# Patient Record
Sex: MALE | Race: WHITE | NOT HISPANIC OR LATINO | Employment: OTHER | URBAN - METROPOLITAN AREA
[De-identification: names, ages, dates, MRNs, and addresses within clinical notes are randomized per-mention and may not be internally consistent; named-entity substitution may affect disease eponyms.]

---

## 2017-01-01 ENCOUNTER — ANESTHESIA EVENT (OUTPATIENT)
Dept: PERIOP | Facility: HOSPITAL | Age: 77
End: 2017-01-01

## 2017-01-01 ENCOUNTER — APPOINTMENT (OUTPATIENT)
Dept: CARDIOLOGY | Facility: HOSPITAL | Age: 77
End: 2017-01-01

## 2017-01-01 ENCOUNTER — HOSPITAL ENCOUNTER (INPATIENT)
Facility: HOSPITAL | Age: 77
LOS: 12 days | End: 2017-03-18
Attending: FAMILY MEDICINE | Admitting: HOSPITALIST

## 2017-01-01 ENCOUNTER — APPOINTMENT (OUTPATIENT)
Dept: GENERAL RADIOLOGY | Facility: HOSPITAL | Age: 77
End: 2017-01-01

## 2017-01-01 ENCOUNTER — ANESTHESIA (OUTPATIENT)
Dept: PERIOP | Facility: HOSPITAL | Age: 77
End: 2017-01-01

## 2017-01-01 ENCOUNTER — HOSPITAL ENCOUNTER (EMERGENCY)
Facility: HOSPITAL | Age: 77
Discharge: HOME | End: 2017-02-25
Attending: EMERGENCY MEDICINE | Admitting: EMERGENCY MEDICINE

## 2017-01-01 ENCOUNTER — HOSPITAL ENCOUNTER (INPATIENT)
Facility: HOSPITAL | Age: 77
LOS: 24 days | Discharge: NURSING, REHAB OR LONG-TERM CARE | End: 2017-02-13
Attending: HOSPITALIST | Admitting: HOSPITALIST

## 2017-01-01 ENCOUNTER — APPOINTMENT (OUTPATIENT)
Dept: CARDIOLOGY | Facility: HOSPITAL | Age: 77
End: 2017-01-01
Attending: SURGERY

## 2017-01-01 ENCOUNTER — APPOINTMENT (OUTPATIENT)
Dept: CT IMAGING | Facility: HOSPITAL | Age: 77
End: 2017-01-01

## 2017-01-01 ENCOUNTER — APPOINTMENT (OUTPATIENT)
Dept: GENERAL RADIOLOGY | Facility: HOSPITAL | Age: 77
End: 2017-01-01
Attending: SURGERY

## 2017-01-01 ENCOUNTER — TELEPHONE (OUTPATIENT)
Dept: INFECTIOUS DISEASES | Facility: CLINIC | Age: 77
End: 2017-01-01

## 2017-01-01 VITALS
TEMPERATURE: 97.2 F | SYSTOLIC BLOOD PRESSURE: 73 MMHG | HEIGHT: 74 IN | BODY MASS INDEX: 19.66 KG/M2 | WEIGHT: 153.19 LBS | DIASTOLIC BLOOD PRESSURE: 49 MMHG | OXYGEN SATURATION: 85 %

## 2017-01-01 VITALS
DIASTOLIC BLOOD PRESSURE: 67 MMHG | HEART RATE: 101 BPM | SYSTOLIC BLOOD PRESSURE: 110 MMHG | RESPIRATION RATE: 16 BRPM | HEIGHT: 72 IN | TEMPERATURE: 98.4 F | BODY MASS INDEX: 20.32 KG/M2 | OXYGEN SATURATION: 100 % | WEIGHT: 150 LBS

## 2017-01-01 VITALS
HEART RATE: 89 BPM | BODY MASS INDEX: 21.22 KG/M2 | OXYGEN SATURATION: 94 % | SYSTOLIC BLOOD PRESSURE: 122 MMHG | HEIGHT: 74 IN | DIASTOLIC BLOOD PRESSURE: 55 MMHG | WEIGHT: 165.34 LBS | TEMPERATURE: 97.7 F | RESPIRATION RATE: 18 BRPM

## 2017-01-01 DIAGNOSIS — R53.1 GENERALIZED WEAKNESS: ICD-10-CM

## 2017-01-01 DIAGNOSIS — I48.91 ATRIAL FIBRILLATION WITH RVR (HCC): Primary | ICD-10-CM

## 2017-01-01 DIAGNOSIS — D63.8 ANEMIA, CHRONIC DISEASE: Primary | ICD-10-CM

## 2017-01-01 DIAGNOSIS — N18.6 ESRD (END STAGE RENAL DISEASE) ON DIALYSIS (HCC): ICD-10-CM

## 2017-01-01 DIAGNOSIS — I49.5 SSS (SICK SINUS SYNDROME) (HCC): Primary | ICD-10-CM

## 2017-01-01 DIAGNOSIS — I49.5 SICK SINUS SYNDROME (HCC): ICD-10-CM

## 2017-01-01 DIAGNOSIS — E03.9 HYPOTHYROIDISM, UNSPECIFIED TYPE: ICD-10-CM

## 2017-01-01 DIAGNOSIS — T80.219A INFECTED VENOUS ACCESS PORT: ICD-10-CM

## 2017-01-01 DIAGNOSIS — T82.7XXA: ICD-10-CM

## 2017-01-01 DIAGNOSIS — Z99.2 ESRD (END STAGE RENAL DISEASE) ON DIALYSIS (HCC): ICD-10-CM

## 2017-01-01 LAB
ABO + RH BLD: NORMAL
ABO GROUP BLD: NORMAL
ALBUMIN SERPL-MCNC: 2.6 G/DL (ref 3.5–5.2)
ALBUMIN SERPL-MCNC: 2.7 G/DL (ref 3.5–5.2)
ALBUMIN SERPL-MCNC: 2.8 G/DL (ref 3.5–5.2)
ALBUMIN SERPL-MCNC: 2.8 G/DL (ref 3.5–5.2)
ALBUMIN SERPL-MCNC: 2.9 G/DL (ref 3.5–5.2)
ALBUMIN SERPL-MCNC: 3 G/DL (ref 3.5–5.2)
ALBUMIN SERPL-MCNC: 3 G/DL (ref 3.5–5.2)
ALBUMIN SERPL-MCNC: 3.1 G/DL (ref 3.5–5.2)
ALBUMIN SERPL-MCNC: 3.5 G/DL (ref 3.5–5.2)
ALBUMIN/GLOB SERPL: 0.6 G/DL
ALBUMIN/GLOB SERPL: 0.7 G/DL
ALBUMIN/GLOB SERPL: 0.8 G/DL
ALP SERPL-CCNC: 100 U/L (ref 39–117)
ALP SERPL-CCNC: 117 U/L (ref 39–117)
ALP SERPL-CCNC: 123 U/L (ref 39–117)
ALP SERPL-CCNC: 148 U/L (ref 39–117)
ALP SERPL-CCNC: 164 U/L (ref 39–117)
ALP SERPL-CCNC: 175 U/L (ref 39–117)
ALP SERPL-CCNC: 206 U/L (ref 39–117)
ALP SERPL-CCNC: 258 U/L (ref 39–117)
ALP SERPL-CCNC: 261 U/L (ref 39–117)
ALP SERPL-CCNC: 329 U/L (ref 39–117)
ALP SERPL-CCNC: 86 U/L (ref 39–117)
ALP SERPL-CCNC: 99 U/L (ref 39–117)
ALT SERPL W P-5'-P-CCNC: 11 U/L (ref 1–41)
ALT SERPL W P-5'-P-CCNC: 12 U/L (ref 1–41)
ALT SERPL W P-5'-P-CCNC: 14 U/L (ref 1–41)
ALT SERPL W P-5'-P-CCNC: 14 U/L (ref 1–41)
ALT SERPL W P-5'-P-CCNC: 18 U/L (ref 1–41)
ALT SERPL W P-5'-P-CCNC: 19 U/L (ref 1–41)
ALT SERPL W P-5'-P-CCNC: 19 U/L (ref 1–41)
ALT SERPL W P-5'-P-CCNC: 20 U/L (ref 1–41)
ALT SERPL W P-5'-P-CCNC: 24 U/L (ref 1–41)
ALT SERPL W P-5'-P-CCNC: 26 U/L (ref 1–41)
ANION GAP SERPL CALCULATED.3IONS-SCNC: 14.4 MMOL/L
ANION GAP SERPL CALCULATED.3IONS-SCNC: 14.8 MMOL/L
ANION GAP SERPL CALCULATED.3IONS-SCNC: 15.3 MMOL/L
ANION GAP SERPL CALCULATED.3IONS-SCNC: 15.3 MMOL/L
ANION GAP SERPL CALCULATED.3IONS-SCNC: 16.1 MMOL/L
ANION GAP SERPL CALCULATED.3IONS-SCNC: 16.5 MMOL/L
ANION GAP SERPL CALCULATED.3IONS-SCNC: 16.6 MMOL/L
ANION GAP SERPL CALCULATED.3IONS-SCNC: 16.6 MMOL/L
ANION GAP SERPL CALCULATED.3IONS-SCNC: 17.2 MMOL/L
ANION GAP SERPL CALCULATED.3IONS-SCNC: 17.2 MMOL/L
ANION GAP SERPL CALCULATED.3IONS-SCNC: 18.2 MMOL/L
ANION GAP SERPL CALCULATED.3IONS-SCNC: 18.9 MMOL/L
ANION GAP SERPL CALCULATED.3IONS-SCNC: 19.2 MMOL/L
ANION GAP SERPL CALCULATED.3IONS-SCNC: 19.3 MMOL/L
ANION GAP SERPL CALCULATED.3IONS-SCNC: 19.5 MMOL/L
ANION GAP SERPL CALCULATED.3IONS-SCNC: 19.7 MMOL/L
ANION GAP SERPL CALCULATED.3IONS-SCNC: 19.8 MMOL/L
ANION GAP SERPL CALCULATED.3IONS-SCNC: 19.9 MMOL/L
ANION GAP SERPL CALCULATED.3IONS-SCNC: 20 MMOL/L
ANION GAP SERPL CALCULATED.3IONS-SCNC: 20.1 MMOL/L
ANION GAP SERPL CALCULATED.3IONS-SCNC: 20.1 MMOL/L
ANION GAP SERPL CALCULATED.3IONS-SCNC: 20.3 MMOL/L
ANION GAP SERPL CALCULATED.3IONS-SCNC: 23 MMOL/L
ANION GAP SERPL CALCULATED.3IONS-SCNC: 25.7 MMOL/L
ANION GAP SERPL CALCULATED.3IONS-SCNC: 25.8 MMOL/L
ANION GAP SERPL CALCULATED.3IONS-SCNC: 26.3 MMOL/L
ANISOCYTOSIS BLD QL: ABNORMAL
ANISOCYTOSIS BLD QL: ABNORMAL
AORTIC ARCH: 2.3 CM
APTT PPP: 155.4 SECONDS (ref 22.7–35.4)
APTT PPP: 33.2 SECONDS (ref 22.7–35.4)
APTT PPP: 33.8 SECONDS (ref 22.7–35.4)
APTT PPP: 34.9 SECONDS (ref 22.7–35.4)
APTT PPP: 37.1 SECONDS (ref 22.7–35.4)
APTT PPP: 38.1 SECONDS (ref 22.7–35.4)
APTT PPP: 38.5 SECONDS (ref 22.7–35.4)
APTT PPP: 39 SECONDS (ref 22.7–35.4)
APTT PPP: 40.5 SECONDS (ref 22.7–35.4)
APTT PPP: 40.8 SECONDS (ref 22.7–35.4)
APTT PPP: 41.5 SECONDS (ref 22.7–35.4)
APTT PPP: 48.6 SECONDS (ref 22.7–35.4)
APTT PPP: 52.5 SECONDS (ref 22.7–35.4)
APTT PPP: 54.5 SECONDS (ref 22.7–35.4)
APTT PPP: 57.8 SECONDS (ref 22.7–35.4)
APTT PPP: 59.5 SECONDS (ref 22.7–35.4)
APTT PPP: 63.1 SECONDS (ref 22.7–35.4)
APTT PPP: 68.6 SECONDS (ref 22.7–35.4)
APTT PPP: 84.4 SECONDS (ref 22.7–35.4)
APTT PPP: 97.6 SECONDS (ref 22.7–35.4)
ASCENDING AORTA: 4 CM
AST SERPL-CCNC: 14 U/L (ref 1–40)
AST SERPL-CCNC: 17 U/L (ref 1–40)
AST SERPL-CCNC: 18 U/L (ref 1–40)
AST SERPL-CCNC: 21 U/L (ref 1–40)
AST SERPL-CCNC: 22 U/L (ref 1–40)
AST SERPL-CCNC: 24 U/L (ref 1–40)
AST SERPL-CCNC: 26 U/L (ref 1–40)
AST SERPL-CCNC: 26 U/L (ref 1–40)
AST SERPL-CCNC: 33 U/L (ref 1–40)
AST SERPL-CCNC: 37 U/L (ref 1–40)
AST SERPL-CCNC: 37 U/L (ref 1–40)
AST SERPL-CCNC: 43 U/L (ref 1–40)
BACTERIA BLD CULT: ABNORMAL
BACTERIA SPEC AEROBE CULT: ABNORMAL
BACTERIA SPEC AEROBE CULT: NORMAL
BACTERIA SPEC ANAEROBE CULT: NORMAL
BASOPHILS # BLD AUTO: 0.03 10*3/MM3 (ref 0–0.2)
BASOPHILS # BLD AUTO: 0.04 10*3/MM3 (ref 0–0.2)
BASOPHILS # BLD AUTO: 0.05 10*3/MM3 (ref 0–0.2)
BASOPHILS # BLD AUTO: 0.06 10*3/MM3 (ref 0–0.2)
BASOPHILS # BLD AUTO: 0.08 10*3/MM3 (ref 0–0.2)
BASOPHILS # BLD AUTO: 0.12 10*3/MM3 (ref 0–0.2)
BASOPHILS # BLD AUTO: 0.12 10*3/MM3 (ref 0–0.2)
BASOPHILS # BLD AUTO: 0.13 10*3/MM3 (ref 0–0.2)
BASOPHILS # BLD AUTO: 0.14 10*3/MM3 (ref 0–0.2)
BASOPHILS NFR BLD AUTO: 0.2 % (ref 0–1.5)
BASOPHILS NFR BLD AUTO: 0.3 % (ref 0–1.5)
BASOPHILS NFR BLD AUTO: 0.3 % (ref 0–1.5)
BASOPHILS NFR BLD AUTO: 0.4 % (ref 0–1.5)
BASOPHILS NFR BLD AUTO: 0.5 % (ref 0–1.5)
BASOPHILS NFR BLD AUTO: 0.8 % (ref 0–1.5)
BASOPHILS NFR BLD AUTO: 0.9 % (ref 0–1.5)
BASOPHILS NFR BLD AUTO: 1.1 % (ref 0–1.5)
BASOPHILS NFR BLD AUTO: 1.2 % (ref 0–1.5)
BASOPHILS NFR BLD AUTO: 1.3 % (ref 0–1.5)
BH BB BLOOD EXPIRATION DATE: NORMAL
BH BB BLOOD TYPE BARCODE: 5100
BH BB BLOOD TYPE BARCODE: 6200
BH BB DISPENSE STATUS: NORMAL
BH BB PRODUCT CODE: NORMAL
BH BB UNIT NUMBER: NORMAL
BH CV ECHO MEAS - ACS: 0.7 CM
BH CV ECHO MEAS - AO MEAN PG (FULL): 9 MMHG
BH CV ECHO MEAS - AO MEAN PG: 12 MMHG
BH CV ECHO MEAS - AO ROOT AREA (BSA CORRECTED): 1.8
BH CV ECHO MEAS - AO ROOT AREA: 11.9 CM^2
BH CV ECHO MEAS - AO ROOT DIAM: 3.9 CM
BH CV ECHO MEAS - AO V2 MAX: 253 CM/SEC
BH CV ECHO MEAS - AO V2 MEAN: 158 CM/SEC
BH CV ECHO MEAS - AO V2 VTI: 60.1 CM
BH CV ECHO MEAS - ASC AORTA: 4 CM
BH CV ECHO MEAS - AVA(I,A): 1.4 CM^2
BH CV ECHO MEAS - AVA(I,D): 1.4 CM^2
BH CV ECHO MEAS - BSA(HAYCOCK): 2.1 M^2
BH CV ECHO MEAS - BSA: 2.1 M^2
BH CV ECHO MEAS - BZI_BMI: 24.4 KILOGRAMS/M^2
BH CV ECHO MEAS - BZI_METRIC_HEIGHT: 188 CM
BH CV ECHO MEAS - BZI_METRIC_WEIGHT: 86.2 KG
BH CV ECHO MEAS - CONTRAST EF (2CH): 67.2 ML/M^2
BH CV ECHO MEAS - CONTRAST EF 4CH: 66.7 ML/M^2
BH CV ECHO MEAS - EDV(CUBED): 103.8 ML
BH CV ECHO MEAS - EDV(MOD-SP2): 134 ML
BH CV ECHO MEAS - EDV(MOD-SP4): 144 ML
BH CV ECHO MEAS - EDV(TEICH): 102.4 ML
BH CV ECHO MEAS - EF(CUBED): 68.4 %
BH CV ECHO MEAS - EF(MOD-SP2): 67.2 %
BH CV ECHO MEAS - EF(MOD-SP4): 66.7 %
BH CV ECHO MEAS - EF(TEICH): 60 %
BH CV ECHO MEAS - ESV(CUBED): 32.8 ML
BH CV ECHO MEAS - ESV(MOD-SP2): 44 ML
BH CV ECHO MEAS - ESV(MOD-SP4): 48 ML
BH CV ECHO MEAS - ESV(TEICH): 41 ML
BH CV ECHO MEAS - FS: 31.9 %
BH CV ECHO MEAS - IVS/LVPW: 1
BH CV ECHO MEAS - IVSD: 1 CM
BH CV ECHO MEAS - LAT PEAK E' VEL: 7 CM/SEC
BH CV ECHO MEAS - LV DIASTOLIC VOL/BSA (35-75): 67.7 ML/M^2
BH CV ECHO MEAS - LV MASS(C)D: 164.5 GRAMS
BH CV ECHO MEAS - LV MASS(C)DI: 77.3 GRAMS/M^2
BH CV ECHO MEAS - LV MEAN PG: 3 MMHG
BH CV ECHO MEAS - LV SYSTOLIC VOL/BSA (12-30): 22.6 ML/M^2
BH CV ECHO MEAS - LV V1 MAX: 114 CM/SEC
BH CV ECHO MEAS - LV V1 MEAN: 71.1 CM/SEC
BH CV ECHO MEAS - LV V1 VTI: 26.2 CM
BH CV ECHO MEAS - LVIDD: 4.7 CM
BH CV ECHO MEAS - LVIDS: 3.2 CM
BH CV ECHO MEAS - LVLD AP2: 9 CM
BH CV ECHO MEAS - LVLD AP4: 8.8 CM
BH CV ECHO MEAS - LVLS AP2: 6.9 CM
BH CV ECHO MEAS - LVLS AP4: 7.6 CM
BH CV ECHO MEAS - LVOT AREA (M): 3.1 CM^2
BH CV ECHO MEAS - LVOT AREA: 3.1 CM^2
BH CV ECHO MEAS - LVOT DIAM: 2 CM
BH CV ECHO MEAS - LVPWD: 1 CM
BH CV ECHO MEAS - MED PEAK E' VEL: 6 CM/SEC
BH CV ECHO MEAS - MR MAX PG: 89.9 MMHG
BH CV ECHO MEAS - MR MAX VEL: 474 CM/SEC
BH CV ECHO MEAS - MV DEC SLOPE: 441 CM/SEC^2
BH CV ECHO MEAS - MV DEC TIME: 0.26 SEC
BH CV ECHO MEAS - MV E MAX VEL: 138 CM/SEC
BH CV ECHO MEAS - MV MAX PG: 10 MMHG
BH CV ECHO MEAS - MV MEAN PG: 2 MMHG
BH CV ECHO MEAS - MV P1/2T MAX VEL: 159 CM/SEC
BH CV ECHO MEAS - MV P1/2T: 105.6 MSEC
BH CV ECHO MEAS - MV V2 MEAN: 61 CM/SEC
BH CV ECHO MEAS - MV V2 VTI: 48.3 CM
BH CV ECHO MEAS - MVA P1/2T LCG: 1.4 CM^2
BH CV ECHO MEAS - MVA(P1/2T): 2.1 CM^2
BH CV ECHO MEAS - MVA(VTI): 1.7 CM^2
BH CV ECHO MEAS - PA ACC SLOPE: 59.3 CM/SEC^2
BH CV ECHO MEAS - PA ACC TIME: 0.11 SEC
BH CV ECHO MEAS - PA MAX PG (FULL): 6.5 MMHG
BH CV ECHO MEAS - PA MAX PG: 8.9 MMHG
BH CV ECHO MEAS - PA PR(ACCEL): 28.2 MMHG
BH CV ECHO MEAS - PA V2 MAX: 149 CM/SEC
BH CV ECHO MEAS - PULM DIAS VEL: 80.1 CM/SEC
BH CV ECHO MEAS - PULM S/D: 0.61
BH CV ECHO MEAS - PULM SYS VEL: 48.5 CM/SEC
BH CV ECHO MEAS - PVA(V,A): 2.4 CM^2
BH CV ECHO MEAS - PVA(V,D): 2.4 CM^2
BH CV ECHO MEAS - QP/QS: 0.59
BH CV ECHO MEAS - RAP SYSTOLE: 15 MMHG
BH CV ECHO MEAS - RV MAX PG: 2.4 MMHG
BH CV ECHO MEAS - RV MEAN PG: 1 MMHG
BH CV ECHO MEAS - RV V1 MAX: 77.5 CM/SEC
BH CV ECHO MEAS - RV V1 MEAN: 46.4 CM/SEC
BH CV ECHO MEAS - RV V1 VTI: 10.7 CM
BH CV ECHO MEAS - RVOT AREA: 4.5 CM^2
BH CV ECHO MEAS - RVOT DIAM: 2.4 CM
BH CV ECHO MEAS - RVSP: 98 MMHG
BH CV ECHO MEAS - SI(AO): 337.7 ML/M^2
BH CV ECHO MEAS - SI(CUBED): 33.4 ML/M^2
BH CV ECHO MEAS - SI(LVOT): 38.7 ML/M^2
BH CV ECHO MEAS - SI(MOD-SP2): 42.3 ML/M^2
BH CV ECHO MEAS - SI(MOD-SP4): 45.1 ML/M^2
BH CV ECHO MEAS - SI(TEICH): 28.9 ML/M^2
BH CV ECHO MEAS - SUP REN AO DIAM: 1.8 CM
BH CV ECHO MEAS - SV(AO): 717.9 ML
BH CV ECHO MEAS - SV(CUBED): 71.1 ML
BH CV ECHO MEAS - SV(LVOT): 82.3 ML
BH CV ECHO MEAS - SV(MOD-SP2): 90 ML
BH CV ECHO MEAS - SV(MOD-SP4): 96 ML
BH CV ECHO MEAS - SV(RVOT): 48.4 ML
BH CV ECHO MEAS - SV(TEICH): 61.4 ML
BH CV ECHO MEAS - TAPSE (>1.6): 1.8 CM2
BH CV ECHO MEAS - TR MAX VEL: 456 CM/SEC
BH CV VAS MEAS BASILIC ANTECUBITAL FOSSA LEFT: 0.36 CM
BH CV VAS MEAS BASILIC FOREARM LEFT - DIST: 0.16 CM
BH CV VAS MEAS BASILIC FOREARM LEFT - MID: 0.2 CM
BH CV VAS MEAS BASILIC FOREARM LEFT - PROX: 0.21 CM
BH CV VAS MEAS BASILIC UPPER ARM LEFT - DIST: 0.33 CM
BH CV VAS MEAS BASILIC UPPER ARM LEFT - MID: 0.58 CM
BH CV VAS MEAS BASILIC UPPER ARM LEFT - PROX: 0.5 CM
BH CV VAS MEAS CEPHALIC ANTECUBITAL FOSSA LEFT: 0.21 CM
BH CV VAS MEAS CEPHALIC FOREARM LEFT - DIST: 0.11 CM
BH CV VAS MEAS CEPHALIC FOREARM LEFT - MID: 0.18 CM
BH CV VAS MEAS CEPHALIC FOREARM LEFT - PROX: 0.15 CM
BH CV VAS MEAS CEPHALIC UPPER ARM LEFT - DIST: 0.18 CM
BH CV VAS MEAS CEPHALIC UPPER ARM LEFT - MID: 0.13 CM
BH CV VAS MEAS CEPHALIC UPPER ARM LEFT - PROX: 0.15 CM
BH CV VAS MEAS RADIAL UPPER ARM LEFT - DIST: 0.24 CM
BH CV VAS MEAS RADIAL UPPER ARM LEFT - MID: 0.26 CM
BH CV VAS MEAS RADIAL UPPER ARM LEFT - PROX: 0.16 CM
BH CV XLRA - RV BASE: 4.5 CM
BH CV XLRA - RV LENGTH: 9 CM
BH CV XLRA - RV MID: 4.5 CM
BH CV XLRA - TDI S': 14 CM/SEC
BILIRUB SERPL-MCNC: 1 MG/DL (ref 0.1–1.2)
BILIRUB SERPL-MCNC: 1.1 MG/DL (ref 0.1–1.2)
BILIRUB SERPL-MCNC: 1.1 MG/DL (ref 0.1–1.2)
BILIRUB SERPL-MCNC: 1.2 MG/DL (ref 0.1–1.2)
BILIRUB SERPL-MCNC: 1.3 MG/DL (ref 0.1–1.2)
BILIRUB SERPL-MCNC: 1.9 MG/DL (ref 0.1–1.2)
BILIRUB SERPL-MCNC: 2.1 MG/DL (ref 0.1–1.2)
BILIRUB SERPL-MCNC: 2.4 MG/DL (ref 0.1–1.2)
BILIRUB SERPL-MCNC: 3.8 MG/DL (ref 0.1–1.2)
BILIRUB SERPL-MCNC: 4.2 MG/DL (ref 0.1–1.2)
BLD GP AB SCN SERPL QL: NEGATIVE
BUN BLD-MCNC: 22 MG/DL (ref 8–23)
BUN BLD-MCNC: 24 MG/DL (ref 8–23)
BUN BLD-MCNC: 25 MG/DL (ref 8–23)
BUN BLD-MCNC: 26 MG/DL (ref 8–23)
BUN BLD-MCNC: 27 MG/DL (ref 8–23)
BUN BLD-MCNC: 27 MG/DL (ref 8–23)
BUN BLD-MCNC: 28 MG/DL (ref 8–23)
BUN BLD-MCNC: 31 MG/DL (ref 8–23)
BUN BLD-MCNC: 31 MG/DL (ref 8–23)
BUN BLD-MCNC: 32 MG/DL (ref 8–23)
BUN BLD-MCNC: 33 MG/DL (ref 8–23)
BUN BLD-MCNC: 34 MG/DL (ref 8–23)
BUN BLD-MCNC: 35 MG/DL (ref 8–23)
BUN BLD-MCNC: 35 MG/DL (ref 8–23)
BUN BLD-MCNC: 38 MG/DL (ref 8–23)
BUN BLD-MCNC: 40 MG/DL (ref 8–23)
BUN BLD-MCNC: 40 MG/DL (ref 8–23)
BUN BLD-MCNC: 47 MG/DL (ref 8–23)
BUN BLD-MCNC: 47 MG/DL (ref 8–23)
BUN BLD-MCNC: 48 MG/DL (ref 8–23)
BUN BLD-MCNC: 49 MG/DL (ref 8–23)
BUN BLD-MCNC: 49 MG/DL (ref 8–23)
BUN BLD-MCNC: 63 MG/DL (ref 8–23)
BUN BLD-MCNC: 63 MG/DL (ref 8–23)
BUN BLD-MCNC: 88 MG/DL (ref 8–23)
BUN BLD-MCNC: 9 MG/DL (ref 8–23)
BUN/CREAT SERPL: 10.1 (ref 7–25)
BUN/CREAT SERPL: 10.6 (ref 7–25)
BUN/CREAT SERPL: 11.2 (ref 7–25)
BUN/CREAT SERPL: 12.2 (ref 7–25)
BUN/CREAT SERPL: 12.3 (ref 7–25)
BUN/CREAT SERPL: 3.2 (ref 7–25)
BUN/CREAT SERPL: 4.5 (ref 7–25)
BUN/CREAT SERPL: 5.1 (ref 7–25)
BUN/CREAT SERPL: 5.1 (ref 7–25)
BUN/CREAT SERPL: 5.2 (ref 7–25)
BUN/CREAT SERPL: 5.5 (ref 7–25)
BUN/CREAT SERPL: 5.5 (ref 7–25)
BUN/CREAT SERPL: 5.6 (ref 7–25)
BUN/CREAT SERPL: 5.7 (ref 7–25)
BUN/CREAT SERPL: 5.9 (ref 7–25)
BUN/CREAT SERPL: 6 (ref 7–25)
BUN/CREAT SERPL: 6 (ref 7–25)
BUN/CREAT SERPL: 6.3 (ref 7–25)
BUN/CREAT SERPL: 6.7 (ref 7–25)
BUN/CREAT SERPL: 6.8 (ref 7–25)
BUN/CREAT SERPL: 6.8 (ref 7–25)
BUN/CREAT SERPL: 7.3 (ref 7–25)
BUN/CREAT SERPL: 7.7 (ref 7–25)
BUN/CREAT SERPL: 7.8 (ref 7–25)
BUN/CREAT SERPL: 8.3 (ref 7–25)
BUN/CREAT SERPL: 9.5 (ref 7–25)
BURR CELLS BLD QL SMEAR: ABNORMAL
CALCIUM SPEC-SCNC: 8 MG/DL (ref 8.6–10.5)
CALCIUM SPEC-SCNC: 8.1 MG/DL (ref 8.6–10.5)
CALCIUM SPEC-SCNC: 8.3 MG/DL (ref 8.6–10.5)
CALCIUM SPEC-SCNC: 8.4 MG/DL (ref 8.6–10.5)
CALCIUM SPEC-SCNC: 8.4 MG/DL (ref 8.6–10.5)
CALCIUM SPEC-SCNC: 8.5 MG/DL (ref 8.6–10.5)
CALCIUM SPEC-SCNC: 8.6 MG/DL (ref 8.6–10.5)
CALCIUM SPEC-SCNC: 8.7 MG/DL (ref 8.6–10.5)
CALCIUM SPEC-SCNC: 8.7 MG/DL (ref 8.6–10.5)
CALCIUM SPEC-SCNC: 8.8 MG/DL (ref 8.6–10.5)
CALCIUM SPEC-SCNC: 8.9 MG/DL (ref 8.6–10.5)
CALCIUM SPEC-SCNC: 9 MG/DL (ref 8.6–10.5)
CALCIUM SPEC-SCNC: 9.1 MG/DL (ref 8.6–10.5)
CALCIUM SPEC-SCNC: 9.2 MG/DL (ref 8.6–10.5)
CALCIUM SPEC-SCNC: 9.2 MG/DL (ref 8.6–10.5)
CALCIUM SPEC-SCNC: 9.3 MG/DL (ref 8.6–10.5)
CALCIUM SPEC-SCNC: 9.4 MG/DL (ref 8.6–10.5)
CALCIUM SPEC-SCNC: 9.6 MG/DL (ref 8.6–10.5)
CHLORIDE SERPL-SCNC: 100 MMOL/L (ref 98–107)
CHLORIDE SERPL-SCNC: 101 MMOL/L (ref 98–107)
CHLORIDE SERPL-SCNC: 101 MMOL/L (ref 98–107)
CHLORIDE SERPL-SCNC: 103 MMOL/L (ref 98–107)
CHLORIDE SERPL-SCNC: 88 MMOL/L (ref 98–107)
CHLORIDE SERPL-SCNC: 88 MMOL/L (ref 98–107)
CHLORIDE SERPL-SCNC: 93 MMOL/L (ref 98–107)
CHLORIDE SERPL-SCNC: 94 MMOL/L (ref 98–107)
CHLORIDE SERPL-SCNC: 95 MMOL/L (ref 98–107)
CHLORIDE SERPL-SCNC: 96 MMOL/L (ref 98–107)
CHLORIDE SERPL-SCNC: 97 MMOL/L (ref 98–107)
CHLORIDE SERPL-SCNC: 98 MMOL/L (ref 98–107)
CHLORIDE SERPL-SCNC: 99 MMOL/L (ref 98–107)
CHOLEST SERPL-MCNC: 100 MG/DL (ref 0–200)
CHOLEST SERPL-MCNC: 120 MG/DL (ref 0–200)
CK MB SERPL-CCNC: 1.92 NG/ML
CK MB SERPL-CCNC: <1 NG/ML
CK MB SERPL-CCNC: <1 NG/ML
CK SERPL-CCNC: 20 U/L (ref 20–200)
CO2 SERPL-SCNC: 15.7 MMOL/L (ref 22–29)
CO2 SERPL-SCNC: 17.3 MMOL/L (ref 22–29)
CO2 SERPL-SCNC: 18.3 MMOL/L (ref 22–29)
CO2 SERPL-SCNC: 18.9 MMOL/L (ref 22–29)
CO2 SERPL-SCNC: 19.2 MMOL/L (ref 22–29)
CO2 SERPL-SCNC: 19.9 MMOL/L (ref 22–29)
CO2 SERPL-SCNC: 20.4 MMOL/L (ref 22–29)
CO2 SERPL-SCNC: 20.8 MMOL/L (ref 22–29)
CO2 SERPL-SCNC: 21.1 MMOL/L (ref 22–29)
CO2 SERPL-SCNC: 21.2 MMOL/L (ref 22–29)
CO2 SERPL-SCNC: 21.5 MMOL/L (ref 22–29)
CO2 SERPL-SCNC: 21.7 MMOL/L (ref 22–29)
CO2 SERPL-SCNC: 22.1 MMOL/L (ref 22–29)
CO2 SERPL-SCNC: 22.5 MMOL/L (ref 22–29)
CO2 SERPL-SCNC: 22.7 MMOL/L (ref 22–29)
CO2 SERPL-SCNC: 22.8 MMOL/L (ref 22–29)
CO2 SERPL-SCNC: 22.9 MMOL/L (ref 22–29)
CO2 SERPL-SCNC: 23 MMOL/L (ref 22–29)
CO2 SERPL-SCNC: 23 MMOL/L (ref 22–29)
CO2 SERPL-SCNC: 23.8 MMOL/L (ref 22–29)
CO2 SERPL-SCNC: 23.8 MMOL/L (ref 22–29)
CO2 SERPL-SCNC: 24.4 MMOL/L (ref 22–29)
CO2 SERPL-SCNC: 24.6 MMOL/L (ref 22–29)
CO2 SERPL-SCNC: 24.7 MMOL/L (ref 22–29)
CO2 SERPL-SCNC: 24.7 MMOL/L (ref 22–29)
CO2 SERPL-SCNC: 26.2 MMOL/L (ref 22–29)
CREAT BLD-MCNC: 2.81 MG/DL (ref 0.76–1.27)
CREAT BLD-MCNC: 3.39 MG/DL (ref 0.76–1.27)
CREAT BLD-MCNC: 3.57 MG/DL (ref 0.76–1.27)
CREAT BLD-MCNC: 3.78 MG/DL (ref 0.76–1.27)
CREAT BLD-MCNC: 4.16 MG/DL (ref 0.76–1.27)
CREAT BLD-MCNC: 4.28 MG/DL (ref 0.76–1.27)
CREAT BLD-MCNC: 4.37 MG/DL (ref 0.76–1.27)
CREAT BLD-MCNC: 4.76 MG/DL (ref 0.76–1.27)
CREAT BLD-MCNC: 4.86 MG/DL (ref 0.76–1.27)
CREAT BLD-MCNC: 4.87 MG/DL (ref 0.76–1.27)
CREAT BLD-MCNC: 4.87 MG/DL (ref 0.76–1.27)
CREAT BLD-MCNC: 4.95 MG/DL (ref 0.76–1.27)
CREAT BLD-MCNC: 5.15 MG/DL (ref 0.76–1.27)
CREAT BLD-MCNC: 5.17 MG/DL (ref 0.76–1.27)
CREAT BLD-MCNC: 5.29 MG/DL (ref 0.76–1.27)
CREAT BLD-MCNC: 5.37 MG/DL (ref 0.76–1.27)
CREAT BLD-MCNC: 5.87 MG/DL (ref 0.76–1.27)
CREAT BLD-MCNC: 5.89 MG/DL (ref 0.76–1.27)
CREAT BLD-MCNC: 5.92 MG/DL (ref 0.76–1.27)
CREAT BLD-MCNC: 5.97 MG/DL (ref 0.76–1.27)
CREAT BLD-MCNC: 6.42 MG/DL (ref 0.76–1.27)
CREAT BLD-MCNC: 6.91 MG/DL (ref 0.76–1.27)
CREAT BLD-MCNC: 7.03 MG/DL (ref 0.76–1.27)
CREAT BLD-MCNC: 7.11 MG/DL (ref 0.76–1.27)
CREAT BLD-MCNC: 7.18 MG/DL (ref 0.76–1.27)
CREAT BLD-MCNC: 7.21 MG/DL (ref 0.76–1.27)
DEPRECATED RDW RBC AUTO: 54.4 FL (ref 37–54)
DEPRECATED RDW RBC AUTO: 54.4 FL (ref 37–54)
DEPRECATED RDW RBC AUTO: 54.9 FL (ref 37–54)
DEPRECATED RDW RBC AUTO: 55.2 FL (ref 37–54)
DEPRECATED RDW RBC AUTO: 55.9 FL (ref 37–54)
DEPRECATED RDW RBC AUTO: 55.9 FL (ref 37–54)
DEPRECATED RDW RBC AUTO: 56.3 FL (ref 37–54)
DEPRECATED RDW RBC AUTO: 56.6 FL (ref 37–54)
DEPRECATED RDW RBC AUTO: 57 FL (ref 37–54)
DEPRECATED RDW RBC AUTO: 57.4 FL (ref 37–54)
DEPRECATED RDW RBC AUTO: 57.7 FL (ref 37–54)
DEPRECATED RDW RBC AUTO: 58.9 FL (ref 37–54)
DEPRECATED RDW RBC AUTO: 58.9 FL (ref 37–54)
DEPRECATED RDW RBC AUTO: 59.2 FL (ref 37–54)
DEPRECATED RDW RBC AUTO: 60.2 FL (ref 37–54)
DEPRECATED RDW RBC AUTO: 60.2 FL (ref 37–54)
DEPRECATED RDW RBC AUTO: 60.4 FL (ref 37–54)
DEPRECATED RDW RBC AUTO: 60.6 FL (ref 37–54)
DEPRECATED RDW RBC AUTO: 61.2 FL (ref 37–54)
DEPRECATED RDW RBC AUTO: 61.9 FL (ref 37–54)
DEPRECATED RDW RBC AUTO: 62.7 FL (ref 37–54)
DEPRECATED RDW RBC AUTO: 66.6 FL (ref 37–54)
DEPRECATED RDW RBC AUTO: 68.3 FL (ref 37–54)
DEPRECATED RDW RBC AUTO: 68.3 FL (ref 37–54)
DEPRECATED RDW RBC AUTO: 69 FL (ref 37–54)
DEPRECATED RDW RBC AUTO: 70 FL (ref 37–54)
DEPRECATED RDW RBC AUTO: 71.7 FL (ref 37–54)
E/E' RATIO: 21
EOSINOPHIL # BLD AUTO: 0.04 10*3/MM3 (ref 0–0.7)
EOSINOPHIL # BLD AUTO: 0.05 10*3/MM3 (ref 0–0.7)
EOSINOPHIL # BLD AUTO: 0.08 10*3/MM3 (ref 0–0.7)
EOSINOPHIL # BLD AUTO: 0.09 10*3/MM3 (ref 0–0.7)
EOSINOPHIL # BLD AUTO: 0.13 10*3/MM3 (ref 0–0.7)
EOSINOPHIL # BLD AUTO: 0.17 10*3/MM3 (ref 0–0.7)
EOSINOPHIL # BLD AUTO: 0.18 10*3/MM3 (ref 0–0.7)
EOSINOPHIL # BLD AUTO: 0.22 10*3/MM3 (ref 0–0.7)
EOSINOPHIL # BLD AUTO: 0.24 10*3/MM3 (ref 0–0.7)
EOSINOPHIL # BLD AUTO: 0.24 10*3/MM3 (ref 0–0.7)
EOSINOPHIL # BLD AUTO: 0.27 10*3/MM3 (ref 0–0.7)
EOSINOPHIL # BLD AUTO: 0.28 10*3/MM3 (ref 0–0.7)
EOSINOPHIL # BLD AUTO: 0.34 10*3/MM3 (ref 0–0.7)
EOSINOPHIL # BLD AUTO: 0.37 10*3/MM3 (ref 0–0.7)
EOSINOPHIL # BLD AUTO: 0.41 10*3/MM3 (ref 0–0.7)
EOSINOPHIL # BLD AUTO: 0.43 10*3/MM3 (ref 0–0.7)
EOSINOPHIL # BLD AUTO: 0.56 10*3/MM3 (ref 0–0.7)
EOSINOPHIL # BLD MANUAL: 0.37 10*3/MM3 (ref 0–0.7)
EOSINOPHIL NFR BLD AUTO: 0.3 % (ref 0.3–6.2)
EOSINOPHIL NFR BLD AUTO: 0.6 % (ref 0.3–6.2)
EOSINOPHIL NFR BLD AUTO: 0.6 % (ref 0.3–6.2)
EOSINOPHIL NFR BLD AUTO: 1.3 % (ref 0.3–6.2)
EOSINOPHIL NFR BLD AUTO: 1.5 % (ref 0.3–6.2)
EOSINOPHIL NFR BLD AUTO: 1.8 % (ref 0.3–6.2)
EOSINOPHIL NFR BLD AUTO: 2 % (ref 0.3–6.2)
EOSINOPHIL NFR BLD AUTO: 2 % (ref 0.3–6.2)
EOSINOPHIL NFR BLD AUTO: 2.1 % (ref 0.3–6.2)
EOSINOPHIL NFR BLD AUTO: 2.7 % (ref 0.3–6.2)
EOSINOPHIL NFR BLD AUTO: 3 % (ref 0.3–6.2)
EOSINOPHIL NFR BLD AUTO: 3.3 % (ref 0.3–6.2)
EOSINOPHIL NFR BLD AUTO: 4.5 % (ref 0.3–6.2)
EOSINOPHIL NFR BLD AUTO: 4.8 % (ref 0.3–6.2)
EOSINOPHIL NFR BLD AUTO: 5 % (ref 0.3–6.2)
EOSINOPHIL NFR BLD AUTO: 6.3 % (ref 0.3–6.2)
EOSINOPHIL NFR BLD AUTO: 8.4 % (ref 0.3–6.2)
EOSINOPHIL NFR BLD MANUAL: 4 % (ref 0.3–6.2)
ERYTHROCYTE [DISTWIDTH] IN BLOOD BY AUTOMATED COUNT: 15.7 % (ref 11.5–14.5)
ERYTHROCYTE [DISTWIDTH] IN BLOOD BY AUTOMATED COUNT: 15.8 % (ref 11.5–14.5)
ERYTHROCYTE [DISTWIDTH] IN BLOOD BY AUTOMATED COUNT: 15.8 % (ref 11.5–14.5)
ERYTHROCYTE [DISTWIDTH] IN BLOOD BY AUTOMATED COUNT: 16 % (ref 11.5–14.5)
ERYTHROCYTE [DISTWIDTH] IN BLOOD BY AUTOMATED COUNT: 16 % (ref 11.5–14.5)
ERYTHROCYTE [DISTWIDTH] IN BLOOD BY AUTOMATED COUNT: 16.1 % (ref 11.5–14.5)
ERYTHROCYTE [DISTWIDTH] IN BLOOD BY AUTOMATED COUNT: 16.2 % (ref 11.5–14.5)
ERYTHROCYTE [DISTWIDTH] IN BLOOD BY AUTOMATED COUNT: 16.2 % (ref 11.5–14.5)
ERYTHROCYTE [DISTWIDTH] IN BLOOD BY AUTOMATED COUNT: 16.6 % (ref 11.5–14.5)
ERYTHROCYTE [DISTWIDTH] IN BLOOD BY AUTOMATED COUNT: 16.6 % (ref 11.5–14.5)
ERYTHROCYTE [DISTWIDTH] IN BLOOD BY AUTOMATED COUNT: 16.7 % (ref 11.5–14.5)
ERYTHROCYTE [DISTWIDTH] IN BLOOD BY AUTOMATED COUNT: 17.1 % (ref 11.5–14.5)
ERYTHROCYTE [DISTWIDTH] IN BLOOD BY AUTOMATED COUNT: 17.1 % (ref 11.5–14.5)
ERYTHROCYTE [DISTWIDTH] IN BLOOD BY AUTOMATED COUNT: 17.2 % (ref 11.5–14.5)
ERYTHROCYTE [DISTWIDTH] IN BLOOD BY AUTOMATED COUNT: 17.4 % (ref 11.5–14.5)
ERYTHROCYTE [DISTWIDTH] IN BLOOD BY AUTOMATED COUNT: 17.6 % (ref 11.5–14.5)
ERYTHROCYTE [DISTWIDTH] IN BLOOD BY AUTOMATED COUNT: 17.7 % (ref 11.5–14.5)
ERYTHROCYTE [DISTWIDTH] IN BLOOD BY AUTOMATED COUNT: 17.9 % (ref 11.5–14.5)
ERYTHROCYTE [DISTWIDTH] IN BLOOD BY AUTOMATED COUNT: 18 % (ref 11.5–14.5)
ERYTHROCYTE [DISTWIDTH] IN BLOOD BY AUTOMATED COUNT: 18.5 % (ref 11.5–14.5)
ERYTHROCYTE [DISTWIDTH] IN BLOOD BY AUTOMATED COUNT: 18.7 % (ref 11.5–14.5)
ERYTHROCYTE [DISTWIDTH] IN BLOOD BY AUTOMATED COUNT: 18.9 % (ref 11.5–14.5)
ERYTHROCYTE [DISTWIDTH] IN BLOOD BY AUTOMATED COUNT: 20.1 % (ref 11.5–14.5)
ERYTHROCYTE [DISTWIDTH] IN BLOOD BY AUTOMATED COUNT: 20.1 % (ref 11.5–14.5)
ERYTHROCYTE [DISTWIDTH] IN BLOOD BY AUTOMATED COUNT: 20.5 % (ref 11.5–14.5)
GASTROCULT GAST QL: POSITIVE
GFR SERPL CREATININE-BSD FRML MDRD: 10 ML/MIN/1.73
GFR SERPL CREATININE-BSD FRML MDRD: 11 ML/MIN/1.73
GFR SERPL CREATININE-BSD FRML MDRD: 12 ML/MIN/1.73
GFR SERPL CREATININE-BSD FRML MDRD: 13 ML/MIN/1.73
GFR SERPL CREATININE-BSD FRML MDRD: 14 ML/MIN/1.73
GFR SERPL CREATININE-BSD FRML MDRD: 14 ML/MIN/1.73
GFR SERPL CREATININE-BSD FRML MDRD: 16 ML/MIN/1.73
GFR SERPL CREATININE-BSD FRML MDRD: 17 ML/MIN/1.73
GFR SERPL CREATININE-BSD FRML MDRD: 18 ML/MIN/1.73
GFR SERPL CREATININE-BSD FRML MDRD: 22 ML/MIN/1.73
GFR SERPL CREATININE-BSD FRML MDRD: 7 ML/MIN/1.73
GFR SERPL CREATININE-BSD FRML MDRD: 7 ML/MIN/1.73
GFR SERPL CREATININE-BSD FRML MDRD: 8 ML/MIN/1.73
GFR SERPL CREATININE-BSD FRML MDRD: 9 ML/MIN/1.73
GLOBULIN UR ELPH-MCNC: 3.4 GM/DL
GLOBULIN UR ELPH-MCNC: 3.6 GM/DL
GLOBULIN UR ELPH-MCNC: 3.6 GM/DL
GLOBULIN UR ELPH-MCNC: 3.8 GM/DL
GLOBULIN UR ELPH-MCNC: 3.9 GM/DL
GLOBULIN UR ELPH-MCNC: 4 GM/DL
GLOBULIN UR ELPH-MCNC: 4.1 GM/DL
GLOBULIN UR ELPH-MCNC: 4.7 GM/DL
GLOBULIN UR ELPH-MCNC: 4.7 GM/DL
GLOBULIN UR ELPH-MCNC: 4.8 GM/DL
GLOBULIN UR ELPH-MCNC: 4.9 GM/DL
GLOBULIN UR ELPH-MCNC: 5.2 GM/DL
GLUCOSE BLD-MCNC: 101 MG/DL (ref 65–99)
GLUCOSE BLD-MCNC: 103 MG/DL (ref 65–99)
GLUCOSE BLD-MCNC: 105 MG/DL (ref 65–99)
GLUCOSE BLD-MCNC: 105 MG/DL (ref 65–99)
GLUCOSE BLD-MCNC: 106 MG/DL (ref 65–99)
GLUCOSE BLD-MCNC: 106 MG/DL (ref 65–99)
GLUCOSE BLD-MCNC: 108 MG/DL (ref 65–99)
GLUCOSE BLD-MCNC: 110 MG/DL (ref 65–99)
GLUCOSE BLD-MCNC: 112 MG/DL (ref 65–99)
GLUCOSE BLD-MCNC: 112 MG/DL (ref 65–99)
GLUCOSE BLD-MCNC: 115 MG/DL (ref 65–99)
GLUCOSE BLD-MCNC: 117 MG/DL (ref 65–99)
GLUCOSE BLD-MCNC: 118 MG/DL (ref 65–99)
GLUCOSE BLD-MCNC: 121 MG/DL (ref 65–99)
GLUCOSE BLD-MCNC: 126 MG/DL (ref 65–99)
GLUCOSE BLD-MCNC: 138 MG/DL (ref 65–99)
GLUCOSE BLD-MCNC: 141 MG/DL (ref 65–99)
GLUCOSE BLD-MCNC: 83 MG/DL (ref 65–99)
GLUCOSE BLD-MCNC: 86 MG/DL (ref 65–99)
GLUCOSE BLD-MCNC: 94 MG/DL (ref 65–99)
GLUCOSE BLD-MCNC: 95 MG/DL (ref 65–99)
GLUCOSE BLD-MCNC: 96 MG/DL (ref 65–99)
GLUCOSE BLD-MCNC: 96 MG/DL (ref 65–99)
GLUCOSE BLD-MCNC: 97 MG/DL (ref 65–99)
GLUCOSE BLD-MCNC: 99 MG/DL (ref 65–99)
GLUCOSE BLD-MCNC: 99 MG/DL (ref 65–99)
GLUCOSE BLDC GLUCOMTR-MCNC: 110 MG/DL (ref 70–130)
GRAM STN SPEC: ABNORMAL
HBA1C MFR BLD: 4.59 % (ref 4.8–5.6)
HBA1C MFR BLD: 4.9 % (ref 4.8–5.6)
HBV SURFACE AG SERPL QL IA: NORMAL
HCT VFR BLD AUTO: 19.6 % (ref 40.4–52.2)
HCT VFR BLD AUTO: 22.2 % (ref 40.4–52.2)
HCT VFR BLD AUTO: 24.2 % (ref 40.4–52.2)
HCT VFR BLD AUTO: 24.2 % (ref 40.4–52.2)
HCT VFR BLD AUTO: 24.4 % (ref 40.4–52.2)
HCT VFR BLD AUTO: 25.7 % (ref 40.4–52.2)
HCT VFR BLD AUTO: 26 % (ref 40.4–52.2)
HCT VFR BLD AUTO: 26.2 % (ref 40.4–52.2)
HCT VFR BLD AUTO: 26.3 % (ref 40.4–52.2)
HCT VFR BLD AUTO: 26.3 % (ref 40.4–52.2)
HCT VFR BLD AUTO: 26.7 % (ref 40.4–52.2)
HCT VFR BLD AUTO: 26.7 % (ref 40.4–52.2)
HCT VFR BLD AUTO: 27 % (ref 40.4–52.2)
HCT VFR BLD AUTO: 27.3 % (ref 40.4–52.2)
HCT VFR BLD AUTO: 27.6 % (ref 40.4–52.2)
HCT VFR BLD AUTO: 27.6 % (ref 40.4–52.2)
HCT VFR BLD AUTO: 27.7 % (ref 40.4–52.2)
HCT VFR BLD AUTO: 28.2 % (ref 40.4–52.2)
HCT VFR BLD AUTO: 28.4 % (ref 40.4–52.2)
HCT VFR BLD AUTO: 29.3 % (ref 40.4–52.2)
HCT VFR BLD AUTO: 29.3 % (ref 40.4–52.2)
HCT VFR BLD AUTO: 29.6 % (ref 40.4–52.2)
HCT VFR BLD AUTO: 30.2 % (ref 40.4–52.2)
HCT VFR BLD AUTO: 30.3 % (ref 40.4–52.2)
HCT VFR BLD AUTO: 32.2 % (ref 40.4–52.2)
HCT VFR BLD AUTO: 32.2 % (ref 40.4–52.2)
HCT VFR BLD AUTO: 35 % (ref 40.4–52.2)
HDLC SERPL-MCNC: 18 MG/DL (ref 40–60)
HDLC SERPL-MCNC: 4 MG/DL (ref 40–60)
HGB BLD-MCNC: 10 G/DL (ref 13.7–17.6)
HGB BLD-MCNC: 10.1 G/DL (ref 13.7–17.6)
HGB BLD-MCNC: 10.4 G/DL (ref 13.7–17.6)
HGB BLD-MCNC: 10.6 G/DL (ref 13.7–17.6)
HGB BLD-MCNC: 6.7 G/DL (ref 13.7–17.6)
HGB BLD-MCNC: 7.4 G/DL (ref 13.7–17.6)
HGB BLD-MCNC: 8 G/DL (ref 13.7–17.6)
HGB BLD-MCNC: 8 G/DL (ref 13.7–17.6)
HGB BLD-MCNC: 8.2 G/DL (ref 13.7–17.6)
HGB BLD-MCNC: 8.2 G/DL (ref 13.7–17.6)
HGB BLD-MCNC: 8.4 G/DL (ref 13.7–17.6)
HGB BLD-MCNC: 8.5 G/DL (ref 13.7–17.6)
HGB BLD-MCNC: 8.6 G/DL (ref 13.7–17.6)
HGB BLD-MCNC: 8.6 G/DL (ref 13.7–17.6)
HGB BLD-MCNC: 8.7 G/DL (ref 13.7–17.6)
HGB BLD-MCNC: 8.8 G/DL (ref 13.7–17.6)
HGB BLD-MCNC: 8.9 G/DL (ref 13.7–17.6)
HGB BLD-MCNC: 9.1 G/DL (ref 13.7–17.6)
HGB BLD-MCNC: 9.1 G/DL (ref 13.7–17.6)
HGB BLD-MCNC: 9.2 G/DL (ref 13.7–17.6)
HGB BLD-MCNC: 9.2 G/DL (ref 13.7–17.6)
HGB BLD-MCNC: 9.3 G/DL (ref 13.7–17.6)
HGB BLD-MCNC: 9.3 G/DL (ref 13.7–17.6)
HGB BLD-MCNC: 9.4 G/DL (ref 13.7–17.6)
HGB BLD-MCNC: 9.4 G/DL (ref 13.7–17.6)
HGB BLD-MCNC: 9.5 G/DL (ref 13.7–17.6)
HGB BLD-MCNC: 9.5 G/DL (ref 13.7–17.6)
IMM GRANULOCYTES # BLD: 0 10*3/MM3 (ref 0–0.03)
IMM GRANULOCYTES # BLD: 0.02 10*3/MM3 (ref 0–0.03)
IMM GRANULOCYTES # BLD: 0.03 10*3/MM3 (ref 0–0.03)
IMM GRANULOCYTES # BLD: 0.04 10*3/MM3 (ref 0–0.03)
IMM GRANULOCYTES # BLD: 0.04 10*3/MM3 (ref 0–0.03)
IMM GRANULOCYTES # BLD: 0.08 10*3/MM3 (ref 0–0.03)
IMM GRANULOCYTES # BLD: 0.11 10*3/MM3 (ref 0–0.03)
IMM GRANULOCYTES # BLD: 0.12 10*3/MM3 (ref 0–0.03)
IMM GRANULOCYTES NFR BLD: 0 % (ref 0–0.5)
IMM GRANULOCYTES NFR BLD: 0.2 % (ref 0–0.5)
IMM GRANULOCYTES NFR BLD: 0.2 % (ref 0–0.5)
IMM GRANULOCYTES NFR BLD: 0.3 % (ref 0–0.5)
IMM GRANULOCYTES NFR BLD: 0.4 % (ref 0–0.5)
IMM GRANULOCYTES NFR BLD: 0.5 % (ref 0–0.5)
IMM GRANULOCYTES NFR BLD: 0.6 % (ref 0–0.5)
IMM GRANULOCYTES NFR BLD: 0.8 % (ref 0–0.5)
IMM GRANULOCYTES NFR BLD: 0.9 % (ref 0–0.5)
INR PPP: 1.29 (ref 0.9–1.1)
INR PPP: 1.3 (ref 0.9–1.1)
INR PPP: 1.31 (ref 0.9–1.1)
INR PPP: 1.34 (ref 0.9–1.1)
INR PPP: 1.37 (ref 0.9–1.1)
INR PPP: 1.38 (ref 0.9–1.1)
INR PPP: 1.41 (ref 0.9–1.1)
INR PPP: 1.43 (ref 0.9–1.1)
INR PPP: 1.46 (ref 0.9–1.1)
INR PPP: 1.46 (ref 0.9–1.1)
INR PPP: 1.51 (ref 0.9–1.1)
INR PPP: 1.52 (ref 0.9–1.1)
INR PPP: 1.56 (ref 0.9–1.1)
INR PPP: 1.57 (ref 0.9–1.1)
INR PPP: 1.76 (ref 0.9–1.1)
INR PPP: 1.84 (ref 0.9–1.1)
INR PPP: 1.93 (ref 0.9–1.1)
INR PPP: 2.02 (ref 0.9–1.1)
INR PPP: 2.03 (ref 0.9–1.1)
INR PPP: 2.11 (ref 0.9–1.1)
INR PPP: 2.28 (ref 0.9–1.1)
INR PPP: 2.29 (ref 0.9–1.1)
INR PPP: 2.62 (ref 0.9–1.1)
INR PPP: 2.63 (ref 0.9–1.1)
INR PPP: 2.77 (ref 0.9–1.1)
INR PPP: 2.8 (ref 0.9–1.1)
INR PPP: 2.89 (ref 0.9–1.1)
INR PPP: 2.92 (ref 0.9–1.1)
INR PPP: 3.22 (ref 0.9–1.1)
INR PPP: 9.37 (ref 0.9–1.1)
LDLC SERPL CALC-MCNC: 70 MG/DL (ref 0–100)
LDLC SERPL CALC-MCNC: 85 MG/DL (ref 0–100)
LDLC/HDLC SERPL: 17.4 {RATIO}
LDLC/HDLC SERPL: 4.71 {RATIO}
LEFT ATRIUM VOLUME INDEX: 39 ML/M2
LYMPHOCYTES # BLD AUTO: 1.12 10*3/MM3 (ref 0.9–4.8)
LYMPHOCYTES # BLD AUTO: 1.13 10*3/MM3 (ref 0.9–4.8)
LYMPHOCYTES # BLD AUTO: 1.14 10*3/MM3 (ref 0.9–4.8)
LYMPHOCYTES # BLD AUTO: 1.16 10*3/MM3 (ref 0.9–4.8)
LYMPHOCYTES # BLD AUTO: 1.19 10*3/MM3 (ref 0.9–4.8)
LYMPHOCYTES # BLD AUTO: 1.2 10*3/MM3 (ref 0.9–4.8)
LYMPHOCYTES # BLD AUTO: 1.26 10*3/MM3 (ref 0.9–4.8)
LYMPHOCYTES # BLD AUTO: 1.27 10*3/MM3 (ref 0.9–4.8)
LYMPHOCYTES # BLD AUTO: 1.31 10*3/MM3 (ref 0.9–4.8)
LYMPHOCYTES # BLD AUTO: 1.34 10*3/MM3 (ref 0.9–4.8)
LYMPHOCYTES # BLD AUTO: 1.39 10*3/MM3 (ref 0.9–4.8)
LYMPHOCYTES # BLD AUTO: 1.44 10*3/MM3 (ref 0.9–4.8)
LYMPHOCYTES # BLD AUTO: 1.47 10*3/MM3 (ref 0.9–4.8)
LYMPHOCYTES # BLD AUTO: 1.53 10*3/MM3 (ref 0.9–4.8)
LYMPHOCYTES # BLD AUTO: 1.56 10*3/MM3 (ref 0.9–4.8)
LYMPHOCYTES # BLD AUTO: 1.56 10*3/MM3 (ref 0.9–4.8)
LYMPHOCYTES # BLD AUTO: 1.63 10*3/MM3 (ref 0.9–4.8)
LYMPHOCYTES # BLD MANUAL: 0.61 10*3/MM3 (ref 0.9–4.8)
LYMPHOCYTES # BLD MANUAL: 1.75 10*3/MM3 (ref 0.9–4.8)
LYMPHOCYTES NFR BLD AUTO: 10.3 % (ref 19.6–45.3)
LYMPHOCYTES NFR BLD AUTO: 11 % (ref 19.6–45.3)
LYMPHOCYTES NFR BLD AUTO: 11.2 % (ref 19.6–45.3)
LYMPHOCYTES NFR BLD AUTO: 12.6 % (ref 19.6–45.3)
LYMPHOCYTES NFR BLD AUTO: 13.2 % (ref 19.6–45.3)
LYMPHOCYTES NFR BLD AUTO: 14.5 % (ref 19.6–45.3)
LYMPHOCYTES NFR BLD AUTO: 14.6 % (ref 19.6–45.3)
LYMPHOCYTES NFR BLD AUTO: 17.8 % (ref 19.6–45.3)
LYMPHOCYTES NFR BLD AUTO: 18.1 % (ref 19.6–45.3)
LYMPHOCYTES NFR BLD AUTO: 18.1 % (ref 19.6–45.3)
LYMPHOCYTES NFR BLD AUTO: 18.6 % (ref 19.6–45.3)
LYMPHOCYTES NFR BLD AUTO: 20 % (ref 19.6–45.3)
LYMPHOCYTES NFR BLD AUTO: 21.8 % (ref 19.6–45.3)
LYMPHOCYTES NFR BLD AUTO: 23.3 % (ref 19.6–45.3)
LYMPHOCYTES NFR BLD AUTO: 26 % (ref 19.6–45.3)
LYMPHOCYTES NFR BLD AUTO: 8.2 % (ref 19.6–45.3)
LYMPHOCYTES NFR BLD AUTO: 9.7 % (ref 19.6–45.3)
LYMPHOCYTES NFR BLD MANUAL: 19 % (ref 19.6–45.3)
LYMPHOCYTES NFR BLD MANUAL: 3 % (ref 5–12)
LYMPHOCYTES NFR BLD MANUAL: 6 % (ref 19.6–45.3)
LYMPHOCYTES NFR BLD MANUAL: 7 % (ref 5–12)
MAGNESIUM SERPL-MCNC: 2.2 MG/DL (ref 1.6–2.4)
MAGNESIUM SERPL-MCNC: 2.2 MG/DL (ref 1.6–2.4)
MAGNESIUM SERPL-MCNC: 2.5 MG/DL (ref 1.6–2.4)
MAGNESIUM SERPL-MCNC: 2.5 MG/DL (ref 1.6–2.4)
MAGNESIUM SERPL-MCNC: 2.6 MG/DL (ref 1.6–2.4)
MCH RBC QN AUTO: 29.8 PG (ref 27–32.7)
MCH RBC QN AUTO: 30 PG (ref 27–32.7)
MCH RBC QN AUTO: 30.2 PG (ref 27–32.7)
MCH RBC QN AUTO: 30.3 PG (ref 27–32.7)
MCH RBC QN AUTO: 30.9 PG (ref 27–32.7)
MCH RBC QN AUTO: 31.1 PG (ref 27–32.7)
MCH RBC QN AUTO: 31.2 PG (ref 27–32.7)
MCH RBC QN AUTO: 31.2 PG (ref 27–32.7)
MCH RBC QN AUTO: 31.3 PG (ref 27–32.7)
MCH RBC QN AUTO: 31.4 PG (ref 27–32.7)
MCH RBC QN AUTO: 31.5 PG (ref 27–32.7)
MCH RBC QN AUTO: 31.6 PG (ref 27–32.7)
MCH RBC QN AUTO: 31.8 PG (ref 27–32.7)
MCH RBC QN AUTO: 31.9 PG (ref 27–32.7)
MCH RBC QN AUTO: 31.9 PG (ref 27–32.7)
MCH RBC QN AUTO: 32.2 PG (ref 27–32.7)
MCH RBC QN AUTO: 32.2 PG (ref 27–32.7)
MCH RBC QN AUTO: 32.4 PG (ref 27–32.7)
MCH RBC QN AUTO: 32.4 PG (ref 27–32.7)
MCH RBC QN AUTO: 32.5 PG (ref 27–32.7)
MCH RBC QN AUTO: 32.5 PG (ref 27–32.7)
MCH RBC QN AUTO: 33.2 PG (ref 27–32.7)
MCHC RBC AUTO-ENTMCNC: 30.3 G/DL (ref 32.6–36.4)
MCHC RBC AUTO-ENTMCNC: 30.7 G/DL (ref 32.6–36.4)
MCHC RBC AUTO-ENTMCNC: 31 G/DL (ref 32.6–36.4)
MCHC RBC AUTO-ENTMCNC: 31.1 G/DL (ref 32.6–36.4)
MCHC RBC AUTO-ENTMCNC: 31.1 G/DL (ref 32.6–36.4)
MCHC RBC AUTO-ENTMCNC: 31.3 G/DL (ref 32.6–36.4)
MCHC RBC AUTO-ENTMCNC: 31.4 G/DL (ref 32.6–36.4)
MCHC RBC AUTO-ENTMCNC: 31.7 G/DL (ref 32.6–36.4)
MCHC RBC AUTO-ENTMCNC: 32.1 G/DL (ref 32.6–36.4)
MCHC RBC AUTO-ENTMCNC: 32.3 G/DL (ref 32.6–36.4)
MCHC RBC AUTO-ENTMCNC: 32.6 G/DL (ref 32.6–36.4)
MCHC RBC AUTO-ENTMCNC: 32.7 G/DL (ref 32.6–36.4)
MCHC RBC AUTO-ENTMCNC: 32.9 G/DL (ref 32.6–36.4)
MCHC RBC AUTO-ENTMCNC: 33.1 G/DL (ref 32.6–36.4)
MCHC RBC AUTO-ENTMCNC: 33.3 G/DL (ref 32.6–36.4)
MCHC RBC AUTO-ENTMCNC: 33.4 G/DL (ref 32.6–36.4)
MCHC RBC AUTO-ENTMCNC: 33.7 G/DL (ref 32.6–36.4)
MCHC RBC AUTO-ENTMCNC: 34.1 G/DL (ref 32.6–36.4)
MCHC RBC AUTO-ENTMCNC: 34.2 G/DL (ref 32.6–36.4)
MCHC RBC AUTO-ENTMCNC: 34.4 G/DL (ref 32.6–36.4)
MCHC RBC AUTO-ENTMCNC: 34.4 G/DL (ref 32.6–36.4)
MCHC RBC AUTO-ENTMCNC: 35.1 G/DL (ref 32.6–36.4)
MCV RBC AUTO: 101.1 FL (ref 79.8–96.2)
MCV RBC AUTO: 101.9 FL (ref 79.8–96.2)
MCV RBC AUTO: 94.2 FL (ref 79.8–96.2)
MCV RBC AUTO: 94.3 FL (ref 79.8–96.2)
MCV RBC AUTO: 94.5 FL (ref 79.8–96.2)
MCV RBC AUTO: 94.5 FL (ref 79.8–96.2)
MCV RBC AUTO: 94.7 FL (ref 79.8–96.2)
MCV RBC AUTO: 94.9 FL (ref 79.8–96.2)
MCV RBC AUTO: 95.2 FL (ref 79.8–96.2)
MCV RBC AUTO: 95.3 FL (ref 79.8–96.2)
MCV RBC AUTO: 95.4 FL (ref 79.8–96.2)
MCV RBC AUTO: 95.4 FL (ref 79.8–96.2)
MCV RBC AUTO: 95.5 FL (ref 79.8–96.2)
MCV RBC AUTO: 95.7 FL (ref 79.8–96.2)
MCV RBC AUTO: 95.8 FL (ref 79.8–96.2)
MCV RBC AUTO: 95.9 FL (ref 79.8–96.2)
MCV RBC AUTO: 96.1 FL (ref 79.8–96.2)
MCV RBC AUTO: 96.2 FL (ref 79.8–96.2)
MCV RBC AUTO: 96.3 FL (ref 79.8–96.2)
MCV RBC AUTO: 96.7 FL (ref 79.8–96.2)
MCV RBC AUTO: 96.9 FL (ref 79.8–96.2)
MCV RBC AUTO: 97 FL (ref 79.8–96.2)
MCV RBC AUTO: 97.4 FL (ref 79.8–96.2)
MCV RBC AUTO: 97.9 FL (ref 79.8–96.2)
MCV RBC AUTO: 99.2 FL (ref 79.8–96.2)
MCV RBC AUTO: 99.2 FL (ref 79.8–96.2)
MCV RBC AUTO: 99.6 FL (ref 79.8–96.2)
MONOCYTES # BLD AUTO: 0.3 10*3/MM3 (ref 0.2–1.2)
MONOCYTES # BLD AUTO: 0.39 10*3/MM3 (ref 0.2–1.2)
MONOCYTES # BLD AUTO: 0.39 10*3/MM3 (ref 0.2–1.2)
MONOCYTES # BLD AUTO: 0.44 10*3/MM3 (ref 0.2–1.2)
MONOCYTES # BLD AUTO: 0.45 10*3/MM3 (ref 0.2–1.2)
MONOCYTES # BLD AUTO: 0.46 10*3/MM3 (ref 0.2–1.2)
MONOCYTES # BLD AUTO: 0.55 10*3/MM3 (ref 0.2–1.2)
MONOCYTES # BLD AUTO: 0.56 10*3/MM3 (ref 0.2–1.2)
MONOCYTES # BLD AUTO: 0.57 10*3/MM3 (ref 0.2–1.2)
MONOCYTES # BLD AUTO: 0.62 10*3/MM3 (ref 0.2–1.2)
MONOCYTES # BLD AUTO: 0.63 10*3/MM3 (ref 0.2–1.2)
MONOCYTES # BLD AUTO: 0.65 10*3/MM3 (ref 0.2–1.2)
MONOCYTES # BLD AUTO: 0.65 10*3/MM3 (ref 0.2–1.2)
MONOCYTES # BLD AUTO: 0.68 10*3/MM3 (ref 0.2–1.2)
MONOCYTES # BLD AUTO: 0.72 10*3/MM3 (ref 0.2–1.2)
MONOCYTES # BLD AUTO: 0.74 10*3/MM3 (ref 0.2–1.2)
MONOCYTES # BLD AUTO: 0.82 10*3/MM3 (ref 0.2–1.2)
MONOCYTES # BLD AUTO: 0.84 10*3/MM3 (ref 0.2–1.2)
MONOCYTES # BLD AUTO: 1.3 10*3/MM3 (ref 0.2–1.2)
MONOCYTES NFR BLD AUTO: 10.1 % (ref 5–12)
MONOCYTES NFR BLD AUTO: 11.9 % (ref 5–12)
MONOCYTES NFR BLD AUTO: 5 % (ref 5–12)
MONOCYTES NFR BLD AUTO: 5.1 % (ref 5–12)
MONOCYTES NFR BLD AUTO: 5.2 % (ref 5–12)
MONOCYTES NFR BLD AUTO: 5.3 % (ref 5–12)
MONOCYTES NFR BLD AUTO: 6.2 % (ref 5–12)
MONOCYTES NFR BLD AUTO: 6.3 % (ref 5–12)
MONOCYTES NFR BLD AUTO: 6.4 % (ref 5–12)
MONOCYTES NFR BLD AUTO: 6.6 % (ref 5–12)
MONOCYTES NFR BLD AUTO: 6.9 % (ref 5–12)
MONOCYTES NFR BLD AUTO: 7.2 % (ref 5–12)
MONOCYTES NFR BLD AUTO: 7.2 % (ref 5–12)
MONOCYTES NFR BLD AUTO: 9.3 % (ref 5–12)
MONOCYTES NFR BLD AUTO: 9.6 % (ref 5–12)
NEUTROPHILS # BLD AUTO: 10.5 10*3/MM3 (ref 1.9–8.1)
NEUTROPHILS # BLD AUTO: 12.02 10*3/MM3 (ref 1.9–8.1)
NEUTROPHILS # BLD AUTO: 12.29 10*3/MM3 (ref 1.9–8.1)
NEUTROPHILS # BLD AUTO: 4 10*3/MM3 (ref 1.9–8.1)
NEUTROPHILS # BLD AUTO: 4.08 10*3/MM3 (ref 1.9–8.1)
NEUTROPHILS # BLD AUTO: 4.41 10*3/MM3 (ref 1.9–8.1)
NEUTROPHILS # BLD AUTO: 4.43 10*3/MM3 (ref 1.9–8.1)
NEUTROPHILS # BLD AUTO: 4.44 10*3/MM3 (ref 1.9–8.1)
NEUTROPHILS # BLD AUTO: 4.46 10*3/MM3 (ref 1.9–8.1)
NEUTROPHILS # BLD AUTO: 4.55 10*3/MM3 (ref 1.9–8.1)
NEUTROPHILS # BLD AUTO: 5.62 10*3/MM3 (ref 1.9–8.1)
NEUTROPHILS # BLD AUTO: 6.02 10*3/MM3 (ref 1.9–8.1)
NEUTROPHILS # BLD AUTO: 6.46 10*3/MM3 (ref 1.9–8.1)
NEUTROPHILS # BLD AUTO: 6.67 10*3/MM3 (ref 1.9–8.1)
NEUTROPHILS # BLD AUTO: 7.77 10*3/MM3 (ref 1.9–8.1)
NEUTROPHILS # BLD AUTO: 7.8 10*3/MM3 (ref 1.9–8.1)
NEUTROPHILS # BLD AUTO: 9.02 10*3/MM3 (ref 1.9–8.1)
NEUTROPHILS # BLD AUTO: 9.13 10*3/MM3 (ref 1.9–8.1)
NEUTROPHILS # BLD AUTO: 9.62 10*3/MM3 (ref 1.9–8.1)
NEUTROPHILS NFR BLD AUTO: 60.8 % (ref 42.7–76)
NEUTROPHILS NFR BLD AUTO: 63.8 % (ref 42.7–76)
NEUTROPHILS NFR BLD AUTO: 66.4 % (ref 42.7–76)
NEUTROPHILS NFR BLD AUTO: 68.1 % (ref 42.7–76)
NEUTROPHILS NFR BLD AUTO: 69.7 % (ref 42.7–76)
NEUTROPHILS NFR BLD AUTO: 69.8 % (ref 42.7–76)
NEUTROPHILS NFR BLD AUTO: 70.4 % (ref 42.7–76)
NEUTROPHILS NFR BLD AUTO: 70.4 % (ref 42.7–76)
NEUTROPHILS NFR BLD AUTO: 71.4 % (ref 42.7–76)
NEUTROPHILS NFR BLD AUTO: 72.4 % (ref 42.7–76)
NEUTROPHILS NFR BLD AUTO: 74 % (ref 42.7–76)
NEUTROPHILS NFR BLD AUTO: 74.3 % (ref 42.7–76)
NEUTROPHILS NFR BLD AUTO: 80.1 % (ref 42.7–76)
NEUTROPHILS NFR BLD AUTO: 80.4 % (ref 42.7–76)
NEUTROPHILS NFR BLD AUTO: 81 % (ref 42.7–76)
NEUTROPHILS NFR BLD AUTO: 84 % (ref 42.7–76)
NEUTROPHILS NFR BLD AUTO: 85.1 % (ref 42.7–76)
NEUTROPHILS NFR BLD MANUAL: 70 % (ref 42.7–76)
NEUTROPHILS NFR BLD MANUAL: 90 % (ref 42.7–76)
NRBC BLD MANUAL-RTO: 0.8 /100 WBC (ref 0–0)
NT-PROBNP SERPL-MCNC: ABNORMAL PG/ML (ref 0–1800)
NT-PROBNP SERPL-MCNC: ABNORMAL PG/ML (ref 0–1800)
OVALOCYTES BLD QL SMEAR: ABNORMAL
OVALOCYTES BLD QL SMEAR: ABNORMAL
PHOSPHATE SERPL-MCNC: 6 MG/DL (ref 2.5–4.5)
PHOSPHATE SERPL-MCNC: 6.1 MG/DL (ref 2.5–4.5)
PLAT MORPH BLD: NORMAL
PLAT MORPH BLD: NORMAL
PLATELET # BLD AUTO: 118 10*3/MM3 (ref 140–500)
PLATELET # BLD AUTO: 132 10*3/MM3 (ref 140–500)
PLATELET # BLD AUTO: 143 10*3/MM3 (ref 140–500)
PLATELET # BLD AUTO: 153 10*3/MM3 (ref 140–500)
PLATELET # BLD AUTO: 157 10*3/MM3 (ref 140–500)
PLATELET # BLD AUTO: 164 10*3/MM3 (ref 140–500)
PLATELET # BLD AUTO: 171 10*3/MM3 (ref 140–500)
PLATELET # BLD AUTO: 182 10*3/MM3 (ref 140–500)
PLATELET # BLD AUTO: 192 10*3/MM3 (ref 140–500)
PLATELET # BLD AUTO: 202 10*3/MM3 (ref 140–500)
PLATELET # BLD AUTO: 206 10*3/MM3 (ref 140–500)
PLATELET # BLD AUTO: 208 10*3/MM3 (ref 140–500)
PLATELET # BLD AUTO: 224 10*3/MM3 (ref 140–500)
PLATELET # BLD AUTO: 227 10*3/MM3 (ref 140–500)
PLATELET # BLD AUTO: 240 10*3/MM3 (ref 140–500)
PLATELET # BLD AUTO: 273 10*3/MM3 (ref 140–500)
PLATELET # BLD AUTO: 286 10*3/MM3 (ref 140–500)
PLATELET # BLD AUTO: 307 10*3/MM3 (ref 140–500)
PLATELET # BLD AUTO: 326 10*3/MM3 (ref 140–500)
PLATELET # BLD AUTO: 330 10*3/MM3 (ref 140–500)
PLATELET # BLD AUTO: 330 10*3/MM3 (ref 140–500)
PLATELET # BLD AUTO: 342 10*3/MM3 (ref 140–500)
PLATELET # BLD AUTO: 357 10*3/MM3 (ref 140–500)
PLATELET # BLD AUTO: 364 10*3/MM3 (ref 140–500)
PLATELET # BLD AUTO: 374 10*3/MM3 (ref 140–500)
PLATELET # BLD AUTO: 66 10*3/MM3 (ref 140–500)
PLATELET # BLD AUTO: 89 10*3/MM3 (ref 140–500)
PMV BLD AUTO: 10.1 FL (ref 6–12)
PMV BLD AUTO: 10.2 FL (ref 6–12)
PMV BLD AUTO: 10.3 FL (ref 6–12)
PMV BLD AUTO: 10.3 FL (ref 6–12)
PMV BLD AUTO: 10.4 FL (ref 6–12)
PMV BLD AUTO: 10.5 FL (ref 6–12)
PMV BLD AUTO: 10.6 FL (ref 6–12)
PMV BLD AUTO: 10.6 FL (ref 6–12)
PMV BLD AUTO: 10.7 FL (ref 6–12)
PMV BLD AUTO: 10.7 FL (ref 6–12)
PMV BLD AUTO: 10.8 FL (ref 6–12)
PMV BLD AUTO: 10.8 FL (ref 6–12)
PMV BLD AUTO: 10.9 FL (ref 6–12)
PMV BLD AUTO: 10.9 FL (ref 6–12)
PMV BLD AUTO: 11 FL (ref 6–12)
PMV BLD AUTO: 11 FL (ref 6–12)
PMV BLD AUTO: 11.1 FL (ref 6–12)
PMV BLD AUTO: 11.1 FL (ref 6–12)
PMV BLD AUTO: 11.2 FL (ref 6–12)
PMV BLD AUTO: 11.9 FL (ref 6–12)
PMV BLD AUTO: 12.2 FL (ref 6–12)
PMV BLD AUTO: 12.3 FL (ref 6–12)
PMV BLD AUTO: 13 FL (ref 6–12)
PMV BLD AUTO: 9.8 FL (ref 6–12)
PMV BLD AUTO: 9.9 FL (ref 6–12)
POTASSIUM BLD-SCNC: 3.4 MMOL/L (ref 3.5–5.2)
POTASSIUM BLD-SCNC: 3.5 MMOL/L (ref 3.5–5.2)
POTASSIUM BLD-SCNC: 3.6 MMOL/L (ref 3.5–5.2)
POTASSIUM BLD-SCNC: 3.6 MMOL/L (ref 3.5–5.2)
POTASSIUM BLD-SCNC: 3.7 MMOL/L (ref 3.5–5.2)
POTASSIUM BLD-SCNC: 3.8 MMOL/L (ref 3.5–5.2)
POTASSIUM BLD-SCNC: 4 MMOL/L (ref 3.5–5.2)
POTASSIUM BLD-SCNC: 4.1 MMOL/L (ref 3.5–5.2)
POTASSIUM BLD-SCNC: 4.3 MMOL/L (ref 3.5–5.2)
POTASSIUM BLD-SCNC: 4.4 MMOL/L (ref 3.5–5.2)
POTASSIUM BLD-SCNC: 4.5 MMOL/L (ref 3.5–5.2)
POTASSIUM BLD-SCNC: 4.7 MMOL/L (ref 3.5–5.2)
POTASSIUM BLD-SCNC: 4.7 MMOL/L (ref 3.5–5.2)
POTASSIUM BLD-SCNC: 4.8 MMOL/L (ref 3.5–5.2)
POTASSIUM BLD-SCNC: 4.8 MMOL/L (ref 3.5–5.2)
POTASSIUM BLD-SCNC: 5.1 MMOL/L (ref 3.5–5.2)
POTASSIUM BLD-SCNC: 5.1 MMOL/L (ref 3.5–5.2)
POTASSIUM BLD-SCNC: 5.2 MMOL/L (ref 3.5–5.2)
POTASSIUM BLD-SCNC: 5.3 MMOL/L (ref 3.5–5.2)
POTASSIUM BLD-SCNC: 5.5 MMOL/L (ref 3.5–5.2)
POTASSIUM BLD-SCNC: 5.8 MMOL/L (ref 3.5–5.2)
POTASSIUM BLD-SCNC: 6.3 MMOL/L (ref 3.5–5.2)
PROT SERPL-MCNC: 6 G/DL (ref 6–8.5)
PROT SERPL-MCNC: 6.3 G/DL (ref 6–8.5)
PROT SERPL-MCNC: 6.3 G/DL (ref 6–8.5)
PROT SERPL-MCNC: 6.6 G/DL (ref 6–8.5)
PROT SERPL-MCNC: 6.7 G/DL (ref 6–8.5)
PROT SERPL-MCNC: 6.9 G/DL (ref 6–8.5)
PROT SERPL-MCNC: 7.2 G/DL (ref 6–8.5)
PROT SERPL-MCNC: 7.4 G/DL (ref 6–8.5)
PROT SERPL-MCNC: 7.6 G/DL (ref 6–8.5)
PROT SERPL-MCNC: 7.8 G/DL (ref 6–8.5)
PROT SERPL-MCNC: 7.8 G/DL (ref 6–8.5)
PROT SERPL-MCNC: 8.7 G/DL (ref 6–8.5)
PROTHROMBIN TIME: 15.6 SECONDS (ref 11.7–14.2)
PROTHROMBIN TIME: 15.7 SECONDS (ref 11.7–14.2)
PROTHROMBIN TIME: 15.8 SECONDS (ref 11.7–14.2)
PROTHROMBIN TIME: 16.1 SECONDS (ref 11.7–14.2)
PROTHROMBIN TIME: 16.3 SECONDS (ref 11.7–14.2)
PROTHROMBIN TIME: 16.4 SECONDS (ref 11.7–14.2)
PROTHROMBIN TIME: 16.8 SECONDS (ref 11.7–14.2)
PROTHROMBIN TIME: 16.9 SECONDS (ref 11.7–14.2)
PROTHROMBIN TIME: 17.2 SECONDS (ref 11.7–14.2)
PROTHROMBIN TIME: 17.2 SECONDS (ref 11.7–14.2)
PROTHROMBIN TIME: 17.7 SECONDS (ref 11.7–14.2)
PROTHROMBIN TIME: 17.8 SECONDS (ref 11.7–14.2)
PROTHROMBIN TIME: 18.1 SECONDS (ref 11.7–14.2)
PROTHROMBIN TIME: 18.2 SECONDS (ref 11.7–14.2)
PROTHROMBIN TIME: 19.9 SECONDS (ref 11.7–14.2)
PROTHROMBIN TIME: 20.6 SECONDS (ref 11.7–14.2)
PROTHROMBIN TIME: 21.4 SECONDS (ref 11.7–14.2)
PROTHROMBIN TIME: 22.2 SECONDS (ref 11.7–14.2)
PROTHROMBIN TIME: 22.2 SECONDS (ref 11.7–14.2)
PROTHROMBIN TIME: 22.9 SECONDS (ref 11.7–14.2)
PROTHROMBIN TIME: 24.4 SECONDS (ref 11.7–14.2)
PROTHROMBIN TIME: 24.5 SECONDS (ref 11.7–14.2)
PROTHROMBIN TIME: 27.2 SECONDS (ref 11.7–14.2)
PROTHROMBIN TIME: 27.3 SECONDS (ref 11.7–14.2)
PROTHROMBIN TIME: 28.3 SECONDS (ref 11.7–14.2)
PROTHROMBIN TIME: 28.6 SECONDS (ref 11.7–14.2)
PROTHROMBIN TIME: 29.4 SECONDS (ref 11.7–14.2)
PROTHROMBIN TIME: 29.5 SECONDS (ref 11.7–14.2)
PROTHROMBIN TIME: 31.9 SECONDS (ref 11.7–14.2)
PROTHROMBIN TIME: 73.5 SECONDS (ref 11.7–14.2)
RBC # BLD AUTO: 2.07 10*6/MM3 (ref 4.6–6)
RBC # BLD AUTO: 2.28 10*6/MM3 (ref 4.6–6)
RBC # BLD AUTO: 2.53 10*6/MM3 (ref 4.6–6)
RBC # BLD AUTO: 2.56 10*6/MM3 (ref 4.6–6)
RBC # BLD AUTO: 2.59 10*6/MM3 (ref 4.6–6)
RBC # BLD AUTO: 2.59 10*6/MM3 (ref 4.6–6)
RBC # BLD AUTO: 2.63 10*6/MM3 (ref 4.6–6)
RBC # BLD AUTO: 2.64 10*6/MM3 (ref 4.6–6)
RBC # BLD AUTO: 2.72 10*6/MM3 (ref 4.6–6)
RBC # BLD AUTO: 2.73 10*6/MM3 (ref 4.6–6)
RBC # BLD AUTO: 2.77 10*6/MM3 (ref 4.6–6)
RBC # BLD AUTO: 2.83 10*6/MM3 (ref 4.6–6)
RBC # BLD AUTO: 2.83 10*6/MM3 (ref 4.6–6)
RBC # BLD AUTO: 2.85 10*6/MM3 (ref 4.6–6)
RBC # BLD AUTO: 2.86 10*6/MM3 (ref 4.6–6)
RBC # BLD AUTO: 2.89 10*6/MM3 (ref 4.6–6)
RBC # BLD AUTO: 2.92 10*6/MM3 (ref 4.6–6)
RBC # BLD AUTO: 2.94 10*6/MM3 (ref 4.6–6)
RBC # BLD AUTO: 2.95 10*6/MM3 (ref 4.6–6)
RBC # BLD AUTO: 3.05 10*6/MM3 (ref 4.6–6)
RBC # BLD AUTO: 3.05 10*6/MM3 (ref 4.6–6)
RBC # BLD AUTO: 3.07 10*6/MM3 (ref 4.6–6)
RBC # BLD AUTO: 3.15 10*6/MM3 (ref 4.6–6)
RBC # BLD AUTO: 3.16 10*6/MM3 (ref 4.6–6)
RBC # BLD AUTO: 3.17 10*6/MM3 (ref 4.6–6)
RBC # BLD AUTO: 3.29 10*6/MM3 (ref 4.6–6)
RBC # BLD AUTO: 3.53 10*6/MM3 (ref 4.6–6)
RH BLD: POSITIVE
SCAN SLIDE: NORMAL
SCAN SLIDE: NORMAL
SCHISTOCYTES BLD QL SMEAR: ABNORMAL
SODIUM BLD-SCNC: 133 MMOL/L (ref 136–145)
SODIUM BLD-SCNC: 133 MMOL/L (ref 136–145)
SODIUM BLD-SCNC: 134 MMOL/L (ref 136–145)
SODIUM BLD-SCNC: 135 MMOL/L (ref 136–145)
SODIUM BLD-SCNC: 135 MMOL/L (ref 136–145)
SODIUM BLD-SCNC: 136 MMOL/L (ref 136–145)
SODIUM BLD-SCNC: 136 MMOL/L (ref 136–145)
SODIUM BLD-SCNC: 137 MMOL/L (ref 136–145)
SODIUM BLD-SCNC: 138 MMOL/L (ref 136–145)
SODIUM BLD-SCNC: 139 MMOL/L (ref 136–145)
SODIUM BLD-SCNC: 140 MMOL/L (ref 136–145)
SODIUM BLD-SCNC: 142 MMOL/L (ref 136–145)
SODIUM BLD-SCNC: 142 MMOL/L (ref 136–145)
SODIUM BLD-SCNC: 143 MMOL/L (ref 136–145)
T4 SERPL-MCNC: 2.91 MCG/DL (ref 4.5–11.7)
TARGETS BLD QL SMEAR: ABNORMAL
TARGETS BLD QL SMEAR: ABNORMAL
TRIGL SERPL-MCNC: 132 MG/DL (ref 0–150)
TRIGL SERPL-MCNC: 86 MG/DL (ref 0–150)
TROPONIN T SERPL-MCNC: 0.02 NG/ML (ref 0–0.03)
TROPONIN T SERPL-MCNC: 0.03 NG/ML (ref 0–0.03)
TROPONIN T SERPL-MCNC: 0.1 NG/ML (ref 0–0.03)
TROPONIN T SERPL-MCNC: 0.29 NG/ML (ref 0–0.03)
TROPONIN T SERPL-MCNC: 0.35 NG/ML (ref 0–0.03)
TROPONIN T SERPL-MCNC: 0.37 NG/ML (ref 0–0.03)
TSH SERPL DL<=0.05 MIU/L-ACNC: 1.03 MIU/ML (ref 0.27–4.2)
TSH SERPL DL<=0.05 MIU/L-ACNC: 1.65 MIU/ML (ref 0.27–4.2)
TSH SERPL DL<=0.05 MIU/L-ACNC: 33.5 MIU/ML (ref 0.27–4.2)
UNIT  ABO: NORMAL
UNIT  RH: NORMAL
UPPER ARTERIAL LEFT ARM BRACHIAL LENGTH: 0.61 CM
VANCOMYCIN SERPL-MCNC: 16.8 MCG/ML (ref 5–40)
VANCOMYCIN SERPL-MCNC: 20.3 MCG/ML (ref 5–40)
VANCOMYCIN SERPL-MCNC: 21 MCG/ML (ref 5–40)
VANCOMYCIN SERPL-MCNC: 22.6 MCG/ML (ref 5–40)
VANCOMYCIN SERPL-MCNC: 22.7 MCG/ML (ref 5–40)
VANCOMYCIN SERPL-MCNC: 22.7 MCG/ML (ref 5–40)
VANCOMYCIN SERPL-MCNC: 24.6 MCG/ML (ref 5–40)
VANCOMYCIN SERPL-MCNC: 32.6 MCG/ML (ref 5–40)
VANCOMYCIN SERPL-MCNC: 34.8 MCG/ML (ref 5–40)
VANCOMYCIN SERPL-MCNC: 6.6 MCG/ML (ref 5–40)
VANCOMYCIN SERPL-MCNC: 9 MCG/ML (ref 5–40)
VARIANT LYMPHS NFR BLD MANUAL: 1 % (ref 0–5)
VLDLC SERPL-MCNC: 17.2 MG/DL (ref 5–40)
VLDLC SERPL-MCNC: 26.4 MG/DL (ref 5–40)
WBC MORPH BLD: NORMAL
WBC MORPH BLD: NORMAL
WBC NRBC COR # BLD: 10.14 10*3/MM3 (ref 4.5–10.7)
WBC NRBC COR # BLD: 10.46 10*3/MM3 (ref 4.5–10.7)
WBC NRBC COR # BLD: 10.92 10*3/MM3 (ref 4.5–10.7)
WBC NRBC COR # BLD: 11.25 10*3/MM3 (ref 4.5–10.7)
WBC NRBC COR # BLD: 11.88 10*3/MM3 (ref 4.5–10.7)
WBC NRBC COR # BLD: 12.15 10*3/MM3 (ref 4.5–10.7)
WBC NRBC COR # BLD: 12.21 10*3/MM3 (ref 4.5–10.7)
WBC NRBC COR # BLD: 12.8 10*3/MM3 (ref 4.5–10.7)
WBC NRBC COR # BLD: 13.06 10*3/MM3 (ref 4.5–10.7)
WBC NRBC COR # BLD: 13.4 10*3/MM3 (ref 4.5–10.7)
WBC NRBC COR # BLD: 14.31 10*3/MM3 (ref 4.5–10.7)
WBC NRBC COR # BLD: 14.45 10*3/MM3 (ref 4.5–10.7)
WBC NRBC COR # BLD: 14.45 10*3/MM3 (ref 4.5–10.7)
WBC NRBC COR # BLD: 15.18 10*3/MM3 (ref 4.5–10.7)
WBC NRBC COR # BLD: 6.26 10*3/MM3 (ref 4.5–10.7)
WBC NRBC COR # BLD: 6.27 10*3/MM3 (ref 4.5–10.7)
WBC NRBC COR # BLD: 6.36 10*3/MM3 (ref 4.5–10.7)
WBC NRBC COR # BLD: 6.46 10*3/MM3 (ref 4.5–10.7)
WBC NRBC COR # BLD: 6.52 10*3/MM3 (ref 4.5–10.7)
WBC NRBC COR # BLD: 6.7 10*3/MM3 (ref 4.5–10.7)
WBC NRBC COR # BLD: 6.73 10*3/MM3 (ref 4.5–10.7)
WBC NRBC COR # BLD: 6.74 10*3/MM3 (ref 4.5–10.7)
WBC NRBC COR # BLD: 7.75 10*3/MM3 (ref 4.5–10.7)
WBC NRBC COR # BLD: 8.62 10*3/MM3 (ref 4.5–10.7)
WBC NRBC COR # BLD: 8.79 10*3/MM3 (ref 4.5–10.7)
WBC NRBC COR # BLD: 9.02 10*3/MM3 (ref 4.5–10.7)
WBC NRBC COR # BLD: 9.23 10*3/MM3 (ref 4.5–10.7)

## 2017-01-01 PROCEDURE — 85610 PROTHROMBIN TIME: CPT | Performed by: SURGERY

## 2017-01-01 PROCEDURE — 85730 THROMBOPLASTIN TIME PARTIAL: CPT | Performed by: NURSE PRACTITIONER

## 2017-01-01 PROCEDURE — 99231 SBSQ HOSP IP/OBS SF/LOW 25: CPT | Performed by: INTERNAL MEDICINE

## 2017-01-01 PROCEDURE — B548ZZA ULTRASONOGRAPHY OF SUPERIOR VENA CAVA, GUIDANCE: ICD-10-PCS | Performed by: SURGERY

## 2017-01-01 PROCEDURE — 87150 DNA/RNA AMPLIFIED PROBE: CPT | Performed by: HOSPITALIST

## 2017-01-01 PROCEDURE — 85730 THROMBOPLASTIN TIME PARTIAL: CPT | Performed by: HOSPITALIST

## 2017-01-01 PROCEDURE — 85610 PROTHROMBIN TIME: CPT | Performed by: HOSPITALIST

## 2017-01-01 PROCEDURE — 36430 TRANSFUSION BLD/BLD COMPNT: CPT

## 2017-01-01 PROCEDURE — 25010000002 HEPARIN (PORCINE) PER 1000 UNITS: Performed by: INTERNAL MEDICINE

## 2017-01-01 PROCEDURE — 87040 BLOOD CULTURE FOR BACTERIA: CPT | Performed by: INTERNAL MEDICINE

## 2017-01-01 PROCEDURE — 87147 CULTURE TYPE IMMUNOLOGIC: CPT | Performed by: HOSPITALIST

## 2017-01-01 PROCEDURE — 83880 ASSAY OF NATRIURETIC PEPTIDE: CPT | Performed by: FAMILY MEDICINE

## 2017-01-01 PROCEDURE — 84443 ASSAY THYROID STIM HORMONE: CPT | Performed by: NURSE PRACTITIONER

## 2017-01-01 PROCEDURE — 80048 BASIC METABOLIC PNL TOTAL CA: CPT | Performed by: HOSPITALIST

## 2017-01-01 PROCEDURE — 85025 COMPLETE CBC W/AUTO DIFF WBC: CPT | Performed by: EMERGENCY MEDICINE

## 2017-01-01 PROCEDURE — 84443 ASSAY THYROID STIM HORMONE: CPT | Performed by: HOSPITALIST

## 2017-01-01 PROCEDURE — 84484 ASSAY OF TROPONIN QUANT: CPT | Performed by: NURSE PRACTITIONER

## 2017-01-01 PROCEDURE — 99232 SBSQ HOSP IP/OBS MODERATE 35: CPT | Performed by: INTERNAL MEDICINE

## 2017-01-01 PROCEDURE — 87340 HEPATITIS B SURFACE AG IA: CPT | Performed by: INTERNAL MEDICINE

## 2017-01-01 PROCEDURE — 86901 BLOOD TYPING SEROLOGIC RH(D): CPT

## 2017-01-01 PROCEDURE — 94640 AIRWAY INHALATION TREATMENT: CPT

## 2017-01-01 PROCEDURE — 80202 ASSAY OF VANCOMYCIN: CPT | Performed by: HOSPITALIST

## 2017-01-01 PROCEDURE — 93010 ELECTROCARDIOGRAM REPORT: CPT | Performed by: INTERNAL MEDICINE

## 2017-01-01 PROCEDURE — 87040 BLOOD CULTURE FOR BACTERIA: CPT | Performed by: SURGERY

## 2017-01-01 PROCEDURE — 85610 PROTHROMBIN TIME: CPT | Performed by: NURSE PRACTITIONER

## 2017-01-01 PROCEDURE — 82962 GLUCOSE BLOOD TEST: CPT

## 2017-01-01 PROCEDURE — 86900 BLOOD TYPING SEROLOGIC ABO: CPT

## 2017-01-01 PROCEDURE — 25010000002 EPOETIN ALFA PER 1000 UNITS: Performed by: INTERNAL MEDICINE

## 2017-01-01 PROCEDURE — 25010000002 VANCOMYCIN: Performed by: HOSPITALIST

## 2017-01-01 PROCEDURE — 92526 ORAL FUNCTION THERAPY: CPT

## 2017-01-01 PROCEDURE — 85025 COMPLETE CBC W/AUTO DIFF WBC: CPT | Performed by: NURSE PRACTITIONER

## 2017-01-01 PROCEDURE — 0 IOPAMIDOL PER 1 ML: Performed by: SURGERY

## 2017-01-01 PROCEDURE — 25010000002 NEOSTIGMINE 10 MG/10ML SOLUTION: Performed by: NURSE ANESTHETIST, CERTIFIED REGISTERED

## 2017-01-01 PROCEDURE — 25010000002 MORPHINE PER 10 MG: Performed by: HOSPITALIST

## 2017-01-01 PROCEDURE — 99221 1ST HOSP IP/OBS SF/LOW 40: CPT | Performed by: INTERNAL MEDICINE

## 2017-01-01 PROCEDURE — 87205 SMEAR GRAM STAIN: CPT | Performed by: SURGERY

## 2017-01-01 PROCEDURE — 25010000002 HEPARIN (PORCINE) PER 1000 UNITS: Performed by: NURSE PRACTITIONER

## 2017-01-01 PROCEDURE — 80069 RENAL FUNCTION PANEL: CPT | Performed by: INTERNAL MEDICINE

## 2017-01-01 PROCEDURE — 93005 ELECTROCARDIOGRAM TRACING: CPT | Performed by: INTERNAL MEDICINE

## 2017-01-01 PROCEDURE — 25010000002 HEPARIN (PORCINE) PER 1000 UNITS

## 2017-01-01 PROCEDURE — P9016 RBC LEUKOCYTES REDUCED: HCPCS

## 2017-01-01 PROCEDURE — 85025 COMPLETE CBC W/AUTO DIFF WBC: CPT | Performed by: HOSPITALIST

## 2017-01-01 PROCEDURE — P9017 PLASMA 1 DONOR FRZ W/IN 8 HR: HCPCS

## 2017-01-01 PROCEDURE — 83735 ASSAY OF MAGNESIUM: CPT | Performed by: INTERNAL MEDICINE

## 2017-01-01 PROCEDURE — 85027 COMPLETE CBC AUTOMATED: CPT | Performed by: INTERNAL MEDICINE

## 2017-01-01 PROCEDURE — 99284 EMERGENCY DEPT VISIT MOD MDM: CPT

## 2017-01-01 PROCEDURE — 5A1D60Z PERFORMANCE OF URINARY FILTRATION, MULTIPLE: ICD-10-PCS | Performed by: INTERNAL MEDICINE

## 2017-01-01 PROCEDURE — 82550 ASSAY OF CK (CPK): CPT | Performed by: FAMILY MEDICINE

## 2017-01-01 PROCEDURE — C1769 GUIDE WIRE: HCPCS | Performed by: SURGERY

## 2017-01-01 PROCEDURE — 93005 ELECTROCARDIOGRAM TRACING: CPT | Performed by: NURSE PRACTITIONER

## 2017-01-01 PROCEDURE — 85610 PROTHROMBIN TIME: CPT | Performed by: EMERGENCY MEDICINE

## 2017-01-01 PROCEDURE — 25010000002 DIAZEPAM PER 5 MG: Performed by: PSYCHIATRY & NEUROLOGY

## 2017-01-01 PROCEDURE — C1750 CATH, HEMODIALYSIS,LONG-TERM: HCPCS | Performed by: SURGERY

## 2017-01-01 PROCEDURE — 92611 MOTION FLUOROSCOPY/SWALLOW: CPT

## 2017-01-01 PROCEDURE — 87186 SC STD MICRODIL/AGAR DIL: CPT | Performed by: HOSPITALIST

## 2017-01-01 PROCEDURE — 71250 CT THORAX DX C-: CPT

## 2017-01-01 PROCEDURE — C1752 CATH,HEMODIALYSIS,SHORT-TERM: HCPCS | Performed by: SURGERY

## 2017-01-01 PROCEDURE — 93005 ELECTROCARDIOGRAM TRACING: CPT | Performed by: HOSPITALIST

## 2017-01-01 PROCEDURE — 25010000003 CEFAZOLIN PER 500 MG: Performed by: SURGERY

## 2017-01-01 PROCEDURE — 85730 THROMBOPLASTIN TIME PARTIAL: CPT | Performed by: INTERNAL MEDICINE

## 2017-01-01 PROCEDURE — 85730 THROMBOPLASTIN TIME PARTIAL: CPT | Performed by: SURGERY

## 2017-01-01 PROCEDURE — 93227 XTRNL ECG REC<48 HR R&I: CPT | Performed by: INTERNAL MEDICINE

## 2017-01-01 PROCEDURE — 94799 UNLISTED PULMONARY SVC/PX: CPT

## 2017-01-01 PROCEDURE — 71010 HC CHEST PA OR AP: CPT

## 2017-01-01 PROCEDURE — 05PY0JZ REMOVAL OF SYNTHETIC SUBSTITUTE FROM UPPER VEIN, OPEN APPROACH: ICD-10-PCS | Performed by: SURGERY

## 2017-01-01 PROCEDURE — 80053 COMPREHEN METABOLIC PANEL: CPT | Performed by: HOSPITALIST

## 2017-01-01 PROCEDURE — 99232 SBSQ HOSP IP/OBS MODERATE 35: CPT | Performed by: NURSE PRACTITIONER

## 2017-01-01 PROCEDURE — 02HV33Z INSERTION OF INFUSION DEVICE INTO SUPERIOR VENA CAVA, PERCUTANEOUS APPROACH: ICD-10-PCS | Performed by: SURGERY

## 2017-01-01 PROCEDURE — 82553 CREATINE MB FRACTION: CPT | Performed by: INTERNAL MEDICINE

## 2017-01-01 PROCEDURE — 97110 THERAPEUTIC EXERCISES: CPT

## 2017-01-01 PROCEDURE — 85025 COMPLETE CBC W/AUTO DIFF WBC: CPT | Performed by: FAMILY MEDICINE

## 2017-01-01 PROCEDURE — 85610 PROTHROMBIN TIME: CPT | Performed by: FAMILY MEDICINE

## 2017-01-01 PROCEDURE — 80061 LIPID PANEL: CPT | Performed by: HOSPITALIST

## 2017-01-01 PROCEDURE — 80048 BASIC METABOLIC PNL TOTAL CA: CPT | Performed by: INTERNAL MEDICINE

## 2017-01-01 PROCEDURE — 25010000002 CEFAZOLIN PER 500 MG: Performed by: NURSE ANESTHETIST, CERTIFIED REGISTERED

## 2017-01-01 PROCEDURE — 86850 RBC ANTIBODY SCREEN: CPT

## 2017-01-01 PROCEDURE — 87040 BLOOD CULTURE FOR BACTERIA: CPT | Performed by: HOSPITALIST

## 2017-01-01 PROCEDURE — 25010000002 VANCOMYCIN: Performed by: INTERNAL MEDICINE

## 2017-01-01 PROCEDURE — 87070 CULTURE OTHR SPECIMN AEROBIC: CPT | Performed by: SURGERY

## 2017-01-01 PROCEDURE — 25010000002 PROPOFOL 10 MG/ML EMULSION: Performed by: ANESTHESIOLOGY

## 2017-01-01 PROCEDURE — 25010000002 HEPARIN (PORCINE) PER 1000 UNITS: Performed by: SURGERY

## 2017-01-01 PROCEDURE — 92610 EVALUATE SWALLOWING FUNCTION: CPT

## 2017-01-01 PROCEDURE — 25010000002 FENTANYL CITRATE (PF) 100 MCG/2ML SOLUTION: Performed by: NURSE ANESTHETIST, CERTIFIED REGISTERED

## 2017-01-01 PROCEDURE — 85025 COMPLETE CBC W/AUTO DIFF WBC: CPT | Performed by: SURGERY

## 2017-01-01 PROCEDURE — 83036 HEMOGLOBIN GLYCOSYLATED A1C: CPT | Performed by: HOSPITALIST

## 2017-01-01 PROCEDURE — 77001 FLUOROGUIDE FOR VEIN DEVICE: CPT

## 2017-01-01 PROCEDURE — 99285 EMERGENCY DEPT VISIT HI MDM: CPT

## 2017-01-01 PROCEDURE — 25010000002 HALOPERIDOL LACTATE PER 5 MG: Performed by: HOSPITALIST

## 2017-01-01 PROCEDURE — 97162 PT EVAL MOD COMPLEX 30 MIN: CPT

## 2017-01-01 PROCEDURE — 25010000002 VANCOMYCIN PER 500 MG: Performed by: HOSPITALIST

## 2017-01-01 PROCEDURE — 25010000002 MIDAZOLAM PER 1 MG: Performed by: ANESTHESIOLOGY

## 2017-01-01 PROCEDURE — 84443 ASSAY THYROID STIM HORMONE: CPT | Performed by: FAMILY MEDICINE

## 2017-01-01 PROCEDURE — 87186 SC STD MICRODIL/AGAR DIL: CPT | Performed by: SURGERY

## 2017-01-01 PROCEDURE — 80053 COMPREHEN METABOLIC PANEL: CPT | Performed by: FAMILY MEDICINE

## 2017-01-01 PROCEDURE — 86927 PLASMA FRESH FROZEN: CPT

## 2017-01-01 PROCEDURE — 93225 XTRNL ECG REC<48 HRS REC: CPT

## 2017-01-01 PROCEDURE — 87147 CULTURE TYPE IMMUNOLOGIC: CPT | Performed by: SURGERY

## 2017-01-01 PROCEDURE — 84484 ASSAY OF TROPONIN QUANT: CPT | Performed by: FAMILY MEDICINE

## 2017-01-01 PROCEDURE — 93005 ELECTROCARDIOGRAM TRACING: CPT | Performed by: FAMILY MEDICINE

## 2017-01-01 PROCEDURE — 80053 COMPREHEN METABOLIC PANEL: CPT | Performed by: EMERGENCY MEDICINE

## 2017-01-01 PROCEDURE — 85025 COMPLETE CBC W/AUTO DIFF WBC: CPT | Performed by: INTERNAL MEDICINE

## 2017-01-01 PROCEDURE — 86900 BLOOD TYPING SEROLOGIC ABO: CPT | Performed by: INTERNAL MEDICINE

## 2017-01-01 PROCEDURE — 86923 COMPATIBILITY TEST ELECTRIC: CPT

## 2017-01-01 PROCEDURE — 82553 CREATINE MB FRACTION: CPT | Performed by: NURSE PRACTITIONER

## 2017-01-01 PROCEDURE — 25010000002 HEPARIN FLUSH (PORCINE) 100 UNIT/ML SOLUTION

## 2017-01-01 PROCEDURE — 25010000002 VITAMIN K1 PER 1 MG: Performed by: HOSPITALIST

## 2017-01-01 PROCEDURE — G0365 VESSEL MAPPING HEMO ACCESS: HCPCS

## 2017-01-01 PROCEDURE — 82553 CREATINE MB FRACTION: CPT | Performed by: FAMILY MEDICINE

## 2017-01-01 PROCEDURE — 25010000002 VITAMIN K1 1 MG/1 ML SOLUTION: Performed by: SURGERY

## 2017-01-01 PROCEDURE — 74230 X-RAY XM SWLNG FUNCJ C+: CPT

## 2017-01-01 PROCEDURE — 87075 CULTR BACTERIA EXCEPT BLOOD: CPT | Performed by: SURGERY

## 2017-01-01 PROCEDURE — 25010000002 IRON SUCROSE PER 1 MG: Performed by: INTERNAL MEDICINE

## 2017-01-01 PROCEDURE — B5181ZZ FLUOROSCOPY OF SUPERIOR VENA CAVA USING LOW OSMOLAR CONTRAST: ICD-10-PCS | Performed by: SURGERY

## 2017-01-01 PROCEDURE — 86850 RBC ANTIBODY SCREEN: CPT | Performed by: INTERNAL MEDICINE

## 2017-01-01 PROCEDURE — 99233 SBSQ HOSP IP/OBS HIGH 50: CPT | Performed by: INTERNAL MEDICINE

## 2017-01-01 PROCEDURE — 86901 BLOOD TYPING SEROLOGIC RH(D): CPT | Performed by: INTERNAL MEDICINE

## 2017-01-01 PROCEDURE — 93226 XTRNL ECG REC<48 HR SCAN A/R: CPT

## 2017-01-01 PROCEDURE — 99223 1ST HOSP IP/OBS HIGH 75: CPT | Performed by: INTERNAL MEDICINE

## 2017-01-01 PROCEDURE — 84484 ASSAY OF TROPONIN QUANT: CPT | Performed by: INTERNAL MEDICINE

## 2017-01-01 PROCEDURE — 83735 ASSAY OF MAGNESIUM: CPT | Performed by: FAMILY MEDICINE

## 2017-01-01 PROCEDURE — B5181ZA FLUOROSCOPY OF SUPERIOR VENA CAVA USING LOW OSMOLAR CONTRAST, GUIDANCE: ICD-10-PCS | Performed by: SURGERY

## 2017-01-01 PROCEDURE — 84436 ASSAY OF TOTAL THYROXINE: CPT | Performed by: FAMILY MEDICINE

## 2017-01-01 PROCEDURE — 87176 TISSUE HOMOGENIZATION CULTR: CPT | Performed by: SURGERY

## 2017-01-01 PROCEDURE — 93306 TTE W/DOPPLER COMPLETE: CPT | Performed by: INTERNAL MEDICINE

## 2017-01-01 PROCEDURE — 94002 VENT MGMT INPAT INIT DAY: CPT

## 2017-01-01 PROCEDURE — 93005 ELECTROCARDIOGRAM TRACING: CPT | Performed by: EMERGENCY MEDICINE

## 2017-01-01 PROCEDURE — 25010000002 PROPOFOL 10 MG/ML EMULSION: Performed by: NURSE ANESTHETIST, CERTIFIED REGISTERED

## 2017-01-01 PROCEDURE — 85007 BL SMEAR W/DIFF WBC COUNT: CPT | Performed by: NURSE PRACTITIONER

## 2017-01-01 PROCEDURE — 85007 BL SMEAR W/DIFF WBC COUNT: CPT | Performed by: HOSPITALIST

## 2017-01-01 PROCEDURE — 63510000001 EPOETIN ALFA PER 1000 UNITS: Performed by: INTERNAL MEDICINE

## 2017-01-01 PROCEDURE — 83880 ASSAY OF NATRIURETIC PEPTIDE: CPT | Performed by: HOSPITALIST

## 2017-01-01 PROCEDURE — 80048 BASIC METABOLIC PNL TOTAL CA: CPT | Performed by: SURGERY

## 2017-01-01 PROCEDURE — 25010000002 HALOPERIDOL LACTATE PER 5 MG

## 2017-01-01 PROCEDURE — 80202 ASSAY OF VANCOMYCIN: CPT | Performed by: INTERNAL MEDICINE

## 2017-01-01 PROCEDURE — 25010000002 LORAZEPAM PER 2 MG: Performed by: HOSPITALIST

## 2017-01-01 PROCEDURE — 84132 ASSAY OF SERUM POTASSIUM: CPT | Performed by: INTERNAL MEDICINE

## 2017-01-01 PROCEDURE — 93306 TTE W/DOPPLER COMPLETE: CPT

## 2017-01-01 PROCEDURE — 85027 COMPLETE CBC AUTOMATED: CPT | Performed by: NURSE PRACTITIONER

## 2017-01-01 PROCEDURE — 25010000002 PIPERACILLIN SOD-TAZOBACTAM PER 1 G: Performed by: HOSPITALIST

## 2017-01-01 PROCEDURE — 0JPT0XZ REMOVAL OF TUNNELED VASCULAR ACCESS DEVICE FROM TRUNK SUBCUTANEOUS TISSUE AND FASCIA, OPEN APPROACH: ICD-10-PCS | Performed by: SURGERY

## 2017-01-01 PROCEDURE — 82270 OCCULT BLOOD FECES: CPT | Performed by: HOSPITALIST

## 2017-01-01 DEVICE — PRECISION HSI CHRONIC CATHETER KIT,SYMMETRICAL TIP, HEPARIN COATING, SILVER ION SLEEVE AND TAL VENATRAC STYLET,14.5 FR/CH (4.8 MM) X 23 CM
Type: IMPLANTABLE DEVICE | Site: NECK | Status: FUNCTIONAL
Brand: PALINDROME

## 2017-01-01 RX ORDER — DIAZEPAM 2 MG/1
2 TABLET ORAL EVERY 8 HOURS PRN
Status: DISCONTINUED | OUTPATIENT
Start: 2017-01-01 | End: 2017-01-01 | Stop reason: HOSPADM

## 2017-01-01 RX ORDER — HEPARIN SODIUM 1000 [USP'U]/ML
3400 INJECTION, SOLUTION INTRAVENOUS; SUBCUTANEOUS ONCE
Status: COMPLETED | OUTPATIENT
Start: 2017-01-01 | End: 2017-01-01

## 2017-01-01 RX ORDER — LORAZEPAM 2 MG/ML
1 INJECTION INTRAMUSCULAR
Status: DISCONTINUED | OUTPATIENT
Start: 2017-01-01 | End: 2017-01-01 | Stop reason: HOSPADM

## 2017-01-01 RX ORDER — GLYCOPYRROLATE 0.2 MG/ML
0.2 INJECTION INTRAMUSCULAR; INTRAVENOUS
Status: DISCONTINUED | OUTPATIENT
Start: 2017-01-01 | End: 2017-01-01 | Stop reason: HOSPADM

## 2017-01-01 RX ORDER — ASPIRIN 81 MG/1
81 TABLET ORAL DAILY
Qty: 30 TABLET | Refills: 0 | Status: SHIPPED | OUTPATIENT
Start: 2017-01-01 | End: 2017-01-01

## 2017-01-01 RX ORDER — WARFARIN SODIUM 2.5 MG/1
2.5 TABLET ORAL
Status: COMPLETED | OUTPATIENT
Start: 2017-01-01 | End: 2017-01-01

## 2017-01-01 RX ORDER — HEPARIN SODIUM 1000 [USP'U]/ML
INJECTION, SOLUTION INTRAVENOUS; SUBCUTANEOUS
Status: COMPLETED
Start: 2017-01-01 | End: 2017-01-01

## 2017-01-01 RX ORDER — ATENOLOL 25 MG/1
25 TABLET ORAL 2 TIMES DAILY
Status: DISCONTINUED | OUTPATIENT
Start: 2017-01-01 | End: 2017-01-01

## 2017-01-01 RX ORDER — LANSOPRAZOLE
30 KIT EVERY MORNING
Status: DISCONTINUED | OUTPATIENT
Start: 2017-01-01 | End: 2017-01-01

## 2017-01-01 RX ORDER — WARFARIN SODIUM 2.5 MG/1
2.5 TABLET ORAL
Status: DISCONTINUED | OUTPATIENT
Start: 2017-01-01 | End: 2017-01-01

## 2017-01-01 RX ORDER — ATENOLOL 50 MG/1
50 TABLET ORAL 2 TIMES DAILY
Status: DISCONTINUED | OUTPATIENT
Start: 2017-01-01 | End: 2017-01-01

## 2017-01-01 RX ORDER — LANSOPRAZOLE
30 KIT EVERY MORNING
Status: DISCONTINUED | OUTPATIENT
Start: 2017-01-01 | End: 2017-01-01 | Stop reason: HOSPADM

## 2017-01-01 RX ORDER — PROMETHAZINE HYDROCHLORIDE 25 MG/ML
12.5 INJECTION, SOLUTION INTRAMUSCULAR; INTRAVENOUS ONCE AS NEEDED
Status: DISCONTINUED | OUTPATIENT
Start: 2017-01-01 | End: 2017-01-01 | Stop reason: HOSPADM

## 2017-01-01 RX ORDER — ISOSORBIDE MONONITRATE 30 MG/1
30 TABLET, EXTENDED RELEASE ORAL
Status: DISCONTINUED | OUTPATIENT
Start: 2017-01-01 | End: 2017-01-01

## 2017-01-01 RX ORDER — FUROSEMIDE 80 MG
80 TABLET ORAL DAILY
COMMUNITY
End: 2017-01-01 | Stop reason: HOSPADM

## 2017-01-01 RX ORDER — VANCOMYCIN HYDROCHLORIDE 1 G/200ML
1000 INJECTION, SOLUTION INTRAVENOUS ONCE
Status: COMPLETED | OUTPATIENT
Start: 2017-01-01 | End: 2017-01-01

## 2017-01-01 RX ORDER — HEPARIN SODIUM 1000 [USP'U]/ML
3800 INJECTION, SOLUTION INTRAVENOUS; SUBCUTANEOUS AS NEEDED
Status: DISCONTINUED | OUTPATIENT
Start: 2017-01-01 | End: 2017-01-01

## 2017-01-01 RX ORDER — SODIUM CHLORIDE 9 MG/ML
100 INJECTION, SOLUTION INTRAVENOUS CONTINUOUS
Status: DISCONTINUED | OUTPATIENT
Start: 2017-01-01 | End: 2017-01-01

## 2017-01-01 RX ORDER — LORAZEPAM 2 MG/ML
0.5 INJECTION INTRAMUSCULAR
Status: DISCONTINUED | OUTPATIENT
Start: 2017-01-01 | End: 2017-01-01 | Stop reason: HOSPADM

## 2017-01-01 RX ORDER — HYDRALAZINE HYDROCHLORIDE 20 MG/ML
5 INJECTION INTRAMUSCULAR; INTRAVENOUS
Status: DISCONTINUED | OUTPATIENT
Start: 2017-01-01 | End: 2017-01-01 | Stop reason: HOSPADM

## 2017-01-01 RX ORDER — ATROPINE SULFATE 0.4 MG/ML
0.5 AMPUL (ML) INJECTION ONCE
Status: DISCONTINUED | OUTPATIENT
Start: 2017-01-01 | End: 2017-01-01

## 2017-01-01 RX ORDER — HALOPERIDOL 5 MG/ML
2 INJECTION INTRAMUSCULAR EVERY 4 HOURS PRN
Status: DISCONTINUED | OUTPATIENT
Start: 2017-01-01 | End: 2017-01-01 | Stop reason: HOSPADM

## 2017-01-01 RX ORDER — SODIUM CHLORIDE, SODIUM LACTATE, POTASSIUM CHLORIDE, CALCIUM CHLORIDE 600; 310; 30; 20 MG/100ML; MG/100ML; MG/100ML; MG/100ML
9 INJECTION, SOLUTION INTRAVENOUS CONTINUOUS
Status: DISCONTINUED | OUTPATIENT
Start: 2017-01-01 | End: 2017-01-01

## 2017-01-01 RX ORDER — WARFARIN SODIUM 7.5 MG/1
7.5 TABLET ORAL
Status: COMPLETED | OUTPATIENT
Start: 2017-01-01 | End: 2017-01-01

## 2017-01-01 RX ORDER — HALOPERIDOL 5 MG/ML
1 INJECTION INTRAMUSCULAR EVERY 4 HOURS PRN
Status: DISCONTINUED | OUTPATIENT
Start: 2017-01-01 | End: 2017-01-01 | Stop reason: HOSPADM

## 2017-01-01 RX ORDER — MANNITOL 250 MG/ML
12.5 INJECTION, SOLUTION INTRAVENOUS AS NEEDED
Status: CANCELLED | OUTPATIENT
Start: 2017-01-01 | End: 2017-01-01

## 2017-01-01 RX ORDER — HALOPERIDOL 2 MG/ML
1 SOLUTION ORAL EVERY 4 HOURS PRN
Status: DISCONTINUED | OUTPATIENT
Start: 2017-01-01 | End: 2017-01-01 | Stop reason: HOSPADM

## 2017-01-01 RX ORDER — LEVOTHYROXINE SODIUM 0.1 MG/1
100 TABLET ORAL
COMMUNITY

## 2017-01-01 RX ORDER — HYDROCODONE BITARTRATE AND ACETAMINOPHEN 5; 325 MG/1; MG/1
1 TABLET ORAL EVERY 6 HOURS PRN
Status: ON HOLD | COMMUNITY
End: 2017-01-01

## 2017-01-01 RX ORDER — ALBUMIN (HUMAN) 12.5 G/50ML
12.5 SOLUTION INTRAVENOUS AS NEEDED
Status: ACTIVE | OUTPATIENT
Start: 2017-01-01 | End: 2017-01-01

## 2017-01-01 RX ORDER — ASPIRIN 81 MG/1
81 TABLET ORAL DAILY
Status: DISCONTINUED | OUTPATIENT
Start: 2017-01-01 | End: 2017-01-01 | Stop reason: HOSPADM

## 2017-01-01 RX ORDER — LACTULOSE 10 G/15ML
20 SOLUTION ORAL DAILY PRN
Status: ON HOLD | COMMUNITY
End: 2017-01-01

## 2017-01-01 RX ORDER — ALPRAZOLAM 0.5 MG/1
0.5 TABLET ORAL EVERY 12 HOURS
Status: ON HOLD | COMMUNITY
End: 2017-01-01

## 2017-01-01 RX ORDER — IPRATROPIUM BROMIDE AND ALBUTEROL SULFATE 2.5; .5 MG/3ML; MG/3ML
3 SOLUTION RESPIRATORY (INHALATION)
Status: DISCONTINUED | OUTPATIENT
Start: 2017-01-01 | End: 2017-01-01

## 2017-01-01 RX ORDER — FENTANYL CITRATE 50 UG/ML
25 INJECTION, SOLUTION INTRAMUSCULAR; INTRAVENOUS
Status: DISCONTINUED | OUTPATIENT
Start: 2017-01-01 | End: 2017-01-01 | Stop reason: HOSPADM

## 2017-01-01 RX ORDER — ASPIRIN 81 MG/1
81 TABLET ORAL DAILY
Status: DISCONTINUED | OUTPATIENT
Start: 2017-01-01 | End: 2017-01-01

## 2017-01-01 RX ORDER — HALOPERIDOL 1 MG/1
2 TABLET ORAL EVERY 4 HOURS PRN
Status: DISCONTINUED | OUTPATIENT
Start: 2017-01-01 | End: 2017-01-01 | Stop reason: HOSPADM

## 2017-01-01 RX ORDER — ONDANSETRON 2 MG/ML
4 INJECTION INTRAMUSCULAR; INTRAVENOUS ONCE AS NEEDED
Status: DISCONTINUED | OUTPATIENT
Start: 2017-01-01 | End: 2017-01-01 | Stop reason: HOSPADM

## 2017-01-01 RX ORDER — PHYTONADIONE 2 MG/ML
2.5 INJECTION, EMULSION INTRAMUSCULAR; INTRAVENOUS; SUBCUTANEOUS ONCE
Status: COMPLETED | OUTPATIENT
Start: 2017-01-01 | End: 2017-01-01

## 2017-01-01 RX ORDER — FAMOTIDINE 10 MG/ML
20 INJECTION, SOLUTION INTRAVENOUS ONCE
Status: COMPLETED | OUTPATIENT
Start: 2017-01-01 | End: 2017-01-01

## 2017-01-01 RX ORDER — HEPARIN SODIUM 5000 [USP'U]/ML
5000 INJECTION, SOLUTION INTRAVENOUS; SUBCUTANEOUS EVERY 12 HOURS SCHEDULED
Status: DISCONTINUED | OUTPATIENT
Start: 2017-01-01 | End: 2017-01-01

## 2017-01-01 RX ORDER — HEPARIN SODIUM 1000 [USP'U]/ML
INJECTION, SOLUTION INTRAVENOUS; SUBCUTANEOUS AS NEEDED
Status: DISCONTINUED | OUTPATIENT
Start: 2017-01-01 | End: 2017-01-01 | Stop reason: HOSPADM

## 2017-01-01 RX ORDER — LEVOTHYROXINE SODIUM 0.1 MG/1
100 TABLET ORAL
Status: DISCONTINUED | OUTPATIENT
Start: 2017-01-01 | End: 2017-01-01

## 2017-01-01 RX ORDER — GLYCOPYRROLATE 0.2 MG/ML
INJECTION INTRAMUSCULAR; INTRAVENOUS AS NEEDED
Status: DISCONTINUED | OUTPATIENT
Start: 2017-01-01 | End: 2017-01-01 | Stop reason: SURG

## 2017-01-01 RX ORDER — PROPOFOL 10 MG/ML
VIAL (ML) INTRAVENOUS CONTINUOUS PRN
Status: DISCONTINUED | OUTPATIENT
Start: 2017-01-01 | End: 2017-01-01 | Stop reason: SURG

## 2017-01-01 RX ORDER — ALPRAZOLAM 0.25 MG/1
0.25 TABLET ORAL NIGHTLY
Status: DISCONTINUED | OUTPATIENT
Start: 2017-01-01 | End: 2017-01-01

## 2017-01-01 RX ORDER — ALPRAZOLAM 0.25 MG/1
0.25 TABLET ORAL 4 TIMES DAILY PRN
Status: DISCONTINUED | OUTPATIENT
Start: 2017-01-01 | End: 2017-01-01

## 2017-01-01 RX ORDER — SODIUM CHLORIDE 9 MG/ML
INJECTION, SOLUTION INTRAVENOUS CONTINUOUS PRN
Status: DISCONTINUED | OUTPATIENT
Start: 2017-01-01 | End: 2017-01-01 | Stop reason: SURG

## 2017-01-01 RX ORDER — LORAZEPAM 1 MG/1
2 TABLET ORAL
Status: DISCONTINUED | OUTPATIENT
Start: 2017-01-01 | End: 2017-01-01 | Stop reason: HOSPADM

## 2017-01-01 RX ORDER — HALOPERIDOL 2 MG/ML
2 SOLUTION ORAL EVERY 4 HOURS PRN
Status: DISCONTINUED | OUTPATIENT
Start: 2017-01-01 | End: 2017-01-01 | Stop reason: HOSPADM

## 2017-01-01 RX ORDER — HYDRALAZINE HYDROCHLORIDE 25 MG/1
25 TABLET, FILM COATED ORAL EVERY 8 HOURS SCHEDULED
Status: DISCONTINUED | OUTPATIENT
Start: 2017-01-01 | End: 2017-01-01

## 2017-01-01 RX ORDER — LIDOCAINE HYDROCHLORIDE 20 MG/ML
INJECTION, SOLUTION INFILTRATION; PERINEURAL AS NEEDED
Status: DISCONTINUED | OUTPATIENT
Start: 2017-01-01 | End: 2017-01-01 | Stop reason: SURG

## 2017-01-01 RX ORDER — HYDRALAZINE HYDROCHLORIDE 25 MG/1
25 TABLET, FILM COATED ORAL DAILY
Status: DISCONTINUED | OUTPATIENT
Start: 2017-01-01 | End: 2017-01-01

## 2017-01-01 RX ORDER — NEOSTIGMINE METHYLSULFATE 1 MG/ML
INJECTION, SOLUTION INTRAVENOUS AS NEEDED
Status: DISCONTINUED | OUTPATIENT
Start: 2017-01-01 | End: 2017-01-01 | Stop reason: SURG

## 2017-01-01 RX ORDER — DIPHENOXYLATE HYDROCHLORIDE AND ATROPINE SULFATE 2.5; .025 MG/1; MG/1
1 TABLET ORAL
Status: DISCONTINUED | OUTPATIENT
Start: 2017-01-01 | End: 2017-01-01 | Stop reason: HOSPADM

## 2017-01-01 RX ORDER — PROMETHAZINE HYDROCHLORIDE 6.25 MG/5ML
6.25 SYRUP ORAL EVERY 4 HOURS PRN
Status: DISCONTINUED | OUTPATIENT
Start: 2017-01-01 | End: 2017-01-01 | Stop reason: HOSPADM

## 2017-01-01 RX ORDER — LORAZEPAM 2 MG/ML
0.5 CONCENTRATE ORAL
Status: DISCONTINUED | OUTPATIENT
Start: 2017-01-01 | End: 2017-01-01 | Stop reason: HOSPADM

## 2017-01-01 RX ORDER — LORAZEPAM 1 MG/1
1 TABLET ORAL
Status: DISCONTINUED | OUTPATIENT
Start: 2017-01-01 | End: 2017-01-01 | Stop reason: HOSPADM

## 2017-01-01 RX ORDER — ONDANSETRON 2 MG/ML
4 INJECTION INTRAMUSCULAR; INTRAVENOUS EVERY 6 HOURS PRN
Status: DISCONTINUED | OUTPATIENT
Start: 2017-01-01 | End: 2017-01-01 | Stop reason: HOSPADM

## 2017-01-01 RX ORDER — MAGNESIUM HYDROXIDE 1200 MG/15ML
LIQUID ORAL AS NEEDED
Status: DISCONTINUED | OUTPATIENT
Start: 2017-01-01 | End: 2017-01-01 | Stop reason: HOSPADM

## 2017-01-01 RX ORDER — MIDAZOLAM HYDROCHLORIDE 1 MG/ML
2 INJECTION INTRAMUSCULAR; INTRAVENOUS
Status: DISCONTINUED | OUTPATIENT
Start: 2017-01-01 | End: 2017-01-01 | Stop reason: HOSPADM

## 2017-01-01 RX ORDER — PROMETHAZINE HYDROCHLORIDE 12.5 MG/1
6.25 SUPPOSITORY RECTAL EVERY 4 HOURS PRN
Status: DISCONTINUED | OUTPATIENT
Start: 2017-01-01 | End: 2017-01-01 | Stop reason: HOSPADM

## 2017-01-01 RX ORDER — HALOPERIDOL 5 MG/ML
INJECTION INTRAMUSCULAR
Status: COMPLETED
Start: 2017-01-01 | End: 2017-01-01

## 2017-01-01 RX ORDER — PROMETHAZINE HYDROCHLORIDE 25 MG/1
25 SUPPOSITORY RECTAL ONCE AS NEEDED
Status: DISCONTINUED | OUTPATIENT
Start: 2017-01-01 | End: 2017-01-01 | Stop reason: HOSPADM

## 2017-01-01 RX ORDER — BISACODYL 10 MG
10 SUPPOSITORY, RECTAL RECTAL DAILY PRN
Status: ON HOLD | COMMUNITY
End: 2017-01-01

## 2017-01-01 RX ORDER — SODIUM CHLORIDE 0.9 % (FLUSH) 0.9 %
1-10 SYRINGE (ML) INJECTION AS NEEDED
Status: DISCONTINUED | OUTPATIENT
Start: 2017-01-01 | End: 2017-01-01 | Stop reason: HOSPADM

## 2017-01-01 RX ORDER — PHYTONADIONE 2 MG/ML
5 INJECTION, EMULSION INTRAMUSCULAR; INTRAVENOUS; SUBCUTANEOUS ONCE
Status: DISCONTINUED | OUTPATIENT
Start: 2017-01-01 | End: 2017-01-01

## 2017-01-01 RX ORDER — WARFARIN SODIUM 10 MG/1
10 TABLET ORAL
Status: DISCONTINUED | OUTPATIENT
Start: 2017-01-01 | End: 2017-01-01

## 2017-01-01 RX ORDER — LABETALOL HYDROCHLORIDE 5 MG/ML
5 INJECTION, SOLUTION INTRAVENOUS
Status: DISCONTINUED | OUTPATIENT
Start: 2017-01-01 | End: 2017-01-01 | Stop reason: HOSPADM

## 2017-01-01 RX ORDER — WARFARIN SODIUM 5 MG/1
5 TABLET ORAL
Status: COMPLETED | OUTPATIENT
Start: 2017-01-01 | End: 2017-01-01

## 2017-01-01 RX ORDER — HALOPERIDOL 1 MG/1
1 TABLET ORAL EVERY 4 HOURS PRN
Status: DISCONTINUED | OUTPATIENT
Start: 2017-01-01 | End: 2017-01-01 | Stop reason: HOSPADM

## 2017-01-01 RX ORDER — LORAZEPAM 0.5 MG/1
0.5 TABLET ORAL
Status: DISCONTINUED | OUTPATIENT
Start: 2017-01-01 | End: 2017-01-01 | Stop reason: HOSPADM

## 2017-01-01 RX ORDER — HEPARIN SODIUM 1000 [USP'U]/ML
INJECTION, SOLUTION INTRAVENOUS; SUBCUTANEOUS
Status: DISPENSED
Start: 2017-01-01 | End: 2017-01-01

## 2017-01-01 RX ORDER — HYDRALAZINE HYDROCHLORIDE 25 MG/1
25 TABLET, FILM COATED ORAL DAILY
COMMUNITY
End: 2017-01-01 | Stop reason: HOSPADM

## 2017-01-01 RX ORDER — MORPHINE SULFATE 100 MG/5ML
20 SOLUTION ORAL
Status: DISCONTINUED | OUTPATIENT
Start: 2017-01-01 | End: 2017-01-01 | Stop reason: HOSPADM

## 2017-01-01 RX ORDER — HEPARIN SODIUM 1000 [USP'U]/ML
3800 INJECTION, SOLUTION INTRAVENOUS; SUBCUTANEOUS ONCE
Status: COMPLETED | OUTPATIENT
Start: 2017-01-01 | End: 2017-01-01

## 2017-01-01 RX ORDER — FUROSEMIDE 80 MG
80 TABLET ORAL DAILY
Status: DISCONTINUED | OUTPATIENT
Start: 2017-01-01 | End: 2017-01-01

## 2017-01-01 RX ORDER — HALOPERIDOL 5 MG/ML
1 INJECTION INTRAMUSCULAR EVERY 6 HOURS PRN
Status: DISCONTINUED | OUTPATIENT
Start: 2017-01-01 | End: 2017-01-01 | Stop reason: HOSPADM

## 2017-01-01 RX ORDER — MORPHINE SULFATE 100 MG/5ML
10 SOLUTION ORAL
Status: DISCONTINUED | OUTPATIENT
Start: 2017-01-01 | End: 2017-01-01 | Stop reason: HOSPADM

## 2017-01-01 RX ORDER — FENTANYL CITRATE 50 UG/ML
INJECTION, SOLUTION INTRAMUSCULAR; INTRAVENOUS AS NEEDED
Status: DISCONTINUED | OUTPATIENT
Start: 2017-01-01 | End: 2017-01-01 | Stop reason: SURG

## 2017-01-01 RX ORDER — HYDROCODONE BITARTRATE AND ACETAMINOPHEN 5; 325 MG/1; MG/1
1 TABLET ORAL EVERY 6 HOURS PRN
Status: DISCONTINUED | OUTPATIENT
Start: 2017-01-01 | End: 2017-01-01 | Stop reason: HOSPADM

## 2017-01-01 RX ORDER — HYDROCODONE BITARTRATE AND ACETAMINOPHEN 5; 325 MG/1; MG/1
1 TABLET ORAL 3 TIMES DAILY
Status: ON HOLD | COMMUNITY
End: 2017-01-01

## 2017-01-01 RX ORDER — WARFARIN SODIUM 1 MG/1
TABLET ORAL
Qty: 30 TABLET | Refills: 0 | Status: ON HOLD | OUTPATIENT
Start: 2017-01-01 | End: 2017-01-01

## 2017-01-01 RX ORDER — PROPOFOL 10 MG/ML
VIAL (ML) INTRAVENOUS AS NEEDED
Status: DISCONTINUED | OUTPATIENT
Start: 2017-01-01 | End: 2017-01-01 | Stop reason: SURG

## 2017-01-01 RX ORDER — WARFARIN SODIUM 7.5 MG/1
7.5 TABLET ORAL
Status: DISCONTINUED | OUTPATIENT
Start: 2017-01-01 | End: 2017-01-01

## 2017-01-01 RX ORDER — HEPARIN SODIUM 5000 [USP'U]/ML
45.2 INJECTION, SOLUTION INTRAVENOUS; SUBCUTANEOUS ONCE
Status: COMPLETED | OUTPATIENT
Start: 2017-01-01 | End: 2017-01-01

## 2017-01-01 RX ORDER — DIAZEPAM 5 MG/ML
1 INJECTION, SOLUTION INTRAMUSCULAR; INTRAVENOUS EVERY 8 HOURS PRN
Status: DISCONTINUED | OUTPATIENT
Start: 2017-01-01 | End: 2017-01-01

## 2017-01-01 RX ORDER — HEPARIN SODIUM 5000 [USP'U]/ML
29.9-45.2 INJECTION, SOLUTION INTRAVENOUS; SUBCUTANEOUS EVERY 6 HOURS PRN
Status: DISCONTINUED | OUTPATIENT
Start: 2017-01-01 | End: 2017-01-01

## 2017-01-01 RX ORDER — MANNITOL 250 MG/ML
12.5 INJECTION, SOLUTION INTRAVENOUS AS NEEDED
Status: ACTIVE | OUTPATIENT
Start: 2017-01-01 | End: 2017-01-01

## 2017-01-01 RX ORDER — PROMETHAZINE HYDROCHLORIDE 25 MG/1
25 TABLET ORAL ONCE AS NEEDED
Status: DISCONTINUED | OUTPATIENT
Start: 2017-01-01 | End: 2017-01-01 | Stop reason: HOSPADM

## 2017-01-01 RX ORDER — ACETAMINOPHEN 650 MG/1
650 SUPPOSITORY RECTAL EVERY 4 HOURS PRN
Status: DISCONTINUED | OUTPATIENT
Start: 2017-01-01 | End: 2017-01-01 | Stop reason: HOSPADM

## 2017-01-01 RX ORDER — HEPARIN SODIUM 1000 [USP'U]/ML
3800 INJECTION, SOLUTION INTRAVENOUS; SUBCUTANEOUS ONCE
Status: DISCONTINUED | OUTPATIENT
Start: 2017-01-01 | End: 2017-01-01

## 2017-01-01 RX ORDER — DIAZEPAM 2 MG/1
2 TABLET ORAL EVERY 8 HOURS PRN
Qty: 10 TABLET | Refills: 0 | Status: SHIPPED | OUTPATIENT
Start: 2017-01-01 | End: 2017-01-01

## 2017-01-01 RX ORDER — WARFARIN SODIUM 2 MG/1
2 TABLET ORAL
Status: DISCONTINUED | OUTPATIENT
Start: 2017-01-01 | End: 2017-01-01

## 2017-01-01 RX ORDER — ALPRAZOLAM 0.25 MG/1
0.25 TABLET ORAL NIGHTLY
COMMUNITY
End: 2017-01-01 | Stop reason: HOSPADM

## 2017-01-01 RX ORDER — WARFARIN SODIUM 1 MG/1
1 TABLET ORAL
Status: DISCONTINUED | OUTPATIENT
Start: 2017-01-01 | End: 2017-01-01 | Stop reason: HOSPADM

## 2017-01-01 RX ORDER — ALPRAZOLAM 0.5 MG/1
0.5 TABLET ORAL 4 TIMES DAILY PRN
COMMUNITY

## 2017-01-01 RX ORDER — PANTOPRAZOLE SODIUM 40 MG/1
40 TABLET, DELAYED RELEASE ORAL
Status: DISCONTINUED | OUTPATIENT
Start: 2017-01-01 | End: 2017-01-01

## 2017-01-01 RX ORDER — ROCURONIUM BROMIDE 10 MG/ML
INJECTION, SOLUTION INTRAVENOUS AS NEEDED
Status: DISCONTINUED | OUTPATIENT
Start: 2017-01-01 | End: 2017-01-01 | Stop reason: SURG

## 2017-01-01 RX ORDER — ACETAMINOPHEN 10 MG/ML
500 INJECTION, SOLUTION INTRAVENOUS ONCE
Status: COMPLETED | OUTPATIENT
Start: 2017-01-01 | End: 2017-01-01

## 2017-01-01 RX ORDER — ATENOLOL 25 MG/1
25 TABLET ORAL
Status: DISCONTINUED | OUTPATIENT
Start: 2017-01-01 | End: 2017-01-01

## 2017-01-01 RX ORDER — ACETAMINOPHEN 325 MG/1
650 TABLET ORAL EVERY 4 HOURS PRN
Status: DISCONTINUED | OUTPATIENT
Start: 2017-01-01 | End: 2017-01-01 | Stop reason: HOSPADM

## 2017-01-01 RX ORDER — HALOPERIDOL 5 MG/ML
0.5 INJECTION INTRAMUSCULAR EVERY 6 HOURS PRN
Status: DISCONTINUED | OUTPATIENT
Start: 2017-01-01 | End: 2017-01-01

## 2017-01-01 RX ORDER — ISOSORBIDE MONONITRATE 30 MG/1
30 TABLET, EXTENDED RELEASE ORAL
Status: DISCONTINUED | OUTPATIENT
Start: 2017-01-01 | End: 2017-01-01 | Stop reason: HOSPADM

## 2017-01-01 RX ORDER — PANTOPRAZOLE SODIUM 40 MG/10ML
40 INJECTION, POWDER, LYOPHILIZED, FOR SOLUTION INTRAVENOUS
Status: DISCONTINUED | OUTPATIENT
Start: 2017-01-01 | End: 2017-01-01 | Stop reason: CLARIF

## 2017-01-01 RX ORDER — MIDAZOLAM HYDROCHLORIDE 1 MG/ML
0.5 INJECTION INTRAMUSCULAR; INTRAVENOUS
Status: DISCONTINUED | OUTPATIENT
Start: 2017-01-01 | End: 2017-01-01 | Stop reason: HOSPADM

## 2017-01-01 RX ORDER — MORPHINE SULFATE 2 MG/ML
2 INJECTION, SOLUTION INTRAMUSCULAR; INTRAVENOUS
Status: DISCONTINUED | OUTPATIENT
Start: 2017-01-01 | End: 2017-01-01 | Stop reason: HOSPADM

## 2017-01-01 RX ORDER — MORPHINE SULFATE 2 MG/ML
6 INJECTION, SOLUTION INTRAMUSCULAR; INTRAVENOUS
Status: DISCONTINUED | OUTPATIENT
Start: 2017-01-01 | End: 2017-01-01 | Stop reason: HOSPADM

## 2017-01-01 RX ORDER — WARFARIN SODIUM 2.5 MG/1
2.5 TABLET ORAL
COMMUNITY
End: 2017-01-01 | Stop reason: HOSPADM

## 2017-01-01 RX ORDER — HEPARIN SODIUM 1000 [USP'U]/ML
10000 INJECTION, SOLUTION INTRAVENOUS; SUBCUTANEOUS ONCE
Status: DISCONTINUED | OUTPATIENT
Start: 2017-01-01 | End: 2017-01-01

## 2017-01-01 RX ORDER — PROMETHAZINE HYDROCHLORIDE 25 MG/1
6.25 TABLET ORAL EVERY 4 HOURS PRN
Status: DISCONTINUED | OUTPATIENT
Start: 2017-01-01 | End: 2017-01-01 | Stop reason: HOSPADM

## 2017-01-01 RX ORDER — LORAZEPAM 2 MG/ML
1 INJECTION INTRAMUSCULAR EVERY 4 HOURS PRN
Status: DISCONTINUED | OUTPATIENT
Start: 2017-01-01 | End: 2017-01-01

## 2017-01-01 RX ORDER — LORAZEPAM 2 MG/ML
1 CONCENTRATE ORAL
Status: DISCONTINUED | OUTPATIENT
Start: 2017-01-01 | End: 2017-01-01 | Stop reason: HOSPADM

## 2017-01-01 RX ORDER — ISOSORBIDE MONONITRATE 30 MG/1
30 TABLET, EXTENDED RELEASE ORAL
Qty: 30 TABLET | Refills: 0 | Status: SHIPPED | OUTPATIENT
Start: 2017-01-01 | End: 2017-01-01

## 2017-01-01 RX ORDER — MIDAZOLAM HYDROCHLORIDE 1 MG/ML
1 INJECTION INTRAMUSCULAR; INTRAVENOUS
Status: DISCONTINUED | OUTPATIENT
Start: 2017-01-01 | End: 2017-01-01 | Stop reason: HOSPADM

## 2017-01-01 RX ORDER — GLYCOPYRROLATE 0.2 MG/ML
0.4 INJECTION INTRAMUSCULAR; INTRAVENOUS
Status: DISCONTINUED | OUTPATIENT
Start: 2017-01-01 | End: 2017-01-01 | Stop reason: HOSPADM

## 2017-01-01 RX ORDER — PROMETHAZINE HYDROCHLORIDE 25 MG/ML
6.25 INJECTION, SOLUTION INTRAMUSCULAR; INTRAVENOUS EVERY 4 HOURS PRN
Status: DISCONTINUED | OUTPATIENT
Start: 2017-01-01 | End: 2017-01-01 | Stop reason: HOSPADM

## 2017-01-01 RX ORDER — LORAZEPAM 2 MG/ML
2 CONCENTRATE ORAL
Status: DISCONTINUED | OUTPATIENT
Start: 2017-01-01 | End: 2017-01-01 | Stop reason: HOSPADM

## 2017-01-01 RX ORDER — HYDROCODONE BITARTRATE AND ACETAMINOPHEN 5; 325 MG/1; MG/1
1 TABLET ORAL EVERY 6 HOURS PRN
Qty: 10 TABLET | Refills: 0 | Status: SHIPPED | OUTPATIENT
Start: 2017-01-01 | End: 2017-01-01

## 2017-01-01 RX ORDER — MORPHINE SULFATE 100 MG/5ML
5 SOLUTION ORAL
Status: DISCONTINUED | OUTPATIENT
Start: 2017-01-01 | End: 2017-01-01 | Stop reason: HOSPADM

## 2017-01-01 RX ORDER — LORAZEPAM 2 MG/ML
2 INJECTION INTRAMUSCULAR
Status: DISCONTINUED | OUTPATIENT
Start: 2017-01-01 | End: 2017-01-01 | Stop reason: HOSPADM

## 2017-01-01 RX ORDER — ALBUTEROL SULFATE 2.5 MG/3ML
2.5 SOLUTION RESPIRATORY (INHALATION) ONCE AS NEEDED
Status: DISCONTINUED | OUTPATIENT
Start: 2017-01-01 | End: 2017-01-01 | Stop reason: HOSPADM

## 2017-01-01 RX ORDER — LEVOTHYROXINE SODIUM 0.1 MG/1
100 TABLET ORAL DAILY
Status: DISCONTINUED | OUTPATIENT
Start: 2017-01-01 | End: 2017-01-01 | Stop reason: HOSPADM

## 2017-01-01 RX ORDER — VANCOMYCIN HYDROCHLORIDE 1 G/200ML
1000 INJECTION, SOLUTION INTRAVENOUS ONCE
Status: DISCONTINUED | OUTPATIENT
Start: 2017-01-01 | End: 2017-01-01

## 2017-01-01 RX ORDER — SODIUM CHLORIDE 0.9 % (FLUSH) 0.9 %
10 SYRINGE (ML) INJECTION AS NEEDED
Status: DISCONTINUED | OUTPATIENT
Start: 2017-01-01 | End: 2017-01-01 | Stop reason: HOSPADM

## 2017-01-01 RX ORDER — ATENOLOL 50 MG/1
50 TABLET ORAL 2 TIMES DAILY
COMMUNITY
End: 2017-01-01 | Stop reason: HOSPADM

## 2017-01-01 RX ORDER — HEPARIN SODIUM 1000 [USP'U]/ML
3000 INJECTION, SOLUTION INTRAVENOUS; SUBCUTANEOUS AS NEEDED
Status: DISCONTINUED | OUTPATIENT
Start: 2017-01-01 | End: 2017-01-01

## 2017-01-01 RX ORDER — LANSOPRAZOLE
30 KIT EVERY MORNING
Qty: 300 ML | Refills: 0 | Status: SHIPPED | OUTPATIENT
Start: 2017-01-01 | End: 2017-01-01

## 2017-01-01 RX ORDER — CEFAZOLIN SODIUM 500 MG/2.2ML
INJECTION, POWDER, FOR SOLUTION INTRAMUSCULAR; INTRAVENOUS AS NEEDED
Status: DISCONTINUED | OUTPATIENT
Start: 2017-01-01 | End: 2017-01-01 | Stop reason: SURG

## 2017-01-01 RX ORDER — HALOPERIDOL 5 MG/ML
1 INJECTION INTRAMUSCULAR EVERY 6 HOURS PRN
Status: DISCONTINUED | OUTPATIENT
Start: 2017-01-01 | End: 2017-01-01

## 2017-01-01 RX ORDER — ACETAMINOPHEN 160 MG/5ML
650 SOLUTION ORAL EVERY 4 HOURS PRN
Status: DISCONTINUED | OUTPATIENT
Start: 2017-01-01 | End: 2017-01-01 | Stop reason: HOSPADM

## 2017-01-01 RX ORDER — PROMETHAZINE HYDROCHLORIDE 25 MG/ML
5 INJECTION, SOLUTION INTRAMUSCULAR; INTRAVENOUS ONCE AS NEEDED
Status: DISCONTINUED | OUTPATIENT
Start: 2017-01-01 | End: 2017-01-01 | Stop reason: HOSPADM

## 2017-01-01 RX ADMIN — HALOPERIDOL LACTATE 2 MG: 5 INJECTION, SOLUTION INTRAMUSCULAR at 17:42

## 2017-01-01 RX ADMIN — ISOSORBIDE MONONITRATE 30 MG: 30 TABLET, EXTENDED RELEASE ORAL at 08:22

## 2017-01-01 RX ADMIN — HYDROCODONE BITARTATE AND ACETAMINOPHEN 1 TABLET: 5; 325 TABLET ORAL at 06:57

## 2017-01-01 RX ADMIN — HEPARIN SODIUM 4000 UNITS: 5000 INJECTION, SOLUTION INTRAVENOUS; SUBCUTANEOUS at 05:17

## 2017-01-01 RX ADMIN — TAZOBACTAM SODIUM AND PIPERACILLIN SODIUM 3.38 G: 375; 3 INJECTION, SOLUTION INTRAVENOUS at 01:55

## 2017-01-01 RX ADMIN — LANSOPRAZOLE 30 MG: KIT at 06:29

## 2017-01-01 RX ADMIN — LEVOTHYROXINE SODIUM 100 MCG: 100 TABLET ORAL at 06:43

## 2017-01-01 RX ADMIN — MORPHINE SULFATE 4 MG: 4 INJECTION, SOLUTION INTRAMUSCULAR; INTRAVENOUS at 06:29

## 2017-01-01 RX ADMIN — ASPIRIN 81 MG: 81 TABLET ORAL at 08:57

## 2017-01-01 RX ADMIN — Medication: at 09:02

## 2017-01-01 RX ADMIN — ISOSORBIDE MONONITRATE 30 MG: 30 TABLET, EXTENDED RELEASE ORAL at 12:26

## 2017-01-01 RX ADMIN — LEVOTHYROXINE SODIUM 100 MCG: 100 TABLET ORAL at 05:30

## 2017-01-01 RX ADMIN — LEVOTHYROXINE SODIUM 100 MCG: 100 TABLET ORAL at 06:20

## 2017-01-01 RX ADMIN — ERYTHROPOIETIN 10000 UNITS: 10000 INJECTION, SOLUTION INTRAVENOUS; SUBCUTANEOUS at 17:19

## 2017-01-01 RX ADMIN — HEPARIN SODIUM 3800 UNITS: 1000 INJECTION, SOLUTION INTRAVENOUS; SUBCUTANEOUS at 14:34

## 2017-01-01 RX ADMIN — HYDROCODONE BITARTATE AND ACETAMINOPHEN 1 TABLET: 5; 325 TABLET ORAL at 15:48

## 2017-01-01 RX ADMIN — LEVOTHYROXINE SODIUM 100 MCG: 100 TABLET ORAL at 10:01

## 2017-01-01 RX ADMIN — LEVOTHYROXINE SODIUM 100 MCG: 100 TABLET ORAL at 06:29

## 2017-01-01 RX ADMIN — HEPARIN SODIUM 13.3 UNITS/KG/HR: 10000 INJECTION, SOLUTION INTRAVENOUS at 12:08

## 2017-01-01 RX ADMIN — HYDRALAZINE HYDROCHLORIDE 25 MG: 25 TABLET ORAL at 21:00

## 2017-01-01 RX ADMIN — HEPARIN SODIUM 5000 UNITS: 5000 INJECTION, SOLUTION INTRAVENOUS; SUBCUTANEOUS at 20:30

## 2017-01-01 RX ADMIN — MORPHINE SULFATE 4 MG: 4 INJECTION, SOLUTION INTRAMUSCULAR; INTRAVENOUS at 21:22

## 2017-01-01 RX ADMIN — PANTOPRAZOLE SODIUM 40 MG: 40 TABLET, DELAYED RELEASE ORAL at 06:35

## 2017-01-01 RX ADMIN — LEVOTHYROXINE SODIUM 100 MCG: 100 TABLET ORAL at 10:48

## 2017-01-01 RX ADMIN — ALPRAZOLAM 0.25 MG: 0.25 TABLET ORAL at 20:47

## 2017-01-01 RX ADMIN — WARFARIN SODIUM 1 MG: 1 TABLET ORAL at 17:29

## 2017-01-01 RX ADMIN — HEPARIN SODIUM 3900 UNITS: 1000 INJECTION, SOLUTION INTRAVENOUS; SUBCUTANEOUS at 09:11

## 2017-01-01 RX ADMIN — WARFARIN SODIUM 2.5 MG: 2.5 TABLET ORAL at 19:52

## 2017-01-01 RX ADMIN — LEVOTHYROXINE SODIUM 100 MCG: 100 TABLET ORAL at 10:52

## 2017-01-01 RX ADMIN — HEPARIN SODIUM 4000 UNITS: 5000 INJECTION, SOLUTION INTRAVENOUS; SUBCUTANEOUS at 10:53

## 2017-01-01 RX ADMIN — PHYTONADIONE 5 MG: 10 INJECTION, EMULSION INTRAMUSCULAR; INTRAVENOUS; SUBCUTANEOUS at 10:25

## 2017-01-01 RX ADMIN — MORPHINE SULFATE 4 MG: 4 INJECTION, SOLUTION INTRAMUSCULAR; INTRAVENOUS at 17:42

## 2017-01-01 RX ADMIN — HYDROCODONE BITARTATE AND ACETAMINOPHEN 1 TABLET: 5; 325 TABLET ORAL at 02:24

## 2017-01-01 RX ADMIN — ASPIRIN 81 MG: 81 TABLET ORAL at 13:18

## 2017-01-01 RX ADMIN — PHYTONADIONE 2.5 MG: 1 INJECTION, EMULSION INTRAMUSCULAR; INTRAVENOUS; SUBCUTANEOUS at 12:53

## 2017-01-01 RX ADMIN — ROCURONIUM BROMIDE 50 MG: 10 INJECTION INTRAVENOUS at 09:59

## 2017-01-01 RX ADMIN — IRON SUCROSE 100 MG: 20 INJECTION, SOLUTION INTRAVENOUS at 00:52

## 2017-01-01 RX ADMIN — HEPARIN SODIUM 18 UNITS/KG/HR: 10000 INJECTION, SOLUTION INTRAVENOUS at 08:51

## 2017-01-01 RX ADMIN — FENTANYL CITRATE 25 MCG: 50 INJECTION INTRAMUSCULAR; INTRAVENOUS at 10:17

## 2017-01-01 RX ADMIN — HALOPERIDOL LACTATE 1 MG: 5 INJECTION, SOLUTION INTRAMUSCULAR at 17:25

## 2017-01-01 RX ADMIN — HEPARIN SODIUM 11.3 UNITS/KG/HR: 10000 INJECTION, SOLUTION INTRAVENOUS at 21:14

## 2017-01-01 RX ADMIN — HYDROCODONE BITARTATE AND ACETAMINOPHEN 1 TABLET: 5; 325 TABLET ORAL at 05:04

## 2017-01-01 RX ADMIN — HYDROCODONE BITARTATE AND ACETAMINOPHEN 1 TABLET: 5; 325 TABLET ORAL at 06:28

## 2017-01-01 RX ADMIN — HYDRALAZINE HYDROCHLORIDE 25 MG: 25 TABLET ORAL at 06:06

## 2017-01-01 RX ADMIN — ISOSORBIDE MONONITRATE 30 MG: 30 TABLET, EXTENDED RELEASE ORAL at 10:06

## 2017-01-01 RX ADMIN — HYDROCODONE BITARTATE AND ACETAMINOPHEN 1 TABLET: 5; 325 TABLET ORAL at 15:01

## 2017-01-01 RX ADMIN — HALOPERIDOL LACTATE 2 MG: 5 INJECTION, SOLUTION INTRAMUSCULAR at 17:53

## 2017-01-01 RX ADMIN — ALPRAZOLAM 0.25 MG: 0.25 TABLET ORAL at 20:59

## 2017-01-01 RX ADMIN — LEVOTHYROXINE SODIUM 100 MCG: 100 TABLET ORAL at 10:16

## 2017-01-01 RX ADMIN — HYDROCODONE BITARTATE AND ACETAMINOPHEN 1 TABLET: 5; 325 TABLET ORAL at 13:10

## 2017-01-01 RX ADMIN — METOROPROLOL TARTRATE 2.5 MG: 5 INJECTION, SOLUTION INTRAVENOUS at 04:12

## 2017-01-01 RX ADMIN — HYDRALAZINE HYDROCHLORIDE 25 MG: 25 TABLET ORAL at 17:00

## 2017-01-01 RX ADMIN — FAMOTIDINE 20 MG: 10 INJECTION, SOLUTION INTRAVENOUS at 14:10

## 2017-01-01 RX ADMIN — HYDROCODONE BITARTATE AND ACETAMINOPHEN 1 TABLET: 5; 325 TABLET ORAL at 16:52

## 2017-01-01 RX ADMIN — ALPRAZOLAM 0.25 MG: 0.25 TABLET ORAL at 02:08

## 2017-01-01 RX ADMIN — LEVOTHYROXINE SODIUM 100 MCG: 100 TABLET ORAL at 12:39

## 2017-01-01 RX ADMIN — ASPIRIN 81 MG: 81 TABLET ORAL at 08:01

## 2017-01-01 RX ADMIN — WARFARIN SODIUM 2.5 MG: 2.5 TABLET ORAL at 18:39

## 2017-01-01 RX ADMIN — METOPROLOL TARTRATE 25 MG: 25 TABLET ORAL at 18:39

## 2017-01-01 RX ADMIN — ASPIRIN 81 MG: 81 TABLET ORAL at 08:20

## 2017-01-01 RX ADMIN — MORPHINE SULFATE 4 MG: 4 INJECTION, SOLUTION INTRAMUSCULAR; INTRAVENOUS at 13:59

## 2017-01-01 RX ADMIN — WARFARIN SODIUM 5 MG: 5 TABLET ORAL at 17:28

## 2017-01-01 RX ADMIN — HYDRALAZINE HYDROCHLORIDE 25 MG: 25 TABLET ORAL at 05:47

## 2017-01-01 RX ADMIN — GLYCOPYRROLATE 0.4 MG: 0.2 INJECTION INTRAMUSCULAR; INTRAVENOUS at 11:39

## 2017-01-01 RX ADMIN — GLYCOPYRROLATE 0.4 MG: 0.2 INJECTION, SOLUTION INTRAMUSCULAR; INTRAVENOUS at 16:42

## 2017-01-01 RX ADMIN — SODIUM HYPOCHLORITE 946 ML: 1.25 SOLUTION TOPICAL at 10:01

## 2017-01-01 RX ADMIN — FENTANYL CITRATE 25 MCG: 50 INJECTION INTRAMUSCULAR; INTRAVENOUS at 07:41

## 2017-01-01 RX ADMIN — HYDROCODONE BITARTATE AND ACETAMINOPHEN 1 TABLET: 5; 325 TABLET ORAL at 18:27

## 2017-01-01 RX ADMIN — HYDROCODONE BITARTATE AND ACETAMINOPHEN 1 TABLET: 5; 325 TABLET ORAL at 05:58

## 2017-01-01 RX ADMIN — ERYTHROPOIETIN 10000 UNITS: 10000 INJECTION, SOLUTION INTRAVENOUS; SUBCUTANEOUS at 09:07

## 2017-01-01 RX ADMIN — SODIUM HYPOCHLORITE 946 ML: 1.25 SOLUTION TOPICAL at 04:53

## 2017-01-01 RX ADMIN — METOPROLOL TARTRATE 25 MG: 25 TABLET ORAL at 20:10

## 2017-01-01 RX ADMIN — FAMOTIDINE 20 MG: 10 INJECTION, SOLUTION INTRAVENOUS at 07:32

## 2017-01-01 RX ADMIN — SODIUM CHLORIDE, PRESERVATIVE FREE: 5 INJECTION INTRAVENOUS at 10:31

## 2017-01-01 RX ADMIN — HEPARIN SODIUM 14.3 UNITS/KG/HR: 10000 INJECTION, SOLUTION INTRAVENOUS at 05:20

## 2017-01-01 RX ADMIN — ASPIRIN 81 MG: 81 TABLET ORAL at 10:02

## 2017-01-01 RX ADMIN — HYDRALAZINE HYDROCHLORIDE 25 MG: 25 TABLET ORAL at 12:38

## 2017-01-01 RX ADMIN — HYDROCODONE BITARTATE AND ACETAMINOPHEN 1 TABLET: 5; 325 TABLET ORAL at 20:55

## 2017-01-01 RX ADMIN — MORPHINE SULFATE 4 MG: 4 INJECTION, SOLUTION INTRAMUSCULAR; INTRAVENOUS at 09:31

## 2017-01-01 RX ADMIN — HEPARIN SODIUM 5000 UNITS: 5000 INJECTION, SOLUTION INTRAVENOUS; SUBCUTANEOUS at 08:00

## 2017-01-01 RX ADMIN — HALOPERIDOL LACTATE 2 MG: 5 INJECTION, SOLUTION INTRAMUSCULAR at 14:37

## 2017-01-01 RX ADMIN — HALOPERIDOL LACTATE 2 MG: 5 INJECTION, SOLUTION INTRAMUSCULAR at 01:11

## 2017-01-01 RX ADMIN — ALPRAZOLAM 0.25 MG: 0.25 TABLET ORAL at 20:22

## 2017-01-01 RX ADMIN — HYDRALAZINE HYDROCHLORIDE 25 MG: 25 TABLET ORAL at 20:47

## 2017-01-01 RX ADMIN — HALOPERIDOL LACTATE 2 MG: 5 INJECTION, SOLUTION INTRAMUSCULAR at 04:34

## 2017-01-01 RX ADMIN — MORPHINE SULFATE 4 MG: 4 INJECTION, SOLUTION INTRAMUSCULAR; INTRAVENOUS at 04:35

## 2017-01-01 RX ADMIN — IOPAMIDOL 10 ML: 510 INJECTION, SOLUTION INTRAVASCULAR at 11:54

## 2017-01-01 RX ADMIN — HALOPERIDOL LACTATE 1 MG: 5 INJECTION, SOLUTION INTRAMUSCULAR at 20:05

## 2017-01-01 RX ADMIN — ASPIRIN 81 MG: 81 TABLET ORAL at 08:39

## 2017-01-01 RX ADMIN — ISOSORBIDE MONONITRATE 30 MG: 30 TABLET, EXTENDED RELEASE ORAL at 08:57

## 2017-01-01 RX ADMIN — MORPHINE SULFATE 4 MG: 4 INJECTION, SOLUTION INTRAMUSCULAR; INTRAVENOUS at 20:49

## 2017-01-01 RX ADMIN — LEVOTHYROXINE SODIUM 100 MCG: 100 TABLET ORAL at 06:35

## 2017-01-01 RX ADMIN — HEPARIN SODIUM 5000 UNITS: 5000 INJECTION, SOLUTION INTRAVENOUS; SUBCUTANEOUS at 21:00

## 2017-01-01 RX ADMIN — HYDROCODONE BITARTATE AND ACETAMINOPHEN 1 TABLET: 5; 325 TABLET ORAL at 05:33

## 2017-01-01 RX ADMIN — HALOPERIDOL LACTATE 2 MG: 5 INJECTION, SOLUTION INTRAMUSCULAR at 05:46

## 2017-01-01 RX ADMIN — PANTOPRAZOLE SODIUM 40 MG: 40 TABLET, DELAYED RELEASE ORAL at 06:34

## 2017-01-01 RX ADMIN — VANCOMYCIN HYDROCHLORIDE 1000 MG: 1 INJECTION, SOLUTION INTRAVENOUS at 18:37

## 2017-01-01 RX ADMIN — HEPARIN SODIUM 4000 UNITS: 5000 INJECTION, SOLUTION INTRAVENOUS; SUBCUTANEOUS at 20:24

## 2017-01-01 RX ADMIN — HEPARIN SODIUM 3800 UNITS: 1000 INJECTION, SOLUTION INTRAVENOUS; SUBCUTANEOUS at 12:19

## 2017-01-01 RX ADMIN — HALOPERIDOL LACTATE 2 MG: 5 INJECTION, SOLUTION INTRAMUSCULAR at 08:30

## 2017-01-01 RX ADMIN — METOPROLOL TARTRATE 25 MG: 25 TABLET ORAL at 23:22

## 2017-01-01 RX ADMIN — IPRATROPIUM BROMIDE AND ALBUTEROL SULFATE 3 ML: .5; 3 SOLUTION RESPIRATORY (INHALATION) at 19:46

## 2017-01-01 RX ADMIN — LANSOPRAZOLE 30 MG: KIT at 10:19

## 2017-01-01 RX ADMIN — FUROSEMIDE 80 MG: 80 TABLET ORAL at 12:38

## 2017-01-01 RX ADMIN — LEVOTHYROXINE SODIUM 100 MCG: 100 TABLET ORAL at 10:29

## 2017-01-01 RX ADMIN — LORAZEPAM 2 MG: 2 INJECTION, SOLUTION INTRAMUSCULAR; INTRAVENOUS at 09:14

## 2017-01-01 RX ADMIN — HYDROCODONE BITARTATE AND ACETAMINOPHEN 1 TABLET: 5; 325 TABLET ORAL at 08:52

## 2017-01-01 RX ADMIN — HYDRALAZINE HYDROCHLORIDE 25 MG: 25 TABLET ORAL at 22:00

## 2017-01-01 RX ADMIN — HYDROCODONE BITARTATE AND ACETAMINOPHEN 1 TABLET: 5; 325 TABLET ORAL at 02:08

## 2017-01-01 RX ADMIN — IPRATROPIUM BROMIDE AND ALBUTEROL SULFATE 3 ML: .5; 3 SOLUTION RESPIRATORY (INHALATION) at 15:19

## 2017-01-01 RX ADMIN — FUROSEMIDE 80 MG: 80 TABLET ORAL at 10:47

## 2017-01-01 RX ADMIN — ASPIRIN 81 MG: 81 TABLET ORAL at 08:46

## 2017-01-01 RX ADMIN — HYDRALAZINE HYDROCHLORIDE 25 MG: 25 TABLET ORAL at 14:14

## 2017-01-01 RX ADMIN — HEPARIN SODIUM 3000 UNITS: 5000 INJECTION, SOLUTION INTRAVENOUS; SUBCUTANEOUS at 18:54

## 2017-01-01 RX ADMIN — FUROSEMIDE 80 MG: 80 TABLET ORAL at 10:16

## 2017-01-01 RX ADMIN — HEPARIN SODIUM 3000 UNITS: 5000 INJECTION, SOLUTION INTRAVENOUS; SUBCUTANEOUS at 02:58

## 2017-01-01 RX ADMIN — MORPHINE SULFATE 2 MG: 2 INJECTION, SOLUTION INTRAMUSCULAR; INTRAVENOUS at 14:37

## 2017-01-01 RX ADMIN — VANCOMYCIN HYDROCHLORIDE 1250 MG: 1 INJECTION, POWDER, LYOPHILIZED, FOR SOLUTION INTRAVENOUS at 17:02

## 2017-01-01 RX ADMIN — FUROSEMIDE 80 MG: 80 TABLET ORAL at 08:13

## 2017-01-01 RX ADMIN — ASPIRIN 81 MG: 81 TABLET ORAL at 08:50

## 2017-01-01 RX ADMIN — LANSOPRAZOLE 30 MG: KIT at 10:01

## 2017-01-01 RX ADMIN — HYDROCODONE BITARTATE AND ACETAMINOPHEN 1 TABLET: 5; 325 TABLET ORAL at 12:17

## 2017-01-01 RX ADMIN — ERYTHROPOIETIN 6000 UNITS: 3000 INJECTION, SOLUTION INTRAVENOUS; SUBCUTANEOUS at 18:13

## 2017-01-01 RX ADMIN — LIDOCAINE HYDROCHLORIDE 20 MG: 20 INJECTION, SOLUTION INFILTRATION; PERINEURAL at 09:58

## 2017-01-01 RX ADMIN — HALOPERIDOL LACTATE 1 MG: 5 INJECTION, SOLUTION INTRAMUSCULAR at 14:53

## 2017-01-01 RX ADMIN — ISOSORBIDE MONONITRATE 30 MG: 30 TABLET, EXTENDED RELEASE ORAL at 08:50

## 2017-01-01 RX ADMIN — HALOPERIDOL LACTATE 2 MG: 5 INJECTION, SOLUTION INTRAMUSCULAR at 00:40

## 2017-01-01 RX ADMIN — PANTOPRAZOLE SODIUM 40 MG: 40 TABLET, DELAYED RELEASE ORAL at 06:43

## 2017-01-01 RX ADMIN — HEPARIN SODIUM 5000 UNITS: 5000 INJECTION, SOLUTION INTRAVENOUS; SUBCUTANEOUS at 10:19

## 2017-01-01 RX ADMIN — HYDRALAZINE HYDROCHLORIDE 25 MG: 25 TABLET ORAL at 14:19

## 2017-01-01 RX ADMIN — METOROPROLOL TARTRATE 2.5 MG: 5 INJECTION, SOLUTION INTRAVENOUS at 21:29

## 2017-01-01 RX ADMIN — HYDRALAZINE HYDROCHLORIDE 25 MG: 25 TABLET ORAL at 05:07

## 2017-01-01 RX ADMIN — ISOSORBIDE MONONITRATE 30 MG: 30 TABLET, EXTENDED RELEASE ORAL at 08:46

## 2017-01-01 RX ADMIN — LEVOTHYROXINE SODIUM 100 MCG: 100 TABLET ORAL at 06:34

## 2017-01-01 RX ADMIN — MORPHINE SULFATE 2 MG: 2 INJECTION, SOLUTION INTRAMUSCULAR; INTRAVENOUS at 17:25

## 2017-01-01 RX ADMIN — LEVOTHYROXINE SODIUM 100 MCG: 100 TABLET ORAL at 05:33

## 2017-01-01 RX ADMIN — PANTOPRAZOLE SODIUM 40 MG: 40 INJECTION, POWDER, FOR SOLUTION INTRAVENOUS at 12:31

## 2017-01-01 RX ADMIN — HYDRALAZINE HYDROCHLORIDE 25 MG: 25 TABLET ORAL at 05:30

## 2017-01-01 RX ADMIN — METOPROLOL TARTRATE 2.5 MG: 5 INJECTION, SOLUTION INTRAVENOUS at 15:29

## 2017-01-01 RX ADMIN — WARFARIN SODIUM 2.5 MG: 2.5 TABLET ORAL at 17:02

## 2017-01-01 RX ADMIN — MORPHINE SULFATE 4 MG: 4 INJECTION, SOLUTION INTRAMUSCULAR; INTRAVENOUS at 17:38

## 2017-01-01 RX ADMIN — ISOSORBIDE MONONITRATE 30 MG: 30 TABLET, EXTENDED RELEASE ORAL at 13:18

## 2017-01-01 RX ADMIN — HEPARIN SODIUM 11.3 UNITS/KG/HR: 10000 INJECTION, SOLUTION INTRAVENOUS at 20:24

## 2017-01-01 RX ADMIN — HALOPERIDOL LACTATE 2 MG: 5 INJECTION, SOLUTION INTRAMUSCULAR at 20:04

## 2017-01-01 RX ADMIN — LEVOTHYROXINE SODIUM 100 MCG: 100 TABLET ORAL at 08:13

## 2017-01-01 RX ADMIN — HYDROCODONE BITARTATE AND ACETAMINOPHEN 1 TABLET: 5; 325 TABLET ORAL at 04:53

## 2017-01-01 RX ADMIN — LEVOTHYROXINE SODIUM 100 MCG: 100 TABLET ORAL at 07:33

## 2017-01-01 RX ADMIN — LANSOPRAZOLE 30 MG: KIT at 05:31

## 2017-01-01 RX ADMIN — LEVOTHYROXINE SODIUM 100 MCG: 100 TABLET ORAL at 08:42

## 2017-01-01 RX ADMIN — ALPRAZOLAM 0.25 MG: 0.25 TABLET ORAL at 00:35

## 2017-01-01 RX ADMIN — SODIUM CHLORIDE: 9 INJECTION, SOLUTION INTRAVENOUS at 09:48

## 2017-01-01 RX ADMIN — ASPIRIN 81 MG: 81 TABLET ORAL at 13:50

## 2017-01-01 RX ADMIN — SODIUM HYPOCHLORITE 946 ML: 1.25 SOLUTION TOPICAL at 21:06

## 2017-01-01 RX ADMIN — HYDROCODONE BITARTATE AND ACETAMINOPHEN 1 TABLET: 5; 325 TABLET ORAL at 11:51

## 2017-01-01 RX ADMIN — HYDROCODONE BITARTATE AND ACETAMINOPHEN 1 TABLET: 5; 325 TABLET ORAL at 01:08

## 2017-01-01 RX ADMIN — HYDRALAZINE HYDROCHLORIDE 25 MG: 25 TABLET ORAL at 08:13

## 2017-01-01 RX ADMIN — ALPRAZOLAM 0.25 MG: 0.25 TABLET ORAL at 21:50

## 2017-01-01 RX ADMIN — HEPARIN SODIUM 20 UNITS/KG/HR: 10000 INJECTION, SOLUTION INTRAVENOUS at 20:17

## 2017-01-01 RX ADMIN — HALOPERIDOL LACTATE 1 MG: 5 INJECTION, SOLUTION INTRAMUSCULAR at 21:03

## 2017-01-01 RX ADMIN — WARFARIN SODIUM 7.5 MG: 7.5 TABLET ORAL at 17:27

## 2017-01-01 RX ADMIN — HYDRALAZINE HYDROCHLORIDE 25 MG: 25 TABLET ORAL at 16:37

## 2017-01-01 RX ADMIN — HYDRALAZINE HYDROCHLORIDE 25 MG: 25 TABLET ORAL at 13:35

## 2017-01-01 RX ADMIN — IPRATROPIUM BROMIDE AND ALBUTEROL SULFATE 3 ML: .5; 3 SOLUTION RESPIRATORY (INHALATION) at 15:33

## 2017-01-01 RX ADMIN — HEPARIN SODIUM 3000 UNITS: 1000 INJECTION, SOLUTION INTRAVENOUS; SUBCUTANEOUS at 12:58

## 2017-01-01 RX ADMIN — HEPARIN SODIUM 3400 UNITS: 1000 INJECTION, SOLUTION INTRAVENOUS; SUBCUTANEOUS at 19:09

## 2017-01-01 RX ADMIN — VANCOMYCIN HYDROCHLORIDE 500 MG: 500 INJECTION, POWDER, LYOPHILIZED, FOR SOLUTION INTRAVENOUS at 13:19

## 2017-01-01 RX ADMIN — VANCOMYCIN HYDROCHLORIDE 1000 MG: 1 INJECTION, SOLUTION INTRAVENOUS at 17:11

## 2017-01-01 RX ADMIN — NEOSTIGMINE METHYLSULFATE 2 MG: 1 INJECTION INTRAVENOUS at 11:39

## 2017-01-01 RX ADMIN — IPRATROPIUM BROMIDE AND ALBUTEROL SULFATE 3 ML: .5; 3 SOLUTION RESPIRATORY (INHALATION) at 11:06

## 2017-01-01 RX ADMIN — SODIUM HYPOCHLORITE 946 ML: 1.25 SOLUTION TOPICAL at 08:43

## 2017-01-01 RX ADMIN — LORAZEPAM 1 MG: 2 SOLUTION, CONCENTRATE ORAL at 12:23

## 2017-01-01 RX ADMIN — ASPIRIN 81 MG: 81 TABLET ORAL at 12:26

## 2017-01-01 RX ADMIN — LEVOTHYROXINE SODIUM 100 MCG: 100 TABLET ORAL at 06:31

## 2017-01-01 RX ADMIN — HEPARIN SODIUM 5000 UNITS: 5000 INJECTION, SOLUTION INTRAVENOUS; SUBCUTANEOUS at 08:43

## 2017-01-01 RX ADMIN — SODIUM CHLORIDE: 9 INJECTION, SOLUTION INTRAVENOUS at 11:47

## 2017-01-01 RX ADMIN — WARFARIN SODIUM 1 MG: 1 TABLET ORAL at 17:19

## 2017-01-01 RX ADMIN — HYDRALAZINE HYDROCHLORIDE 25 MG: 25 TABLET ORAL at 21:02

## 2017-01-01 RX ADMIN — HYDRALAZINE HYDROCHLORIDE 25 MG: 25 TABLET ORAL at 21:24

## 2017-01-01 RX ADMIN — HEPARIN SODIUM 4000 UNITS: 5000 INJECTION, SOLUTION INTRAVENOUS; SUBCUTANEOUS at 19:28

## 2017-01-01 RX ADMIN — HYDRALAZINE HYDROCHLORIDE 25 MG: 25 TABLET ORAL at 21:12

## 2017-01-01 RX ADMIN — HEPARIN SODIUM 14 UNITS/KG/HR: 10000 INJECTION, SOLUTION INTRAVENOUS at 11:53

## 2017-01-01 RX ADMIN — MORPHINE SULFATE 4 MG: 4 INJECTION, SOLUTION INTRAMUSCULAR; INTRAVENOUS at 16:38

## 2017-01-01 RX ADMIN — LANSOPRAZOLE 30 MG: KIT at 06:07

## 2017-01-01 RX ADMIN — PROPOFOL 30 MCG/KG/MIN: 10 INJECTION, EMULSION INTRAVENOUS at 15:25

## 2017-01-01 RX ADMIN — MORPHINE SULFATE 2 MG: 2 INJECTION, SOLUTION INTRAMUSCULAR; INTRAVENOUS at 12:23

## 2017-01-01 RX ADMIN — ASPIRIN 81 MG: 81 TABLET ORAL at 15:55

## 2017-01-01 RX ADMIN — LEVOTHYROXINE SODIUM 100 MCG: 100 TABLET ORAL at 06:45

## 2017-01-01 RX ADMIN — HYDRALAZINE HYDROCHLORIDE 25 MG: 25 TABLET ORAL at 23:22

## 2017-01-01 RX ADMIN — LANSOPRAZOLE 30 MG: KIT at 10:52

## 2017-01-01 RX ADMIN — HEPARIN SODIUM 3800 UNITS: 1000 INJECTION, SOLUTION INTRAVENOUS; SUBCUTANEOUS at 15:37

## 2017-01-01 RX ADMIN — METOPROLOL TARTRATE 25 MG: 25 TABLET ORAL at 10:03

## 2017-01-01 RX ADMIN — LORAZEPAM 1 MG: 2 SOLUTION, CONCENTRATE ORAL at 07:38

## 2017-01-01 RX ADMIN — FUROSEMIDE 80 MG: 80 TABLET ORAL at 08:42

## 2017-01-01 RX ADMIN — HEPARIN SODIUM 18 UNITS/KG/HR: 10000 INJECTION, SOLUTION INTRAVENOUS at 10:36

## 2017-01-01 RX ADMIN — FENTANYL CITRATE 25 MCG: 50 INJECTION INTRAMUSCULAR; INTRAVENOUS at 07:55

## 2017-01-01 RX ADMIN — VANCOMYCIN HYDROCHLORIDE 1250 MG: 1 INJECTION, POWDER, LYOPHILIZED, FOR SOLUTION INTRAVENOUS at 15:43

## 2017-01-01 RX ADMIN — MORPHINE SULFATE 4 MG: 4 INJECTION, SOLUTION INTRAMUSCULAR; INTRAVENOUS at 00:40

## 2017-01-01 RX ADMIN — HYDRALAZINE HYDROCHLORIDE 25 MG: 25 TABLET ORAL at 06:29

## 2017-01-01 RX ADMIN — ISOSORBIDE MONONITRATE 30 MG: 30 TABLET, EXTENDED RELEASE ORAL at 08:20

## 2017-01-01 RX ADMIN — SODIUM CHLORIDE 250 ML: 9 INJECTION, SOLUTION INTRAVENOUS at 08:39

## 2017-01-01 RX ADMIN — GLYCOPYRROLATE 0.4 MG: 0.2 INJECTION, SOLUTION INTRAMUSCULAR; INTRAVENOUS at 14:23

## 2017-01-01 RX ADMIN — HYDRALAZINE HYDROCHLORIDE 25 MG: 25 TABLET ORAL at 06:45

## 2017-01-01 RX ADMIN — ISOSORBIDE MONONITRATE 30 MG: 30 TABLET, EXTENDED RELEASE ORAL at 08:39

## 2017-01-01 RX ADMIN — ALPRAZOLAM 0.25 MG: 0.25 TABLET ORAL at 12:08

## 2017-01-01 RX ADMIN — GLYCOPYRROLATE 0.4 MG: 0.2 INJECTION, SOLUTION INTRAMUSCULAR; INTRAVENOUS at 10:53

## 2017-01-01 RX ADMIN — HYDROCODONE BITARTATE AND ACETAMINOPHEN 1 TABLET: 5; 325 TABLET ORAL at 09:00

## 2017-01-01 RX ADMIN — LEVOTHYROXINE SODIUM 100 MCG: 100 TABLET ORAL at 06:28

## 2017-01-01 RX ADMIN — PANTOPRAZOLE SODIUM 40 MG: 40 INJECTION, POWDER, FOR SOLUTION INTRAVENOUS at 05:17

## 2017-01-01 RX ADMIN — ASPIRIN 81 MG: 81 TABLET ORAL at 08:34

## 2017-01-01 RX ADMIN — Medication 1250 MG: at 15:52

## 2017-01-01 RX ADMIN — WARFARIN SODIUM 5 MG: 5 TABLET ORAL at 19:21

## 2017-01-01 RX ADMIN — MORPHINE SULFATE 2 MG: 2 INJECTION, SOLUTION INTRAMUSCULAR; INTRAVENOUS at 07:38

## 2017-01-01 RX ADMIN — HYDRALAZINE HYDROCHLORIDE 25 MG: 25 TABLET ORAL at 12:26

## 2017-01-01 RX ADMIN — HYDROCODONE BITARTATE AND ACETAMINOPHEN 1 TABLET: 5; 325 TABLET ORAL at 13:33

## 2017-01-01 RX ADMIN — HYDROCODONE BITARTATE AND ACETAMINOPHEN 1 TABLET: 5; 325 TABLET ORAL at 18:46

## 2017-01-01 RX ADMIN — ISOSORBIDE MONONITRATE 30 MG: 30 TABLET, EXTENDED RELEASE ORAL at 08:01

## 2017-01-01 RX ADMIN — ERYTHROPOIETIN 10000 UNITS: 10000 INJECTION, SOLUTION INTRAVENOUS; SUBCUTANEOUS at 11:01

## 2017-01-01 RX ADMIN — HYDROCODONE BITARTATE AND ACETAMINOPHEN 1 TABLET: 5; 325 TABLET ORAL at 17:28

## 2017-01-01 RX ADMIN — MIDAZOLAM 0.5 MG: 1 INJECTION INTRAMUSCULAR; INTRAVENOUS at 07:23

## 2017-01-01 RX ADMIN — ALPRAZOLAM 0.25 MG: 0.25 TABLET ORAL at 16:03

## 2017-01-01 RX ADMIN — METOPROLOL TARTRATE 2.5 MG: 5 INJECTION, SOLUTION INTRAVENOUS at 16:03

## 2017-01-01 RX ADMIN — CEFAZOLIN SODIUM 1 G: 500 INJECTION, POWDER, FOR SOLUTION INTRAMUSCULAR; INTRAVENOUS at 09:58

## 2017-01-01 RX ADMIN — IPRATROPIUM BROMIDE AND ALBUTEROL SULFATE 3 ML: .5; 3 SOLUTION RESPIRATORY (INHALATION) at 06:56

## 2017-01-01 RX ADMIN — HEPARIN SODIUM 5000 UNITS: 5000 INJECTION, SOLUTION INTRAVENOUS; SUBCUTANEOUS at 20:46

## 2017-01-01 RX ADMIN — HEPARIN SODIUM 2650 UNITS: 5000 INJECTION, SOLUTION INTRAVENOUS; SUBCUTANEOUS at 12:06

## 2017-01-01 RX ADMIN — IPRATROPIUM BROMIDE AND ALBUTEROL SULFATE 3 ML: .5; 3 SOLUTION RESPIRATORY (INHALATION) at 00:33

## 2017-01-01 RX ADMIN — IPRATROPIUM BROMIDE AND ALBUTEROL SULFATE 3 ML: .5; 3 SOLUTION RESPIRATORY (INHALATION) at 04:01

## 2017-01-01 RX ADMIN — VANCOMYCIN HYDROCHLORIDE 500 MG: 500 INJECTION, POWDER, LYOPHILIZED, FOR SOLUTION INTRAVENOUS at 15:05

## 2017-01-01 RX ADMIN — GLYCOPYRROLATE 0.2 MG: 0.2 INJECTION, SOLUTION INTRAMUSCULAR; INTRAVENOUS at 04:35

## 2017-01-01 RX ADMIN — HYDRALAZINE HYDROCHLORIDE 25 MG: 25 TABLET ORAL at 21:50

## 2017-01-01 RX ADMIN — HEPARIN SODIUM 4000 UNITS: 5000 INJECTION, SOLUTION INTRAVENOUS; SUBCUTANEOUS at 23:51

## 2017-01-01 RX ADMIN — MIDAZOLAM 0.5 MG: 1 INJECTION INTRAMUSCULAR; INTRAVENOUS at 14:09

## 2017-01-01 RX ADMIN — SODIUM CHLORIDE: 9 INJECTION, SOLUTION INTRAVENOUS at 07:39

## 2017-01-01 RX ADMIN — IPRATROPIUM BROMIDE AND ALBUTEROL SULFATE 3 ML: .5; 3 SOLUTION RESPIRATORY (INHALATION) at 07:20

## 2017-01-01 RX ADMIN — HEPARIN SODIUM 17 UNITS/KG/HR: 10000 INJECTION, SOLUTION INTRAVENOUS at 02:12

## 2017-01-01 RX ADMIN — ALPRAZOLAM 0.25 MG: 0.25 TABLET ORAL at 12:27

## 2017-01-01 RX ADMIN — HALOPERIDOL LACTATE 0.5 MG: 5 INJECTION, SOLUTION INTRAMUSCULAR at 08:51

## 2017-01-01 RX ADMIN — HEPARIN SODIUM 3800 UNITS: 1000 INJECTION, SOLUTION INTRAVENOUS; SUBCUTANEOUS at 14:56

## 2017-01-01 RX ADMIN — PANTOPRAZOLE SODIUM 40 MG: 40 TABLET, DELAYED RELEASE ORAL at 06:31

## 2017-01-01 RX ADMIN — ALPRAZOLAM 0.25 MG: 0.25 TABLET ORAL at 06:30

## 2017-01-01 RX ADMIN — HEPARIN SODIUM 4000 UNITS: 5000 INJECTION, SOLUTION INTRAVENOUS; SUBCUTANEOUS at 08:15

## 2017-01-01 RX ADMIN — GLYCOPYRROLATE 0.2 MG: 0.2 INJECTION, SOLUTION INTRAMUSCULAR; INTRAVENOUS at 08:30

## 2017-01-01 RX ADMIN — IPRATROPIUM BROMIDE AND ALBUTEROL SULFATE 3 ML: .5; 3 SOLUTION RESPIRATORY (INHALATION) at 02:54

## 2017-01-01 RX ADMIN — LANSOPRAZOLE 30 MG: KIT at 06:46

## 2017-01-01 RX ADMIN — HYDRALAZINE HYDROCHLORIDE 25 MG: 25 TABLET ORAL at 07:33

## 2017-01-01 RX ADMIN — ALPRAZOLAM 0.25 MG: 0.25 TABLET ORAL at 20:26

## 2017-01-01 RX ADMIN — FENTANYL CITRATE 25 MCG: 50 INJECTION INTRAMUSCULAR; INTRAVENOUS at 11:03

## 2017-01-01 RX ADMIN — SODIUM CHLORIDE, POTASSIUM CHLORIDE, SODIUM LACTATE AND CALCIUM CHLORIDE 9 ML/HR: 600; 310; 30; 20 INJECTION, SOLUTION INTRAVENOUS at 09:28

## 2017-01-01 RX ADMIN — LEVOTHYROXINE SODIUM 100 MCG: 100 TABLET ORAL at 06:06

## 2017-01-01 RX ADMIN — HYDRALAZINE HYDROCHLORIDE 25 MG: 25 TABLET ORAL at 15:22

## 2017-01-01 RX ADMIN — SODIUM HYPOCHLORITE 946 ML: 1.25 SOLUTION TOPICAL at 23:03

## 2017-01-01 RX ADMIN — VANCOMYCIN HYDROCHLORIDE 1250 MG: 1 INJECTION, POWDER, LYOPHILIZED, FOR SOLUTION INTRAVENOUS at 15:38

## 2017-01-01 RX ADMIN — LEVOTHYROXINE SODIUM 100 MCG: 100 TABLET ORAL at 06:49

## 2017-01-01 RX ADMIN — LANSOPRAZOLE 30 MG: KIT at 06:53

## 2017-01-01 RX ADMIN — HYDROCODONE BITARTATE AND ACETAMINOPHEN 1 TABLET: 5; 325 TABLET ORAL at 14:14

## 2017-01-01 RX ADMIN — PANTOPRAZOLE SODIUM 40 MG: 40 TABLET, DELAYED RELEASE ORAL at 06:49

## 2017-01-01 RX ADMIN — SODIUM HYPOCHLORITE 946 ML: 1.25 SOLUTION TOPICAL at 10:20

## 2017-01-01 RX ADMIN — HALOPERIDOL LACTATE 1 MG: 5 INJECTION, SOLUTION INTRAMUSCULAR at 05:07

## 2017-01-01 RX ADMIN — HYDRALAZINE HYDROCHLORIDE 25 MG: 25 TABLET ORAL at 06:20

## 2017-01-01 RX ADMIN — HEPARIN SODIUM 5000 UNITS: 5000 INJECTION, SOLUTION INTRAVENOUS; SUBCUTANEOUS at 20:58

## 2017-01-01 RX ADMIN — WARFARIN SODIUM 1 MG: 1 TABLET ORAL at 18:15

## 2017-01-01 RX ADMIN — FUROSEMIDE 80 MG: 80 TABLET ORAL at 08:00

## 2017-01-01 RX ADMIN — LANSOPRAZOLE 30 MG: KIT at 08:43

## 2017-01-01 RX ADMIN — HEPARIN SODIUM 13.3 UNITS/KG/HR: 10000 INJECTION, SOLUTION INTRAVENOUS at 13:54

## 2017-01-01 RX ADMIN — HYDRALAZINE HYDROCHLORIDE 25 MG: 25 TABLET ORAL at 06:22

## 2017-01-01 RX ADMIN — PROPOFOL 100 MG: 10 INJECTION, EMULSION INTRAVENOUS at 09:58

## 2017-01-01 RX ADMIN — ATROPINE SULFATE 0.5 MG: 0.1 INJECTION, SOLUTION INTRAVENOUS at 13:18

## 2017-01-01 RX ADMIN — HYDRALAZINE HYDROCHLORIDE 25 MG: 25 TABLET ORAL at 10:47

## 2017-01-01 RX ADMIN — FUROSEMIDE 80 MG: 80 TABLET ORAL at 09:40

## 2017-01-01 RX ADMIN — SODIUM HYPOCHLORITE 946 ML: 1.25 SOLUTION TOPICAL at 08:00

## 2017-01-01 RX ADMIN — ERYTHROPOIETIN 6000 UNITS: 3000 INJECTION, SOLUTION INTRAVENOUS; SUBCUTANEOUS at 10:55

## 2017-01-01 RX ADMIN — MORPHINE SULFATE 2 MG: 2 INJECTION, SOLUTION INTRAMUSCULAR; INTRAVENOUS at 20:04

## 2017-01-01 RX ADMIN — FAMOTIDINE 20 MG: 10 INJECTION, SOLUTION INTRAVENOUS at 09:27

## 2017-01-01 RX ADMIN — HALOPERIDOL LACTATE 2 MG: 5 INJECTION, SOLUTION INTRAMUSCULAR at 20:50

## 2017-01-01 RX ADMIN — IPRATROPIUM BROMIDE AND ALBUTEROL SULFATE 3 ML: .5; 3 SOLUTION RESPIRATORY (INHALATION) at 00:24

## 2017-01-01 RX ADMIN — HYDRALAZINE HYDROCHLORIDE 25 MG: 25 TABLET ORAL at 17:02

## 2017-01-01 RX ADMIN — HALOPERIDOL LACTATE 2 MG: 5 INJECTION, SOLUTION INTRAMUSCULAR at 14:00

## 2017-01-01 RX ADMIN — ERYTHROPOIETIN 10000 UNITS: 10000 INJECTION, SOLUTION INTRAVENOUS; SUBCUTANEOUS at 17:17

## 2017-01-01 RX ADMIN — HYDRALAZINE HYDROCHLORIDE 25 MG: 25 TABLET ORAL at 15:43

## 2017-01-01 RX ADMIN — ALPRAZOLAM 0.25 MG: 0.25 TABLET ORAL at 18:46

## 2017-01-01 RX ADMIN — HYDRALAZINE HYDROCHLORIDE 25 MG: 25 TABLET ORAL at 20:22

## 2017-01-01 RX ADMIN — HYDROCODONE BITARTATE AND ACETAMINOPHEN 1 TABLET: 5; 325 TABLET ORAL at 20:22

## 2017-01-01 RX ADMIN — ISOSORBIDE MONONITRATE 30 MG: 30 TABLET, EXTENDED RELEASE ORAL at 11:24

## 2017-01-01 RX ADMIN — HEPARIN SODIUM 3900 UNITS: 1000 INJECTION, SOLUTION INTRAVENOUS; SUBCUTANEOUS at 16:01

## 2017-01-01 RX ADMIN — MORPHINE SULFATE 4 MG: 4 INJECTION, SOLUTION INTRAMUSCULAR; INTRAVENOUS at 01:11

## 2017-01-01 RX ADMIN — HYDRALAZINE HYDROCHLORIDE 25 MG: 25 TABLET ORAL at 10:16

## 2017-01-01 RX ADMIN — HEPARIN SODIUM 11.3 UNITS/KG/HR: 10000 INJECTION, SOLUTION INTRAVENOUS at 10:53

## 2017-01-01 RX ADMIN — ALPRAZOLAM 0.25 MG: 0.25 TABLET ORAL at 21:22

## 2017-01-01 RX ADMIN — HYDROCODONE BITARTATE AND ACETAMINOPHEN 1 TABLET: 5; 325 TABLET ORAL at 21:32

## 2017-01-01 RX ADMIN — ALPRAZOLAM 0.25 MG: 0.25 TABLET ORAL at 20:20

## 2017-01-01 RX ADMIN — ACETAMINOPHEN 500 MG: 10 INJECTION, SOLUTION INTRAVENOUS at 07:31

## 2017-01-01 RX ADMIN — HALOPERIDOL LACTATE 1 MG: 5 INJECTION, SOLUTION INTRAMUSCULAR at 21:47

## 2017-01-01 RX ADMIN — LORAZEPAM 1 MG: 2 SOLUTION, CONCENTRATE ORAL at 17:25

## 2017-01-01 RX ADMIN — HYDROCODONE BITARTATE AND ACETAMINOPHEN 1 TABLET: 5; 325 TABLET ORAL at 08:27

## 2017-01-01 RX ADMIN — HALOPERIDOL LACTATE 2 MG: 5 INJECTION, SOLUTION INTRAMUSCULAR at 09:31

## 2017-01-01 RX ADMIN — SODIUM CHLORIDE, PRESERVATIVE FREE: 5 INJECTION INTRAVENOUS at 21:20

## 2017-01-01 RX ADMIN — MORPHINE SULFATE 4 MG: 4 INJECTION, SOLUTION INTRAMUSCULAR; INTRAVENOUS at 03:58

## 2017-01-01 RX ADMIN — LANSOPRAZOLE 30 MG: KIT at 05:47

## 2017-01-01 RX ADMIN — LEVOTHYROXINE SODIUM 100 MCG: 100 TABLET ORAL at 05:47

## 2017-01-01 RX ADMIN — METOPROLOL TARTRATE 25 MG: 25 TABLET ORAL at 13:48

## 2017-01-01 RX ADMIN — LANSOPRAZOLE 30 MG: KIT at 05:54

## 2017-01-01 RX ADMIN — MORPHINE SULFATE 4 MG: 4 INJECTION, SOLUTION INTRAMUSCULAR; INTRAVENOUS at 05:45

## 2017-01-01 RX ADMIN — ISOSORBIDE MONONITRATE 30 MG: 30 TABLET, EXTENDED RELEASE ORAL at 08:48

## 2017-01-01 RX ADMIN — ALPRAZOLAM 0.25 MG: 0.25 TABLET ORAL at 23:20

## 2017-01-01 RX ADMIN — LANSOPRAZOLE 30 MG: KIT at 06:06

## 2017-01-01 RX ADMIN — SODIUM HYPOCHLORITE 946 ML: 1.25 SOLUTION TOPICAL at 10:48

## 2017-01-01 RX ADMIN — HEPARIN SODIUM 4000 UNITS: 5000 INJECTION, SOLUTION INTRAVENOUS; SUBCUTANEOUS at 05:31

## 2017-01-01 RX ADMIN — ASPIRIN 81 MG: 81 TABLET ORAL at 08:22

## 2017-01-01 RX ADMIN — DIAZEPAM 1 MG: 5 INJECTION, SOLUTION INTRAMUSCULAR; INTRAVENOUS at 17:15

## 2017-01-01 RX ADMIN — ASPIRIN 81 MG: 81 TABLET ORAL at 09:37

## 2017-01-01 RX ADMIN — PROPOFOL 20 MG: 10 INJECTION, EMULSION INTRAVENOUS at 10:02

## 2017-01-01 RX ADMIN — IPRATROPIUM BROMIDE AND ALBUTEROL SULFATE 3 ML: .5; 3 SOLUTION RESPIRATORY (INHALATION) at 03:32

## 2017-01-01 RX ADMIN — MORPHINE SULFATE 4 MG: 4 INJECTION, SOLUTION INTRAMUSCULAR; INTRAVENOUS at 14:23

## 2017-01-01 RX ADMIN — SODIUM HYPOCHLORITE 946 ML: 1.25 SOLUTION TOPICAL at 21:50

## 2017-01-01 RX ADMIN — VANCOMYCIN HYDROCHLORIDE 1750 MG: 1 INJECTION, POWDER, LYOPHILIZED, FOR SOLUTION INTRAVENOUS at 18:20

## 2017-01-01 RX ADMIN — ROCURONIUM BROMIDE 10 MG: 10 INJECTION INTRAVENOUS at 11:04

## 2017-01-01 RX ADMIN — HEPARIN SODIUM 16.3 UNITS/KG/HR: 10000 INJECTION, SOLUTION INTRAVENOUS at 19:28

## 2017-01-01 RX ADMIN — ISOSORBIDE MONONITRATE 30 MG: 30 TABLET, EXTENDED RELEASE ORAL at 08:34

## 2017-01-01 RX ADMIN — LANSOPRAZOLE 30 MG: KIT at 09:02

## 2017-01-01 RX ADMIN — ASPIRIN 81 MG: 81 TABLET ORAL at 10:06

## 2017-01-01 RX ADMIN — HYDROCODONE BITARTATE AND ACETAMINOPHEN 1 TABLET: 5; 325 TABLET ORAL at 20:46

## 2017-01-01 RX ADMIN — ASPIRIN 81 MG: 81 TABLET ORAL at 08:48

## 2017-01-01 RX ADMIN — SODIUM HYPOCHLORITE 946 ML: 1.25 SOLUTION TOPICAL at 18:58

## 2017-01-01 RX ADMIN — HEPARIN SODIUM 4000 UNITS: 5000 INJECTION, SOLUTION INTRAVENOUS; SUBCUTANEOUS at 11:51

## 2017-01-01 RX ADMIN — HEPARIN SODIUM 11.3 UNITS/KG/HR: 10000 INJECTION, SOLUTION INTRAVENOUS at 00:37

## 2017-01-01 RX ADMIN — FUROSEMIDE 80 MG: 80 TABLET ORAL at 10:01

## 2017-01-01 RX ADMIN — LEVOTHYROXINE SODIUM 100 MCG: 100 TABLET ORAL at 09:00

## 2017-01-01 RX ADMIN — SODIUM HYPOCHLORITE 946 ML: 1.25 SOLUTION TOPICAL at 21:02

## 2017-01-01 RX ADMIN — MORPHINE SULFATE 4 MG: 4 INJECTION, SOLUTION INTRAMUSCULAR; INTRAVENOUS at 08:30

## 2017-01-01 RX ADMIN — HYDROCODONE BITARTATE AND ACETAMINOPHEN 1 TABLET: 5; 325 TABLET ORAL at 03:22

## 2017-01-01 RX ADMIN — MORPHINE SULFATE 4 MG: 4 INJECTION, SOLUTION INTRAMUSCULAR; INTRAVENOUS at 17:54

## 2017-01-01 RX ADMIN — FENTANYL CITRATE 50 MCG: 50 INJECTION INTRAMUSCULAR; INTRAVENOUS at 11:47

## 2017-01-01 RX ADMIN — HEPARIN SODIUM 5000 UNITS: 5000 INJECTION, SOLUTION INTRAVENOUS; SUBCUTANEOUS at 10:01

## 2017-01-01 RX ADMIN — GLYCOPYRROLATE 0.2 MG: 0.2 INJECTION INTRAMUSCULAR; INTRAVENOUS at 10:04

## 2017-01-01 RX ADMIN — HYDRALAZINE HYDROCHLORIDE 25 MG: 25 TABLET ORAL at 09:40

## 2017-01-01 RX ADMIN — WARFARIN SODIUM 1 MG: 1 TABLET ORAL at 18:31

## 2017-01-01 RX ADMIN — WARFARIN SODIUM 2.5 MG: 2.5 TABLET ORAL at 19:58

## 2017-01-01 RX ADMIN — ASPIRIN 81 MG: 81 TABLET ORAL at 11:24

## 2017-01-01 RX ADMIN — SODIUM HYPOCHLORITE 946 ML: 1.25 SOLUTION TOPICAL at 20:33

## 2017-01-21 PROBLEM — A41.9 SEPSIS (HCC): Status: ACTIVE | Noted: 2017-01-01

## 2017-01-23 NOTE — ANESTHESIA PREPROCEDURE EVALUATION
Anesthesia Evaluation      No history of anesthetic complications   Airway   Mallampati: II  no difficulty expected  Dental    (+) edentulous    Pulmonary - normal exam   (+) pneumonia ,   (-) COPD, asthma, sleep apnea, not a smoker    PE comment: nonlabored  Cardiovascular   (+) hypertension, dysrhythmias (junctional esscape rhythm, h/o a-fib), CHF,   (-) valvular problems/murmurs, past MI, CAD, angina    Rate: abnormal  PE comment: Bradycardic, escape rhythm on monitor    Neuro/Psych  (+) psychiatric history Anxiety, poor historian.,    (-) seizures, TIA, CVA  GI/Hepatic/Renal/Endo    (+)  GERD, chronic renal disease ESRD and dialysis, hypothyroidism,   (-) liver disease, diabetes, hyperthyroidism    Musculoskeletal (-) negative ROS    Abdominal    Substance History      OB/GYN          Other   (+) blood dyscrasia (anemia)      Other Comment: Infected a-v fistula;  Lymphoma;                          Anesthesia Plan    ASA 4     general     intravenous induction   Anesthetic plan and risks discussed with patient.

## 2017-01-23 NOTE — ANESTHESIA PROCEDURE NOTES
Airway  Urgency: elective    Airway not difficult    General Information and Staff    Patient location during procedure: OR  Anesthesiologist: JUMA BALLESTEROS  CRNA: XIMENA YANEZ    Indications and Patient Condition  Indications for airway management: airway protection    Preoxygenated: yes  Mask difficulty assessment: 1 - vent by mask    Final Airway Details  Final airway type: endotracheal airway      Successful airway: ETT  Cuffed: yes   Successful intubation technique: direct laryngoscopy  Endotracheal tube insertion site: oral  Blade: Marian  Blade size: #4  ETT size: 8.0 mm  Cormack-Lehane Classification: grade I - full view of glottis  Placement verified by: chest auscultation and capnometry   Measured from: lips  ETT to lips (cm): 22  Number of attempts at approach: 1    Additional Comments  Smooth IV/mask induction/intubation. Easy bag-mask ventilation. Orally intubated, easy, atraumatic, lips//mouth left intact, as preop. Direct visual of vocal cords. +ETCO2, bilateral breath sounds and equal.

## 2017-01-23 NOTE — ANESTHESIA POSTPROCEDURE EVALUATION
Patient: Ze Wu    Procedure Summary     Date Anesthesia Start Anesthesia Stop Room / Location    01/23/17 0950 1158  [facility] OR 18 INV /  [facility] OR 18/19       Procedure Diagnosis Provider Provider    ultrasound access of right internal jugular vein with marlene dialysis catheter placement, EXCISION INFECTED RIGTH ARM AV GRAFT WITH REMOVAL OF HERO CATHETER, REMOVAL OF FEMORAL DIALYSIS CATHETER (Right Head) Arteriovenous graft infection, initial encounter MD Anthony Cooper MD          Anesthesia Type: general  Last vitals  /82 (01/23/17 1330)    Temp      Pulse (!) 38 (01/23/17 1330)   Resp 20 (01/23/17 1330)    SpO2 97 % (01/23/17 1330)      Post Anesthesia Care and Evaluation    Patient location during evaluation: PACU  Patient participation: complete - patient participated  Level of consciousness: awake and alert  Pain management: adequate  Airway patency: patent  Anesthetic complications: No anesthetic complications    Cardiovascular status: acceptable  Respiratory status: acceptable  Hydration status: acceptable

## 2017-01-31 NOTE — ANESTHESIA POSTPROCEDURE EVALUATION
Patient: Ze Wu    Procedure Summary     Date Anesthesia Start Anesthesia Stop Room / Location    01/31/17 0739 0839  [facility] OR 10 /  [facility] OR       Procedure Diagnosis Surgeon Provider    RT INTERNAL JUGULAR PALINDROME CATHETER PLACEMENT (Right Neck) Hypertensive CKD (chronic kidney disease), stage 5 chronic kidney disease or end stage renal disease MD Kinza Cooper MD          Anesthesia Type: MAC  Last vitals  /60 (01/31/17 0900)    Temp 37 °C (98.6 °F) (01/31/17 0833)    Pulse 55 (01/31/17 0915)   Resp 16 (01/31/17 0915)    SpO2 97 % (01/31/17 0915)      Post Anesthesia Care and Evaluation    Patient location during evaluation: PACU  Patient participation: complete - patient participated  Level of consciousness: awake and confused  Pain management: adequate  Airway patency: patent  Anesthetic complications: No anesthetic complications  PONV Status: none  Cardiovascular status: acceptable  Hydration status: acceptable

## 2017-01-31 NOTE — ANESTHESIA PREPROCEDURE EVALUATION
Anesthesia Evaluation     Patient summary reviewed and Nursing notes reviewed    No history of anesthetic complications   Airway   Mallampati: II  TM distance: >3 FB  Neck ROM: full  no difficulty expected  Dental    (+) upper dentures    Comment: Lower edentulous    Pulmonary    (+) pneumonia ,     PE comment: bs diminished but clear  Cardiovascular   (+) hypertension, dysrhythmias Atrial Fib, CHF,     Rhythm: regular  Rate: abnormal  PE comment: bradycardia    Neuro/Psych  (+) psychiatric history Anxiety,      ROS Comment: confusion  GI/Hepatic/Renal/Endo    (+)  GERD, chronic renal disease, hypothyroidism,     Musculoskeletal     (+) back pain,   Abdominal  - normal exam   Substance History - negative use     OB/GYN negative ob/gyn ROS         Other         Other Comment: lymphoma                        Anesthesia Plan    ASA 4     MAC     intravenous induction   Anesthetic plan and risks discussed with patient.

## 2017-02-01 NOTE — PLAN OF CARE
Problem: Patient Care Overview (Adult)  Goal: Plan of Care Review  Outcome: Ongoing (interventions implemented as appropriate)    02/01/17 1113 02/01/17 1118   Coping/Psychosocial Response Interventions   Plan Of Care Reviewed With --  patient;son   Patient Care Overview   Progress --  improving   Outcome Evaluation   Outcome Summary/Follow up Plan Patient alert, disoriented to time and place, denies pain, able to follow commands, dsg changed to rt arm ,patient tolerated well, started on heparin gtt this shift, preliminary labs drawn, will recheck aptt q 6hrs. Elevated Troponin this shift MD aware, aeb n/o, coumadin and lovenox to be d/c'ed per starting heparin. VSS --

## 2017-02-01 NOTE — PROGRESS NOTES
[facility]  Cardiology Progress Note    Patient Identification:  Name: Ze Wu  Age: 76 y.o.  Sex: male  :  1940  MRN: [id number]                 Follow Up / Chief Complaint: CAD, (permanent) afib,variable vr with bradycardia,CHF,  HTN    Interval History:  This is a 76-year-old  male with a history of  coronary artery disease, chronic A. fib on anticoagulation with Coumadin, history of congestive heart failure, hypertension. We have been asked to consult regarding atrial fibrillation with variable ventricular rates and supratherapuetic INR.       Subjective:     Denies chest pain, pressure, dizziness or  SOA    Objective:  Ventricular rates 50-60s with infrequent, transient (less than 2 seconds) decrease his ventricular rate to 48.  No pauses > 3 seconds and no RVR.      New anterior ST changes noted on this am EKG (see below)  Troponin 0.294 ->  0.374        Past Medical History:  Past Medical History   Diagnosis Date   • Anemia    • Anxiety    • CHF (congestive heart failure)    • Chronic a-fib    • Chronic anticoagulation    • Confusion    • Disease of thyroid gland    • GERD (gastroesophageal reflux disease)    • Hypertension    • Hypothyroidism    • Kidney disease    • Lymphoma    • Staph aureus infection 2017     labs called from [facility]     Past Surgical History:  Past Surgical History   Procedure Laterality Date   • Mediport insertion, single     • Thumb amputation Left    • Av fistula placement Right    • Arteriovenous fistula/shunt surgery Right 2017     Procedure: ultrasound access of right internal jugular vein with mahurkar dialysis catheter placement, EXCISION INFECTED RIGTH ARM AV GRAFT WITH REMOVAL OF HERO CATHETER, REMOVAL OF FEMORAL DIALYSIS CATHETER;  Surgeon: Floyd Fraga MD;  Location: [facility] OR ;  Service:         Social History:   Social History   Substance Use Topics   • Smoking status: Never Smoker   •  Smokeless tobacco: Not on file   • Alcohol use No      Family History:  History reviewed. No pertinent family history.       Allergies:  No Known Allergies     Scheduled Meds:    hydrALAZINE 25 mg Q8H   lansoprazole 30 mg QAM   levothyroxine 100 mcg Daily   Vancomycin Pharmacy Intermittent Dosing  Daily   warfarin 2.5 mg Daily       INTAKE AND OUTPUT:    Intake/Output Summary (Last 24 hours) at 02/01/17 0900  Last data filed at 01/31/17 1736   Gross per 24 hour   Intake      0 ml   Output      0 ml   Net      0 ml       Review of Systems:   GI: no abdominal pain  Cardiac  Cp or pressure  Pulmonary:  No SOA    Constitutional:  Temp:  [96 °F (35.6 °C)-98.5 °F (36.9 °C)] 98.5 °F (36.9 °C)  Heart Rate:  [48-71] 52  Resp:  [16-20] 18  BP: (112-152)/(53-95) 119/73    Physical Exam by Tristan Mojica MD    General:  Awake, resting quietly.   Appears in no acute distress  Eyes: PERTL,  HEENT:  Thyroid not visibly enlarged.  Oral mucosal dry, no cyanosis  Respiratory: Respirations regular and unlabored at rest. BBS with a diminished air entry in bases  No crackles or wheezes auscultated.  Left subclavian Mediport and  right IJ  catheter noted  Cardiovascular: S1S2 irregular rate and rhythm. Unchanged I/VI systolic murmur without rub or gallop.. No pretibial pitting edema.  Gastrointestinal: Abdomen soft, non tender. Bowel sounds present.   Musculoskeletal: ALEGRIA x4. No abnormal movements  Extremities: No digital cyanosis.  No RUE edema,  Skin: Color pale. pink. Skin warm and dry to touch.   Neuro: AAO x2 CN II-XII grossly intact  Psych: pleasant and cooperative      Cardiographics  Telemetry: afib, VR 50's-60 with infrequent,PVC .  No pauses  >2.0 sec.         EKG 2-1-17        EKG 1-30-17      Echocardiogram:   · All left ventricular wall segments contract normally.  · Left atrial cavity size is mildly dilated.  · Mild to moderate aortic valve stenosis is present.  · Mild to moderate tricuspid valve regurgitation is  present.  · Left Ventricle: Calculated EF = 66.7%.  · There is no evidence of pericardial effusion         Lab Review      1/22/2017 05:43 1/30/2017 17:18 2/1/2017 05:48   CKMB  <1.00    Troponin T 0.033 (H) 0.294 (C) 0.374 (C)       Results from last 7 days  Lab Units 02/01/17  0548   SODIUM mmol/L 139   POTASSIUM mmol/L 4.4   BUN mg/dL 25*   CREATININE mg/dL 4.16*   CALCIUM mg/dL 9.2       Results from last 7 days  Lab Units 02/01/17  0548 01/30/17  0407 01/29/17  0645   WBC 10*3/mm3 13.06* 12.21* 12.15*   HEMOGLOBIN g/dL 9.4* 8.6* 8.7*   HEMATOCRIT % 28.4* 26.3* 26.3*   PLATELETS 10*3/mm3 342 330 273       Results from last 7 days  Lab Units 02/01/17  0548 01/31/17  1126 01/27/17  0606   INR  1.38* 1.29* 1.34*         Assessment:  - (Permanent) atrial fibrillation  - sick sinus syndrome  - elevated [id number] score  - elevated troponin  - CAD  - HTN    - a/c anemia -> IM, Renal  - Pneumonia  - Pulmonary septic emboli -> ID  - Acute hypoxic respiratory failure -> pulmonary  - MSSA septicemia -> ID  - Infected AV fistula -> ID, Renal, vascular surgery  - ESRD-> Renal  - B-cell lymphoma (in remission)  - Thyroid cancer (in remission)      Plan:  - (Permanent) atrial fibrillation -   ventricular rates 48's - 60's No additional pauses > 3.0 seconds and no high degree blocks.   Start Hep gtt bridge therapy pending ID clearance for pacemaker implant    - sick sinus syndrome - transient episodes of  (asymptomatic) ventricular rates 48-50s. No pauses >3 seconds, no RVR.  Plan permanent pacemaker once  cleared by infectious disease.  In the interim, continue to avoid beta blocker, calcium channel blockers      - elevated [id number] score -   Start Heparin gtt as bridge therapy      - elevated troponin -  Cannot exclude NSTEMI  No angina, but anterior ST changes on this am EKG. Antianginal therapy adjusted with the addition of nitrate to aspirin (no beta blocker or calcium channel blocker due to sss). Needs cardiac cath  before proceeding with pacemaker implant    - HTN - controlled on hydralazine 25 mg TID.   Avoid calcium channel blockers and beta blockers due to sick sinus syndrome.            Stop Coumadin and start Heparin gtt bridge therapy. Patient needs a cardiac cath  before planned pacemaker implant. Antianginal therapy optimized pending ID clearance for cardiac cath and pacemaker  Recheck troponin and EKG in am    I reviewed the patient's new clinical results and treatment plan . I personally viewed and interpreted the patient's EKG/Telemetry data    )2/1/2017  Tristan Mojica MD          EMR Dragon/Transcription disclaimer:   Much of this encounter note is an electronic transcription/translation of spoken language to printed text. The electronic translation of spoken language may permit erroneous, or at times, nonsensical words or phrases to be inadvertently transcribed; Although I have reviewed the note for such errors, some may still exist.

## 2017-02-01 NOTE — PLAN OF CARE
Problem: Patient Care Overview (Adult)  Goal: Plan of Care Review  Outcome: Ongoing (interventions implemented as appropriate)    02/01/17 0106   Coping/Psychosocial Response Interventions   Plan Of Care Reviewed With patient   Patient Care Overview   Progress improving   Outcome Evaluation   Outcome Summary/Follow up Plan VSS, surgery for new dialysis tunnel cath today then to dialysis. Pt very confused somewhat combative, needing reorientation frequently, dressings clean dry intact. Continue to monitor.        Goal: Adult Individualization and Mutuality  Outcome: Ongoing (interventions implemented as appropriate)  Goal: Discharge Needs Assessment  Outcome: Ongoing (interventions implemented as appropriate)    01/26/17 0542   Discharge Needs Assessment   Discharge Disposition still a patient         Problem: Chronic Kidney Disease/End Stage Renal Disease (Adult)  Goal: Signs and Symptoms of Listed Potential Problems Will be Absent or Manageable (Chronic Kidney Disease/End Stage Renal Disease)  Outcome: Ongoing (interventions implemented as appropriate)    02/01/17 0106   Chronic Kidney Disease/End Stage Renal Disease   Problems Assessed (Chronic Kidney Disease/ESRD) all   Problems Present (Chronic Kidney Disease/ESRD) fluid imbalance;vascular access complications         Problem: Confusion, Chronic (Adult)  Goal: Cognitive/Functional Impairments Minimized  Outcome: Ongoing (interventions implemented as appropriate)    02/01/17 0106   Confusion, Chronic (Adult)   Cognitive/Functional Impairments Minimized making progress toward outcome       Goal: Free from Harm/Injuries  Outcome: Ongoing (interventions implemented as appropriate)    02/01/17 0106   Confusion, Chronic (Adult)   Free from Harm/Injuries making progress toward outcome

## 2017-02-01 NOTE — SIGNIFICANT NOTE
02/01/17 1602   Rehab Treatment   Discipline occupational therapist   Rehab Evaluation   Evaluation Not Performed other (see comments)  (pt now resting . Refused PT per [id number])

## 2017-02-01 NOTE — PLAN OF CARE
Problem: Patient Care Overview (Adult)  Goal: Plan of Care Review  Outcome: Ongoing (interventions implemented as appropriate)  Goal: Adult Individualization and Mutuality  Outcome: Ongoing (interventions implemented as appropriate)    Problem: Chronic Kidney Disease/End Stage Renal Disease (Adult)  Goal: Signs and Symptoms of Listed Potential Problems Will be Absent or Manageable (Chronic Kidney Disease/End Stage Renal Disease)  Outcome: Ongoing (interventions implemented as appropriate)    02/01/17 1113   Chronic Kidney Disease/End Stage Renal Disease   Problems Assessed (Chronic Kidney Disease/ESRD) all   Problems Present (Chronic Kidney Disease/ESRD) cardiovascular disease;electrolyte imbalance;fluid imbalance;situational response         Problem: Confusion, Chronic (Adult)  Goal: Cognitive/Functional Impairments Minimized  Outcome: Ongoing (interventions implemented as appropriate)    02/01/17 1113   Confusion, Chronic (Adult)   Cognitive/Functional Impairments Minimized achieves outcome       Goal: Free from Harm/Injuries  Outcome: Ongoing (interventions implemented as appropriate)    Problem: Pneumonia (Adult)  Goal: Signs and Symptoms of Listed Potential Problems Will be Absent or Manageable (Pneumonia)  Outcome: Ongoing (interventions implemented as appropriate)    02/01/17 1113   Pneumonia   Problems Assessed (Pneumonia) all   Problems Present (Pneumonia) fluid/electrolyte imbalance         Problem: Perioperative Period (Adult)  Goal: Signs and Symptoms of Listed Potential Problems Will be Absent or Manageable (Perioperative Period)  Outcome: Ongoing (interventions implemented as appropriate)    02/01/17 1113   Perioperative Period   Problems Assessed (Perioperative Period) all   Problems Present (Perioperative Period) pain;situational response

## 2017-02-01 NOTE — PROGRESS NOTES
Continued Stay Note  [facility]     Patient Name: Ze Wu  MRN: [id number]  Today's Date: 2/1/2017    Admit Date: 1/20/2017          Discharge Plan       02/01/17 1229    Case Management/Social Work Plan    Plan SNF - will need precert    Patient/Family In Agreement With Plan yes    Additional Comments Attempted to follow up with patient who is severly confused. Called to son/Tano [phone] and he is interested in [facility] [place] and [facility] and  [facility]. He has no preference to #1 choice. Called referrals to Vilma/[facility], Leeanne/[facility] and [facility]. Packet is in 5E CCP office.   Return call from [facility] and they will not have any beds until next week and no LTC beds if needed. Return call from [facility] and no HD at [facility] [facility].   1340 - [facility] here and is following.               Discharge Codes     None            Moshe Wesley RN

## 2017-02-01 NOTE — SIGNIFICANT NOTE
02/01/17 1329   Rehab Treatment   Discipline physical therapist   Treatment Not Performed patient/family declined treatment  (encouraged and educated pt on working with PT, but pt continues to decline getting up at this time. Pt also very confused. Will follow up tomorrow.)   Recommendation   PT - Next Appointment 02/02/17

## 2017-02-01 NOTE — PROGRESS NOTES
NEPHROLOGY PROGRESS NOTE    PATIENT IDENTIFICATION:   Name:  Ze Wu      MRN:  [id number]     76 y.o.  male             Reason for visit: ESRD    SUBJECTIVE:   Seen and examined.  Hd Rt TDC yesterday. Sleepy. NAD. No new complaints.  OBJECTIVE:  Vitals:    01/31/17 2005 01/31/17 2300 02/01/17 0700 02/01/17 1100   BP: 112/53 136/62 119/73 142/51   BP Location: Left arm Left arm Left arm Left arm   Patient Position: Lying Lying Lying Lying   Pulse: 58 54 52 (!) 43   Resp: 18 18 18 18   Temp: 98 °F (36.7 °C) 97 °F (36.1 °C) 98.5 °F (36.9 °C) 98.4 °F (36.9 °C)   TempSrc: Oral Oral Oral Oral   SpO2: 95%   96%   Weight:       Height:         FiO2 (%): 100 %     Body mass index is 25.05 kg/(m^2).    Intake/Output Summary (Last 24 hours) at 02/01/17 1157  Last data filed at 01/31/17 1736   Gross per 24 hour   Intake      0 ml   Output      0 ml   Net      0 ml         Exam:  GEN:   appears stated age  EYES:   Anicteric sclera  ENT:    External ears/nose normal, MM are   NECK:  No adenopathy, JVP   LUNGS: Normal chest on inspection; not labored  CV:  Normal S1S2, without murmur. Bradycardic  ABD:  Non-tender, non-distended, no hepatosplenomegaly, +BS  EXT:  No edema; no cyanosis; clubbing, RUE w/ excision site open, no purulence or drainage. Rt TDC    Scheduled meds:      aspirin 81 mg Oral Daily   epoetin bacilio 10,000 Units Intravenous Once   hydrALAZINE 25 mg Oral Q8H   isosorbide mononitrate 30 mg Oral Q24H   lansoprazole 30 mg Oral QAM   levothyroxine 100 mcg Oral Daily   Vancomycin Pharmacy Intermittent Dosing  Does not apply Daily     IV meds:                          heparin (porcine) 11.3 Units/kg/hr (Adjusted) Last Rate: 11.3 Units/kg/hr (02/01/17 1053)   Pharmacy to dose vancomycin         Data Review:      Results from last 7 days  Lab Units 02/01/17  0548 01/31/17  1126 01/30/17  1718 01/30/17  0407  01/27/17  0606   SODIUM mmol/L 139 136  --  139  < > 139   POTASSIUM mmol/L 4.4 4.4 4.0 3.6  < > 3.6  "  CHLORIDE mmol/L 98 95*  --  98  < > 100   TOTAL CO2 mmol/L 23.8 21.7*  --  24.4  < > 22.9   BUN mg/dL 25* 47*  --  35*  < > 26*   CREATININE mg/dL 4.16* 7.03*  --  5.15*  < > 3.39*   CALCIUM mg/dL 9.2 8.8  --  8.4*  < > 8.7   BILIRUBIN mg/dL  --  1.2  --  1.2  --  2.1*   ALK PHOS U/L  --  175*  --  206*  --  258*   ALT (SGPT) U/L  --  14  --  18  --  19   AST (SGOT) U/L  --  17  --  22  --  26   GLUCOSE mg/dL 110* 97  --  99  < > 106*   < > = values in this interval not displayed.    Estimated Creatinine Clearance: 17.8 mL/min (by C-G formula based on Cr of 4.16).      Results from last 7 days  Lab Units 01/30/17  1718   MAGNESIUM mg/dL 2.2         Results from last 7 days  Lab Units 02/01/17  1016 02/01/17  0548 01/30/17  0407 01/29/17  0645 01/28/17  0647   WBC 10*3/mm3 11.25* 13.06* 12.21* 12.15* 12.80*   HEMOGLOBIN g/dL 9.5* 9.4* 8.6* 8.7* 8.4*   PLATELETS 10*3/mm3 364 342 330 273 240         Results from last 7 days  Lab Units 02/01/17  0548 01/31/17  1126 01/27/17  0606 01/26/17  0555   INR  1.38* 1.29* 1.34* 1.46*             ASSESSMENT:   Active Problems:    Sepsis    1. ESRD on HD TTS  2. Hypercaogulopathy with bleeding graft: Improved  3. MRSA bacteremia: Infected graft is the most likely source.  4. Encephalopathy\" improved.  Still not at baseline.   5. Anemia of ESRD  6. Secondary hyperparathyroidism  7.  Pulmonary septic emboli  8. HTN     Plan:     S/P graft excision. S/P TDC catheter 01/31.   Vanc 1250 mg  post dialysis for 6 weeks With stop date 03/06/2017  Repeat bood Cx one week post ABX  HD at AM  Surveillance labs    Ariadne Gonsales MD  2/1/2017    11:57 AM     "

## 2017-02-01 NOTE — PROGRESS NOTES
Floyd Fraga MD       LOS: 12 days   Patient Care Team:  No Known Provider as PCP - General    Subjective     76 y.o. male with sepsis and coagulopathy resolved with removal of right arm arm av shunt and hero catheter and antibiotics per infectious disease. Home warfarin dose for a fib resumed yesterday and monitor pt inr. Plans for pacemaker in future per cardiology once infection treated noted. Left port accessed for iv medications and site clean. Right palindrome site clean and ok to use for dialysis. Continue local care right arm wounds.    Review of Systems  Review of Systems - Negative except history of present illness      Objective     Vital Signs  Temp:  [96 °F (35.6 °C)-98.6 °F (37 °C)] 97 °F (36.1 °C)  Heart Rate:  [48-71] 54  Resp:  [16-20] 18  BP: (112-152)/(53-95) 136/62    Physical Exam  General: No acute distress. Alert and oriented x 4  HEENT: No jugular venous distension, trachea is midline  CV: RRR, S1S2  Resp: Clear unlabored breathing  Abd: Abdomen is soft, nontender, nondistended  Extremities: right arm wounds granulation, right palindrome and left port site clean, no leg swelling    Results Review:     No results found for this or any previous visit (from the past 12 hour(s)).]      Assessment/Plan           Active Problems:    Sepsis      Assessment & Plan  76 y.o. male doing well after treatment of infected right arm av graft and hero catheter. Continue left palindrome catheter dialysis. Plan for pacemaker in future per cardiology. Will plan eventual left arm brachial basilic vein fistula after pacemaker done if all ok. Continue warfarin for a fib per cardiology and dialysis per renal and antibiotics per infectious disease. Plans for discharge ok with me when ok with all.      Floyd Fraga MD  02/01/17  6:33 AM    Discussed with Dr. Mojica and wants port removed prior to proceeding with pacemaker placement. Will plan to remove tomorrow so that he can place pacemaker in  future.

## 2017-02-01 NOTE — PROGRESS NOTES
"   DAILY PROGRESS NOTE    CHIEF COMPLAINT  DOING  BETTER  NO NEW ISSUES        HISTORY OF PRESENT ILLNESS: A 76-year-old white male with a history of end-stage renal disease, on hemodialysis for the last few years, B-cell lymphoma in remission, thyroid cancer in remission, coronary artery disease, chronic AFib, on anticoagulation, congestive heart failure, hypertension, depression, who transferred to my service from [facility] where he was admitted with a 3-week history of increasing shortness of breath, productive cough. The patient’s workup did reveal pneumonia with sepsis and he was getting treatment for that but his hemodialysis graft was not working. He was transferred to our service for further evaluation with Vascular Surgery and continued the current treatment.     PHYSICAL EXAMINATION:Blood pressure 142/51, pulse (!) 43, temperature 98.4 °F (36.9 °C), temperature source Oral, resp. rate 18, height 74\" (188 cm), weight 195 lb 1.6 oz (88.5 kg), SpO2 96 %.    GENERAL: Alert, & oriented   HEENT: Unremarkable.   NECK: Supple.   LUNGS: Decreased breath sounds.   HEART: S1, S2 regular.   ABDOMEN: Soft, nontender. Bowel sounds positive.   EXTREMITIES: No edema.   NEUROLOGIC: Moves all 4 extremities. Gait and balance not checked.    DIAGNOSTIC DATA:   Lab Results (last 24 hours)     Procedure Component Value Units Date/Time    Blood Culture [[id number]]  (Normal) Collected:  01/27/17 1952    Specimen:  Blood from Arm, Left Updated:  01/31/17 2101     Blood Culture No growth at 4 days     CBC & Differential [[id number]] Collected:  02/01/17 0548    Specimen:  Blood Updated:  02/01/17 0650    Narrative:       The following orders were created for panel order CBC & Differential.  Procedure                               Abnormality         Status                     ---------                               -----------         ------                     CBC Auto Differential[[id number]]         Abnormal            " Final result                 Please view results for these tests on the individual orders.    CBC Auto Differential [[id number]]  (Abnormal) Collected:  02/01/17 0548    Specimen:  Blood Updated:  02/01/17 0650     WBC 13.06 (H) 10*3/mm3      RBC 2.95 (L) 10*6/mm3      Hemoglobin 9.4 (L) g/dL      Hematocrit 28.4 (L) %      MCV 96.3 (H) fL      MCH 31.9 pg      MCHC 33.1 g/dL      RDW 16.1 (H) %      RDW-SD 56.6 (H) fl      MPV 10.6 fL      Platelets 342 10*3/mm3      Neutrophil % 80.4 (H) %      Lymphocyte % 10.3 (L) %      Monocyte % 6.3 %      Eosinophil % 1.8 %      Basophil % 0.9 %      Immature Grans % 0.3 %      Neutrophils, Absolute 10.50 (H) 10*3/mm3      Lymphocytes, Absolute 1.34 10*3/mm3      Monocytes, Absolute 0.82 10*3/mm3      Eosinophils, Absolute 0.24 10*3/mm3      Basophils, Absolute 0.12 10*3/mm3      Immature Grans, Absolute 0.04 (H) 10*3/mm3     Protime-INR [[id number]]  (Abnormal) Collected:  02/01/17 0548    Specimen:  Blood Updated:  02/01/17 0652     Protime 16.4 (H) Seconds      INR 1.38 (H)     Basic Metabolic Panel [[id number]]  (Abnormal) Collected:  02/01/17 0548    Specimen:  Blood Updated:  02/01/17 0745     Glucose 110 (H) mg/dL      BUN 25 (H) mg/dL      Creatinine 4.16 (H) mg/dL      Sodium 139 mmol/L      Potassium 4.4 mmol/L      Chloride 98 mmol/L      CO2 23.8 mmol/L      Calcium 9.2 mg/dL      eGFR Non African Amer 14 (L) mL/min/1.73      BUN/Creatinine Ratio 6.0 (L)      Anion Gap 17.2 mmol/L     Narrative:       The MDRD GFR formula is only valid for adults with stable renal function between ages 18 and 70.    Troponin [[id number]]  (Abnormal) Collected:  02/01/17 0548    Specimen:  Blood Updated:  02/01/17 0754     Troponin T 0.374 (C) ng/mL     Narrative:       Troponin T Reference Ranges:  Less than 0.03 ng/mL:    Negative for AMI  0.03 to 0.09 ng/mL:      Indeterminant for AMI  Greater than 0.09 ng/mL: Positive for AMI    CBC & Differential [[id number]] Collected:  02/01/17 1016     Specimen:  Blood Updated:  02/01/17 1045    Narrative:       The following orders were created for panel order CBC & Differential.  Procedure                               Abnormality         Status                     ---------                               -----------         ------                     CBC Auto Differential[[id number]]         Abnormal            Final result                 Please view results for these tests on the individual orders.    CBC Auto Differential [[id number]]  (Abnormal) Collected:  02/01/17 1016    Specimen:  Blood Updated:  02/01/17 1045     WBC 11.25 (H) 10*3/mm3      RBC 3.05 (L) 10*6/mm3      Hemoglobin 9.5 (L) g/dL      Hematocrit 29.6 (L) %      MCV 97.0 (H) fL      MCH 31.1 pg      MCHC 32.1 (L) g/dL      RDW 16.2 (H) %      RDW-SD 57.0 (H) fl      MPV 10.4 fL      Platelets 364 10*3/mm3      Neutrophil % 80.1 (H) %      Lymphocyte % 11.2 (L) %      Monocyte % 5.1 %      Eosinophil % 2.1 %      Basophil % 1.1 %      Immature Grans % 0.4 %      Neutrophils, Absolute 9.02 (H) 10*3/mm3      Lymphocytes, Absolute 1.26 10*3/mm3      Monocytes, Absolute 0.57 10*3/mm3      Eosinophils, Absolute 0.24 10*3/mm3      Basophils, Absolute 0.12 10*3/mm3      Immature Grans, Absolute 0.04 (H) 10*3/mm3     aPTT [[id number]]  (Abnormal) Collected:  02/01/17 1016    Specimen:  Blood Updated:  02/01/17 1053     PTT 38.1 (H) seconds     CK-MB [[id number]]  (Normal) Collected:  02/01/17 1016    Specimen:  Blood Updated:  02/01/17 1105     CKMB 1.92 ng/mL         Lab Results (last 24 hours)     Procedure Component Value Units Date/Time    Magnesium [[id number]]  (Normal) Collected:  01/30/17 1718    Specimen:  Blood Updated:  01/30/17 1807     Magnesium 2.2 mg/dL     Potassium [[id number]]  (Normal) Collected:  01/30/17 1718    Specimen:  Blood Updated:  01/30/17 1808     Potassium 4.0 mmol/L     CK-MB [[id number]]  (Normal) Collected:  01/30/17 1718    Specimen:  Blood Updated:  01/30/17 1818     CKMB <1.00  ng/mL     Troponin [[id number]]  (Abnormal) Collected:  01/30/17 1718    Specimen:  Blood Updated:  01/30/17 1819     Troponin T 0.294 (C) ng/mL     Narrative:       Troponin T Reference Ranges:  Less than 0.03 ng/mL:    Negative for AMI  0.03 to 0.09 ng/mL:      Indeterminant for AMI  Greater than 0.09 ng/mL: Positive for AMI    Blood Culture [[id number]]  (Normal) Collected:  01/27/17 1952    Specimen:  Blood from Arm, Left Updated:  01/30/17 2101     Blood Culture No growth at 3 days     Protime-INR [[id number]]  (Abnormal) Collected:  01/31/17 1126    Specimen:  Blood Updated:  01/31/17 1214     Protime 15.6 (H) Seconds      INR 1.29 (H)     Comprehensive Metabolic Panel [[id number]]  (Abnormal) Collected:  01/31/17 1126    Specimen:  Blood Updated:  01/31/17 1217     Glucose 97 mg/dL      BUN 47 (H) mg/dL      Creatinine 7.03 (H) mg/dL      Sodium 136 mmol/L      Potassium 4.4 mmol/L      Chloride 95 (L) mmol/L      CO2 21.7 (L) mmol/L      Calcium 8.8 mg/dL      Total Protein 7.4 g/dL      Albumin 2.70 (L) g/dL      ALT (SGPT) 14 U/L      AST (SGOT) 17 U/L      Alkaline Phosphatase 175 (H) U/L      Total Bilirubin 1.2 mg/dL      eGFR Non African Amer 8 (L) mL/min/1.73      Globulin 4.7 gm/dL      A/G Ratio 0.6 g/dL      BUN/Creatinine Ratio 6.7 (L)      Anion Gap 19.3 mmol/L     Narrative:       The MDRD GFR formula is only valid for adults with stable renal function between ages 18 and 70.        Lab Results (last 24 hours)     Procedure Component Value Units Date/Time    Blood Culture [[id number]]  (Normal) Collected:  01/27/17 1952    Specimen:  Blood from Arm, Left Updated:  01/29/17 2101     Blood Culture No growth at 2 days     CBC (No Diff) [[id number]]  (Abnormal) Collected:  01/30/17 0407    Specimen:  Blood Updated:  01/30/17 0446     WBC 12.21 (H) 10*3/mm3      RBC 2.72 (L) 10*6/mm3      Hemoglobin 8.6 (L) g/dL      Hematocrit 26.3 (L) %      MCV 96.7 (H) fL      MCH 31.6 pg      MCHC 32.7 g/dL      RDW 16.6  (H) %      RDW-SD 58.9 (H) fl      MPV 10.5 fL      Platelets 330 10*3/mm3     Comprehensive Metabolic Panel [[id number]]  (Abnormal) Collected:  01/30/17 0407    Specimen:  Blood Updated:  01/30/17 0505     Glucose 99 mg/dL      BUN 35 (H) mg/dL      Creatinine 5.15 (H) mg/dL      Sodium 139 mmol/L      Potassium 3.6 mmol/L      Chloride 98 mmol/L      CO2 24.4 mmol/L      Calcium 8.4 (L) mg/dL      Total Protein 6.9 g/dL      Albumin 2.90 (L) g/dL      ALT (SGPT) 18 U/L      AST (SGOT) 22 U/L      Alkaline Phosphatase 206 (H) U/L      Total Bilirubin 1.2 mg/dL      eGFR Non African Amer 11 (L) mL/min/1.73      Globulin 4.0 gm/dL      A/G Ratio 0.7 g/dL      BUN/Creatinine Ratio 6.8 (L)      Anion Gap 16.6 mmol/L     Narrative:       The MDRD GFR formula is only valid for adults with stable renal function between ages 18 and 70.    Vancomycin, Random [[id number]]  (Normal) Collected:  01/30/17 0444    Specimen:  Blood Updated:  01/30/17 0542     Vancomycin Random 21.00 mcg/mL     Blood Culture [[id number]]  (Normal) Collected:  01/24/17 1055    Specimen:  Blood from Arm, Left Updated:  01/30/17 0701     Blood Culture No growth at 5 days         Lab Results (last 24 hours)     Procedure Component Value Units Date/Time    Anaerobic Culture [[id number]]  (Normal) Collected:  01/23/17 1048    Specimen:  Wound from Arm, Right Updated:  01/28/17 1415     Culture No anaerobes isolated at 5 days     Anaerobic Culture [[id number]]  (Normal) Collected:  01/23/17 1120    Specimen:  Tissue from Arm, Right Updated:  01/28/17 1416     Culture No anaerobes isolated at 5 days     Blood Culture [[id number]]  (Normal) Collected:  01/27/17 1952    Specimen:  Blood from Arm, Left Updated:  01/28/17 2101     Blood Culture No growth at 24 hours     Blood Culture [[id number]]  (Normal) Collected:  01/24/17 1055    Specimen:  Blood from Arm, Left Updated:  01/29/17 0701     Blood Culture No growth at 4 days     CBC (No Diff) [[id number]]  (Abnormal)  Collected:  01/29/17 0645    Specimen:  Blood Updated:  01/29/17 0713     WBC 12.15 (H) 10*3/mm3      RBC 2.77 (L) 10*6/mm3      Hemoglobin 8.7 (L) g/dL      Hematocrit 26.3 (L) %      MCV 94.9 fL      MCH 31.4 pg      MCHC 33.1 g/dL      RDW 16.7 (H) %      RDW-SD 57.4 (H) fl      MPV 10.8 fL      Platelets 273 10*3/mm3     Basic Metabolic Panel [[id number]]  (Abnormal) Collected:  01/29/17 0645    Specimen:  Blood Updated:  01/29/17 0727     Glucose 101 (H) mg/dL      BUN 24 (H) mg/dL      Creatinine 3.78 (H) mg/dL      Sodium 136 mmol/L      Potassium 3.7 mmol/L      Chloride 96 (L) mmol/L      CO2 24.7 mmol/L      Calcium 8.6 mg/dL      eGFR Non African Amer 16 (L) mL/min/1.73      BUN/Creatinine Ratio 6.3 (L)      Anion Gap 15.3 mmol/L     Narrative:       The MDRD GFR formula is only valid for adults with stable renal function between ages 18 and 70.    Blood Culture [[id number]]  (Normal) Collected:  01/24/17 1124    Specimen:  Blood from Blood, Central Line Updated:  01/29/17 1201     Blood Culture No growth at 5 days         Lab Results (last 24 hours)     Procedure Component Value Units Date/Time    Blood Culture [[id number]]  (Normal) Collected:  01/24/17 1055    Specimen:  Blood from Arm, Left Updated:  01/28/17 0701     Blood Culture No growth at 3 days     CBC (No Diff) [[id number]]  (Abnormal) Collected:  01/28/17 0647    Specimen:  Blood Updated:  01/28/17 0708     WBC 12.80 (H) 10*3/mm3      RBC 2.59 (L) 10*6/mm3      Hemoglobin 8.4 (L) g/dL      Hematocrit 24.4 (L) %      MCV 94.2 fL      MCH 32.4 pg      MCHC 34.4 g/dL      RDW 17.1 (H) %      RDW-SD 58.9 (H) fl      MPV 11.0 fL      Platelets 240 10*3/mm3     Basic Metabolic Panel [[id number]]  (Abnormal) Collected:  01/28/17 0647    Specimen:  Blood Updated:  01/28/17 0723     Glucose 99 mg/dL      BUN 38 (H) mg/dL      Creatinine 4.86 (H) mg/dL      Sodium 137 mmol/L      Potassium 3.5 mmol/L      Chloride 99 mmol/L      CO2 22.7 mmol/L      Calcium 8.5  (L) mg/dL      eGFR Non African Amer 12 (L) mL/min/1.73      BUN/Creatinine Ratio 7.8      Anion Gap 15.3 mmol/L     Narrative:       The MDRD GFR formula is only valid for adults with stable renal function between ages 18 and 70.    Vancomycin, Random [[id number]]  (Normal) Collected:  01/28/17 0647    Specimen:  Blood Updated:  01/28/17 0725     Vancomycin Random 16.80 mcg/mL     Blood Culture [[id number]]  (Normal) Collected:  01/27/17 1952    Specimen:  Blood from Arm, Left Updated:  01/28/17 0901     Blood Culture No growth at less than 24 hours     Blood Culture [[id number]]  (Normal) Collected:  01/24/17 1124    Specimen:  Blood from Blood, Central Line Updated:  01/28/17 1201     Blood Culture No growth at 4 days         Lab Results (last 24 hours)     Procedure Component Value Units Date/Time    Blood Culture [[id number]]  (Normal) Collected:  01/21/17 1449    Specimen:  Blood from Arm, Left Updated:  01/26/17 1601     Blood Culture No growth at 5 days     CBC (No Diff) [[id number]]  (Abnormal) Collected:  01/27/17 0606    Specimen:  Blood Updated:  01/27/17 0659     WBC 13.40 (H) 10*3/mm3      RBC 2.86 (L) 10*6/mm3      Hemoglobin 9.1 (L) g/dL      Hematocrit 27.7 (L) %      MCV 96.9 (H) fL      MCH 31.8 pg      MCHC 32.9 g/dL      RDW 17.4 (H) %      RDW-SD 60.4 (H) fl      MPV 11.1 fL      Platelets 227 10*3/mm3     Blood Culture [[id number]]  (Normal) Collected:  01/24/17 1055    Specimen:  Blood from Arm, Left Updated:  01/27/17 0701     Blood Culture No growth at 2 days     Protime-INR [[id number]]  (Abnormal) Collected:  01/27/17 0606    Specimen:  Blood Updated:  01/27/17 0710     Protime 16.1 (H) Seconds      INR 1.34 (H)     Comprehensive Metabolic Panel [[id number]]  (Abnormal) Collected:  01/27/17 0606    Specimen:  Blood Updated:  01/27/17 0732     Glucose 106 (H) mg/dL      BUN 26 (H) mg/dL      Creatinine 3.39 (H) mg/dL      Sodium 139 mmol/L      Potassium 3.6 mmol/L      Chloride 100 mmol/L      CO2 22.9  mmol/L      Calcium 8.7 mg/dL      Total Protein 6.6 g/dL      Albumin 2.80 (L) g/dL      ALT (SGPT) 19 U/L      AST (SGOT) 26 U/L      Alkaline Phosphatase 258 (H) U/L      Total Bilirubin 2.1 (H) mg/dL      eGFR Non African Amer 18 (L) mL/min/1.73      Globulin 3.8 gm/dL      A/G Ratio 0.7 g/dL      BUN/Creatinine Ratio 7.7      Anion Gap 16.1 mmol/L     Narrative:       The MDRD GFR formula is only valid for adults with stable renal function between ages 18 and 70.    Blood Culture [[id number]]  (Normal) Collected:  01/24/17 1124    Specimen:  Blood from Blood, Central Line Updated:  01/27/17 1201     Blood Culture No growth at 3 days         Lab Results (last 24 hours)     Procedure Component Value Units Date/Time    Blood Culture [[id number]]  (Normal) Collected:  01/21/17 1449    Specimen:  Blood from Arm, Left Updated:  01/25/17 1601     Blood Culture No growth at 4 days     Protime-INR [[id number]]  (Abnormal) Collected:  01/26/17 0555    Specimen:  Blood Updated:  01/26/17 0653     Protime 17.2 (H) Seconds      INR 1.46 (H)     Blood Culture [[id number]]  (Normal) Collected:  01/24/17 1055    Specimen:  Blood from Arm, Left Updated:  01/26/17 0701     Blood Culture No growth at 24 hours     Vancomycin, Random [[id number]]  (Normal) Collected:  01/26/17 0555    Specimen:  Blood Updated:  01/26/17 0703     Vancomycin Random 22.70 mcg/mL     Basic Metabolic Panel [[id number]]  (Abnormal) Collected:  01/26/17 0555    Specimen:  Blood Updated:  01/26/17 0704     Glucose 126 (H) mg/dL      BUN 48 (H) mg/dL      Creatinine 4.76 (H) mg/dL      Sodium 139 mmol/L      Potassium 3.8 mmol/L      Chloride 99 mmol/L      CO2 22.8 mmol/L      Calcium 8.6 mg/dL      eGFR Non African Amer 12 (L) mL/min/1.73      BUN/Creatinine Ratio 10.1      Anion Gap 17.2 mmol/L     Narrative:       The MDRD GFR formula is only valid for adults with stable renal function between ages 18 and 70.    CBC (No Diff) [[id number]]  (Abnormal) Collected:   01/26/17 0555    Specimen:  Blood Updated:  01/26/17 0706     WBC 15.18 (H) 10*3/mm3      RBC 2.07 (L) 10*6/mm3      Hemoglobin 6.7 (C) g/dL      Hematocrit 19.6 (C) %      MCV 94.7 fL      MCH 32.4 pg      MCHC 34.2 g/dL      RDW 17.9 (H) %      RDW-SD 61.2 (H) fl      MPV 11.0 fL      Platelets 192 10*3/mm3     Anaerobic Culture [[id number]]  (Normal) Collected:  01/23/17 1048    Specimen:  Wound from Arm, Right Updated:  01/26/17 1116     Culture No anaerobes isolated at 3 days     Anaerobic Culture [[id number]]  (Normal) Collected:  01/23/17 1120    Specimen:  Tissue from Arm, Right Updated:  01/26/17 1116     Culture No anaerobes isolated at 3 days     Blood Culture [[id number]]  (Normal) Collected:  01/24/17 1124    Specimen:  Blood from Blood, Central Line Updated:  01/26/17 1201     Blood Culture No growth at 2 days         Lab Results (last 24 hours)     Procedure Component Value Units Date/Time    Blood Culture [[id number]]  (Normal) Collected:  01/21/17 1449    Specimen:  Blood from Arm, Left Updated:  01/24/17 1601     Blood Culture No growth at 3 days     Blood Culture [[id number]] Collected:  01/24/17 1055    Specimen:  Blood from Arm, Left Updated:  01/25/17 0606    CBC (No Diff) [[id number]]  (Abnormal) Collected:  01/25/17 0608    Specimen:  Blood Updated:  01/25/17 0640     WBC 14.45 (H) 10*3/mm3      RBC 2.28 (L) 10*6/mm3      Hemoglobin 7.4 (L) g/dL      Hematocrit 22.2 (L) %      MCV 97.4 (H) fL      MCH 32.5 pg      MCHC 33.3 g/dL      RDW 18.0 (H) %      RDW-SD 62.7 (H) fl      MPV 11.2 fL      Platelets 182 10*3/mm3     Protime-INR [[id number]]  (Abnormal) Collected:  01/25/17 0608    Specimen:  Blood Updated:  01/25/17 0648     Protime 18.2 (H) Seconds      INR 1.57 (H)     Blood Culture [[id number]]  (Abnormal)  (Susceptibility) Collected:  01/21/17 1539    Specimen:  Blood from Arm, Left Updated:  01/25/17 0651     Blood Culture Staphylococcus aureus, MRSA (C)       D test is negative. Isolate does not  "exhibit \"inducible\" resistance to Clindamycin (isolate remains susceptible to Clindamycin).    Methicillin resistant Staphylococcus aureus, Patient may be an isolation risk.  Consider infectious disease consult to rule out distant focus of infection.        Gram Stain Result        Anaerobic Bottle Gram positive cocci in pairs             Aerobic Bottle Gram positive cocci in clusters    Susceptibility      Staphylococcus aureus, MRSA     SOFÍA     Clindamycin <=0.25 ug/ml Susceptible     Erythromycin >=8 ug/ml Resistant     Oxacillin >=4 ug/ml Resistant     Penicillin G >=0.5 ug/ml Resistant     Rifampin <=0.5 ug/ml Susceptible     Tetracycline <=1 ug/ml Susceptible     Trimethoprim + Sulfamethoxazole <=10 ug/ml Susceptible     Vancomycin 1 ug/ml Susceptible                    Comprehensive Metabolic Panel [[id number]]  (Abnormal) Collected:  01/25/17 0608    Specimen:  Blood Updated:  01/25/17 0704     Glucose 141 (H) mg/dL      BUN 34 (H) mg/dL      Creatinine 3.57 (H) mg/dL      Sodium 139 mmol/L      Potassium 3.7 mmol/L      Chloride 100 mmol/L      CO2 24.6 mmol/L      Calcium 8.6 mg/dL      Total Protein 6.3 g/dL      Albumin 2.70 (L) g/dL      ALT (SGPT) 26 U/L      AST (SGOT) 43 (H) U/L      Alkaline Phosphatase 329 (H) U/L      Total Bilirubin 2.4 (H) mg/dL      eGFR Non African Amer 17 (L) mL/min/1.73      Globulin 3.6 gm/dL      A/G Ratio 0.8 g/dL      BUN/Creatinine Ratio 9.5      Anion Gap 14.4 mmol/L     Narrative:       The MDRD GFR formula is only valid for adults with stable renal function between ages 18 and 70.    Tissue Culture [[id number]]  (Abnormal)  (Susceptibility) Collected:  01/23/17 1120    Specimen:  Tissue from Arm, Right Updated:  01/25/17 0830     Culture        Moderate growth (3+) Staphylococcus aureus, MRSA (C)      D test is negative. Isolate does not exhibit \"inducible\" resistance to Clindamycin (isolate remains susceptible to Clindamycin).     Methicillin resistant Staphylococcus " aureus, Patient may be an isolation risk.        Gram Stain Result Rare (1+) WBCs seen       Few (2+) Gram positive cocci in pairs     Susceptibility      Staphylococcus aureus, MRSA     SOFÍA     Clindamycin <=0.25 ug/ml Susceptible     Erythromycin >=8 ug/ml Resistant     Oxacillin >=4 ug/ml Resistant     Penicillin G >=0.5 ug/ml Resistant     Rifampin <=0.5 ug/ml Susceptible     Tetracycline <=1 ug/ml Susceptible     Trimethoprim + Sulfamethoxazole <=10 ug/ml Susceptible     Vancomycin 1 ug/ml Susceptible                    Wound Culture [[id number]]  (Abnormal) Collected:  01/23/17 1048    Specimen:  Wound from Arm, Right Updated:  01/25/17 0831     Wound Culture        Moderate growth (3+) Staphylococcus aureus, MRSA (C)      Refer to previous tissue culture collected on 1/23 for SOFÍA    Methicillin resistant Staphylococcus aureus, Patient may be an isolation risk.        Gram Stain Result Rare (1+) WBCs seen       Rare (1+) Gram positive cocci     Blood Culture [[id number]]  (Normal) Collected:  01/24/17 1124    Specimen:  Blood from Blood, Central Line Updated:  01/25/17 1201     Blood Culture No growth at 24 hours         Lab Results (last 24 hours)     Procedure Component Value Units Date/Time    Blood Culture ID, PCR [[id number]]  (Abnormal) Collected:  01/21/17 1539    Specimen:  Blood from Arm, Left Updated:  01/23/17 1552     BCID, PCR        Staphylococcus aureus. mecA (methicillin resistance gene) detected. Identification by BCID PCR. (C)    Blood Culture [[id number]]  (Normal) Collected:  01/21/17 1449    Specimen:  Blood from Arm, Left Updated:  01/23/17 1601     Blood Culture No growth at 2 days     CBC & Differential [[id number]] Collected:  01/24/17 0338    Specimen:  Blood Updated:  01/24/17 0434    Narrative:       The following orders were created for panel order CBC & Differential.  Procedure                               Abnormality         Status                     ---------                                -----------         ------                     CBC Auto Differential[[id number]]         Abnormal            Final result                 Please view results for these tests on the individual orders.    CBC Auto Differential [[id number]]  (Abnormal) Collected:  01/24/17 0338    Specimen:  Blood Updated:  01/24/17 0434     WBC 11.88 (H) 10*3/mm3      RBC 2.56 (L) 10*6/mm3      Hemoglobin 8.5 (L) g/dL      Hematocrit 24.2 (L) %      MCV 94.5 fL      MCH 33.2 (H) pg      MCHC 35.1 g/dL      RDW 17.7 (H) %      RDW-SD 60.6 (H) fl      MPV 12.2 (H) fL      Platelets 132 (L) 10*3/mm3      Neutrophil % 81.0 (H) %      Lymphocyte % 11.0 (L) %      Monocyte % 5.3 %      Eosinophil % 1.5 %      Basophil % 0.3 %      Immature Grans % 0.9 (H) %      Neutrophils, Absolute 9.62 (H) 10*3/mm3      Lymphocytes, Absolute 1.31 10*3/mm3      Monocytes, Absolute 0.63 10*3/mm3      Eosinophils, Absolute 0.18 10*3/mm3      Basophils, Absolute 0.03 10*3/mm3      Immature Grans, Absolute 0.11 (H) 10*3/mm3     aPTT [[id number]]  (Abnormal) Collected:  01/24/17 0338    Specimen:  Blood Updated:  01/24/17 0455     PTT 41.5 (H) seconds     Protime-INR [[id number]]  (Abnormal) Collected:  01/24/17 0338    Specimen:  Blood Updated:  01/24/17 0455     Protime 20.6 (H) Seconds      INR 1.84 (H)     Basic Metabolic Panel [[id number]]  (Abnormal) Collected:  01/24/17 0338    Specimen:  Blood Updated:  01/24/17 0502     Glucose 117 (H) mg/dL      BUN 63 (H) mg/dL      Creatinine 5.17 (H) mg/dL      Sodium 140 mmol/L      Potassium 3.4 (L) mmol/L      Chloride 98 mmol/L      CO2 23.8 mmol/L      Calcium 8.7 mg/dL      eGFR Non African Amer 11 (L) mL/min/1.73      BUN/Creatinine Ratio 12.2      Anion Gap 18.2 mmol/L     Narrative:       The MDRD GFR formula is only valid for adults with stable renal function between ages 18 and 70.    Tissue Culture [[id number]]  (Abnormal) Collected:  01/23/17 1120    Specimen:  Tissue from Arm, Right Updated:  01/24/17 0700      Culture        Moderate growth (3+) Staphylococcus aureus, MRSA (C)        Methicillin resistant Staphylococcus aureus, Patient may be an isolation risk.        Gram Stain Result Rare (1+) WBCs seen       Few (2+) Gram positive cocci in pairs     Wound Culture [[id number]]  (Abnormal) Collected:  01/23/17 1048    Specimen:  Wound from Arm, Right Updated:  01/24/17 0735     Wound Culture        Moderate growth (3+) Staphylococcus aureus, MRSA (C)        Methicillin resistant Staphylococcus aureus, Patient may be an isolation risk.        Gram Stain Result Rare (1+) WBCs seen       Rare (1+) Gram positive cocci     Blood Culture [[id number]] Collected:  01/24/17 1124    Specimen:  Blood from Blood, Central Line Updated:  01/24/17 1135        Lab Results (last 24 hours)     Procedure Component Value Units Date/Time    Blood Culture [[id number]]  (Normal) Collected:  01/21/17 1449    Specimen:  Blood from Arm, Left Updated:  01/22/17 1601     Blood Culture No growth at 24 hours     Occult Blood X 1, Stool [[id number]]  (Abnormal) Collected:  01/22/17 2354    Specimen:  Stool Updated:  01/23/17 0303     Fecal Occult Blood Positive (A)     Protime-INR [[id number]]  (Abnormal) Collected:  01/23/17 0724    Specimen:  Blood Updated:  01/23/17 0759     Protime 17.2 (H) Seconds      INR 1.46 (H)     Vancomycin, Random [[id number]]  (Normal) Collected:  01/23/17 0724    Specimen:  Blood Updated:  01/23/17 0802     Vancomycin Random 22.70 mcg/mL     Comprehensive Metabolic Panel [[id number]]  (Abnormal) Collected:  01/23/17 0724    Specimen:  Blood Updated:  01/23/17 0804     Glucose 138 (H) mg/dL      BUN 49 (H) mg/dL      Creatinine 4.37 (H) mg/dL      Sodium 139 mmol/L      Potassium 3.7 mmol/L      Chloride 97 (L) mmol/L      CO2 22.5 mmol/L      Calcium 9.1 mg/dL      Total Protein 6.3 g/dL      Albumin 2.70 (L) g/dL      ALT (SGPT) 24 U/L      AST (SGOT) 33 U/L      Alkaline Phosphatase 261 (H) U/L      Total Bilirubin 4.2 (H) mg/dL       eGFR Non African Amer 13 (L) mL/min/1.73      Globulin 3.6 gm/dL      A/G Ratio 0.8 g/dL      BUN/Creatinine Ratio 11.2      Anion Gap 19.5 mmol/L     Narrative:       The MDRD GFR formula is only valid for adults with stable renal function between ages 18 and 70.    CBC & Differential [[id number]] Collected:  01/23/17 0724    Specimen:  Blood Updated:  01/23/17 0807    Narrative:       The following orders were created for panel order CBC & Differential.  Procedure                               Abnormality         Status                     ---------                               -----------         ------                     Scan Slide[[id number]]                                                                   CBC Auto Differential[[id number]]         Abnormal            Final result                 Please view results for these tests on the individual orders.    CBC Auto Differential [[id number]]  (Abnormal) Collected:  01/23/17 0724    Specimen:  Blood Updated:  01/23/17 0807     WBC 14.45 (H) 10*3/mm3      RBC 2.83 (L) 10*6/mm3      Hemoglobin 9.1 (L) g/dL      Hematocrit 26.7 (L) %      MCV 94.3 fL      MCH 32.2 pg      MCHC 34.1 g/dL      RDW 17.6 (H) %      RDW-SD 60.2 (H) fl      MPV 11.9 fL      Platelets 118 (L) 10*3/mm3      Neutrophil % 85.1 (H) %      Lymphocyte % 8.2 (L) %      Monocyte % 5.0 %      Eosinophil % 0.6 %      Basophil % 0.3 %      Immature Grans % 0.8 (H) %      Neutrophils, Absolute 12.29 (H) 10*3/mm3      Lymphocytes, Absolute 1.19 10*3/mm3      Monocytes, Absolute 0.72 10*3/mm3      Eosinophils, Absolute 0.08 10*3/mm3      Basophils, Absolute 0.05 10*3/mm3      Immature Grans, Absolute 0.12 (H) 10*3/mm3     POC Glucose Fingerstick [[id number]]  (Normal) Collected:  01/22/17 1150    Specimen:  Blood Updated:  01/23/17 0833     Glucose 110 mg/dL     Narrative:       Meter: [id number] : [id number] Storm Chaney    Anaerobic Culture [[id number]] Collected:  01/23/17 1048    Specimen:  Wound  from Arm, Right Updated:  01/23/17 1106    Anaerobic Culture [[id number]] Collected:  01/23/17 1120    Specimen:  Tissue from Arm, Right Updated:  01/23/17 1203    Wound Culture [[id number]] Collected:  01/23/17 1048    Specimen:  Wound from Arm, Right Updated:  01/23/17 1334     Gram Stain Result Rare (1+) WBCs seen       Rare (1+) Gram positive cocci     Blood Culture ID, PCR [[id number]] Collected:  01/21/17 1539    Specimen:  Blood from Arm, Left Updated:  01/23/17 1348    Blood Culture [[id number]]  (Abnormal) Collected:  01/21/17 1539    Specimen:  Blood from Arm, Left Updated:  01/23/17 1355     Blood Culture Abnormal Stain (A)      Gram Stain Result        Anaerobic Bottle Gram positive cocci in pairs    Tissue Culture [[id number]] Collected:  01/23/17 1120    Specimen:  Tissue from Arm, Right Updated:  01/23/17 1423     Gram Stain Result Rare (1+) WBCs seen       Few (2+) Gram positive cocci in pairs         Lab Results (last 24 hours)     Procedure Component Value Units Date/Time    Hemoglobin A1c [[id number]]  (Abnormal) Collected:  01/21/17 1449    Specimen:  Blood Updated:  01/21/17 1517     Hemoglobin A1C 4.59 (L) %     Narrative:       Hemoglobin A1C Ranges:    Increased Risk for Diabetes  5.7% to 6.4%  Diabetes                     >= 6.5%  Diabetic Goal                < 7.0%    TSH Rfx On Abnormal To Free T4 [[id number]]  (Normal) Collected:  01/21/17 1539    Specimen:  Blood Updated:  01/21/17 1627     TSH 1.030 mIU/mL     Troponin [[id number]]  (Normal) Collected:  01/21/17 1539    Specimen:  Blood Updated:  01/21/17 1627     Troponin T 0.019 ng/mL     Narrative:       Troponin T Reference Ranges:  Less than 0.03 ng/mL:    Negative for AMI  0.03 to 0.09 ng/mL:      Indeterminant for AMI  Greater than 0.09 ng/mL: Positive for AMI    Blood Culture [[id number]]  (Normal) Collected:  01/21/17 1449    Specimen:  Blood from Arm, Left Updated:  01/22/17 0303     Blood Culture No growth at less than 24 hours     Blood  Culture [[id number]]  (Normal) Collected:  01/21/17 1539    Specimen:  Blood from Arm, Left Updated:  01/22/17 0401     Blood Culture No growth at less than 24 hours     CBC & Differential [[id number]] Collected:  01/22/17 0543    Specimen:  Blood Updated:  01/22/17 0627    Narrative:       The following orders were created for panel order CBC & Differential.  Procedure                               Abnormality         Status                     ---------                               -----------         ------                     Scan Slide[[id number]]                                                                   CBC Auto Differential[[id number]]         Abnormal            Final result                 Please view results for these tests on the individual orders.    CBC Auto Differential [[id number]]  (Abnormal) Collected:  01/22/17 0543    Specimen:  Blood Updated:  01/22/17 0627     WBC 14.31 (H) 10*3/mm3      RBC 2.92 (L) 10*6/mm3      Hemoglobin 9.5 (L) g/dL      Hematocrit 27.6 (L) %      MCV 94.5 fL      MCH 32.5 pg      MCHC 34.4 g/dL      RDW 17.1 (H) %      RDW-SD 59.2 (H) fl      MPV 13.0 (H) fL      Platelets 89 (L) 10*3/mm3      Neutrophil % 84.0 (H) %      Lymphocyte % 9.7 (L) %      Monocyte % 5.2 %      Eosinophil % 0.3 %      Basophil % 0.2 %      Immature Grans % 0.6 (H) %      Neutrophils, Absolute 12.02 (H) 10*3/mm3      Lymphocytes, Absolute 1.39 10*3/mm3      Monocytes, Absolute 0.74 10*3/mm3      Eosinophils, Absolute 0.05 10*3/mm3      Basophils, Absolute 0.03 10*3/mm3      Immature Grans, Absolute 0.08 (H) 10*3/mm3     Vancomycin, Random [[id number]]  (Normal) Collected:  01/22/17 0543    Specimen:  Blood Updated:  01/22/17 0640     Vancomycin Random 6.60 mcg/mL     Lipid Panel [[id number]]  (Abnormal) Collected:  01/22/17 0543    Specimen:  Blood Updated:  01/22/17 0647     Total Cholesterol 100 mg/dL      Triglycerides 132 mg/dL      HDL Cholesterol 4 (L) mg/dL      LDL Cholesterol  70 mg/dL       VLDL Cholesterol 26.4 mg/dL      LDL/HDL Ratio 17.40     Narrative:       Cholesterol Reference Ranges  (U.S. Department of Health and Human Services ATP III Classifications)    Desirable          <200 mg/dL  Borderline High    200-239 mg/dL  High Risk          >240 mg/dL      Triglyceride Reference Ranges  (U.S. Department of Health and Human Services ATP III Classifications)    Normal           <150 mg/dL  Borderline High  150-199 mg/dL  High             200-499 mg/dL  Very High        >500 mg/dL    HDL Reference Ranges  (U.S. Department of Health and Human Services ATP III Classifcations)    Low     <40 mg/dl (major risk factor for CHD)  High    >60 mg/dl ('negative' risk factor for CHD)        LDL Reference Ranges  (U.S. Department of Health and Human Services ATP III Classifcations)    Optimal          <100 mg/dL  Near Optimal     100-129 mg/dL  Borderline High  130-159 mg/dL  High             160-189 mg/dL  Very High        >189 mg/dL    Comprehensive Metabolic Panel [[id number]]  (Abnormal) Collected:  01/22/17 0543    Specimen:  Blood Updated:  01/22/17 0650     Glucose 121 (H) mg/dL      BUN 88 (H) mg/dL      Creatinine 7.18 (H) mg/dL      Sodium 134 (L) mmol/L      Potassium 4.4 mmol/L      Chloride 88 (L) mmol/L      CO2 23.0 mmol/L      Calcium 9.2 mg/dL      Total Protein 6.0 g/dL      Albumin 2.60 (L) g/dL      ALT (SGPT) 20 U/L      AST (SGOT) 26 U/L       Specimen hemolyzed.  Results may be affected.        Alkaline Phosphatase 148 (H) U/L      Total Bilirubin 3.8 (H) mg/dL      eGFR Non African Amer 7 (L) mL/min/1.73      Globulin 3.4 gm/dL      A/G Ratio 0.8 g/dL      BUN/Creatinine Ratio 12.3      Anion Gap 23.0 mmol/L     Narrative:       The MDRD GFR formula is only valid for adults with stable renal function between ages 18 and 70.    TSH [[id number]]  (Normal) Collected:  01/22/17 0543    Specimen:  Blood Updated:  01/22/17 0653     TSH 1.650 mIU/mL     BNP [[id number]]  (Abnormal) Collected:   01/22/17 0543    Specimen:  Blood Updated:  01/22/17 0716     proBNP >88306.0 (H) pg/mL     Narrative:       Among patients with dyspnea, NT-proBNP is highly sensitive for the detection of acute congestive heart failure. In addition NT-proBNP of <300 pg/ml effectively rules out acute congestive heart failure with 99% negative predictive value.    Troponin [[id number]]  (Abnormal) Collected:  01/22/17 0543    Specimen:  Blood Updated:  01/22/17 0719     Troponin T 0.033 (H) ng/mL     Narrative:       Troponin T Reference Ranges:  Less than 0.03 ng/mL:    Negative for AMI  0.03 to 0.09 ng/mL:      Indeterminant for AMI  Greater than 0.09 ng/mL: Positive for AMI    Protime-INR [[id number]]  (Abnormal) Collected:  01/22/17 0733    Specimen:  Blood Updated:  01/22/17 0759     Protime 19.9 (H) Seconds      INR 1.76 (H)     Hepatitis B Surface Antigen [[id number]]  (Normal) Collected:  01/22/17 0543    Specimen:  Blood Updated:  01/22/17 1041     Hepatitis B Surface Ag Non-Reactive       Potassium 4.1, glucose 105, BUN 63, creatinine 5.9, INR is 9.37, white count 10.1, hemoglobin 10.1, platelets are 66,000,  No other labs available, but I did receive a call that his blood cultures are positive for MRSA. No chest x-ray or EKG available.   Imaging Results (last 72 hours)     Procedure Component Value Units Date/Time    XR Chest 1 View [[id number]] Collected:  01/21/17 1307     Updated:  01/21/17 1313    Narrative:       XR CHEST 1 VW-     HISTORY: Male who is 76 years-old,  pneumonia     TECHNIQUE: Frontal view of the chest     COMPARISON: None available     FINDINGS: A right subclavian catheter extends to the proximal right  atrium, left chest port is apparent extending to the cavoatrial junction  region. Heart is enlarged. Aorta is tortuous. Pulmonary vasculature is  congested.  Areas of opacity are seen at the right lung base, also  medially at the right upper lung, follow-up advised to characterize  resolution and to exclude  possibility of underlying lesion. No pleural  effusion or pneumothorax. No acute osseous process.       Impression:       Areas of opacity in the right lung, follow-up recommended.  Mild cardiomegaly, pulmonary mass or congestion. Tortuous aorta.     This report was finalized on 1/21/2017 1:10 PM by Dr. Cesar Mantilla MD.           Current Facility-Administered Medications:   •  albumin human 25 % IV SOLN 12.5 g, 12.5 g, Intravenous, PRN, Freya Hastings MD  •  ALPRAZolam (XANAX) tablet 0.25 mg, 0.25 mg, Oral, 4x Daily PRN, Rc No MD, 0.25 mg at 02/01/17 0208  •  aspirin EC tablet 81 mg, 81 mg, Oral, Daily, FRANCISCA Washburn, 81 mg at 02/01/17 1124  •  epoetin bacilio (EPOGEN,PROCRIT) injection 10,000 Units, 10,000 Units, Intravenous, Once, Ariadne Gonsales MD, 10,000 Units at 02/01/17 1120  •  heparin (porcine) 5000 UNIT/ML injection 2,650-4,000 Units, 29.9-45.2 Units/kg, Intravenous, Q6H PRN, FRANCISCA Washburn  •  heparin (porcine) injection 3,000 Units, 3,000 Units, Intracatheter, PRN, Elmer Downs MD, 3,900 Units at 01/31/17 1601  •  heparin infusion 06210 units in 250 mL 0.45 % NaCl, 11.3 Units/kg/hr (Adjusted), Intravenous, Titrated, FRANCISCA Washburn, Last Rate: 9.57 mL/hr at 02/01/17 1053, 11.3 Units/kg/hr at 02/01/17 1053  •  hydrALAZINE (APRESOLINE) tablet 25 mg, 25 mg, Oral, Q8H, FRANCISCA Washburn, 25 mg at 02/01/17 0606  •  HYDROcodone-acetaminophen (NORCO) 5-325 MG per tablet 1 tablet, 1 tablet, Oral, Q6H PRN, Rc No MD, 1 tablet at 02/01/17 0208  •  isosorbide mononitrate (IMDUR) 24 hr tablet 30 mg, 30 mg, Oral, Q24H, Kateryna Batista, APRN, 30 mg at 02/01/17 1124  •  lansoprazole (FIRST) oral suspension 30 mg, 30 mg, Oral, Rc ROCHE MD, 30 mg at 02/01/17 0606  •  levothyroxine (SYNTHROID, LEVOTHROID) tablet 100 mcg, 100 mcg, Oral, Daily, Floyd Fraga MD, 100 mcg at 02/01/17 0606  •  ondansetron (ZOFRAN) injection 4 mg, 4 mg, Intravenous, Q6H PRN, Rc  MD Oneal  •  Pharmacy to dose vancomycin, , Does not apply, Continuous PRN, Rc No MD  •  Vancomycin Pharmacy Intermittent Dosing, , Does not apply, Daily, Rc No MD       ECHO NOTED    ASSESSMENT:  1.   MRSA sepsis. /INFECTED HERO GRAFT/LINE SEPSIS/REPEAT BC NEGATIVE 1/24/17  2.   Pneumonia.   3.   INFECTED GRAFT  S/P EXCISION  4.   Thrombocytopenia.   5.   Chronic AFib WITH LUDIN  6.   Hypertension.   7.   Congestive heart failure.   8.   Hypothyroidism.   9.   History of lymphoma.   10. SEPTIC PULM EMBOLI    PLAN:   1.   CPM  2.   ABX IV FOR 6 WEEKS STOP  3/6/17  3.   NEED PPM   4.   ADV DIET  5.   S/P IJ CATHETER FOR HD  6.   HD TIW  7.   SUPPORTIVE CARE  8.   CARD FOLLOWING  9.   PT/OT  10. STRESS ULCER/DVT PROPHYLAXIS  11. FOLLOW CLOSELY        Rc No M.D.

## 2017-02-02 NOTE — PLAN OF CARE
Problem: Patient Care Overview (Adult)  Goal: Plan of Care Review  Outcome: Ongoing (interventions implemented as appropriate)    02/02/17 1259   Coping/Psychosocial Response Interventions   Plan Of Care Reviewed With patient   Patient Care Overview   Progress no change   Outcome Evaluation   Outcome Summary/Follow up Plan Patient pleasantly confused this morning to time, place, and situation. NPO this am, has been in dialysis since 8:30am, will then go to OR for removal of lt chest mediport. Heparin gtt stopped at noon per MD orders for sx, last ptt 155.4 and heparin was stopped x 1 hr and restarted per MD orders, then stopped at noon. no s/s bleeding or bruising. Tropin still elevated but trending down. CCP reports patient will possibly d/c tomorrow to [facility] that provides hemodialysis. Dressing changed on RT upper arm, dakins wet to dry, no odor, wound bed, pink and yellow, minima drainage. Patient remains off unit at this time, nurse has gone to dialysis unit x 2 to manage heparin gtt. no s/s distress noted         Problem: Chronic Kidney Disease/End Stage Renal Disease (Adult)  Goal: Signs and Symptoms of Listed Potential Problems Will be Absent or Manageable (Chronic Kidney Disease/End Stage Renal Disease)  Outcome: Ongoing (interventions implemented as appropriate)    02/02/17 1259   Chronic Kidney Disease/End Stage Renal Disease   Problems Assessed (Chronic Kidney Disease/ESRD) all   Problems Present (Chronic Kidney Disease/ESRD) cardiovascular disease;electrolyte imbalance;functional decline/self-care deficit;situational response         Problem: Confusion, Chronic (Adult)  Goal: Cognitive/Functional Impairments Minimized  Outcome: Ongoing (interventions implemented as appropriate)    02/02/17 1259   Confusion, Chronic (Adult)   Cognitive/Functional Impairments Minimized making progress toward outcome       Goal: Free from Harm/Injuries  Outcome: Ongoing (interventions implemented as appropriate)    02/02/17  1259   Confusion, Chronic (Adult)   Free from Harm/Injuries achieves outcome

## 2017-02-02 NOTE — NURSING NOTE
No longer necessary to restrict right arm from needle sticks below fistula per Dr. Fraga. Pink bracelet removed.

## 2017-02-02 NOTE — PROGRESS NOTES
I was asked to comment on pacemaker insertion in setting of MRSA septicemia.    I would recommend against placement of a pacemaker until he has completed his course of antibiotics and then had surveillance BCx a week after finishing the antibiotics. This would be around 3/13/17.      The risk is just too high with his recent high grade MRSA bacteremia including septic pulmonary emboli, especially taking into consideration that his bradycardia is asymptomatic.     Please call me at [phone] if any questions.

## 2017-02-02 NOTE — PLAN OF CARE
Problem: Patient Care Overview (Adult)  Goal: Plan of Care Review  Outcome: Ongoing (interventions implemented as appropriate)    02/02/17 0335   Coping/Psychosocial Response Interventions   Plan Of Care Reviewed With patient   Patient Care Overview   Progress no change   Outcome Evaluation   Outcome Summary/Follow up Plan VSS, remains on heparin drip, right arm dressing CDI, NPO for surgery on 2/2         Problem: Chronic Kidney Disease/End Stage Renal Disease (Adult)  Goal: Signs and Symptoms of Listed Potential Problems Will be Absent or Manageable (Chronic Kidney Disease/End Stage Renal Disease)  Outcome: Ongoing (interventions implemented as appropriate)    Problem: Confusion, Chronic (Adult)  Goal: Cognitive/Functional Impairments Minimized  Outcome: Ongoing (interventions implemented as appropriate)  Goal: Free from Harm/Injuries  Outcome: Ongoing (interventions implemented as appropriate)    Problem: Pneumonia (Adult)  Goal: Signs and Symptoms of Listed Potential Problems Will be Absent or Manageable (Pneumonia)  Outcome: Ongoing (interventions implemented as appropriate)

## 2017-02-02 NOTE — ANESTHESIA POSTPROCEDURE EVALUATION
Patient: Ze Wu    Procedure Summary     Date Anesthesia Start Anesthesia Stop Room / Location    02/02/17 1522 1608 [facility] / [facility]       Procedure Diagnosis Surgeon Provider    LT CHEST PORT REMOVAL  (Left ) Infection due to port-a-cath MD Chaya Cooper MD          Anesthesia Type: MAC  Last vitals  /48 (02/02/17 1615)    Temp      Pulse (!) 43 (02/02/17 1615)   Resp 16 (02/02/17 1615)    SpO2 100 % (02/02/17 1615)      Post Anesthesia Care and Evaluation    Patient location during evaluation: PACU  Patient participation: complete - patient participated  Level of consciousness: awake  Pain score: 1  Pain management: adequate  Airway patency: patent  Anesthetic complications: No anesthetic complications  PONV Status: none  Cardiovascular status: acceptable  Respiratory status: acceptable  Hydration status: acceptable

## 2017-02-02 NOTE — PROGRESS NOTES
[facility]  Cardiology Progress Note    Patient Identification:  Name: Ze Wu  Age: 76 y.o.  Sex: male  :  1940  MRN: [id number]                 Follow Up / Chief Complaint: CAD, (permanent) afib,variable vr with bradycardia,CHF,  HTN    Interval History:  This is a 76-year-old  male with a history of  coronary artery disease, chronic A. fib on anticoagulation with Coumadin, history of congestive heart failure, hypertension. We have been asked to consult regarding atrial fibrillation with variable ventricular rates and supratherapuetic INR.       Subjective:     Denies chest pain, pressure, dizziness or  SOA    Objective:  Ventricular rates 50-60s with infrequent, transient (less than 2 seconds) decrease his ventricular rate to 48.  No pauses > 3 seconds and no RVR.      New anterior ST changes noted on this am EKG (see below)  Troponin 0.294 ->  0.374        Past Medical History:  Past Medical History   Diagnosis Date   • Anemia    • Anxiety    • CHF (congestive heart failure)    • Chronic a-fib    • Chronic anticoagulation    • Confusion    • Disease of thyroid gland    • GERD (gastroesophageal reflux disease)    • Hypertension    • Hypothyroidism    • Kidney disease    • Lymphoma    • Staph aureus infection 2017     labs called from [facility]     Past Surgical History:  Past Surgical History   Procedure Laterality Date   • Mediport insertion, single     • Thumb amputation Left    • Av fistula placement Right    • Arteriovenous fistula/shunt surgery Right 2017     Procedure: ultrasound access of right internal jugular vein with mahurkar dialysis catheter placement, EXCISION INFECTED RIGTH ARM AV GRAFT WITH REMOVAL OF HERO CATHETER, REMOVAL OF FEMORAL DIALYSIS CATHETER;  Surgeon: Floyd Fraga MD;  Location: [facility] OR ;  Service:    • Pr insj tunneled cvc w/o subq port/ age 6 yr/> Right 2017     Procedure: RT INTERNAL JUGULAR  PALINDROME CATHETER PLACEMENT;  Surgeon: Floyd Fraga MD;  Location: [facility] OR;  Service: Vascular        Social History:   Social History   Substance Use Topics   • Smoking status: Never Smoker   • Smokeless tobacco: Not on file   • Alcohol use No      Family History:  History reviewed. No pertinent family history.       Allergies:  No Known Allergies     Scheduled Meds:    aspirin 81 mg Daily   hydrALAZINE 25 mg Q8H   isosorbide mononitrate 30 mg Q24H   lansoprazole 30 mg QAM   levothyroxine 100 mcg Daily   Vancomycin Pharmacy Intermittent Dosing  Daily       INTAKE AND OUTPUT:    Intake/Output Summary (Last 24 hours) at 02/02/17 1050  Last data filed at 02/02/17 0529   Gross per 24 hour   Intake 1146.97 ml   Output      0 ml   Net 1146.97 ml       Review of Systems:   GI: no abdominal pain  Cardiac  Cp or pressure  Pulmonary:  No SOA    Constitutional:  Temp:  [97.5 °F (36.4 °C)-98.4 °F (36.9 °C)] 98 °F (36.7 °C)  Heart Rate:  [40-58] 40  Resp:  [18-24] 20  BP: (122-142)/(51-74) 122/54    Physical Exam by Tristan Mojica MD    General:  Awake, resting quietly.   Appears in no acute distress  Eyes: PERTL,  HEENT:  Thyroid not visibly enlarged.  Oral mucosal dry, no cyanosis  Respiratory: Respirations regular and unlabored at rest. BBS with a diminished air entry in bases  No crackles or wheezes auscultated.  Left subclavian Mediport and  right IJ  catheter noted  Cardiovascular: S1S2 irregular rate and rhythm. Unchanged I/VI systolic murmur without rub or gallop.. No pretibial pitting edema.  Gastrointestinal: Abdomen soft, non tender. Bowel sounds present.   Musculoskeletal: ALEGRIA x4. No abnormal movements  Extremities: No digital cyanosis.  No RUE edema,  Skin: Color pale. pink. Skin warm and dry to touch.   Neuro: AAO x2 CN II-XII grossly intact  Psych: pleasant and cooperative          Cardiographics    Telemetry: afib, VR 40's-60 with infrequent,PVC .  No pauses  >2.0 sec.     EKG  2-2-17      Echocardiogram:   · All left ventricular wall segments contract normally.  · Left atrial cavity size is mildly dilated.  · Mild to moderate aortic valve stenosis is present.  · Mild to moderate tricuspid valve regurgitation is present.  · Left Ventricle: Calculated EF = 66.7%.  · There is no evidence of pericardial effusion           Lab Review      1/30/2017 17:18 2/1/2017 05:48 2/2/2017 04:30   Troponin T 0.294 (C) 0.374 (C) 0.355 (C)       Results from last 7 days  Lab Units 02/02/17  0430 02/01/17  1016 02/01/17  0548   WBC 10*3/mm3 10.46 11.25* 13.06*   HEMOGLOBIN g/dL 9.3* 9.5* 9.4*   HEMATOCRIT % 27.6* 29.6* 28.4*   PLATELETS 10*3/mm3 357 364 342       Results from last 7 days  Lab Units 02/02/17  0722 02/02/17  0430 02/01/17  1753  02/01/17  0548 01/31/17  1126   INR   --  1.51*  --   --  1.38* 1.29*   APTT seconds 155.4* 48.6* 57.8*  < >  --   --    < > = values in this interval not displayed.      Assessment:  - (Permanent) atrial fibrillation  - sick sinus syndrome  - elevated [id number] score  - elevated troponin  - CAD  - HTN    - a/c anemia -> IM, Renal  - Pneumonia  - Pulmonary septic emboli -> ID  - Acute hypoxic respiratory failure -> pulmonary  - MSSA septicemia -> ID  - Infected AV fistula -> ID, Renal, vascular surgery  - ESRD-> Renal  - B-cell lymphoma (in remission)  - Thyroid cancer (in remission)      Plan:  - (Permanent) atrial fibrillation -   ventricular rates 48's - 60's No additional pauses > 3.0 seconds and no high degree blocks.   Start Hep gtt bridge therapy pending ID clearance for pacemaker implant    - sick sinus syndrome - transient episodes of  (asymptomatic) ventricular rates 48-50s. No pauses >3 seconds, no RVR.  Plan permanent pacemaker once  cleared by infectious disease.  In the interim, continue to avoid beta blocker, calcium channel blockers      - elevated [id number] score -   Start Heparin gtt as bridge therapy      - elevated troponin -  Cannot exclude  NSTEMI  No angina, but anterior ST changes on this am EKG. Antianginal therapy adjusted with the addition of nitrate to aspirin (no beta blocker or calcium channel blocker due to sss). Needs cardiac cath before proceeding with pacemaker implant    - HTN - controlled on hydralazine 25 mg TID.   Avoid calcium channel blockers and beta blockers due to sick sinus syndrome.           Patient needs a cardiac cath  before planned pacemaker implant. Resume Heparin gtt bridge therapy following chest port removal.  Antianginal therapy optimized pending ID clearance for cardiac cath and pacemaker      I reviewed the patient's new clinical results and treatment plan . I personally viewed and interpreted the patient's EKG/Telemetry data    )2/2/2017  Tristan Mojica MD          EMR Dragon/Transcription disclaimer:   Much of this encounter note is an electronic transcription/translation of spoken language to printed text. The electronic translation of spoken language may permit erroneous, or at times, nonsensical words or phrases to be inadvertently transcribed; Although I have reviewed the note for such errors, some may still exist.

## 2017-02-02 NOTE — PROGRESS NOTES
Continued Stay Note  [facility]     Patient Name: Ze Wu  MRN: [id number]  Today's Date: 2/2/2017    Admit Date: 1/20/2017          Discharge Plan       02/02/17 1416    Case Management/Social Work Plan    Plan [facility] - will need precert    Patient/Family In Agreement With Plan yes    Additional Comments Spoke with Leeanne/Kori and they can accept tomorrow. They are working on setting up patient's dialysis. Patient normally does HD in [facility] on a TTS schedule. Called to son Tano [phone] as pt remains confused. Informed available skilled bed with HD at [place] and he is happy with that facility. Informed that Dr. No plans DC tomorrow. Tano requests ambulance transport. Disclaimer given and that I will email Angela/Andrei to ask. RN updated. Packet is in office with numbers for report and fax.               Discharge Codes     None            Moshe Wesley RN

## 2017-02-02 NOTE — PROGRESS NOTES
NEPHROLOGY PROGRESS NOTE    PATIENT IDENTIFICATION:   Name:  Ze Wu      MRN:  [id number]     76 y.o.  male             Reason for visit: ESRD    SUBJECTIVE:   Seen and examined.  Going for HD. Remains confused. Plan for port removal today.   OBJECTIVE:  Vitals:    02/01/17 2300 02/02/17 0838 02/02/17 0850 02/02/17 1332   BP: 140/65 132/70 122/54 104/46   BP Location: Left arm Left arm Right arm Left arm   Patient Position: Lying Lying Lying Lying   Pulse: 54 58 (!) 40 52   Resp: 24 18 20 12   Temp: 98.1 °F (36.7 °C) 97.5 °F (36.4 °C) 98 °F (36.7 °C) 97.6 °F (36.4 °C)   TempSrc: Oral Oral Oral Temporal Artery    SpO2: 98%   100%   Weight:       Height:         FiO2 (%): 100 %     Body mass index is 25.05 kg/(m^2).    Intake/Output Summary (Last 24 hours) at 02/02/17 1430  Last data filed at 02/02/17 0529   Gross per 24 hour   Intake 546.97 ml   Output      0 ml   Net 546.97 ml         Exam:  GEN:   appears stated age  EYES:   Anicteric sclera  ENT:    External ears/nose normal, MM are   NECK:  No adenopathy, JVP   LUNGS: Normal chest on inspection; not labored  CV:  Normal S1S2, without murmur. Bradycardic  ABD:  Non-tender, non-distended, no hepatosplenomegaly, +BS  EXT:  No edema; no cyanosis; clubbing, RUE w/ excision site open, no purulence or drainage. Rt TDC    Scheduled meds:      [MAR Hold] aspirin 81 mg Oral Daily   hydrALAZINE 25 mg Oral Q8H   [MAR Hold] isosorbide mononitrate 30 mg Oral Q24H   [MAR Hold] lansoprazole 30 mg Oral QAM   [MAR Hold] levothyroxine 100 mcg Oral Daily   [MAR Hold] vancomycin 1,250 mg Intravenous Once   [MAR Hold] Vancomycin Pharmacy Intermittent Dosing  Does not apply Daily     IV meds:                          Pharmacy to dose vancomycin        Data Review:      Results from last 7 days  Lab Units 02/01/17  1406 02/01/17  0548 01/31/17  1126  01/30/17  0407  01/27/17  0606   SODIUM mmol/L 138 139 136  --  139  < > 139   POTASSIUM mmol/L 4.7 4.4 4.4  < > 3.6  < > 3.6  "  CHLORIDE mmol/L 98 98 95*  --  98  < > 100   TOTAL CO2 mmol/L 19.9* 23.8 21.7*  --  24.4  < > 22.9   BUN mg/dL 28* 25* 47*  --  35*  < > 26*   CREATININE mg/dL 4.87* 4.16* 7.03*  --  5.15*  < > 3.39*   CALCIUM mg/dL 9.1 9.2 8.8  --  8.4*  < > 8.7   BILIRUBIN mg/dL  --   --  1.2  --  1.2  --  2.1*   ALK PHOS U/L  --   --  175*  --  206*  --  258*   ALT (SGPT) U/L  --   --  14  --  18  --  19   AST (SGOT) U/L  --   --  17  --  22  --  26   GLUCOSE mg/dL 103* 110* 97  --  99  < > 106*   < > = values in this interval not displayed.    Estimated Creatinine Clearance: 15.2 mL/min (by C-G formula based on Cr of 4.87).      Results from last 7 days  Lab Units 02/01/17  1406 01/30/17  1718   MAGNESIUM mg/dL 2.5* 2.2         Results from last 7 days  Lab Units 02/02/17  0430 02/01/17  1016 02/01/17  0548 01/30/17  0407 01/29/17  0645   WBC 10*3/mm3 10.46 11.25* 13.06* 12.21* 12.15*   HEMOGLOBIN g/dL 9.3* 9.5* 9.4* 8.6* 8.7*   PLATELETS 10*3/mm3 357 364 342 330 273         Results from last 7 days  Lab Units 02/02/17  0430 02/01/17  0548 01/31/17  1126 01/27/17  0606   INR  1.51* 1.38* 1.29* 1.34*             ASSESSMENT:   Active Problems:    Sepsis    1. ESRD on HD TTS  2. Hypercaogulopathy with bleeding graft: Improved  3. MRSA bacteremia: Infected graft is the most likely source.  4. Encephalopathy\" improved.  Still not at baseline.   5. Anemia of ESRD  6. Secondary hyperparathyroidism  7.  Pulmonary septic emboli  8. HTN     Plan:     S/P graft excision. S/P TDC catheter 01/31.   Today for HD and port removal  PPM placement once ABX completed and we have neg blood culture  Minimize narcotics and sedative for confusion  Vanc 1250 mg  post dialysis for 6 weeks With stop date 03/06/2017  Repeat bood Cx one week post ABX  HD at AM  Surveillance labs    Ariadne Gonsales MD  2/2/2017    2:30 PM     "

## 2017-02-02 NOTE — NURSING NOTE
Attempted multiple times to see patient for wound care consult today.  Was in dialysis and the OR.  Will see 2/3/17.

## 2017-02-02 NOTE — SIGNIFICANT NOTE
02/02/17 1136   Rehab Treatment   Discipline physical therapy assistant   Treatment Not Performed patient unavailable for treatment  (pt off floor at dialysis this am. Per RN, going for virgilio after. Will follow-up tomorrow.)   Recommendation   PT - Next Appointment 02/03/17

## 2017-02-02 NOTE — PLAN OF CARE
Problem: Perioperative Period (Adult)  Goal: Signs and Symptoms of Listed Potential Problems Will be Absent or Manageable (Perioperative Period)  Outcome: Ongoing (interventions implemented as appropriate)    02/02/17 7017   Perioperative Period   Problems Assessed (Perioperative Period) pain;infection;situational response   Problems Present (Perioperative Period) infection;situational response

## 2017-02-02 NOTE — PROGRESS NOTES
"Pharmacy to dose Vancomycin    Day 13  Consult for Dr No  Treating: sepsis/bacteremia, infected graft s/p removal  Stop date 3/6 per ID goal trough ~20    Current Vancomycin dose pulse dose for HD    Lab Results   Component Value Date    VANCORANDOM 21.00 01/30/2017       IV Anti-Infectives     Ordered     Dose/Rate Route Frequency Start Stop    01/31/17 1448  vancomycin 1250 mg/250 mL 0.9% NS IVPB ([facility])     Ordering Provider:  Naif Lozano MD    1,250 mg  over 125 Minutes Intravenous Once 01/31/17 1530 01/31/17 1907    01/28/17 0922  vancomycin 1250 mg/250 mL 0.9% NS IVPB ([facility])     Ordering Provider:  Rc No MD    1,250 mg  over 125 Minutes Intravenous Once 01/28/17 1600 01/28/17 1743    01/26/17 1138  vancomycin (VANCOCIN) IVPB 1 g (premix) in Dextrose 5% 200 mL     Ordering Provider:  Rc No MD    1,000 mg  over 100 Minutes Intravenous Once 01/26/17 1600 01/26/17 1851    01/24/17 1258  vancomycin (VANCOCIN) IVPB 1 g (premix) in Dextrose 5% 200 mL     Ordering Provider:  Rc No MD    1,000 mg  over 100 Minutes Intravenous Once 01/24/17 1500 01/24/17 2017    01/22/17 1159  vancomycin 1750 mg/500 mL 0.9% NS IVPB ([facility])     Ordering Provider:  Rc No MD    1,750 mg  over 180 Minutes Intravenous Once 01/22/17 1230 01/22/17 2120    01/21/17 1444  Vancomycin Pharmacy Intermittent Dosing     Ordering Provider:  Rc No MD     Does not apply Daily 01/21/17 1515      01/21/17 1037  Pharmacy to dose vancomycin     Ordering Provider:  Rc No MD     Does not apply Continuous PRN 01/21/17 1037             Relevant clinical data and objective history reviewed:  76 y.o. male 74\" (188 cm) 195 lb 1.6 oz (88.5 kg)    Lab Results   Component Value Date    CREATININE 4.87 (H) 02/01/2017    CREATININE 4.16 (H) 02/01/2017    CREATININE 7.03 (H) 01/31/2017     Estimated Creatinine Clearance: 15.2 mL/min (by C-G formula based on Cr of 4.87).    Lab Results   Component Value Date    WBC " 10.46 02/02/2017    WBC 11.25 (H) 02/01/2017    WBC 13.06 (H) 02/01/2017     Temp Readings from Last 3 Encounters:   02/02/17 98 °F (36.7 °C) (Oral)     Baseline cultures:  1/23 graft from arm MRSA +  1/24 blood NGTD  1/27 blood NGTD     PLAN: Noted ID plans for 1250mg IV vanco after each HD session until 3/6.  Dosed based on previous dosing history and levels.  Patient to have HD today, will order a dose to be given after HD.    Ayla AmosD, BCPS  02/02/17 12:03 PM

## 2017-02-02 NOTE — OP NOTE
REMOVAL VENOUS ACCESS DEVICE  Procedure Note    Ze Wu  1/20/2017 - 2/2/2017    Pre-op Diagnosis:   *Infected port *    Post-op Diagnosis:     Post-Op Diagnosis Codes:     * Infection due to port-a-cath [T80.219A]    Procedure(s):  LT CHEST PORT REMOVAL     Surgeon(s):  Floyd Fraga MD    Anesthesia: Monitor Anesthesia Care    Staff:   Circulator: Shannon Spurling, RN  Scrub Person: Fide Merchant  Assistant: Dru Correa    Indications:  76-year-old gentleman who presented with sepsis and coagulopathy.  He had infection with right arm arteriovenous graft and hero catheter which has been removed.  His sepsis has improved however patient needs pacemaker placed.  Infectious disease and cardiology both fill port should be removed.  Plan is for removal of this.  Plans and risks discussed and agrees to proceed.       Surgeon: Floyd Fraga MD     Assistants: Francie Correa    Anesthesia: Monitored Local Anesthesia with Sedation    ASA Class: 4      Procedure Details left chest prepped and draped in usual fashion.  1% Xylocaine with Marcaine was utilized local anesthesia.  Transverse incision made through previous scar through skin and subcutaneous tissue.  Bleeders were controlled with electrocautery.  The port capsule was entered.  The port itself was circumferentially controlled and taken out of its pocket.  The catheter was mobilized proximally to where it went into the central veins without problems.  The capsule was removed with electrocautery.  Hemostasis achieved with electrocautery.  3-0 Vicryl placed around the catheter exit site from the deep veins.  The port was removed.  The tip was inspected and was intact.  The port was sent for culture.  Hemostasis achieved.  The subcutaneous tissue and capsule closed with 3-0 Vicryl and skin closed with separate figure stitch.  Dressing applied.  Patient tolerated procedure well without problems.    Radiographic interpretation: Not  applicable    Findings: Intact port.  No pus.    Estimated Blood Loss: Less than 50 cc    Specimens:   Port sent for culture      Drains:           Complications: None    Condition: stable    Disposition: To monitored bed    Floyd Fraga MD     Date: 2/2/2017  Time: 3:50 PM

## 2017-02-02 NOTE — SIGNIFICANT NOTE
02/02/17 1108   Rehab Treatment   Discipline occupational therapist   Rehab Evaluation   Evaluation Not Performed patient unavailable for evaluation  (dialysis 1109)

## 2017-02-02 NOTE — PROGRESS NOTES
Floyd Fraga MD       LOS: 13 days   Patient Care Team:  No Known Provider as PCP - General    Subjective     76 y.o. male with resolution of sepsis and coagulopathy.  Patient's right arm wounds healing nicely after removal of infected arteriovenous shunt and hero catheter.  Patient continues on antibiotics per infectious disease but can be given with dialysis.  Patient now on intravenous heparin and warfarin held by cardiology with plans for pacemaker insertion.  Discussed with cardiology yesterday and plan to remove left chest port in preparation for pacemaker.  Right palindrome site clean and working well with dialysis.  We'll stop heparin at noon today for removal of port around 2:00 today.  Will resume heparin after surgery later this evening.    Review of Systems  Review of Systems - Negative except history of present illness      Objective     Vital Signs  Temp:  [97.5 °F (36.4 °C)-98.5 °F (36.9 °C)] 98.1 °F (36.7 °C)  Heart Rate:  [43-54] 54  Resp:  [18-24] 24  BP: (119-142)/(51-74) 140/65    Physical Exam  General: No acute distress. Alert and oriented x 4  HEENT: No jugular venous distension, trachea is midline  CV: RRR, S1S2  Resp: Clear unlabored breathing on oxygen  Abd: Abdomen is soft, nontender, nondistended  Extremities: Right arm wounds clean and granulating.  Right chest tunnel dialysis catheter site clean.  Left port site clean.  No lower extremity swelling.    Results Review:       Recent Results (from the past 12 hour(s))   Protime-INR    Collection Time: 02/02/17  4:30 AM   Result Value Ref Range    Protime 17.7 (H) 11.7 - 14.2 Seconds    INR 1.51 (H) 0.90 - 1.10   aPTT    Collection Time: 02/02/17  4:30 AM   Result Value Ref Range    PTT 48.6 (H) 22.7 - 35.4 seconds   Troponin    Collection Time: 02/02/17  4:30 AM   Result Value Ref Range    Troponin T 0.355 (C) 0.000 - 0.030 ng/mL   CBC Auto Differential    Collection Time: 02/02/17  4:30 AM   Result Value Ref Range    WBC 10.46 4.50  - 10.70 10*3/mm3    RBC 2.89 (L) 4.60 - 6.00 10*6/mm3    Hemoglobin 9.3 (L) 13.7 - 17.6 g/dL    Hematocrit 27.6 (L) 40.4 - 52.2 %    MCV 95.5 79.8 - 96.2 fL    MCH 32.2 27.0 - 32.7 pg    MCHC 33.7 32.6 - 36.4 g/dL    RDW 16.0 (H) 11.5 - 14.5 %    RDW-SD 55.9 (H) 37.0 - 54.0 fl    MPV 10.3 6.0 - 12.0 fL    Platelets 357 140 - 500 10*3/mm3    Neutrophil % 74.3 42.7 - 76.0 %    Lymphocyte % 14.6 (L) 19.6 - 45.3 %    Monocyte % 6.2 5.0 - 12.0 %    Eosinophil % 3.3 0.3 - 6.2 %    Basophil % 1.3 0.0 - 1.5 %    Immature Grans % 0.3 0.0 - 0.5 %    Neutrophils, Absolute 7.77 1.90 - 8.10 10*3/mm3    Lymphocytes, Absolute 1.53 0.90 - 4.80 10*3/mm3    Monocytes, Absolute 0.65 0.20 - 1.20 10*3/mm3    Eosinophils, Absolute 0.34 0.00 - 0.70 10*3/mm3    Basophils, Absolute 0.14 0.00 - 0.20 10*3/mm3    Immature Grans, Absolute 0.03 0.00 - 0.03 10*3/mm3   ]      Assessment/Plan           Active Problems:    Sepsis      Assessment & Plan  76 y.o. male left chest port removal early afternoon.  Stop heparin at noon for this and resume after procedure.  Plans and risk of port removal and eventual plans by cardiology for pacemaker discussed with patient and agrees to proceed.      Floyd Fraga MD  02/02/17  6:33 AM

## 2017-02-02 NOTE — PROGRESS NOTES
"   DAILY PROGRESS NOTE    CHIEF COMPLAINT  DOING  SAME  NO NEW ISSUES        HISTORY OF PRESENT ILLNESS: A 76-year-old white male with a history of end-stage renal disease, on hemodialysis for the last few years, B-cell lymphoma in remission, thyroid cancer in remission, coronary artery disease, chronic AFib, on anticoagulation, congestive heart failure, hypertension, depression, who transferred to my service from [facility] where he was admitted with a 3-week history of increasing shortness of breath, productive cough. The patient’s workup did reveal pneumonia with sepsis and he was getting treatment for that but his hemodialysis graft was not working. He was transferred to our service for further evaluation with Vascular Surgery and continued the current treatment.     PHYSICAL EXAMINATION:Blood pressure 104/46, pulse 52, temperature 97.6 °F (36.4 °C), temperature source Temporal Artery , resp. rate 12, height 74\" (188 cm), weight 195 lb 1.6 oz (88.5 kg), SpO2 100 %.    GENERAL: Alert, & oriented   HEENT: Unremarkable.   NECK: Supple.   LUNGS: Decreased breath sounds.   HEART: S1, S2 regular.   ABDOMEN: Soft, nontender. Bowel sounds positive.   EXTREMITIES: No edema.   NEUROLOGIC: Moves all 4 extremities. Gait and balance not checked.    DIAGNOSTIC DATA:   Lab Results (last 24 hours)     Procedure Component Value Units Date/Time    Basic Metabolic Panel [[id number]]  (Abnormal) Collected:  02/01/17 1406    Specimen:  Blood Updated:  02/01/17 1447     Glucose 103 (H) mg/dL      BUN 28 (H) mg/dL      Creatinine 4.87 (H) mg/dL      Sodium 138 mmol/L      Potassium 4.7 mmol/L      Chloride 98 mmol/L      CO2 19.9 (L) mmol/L      Calcium 9.1 mg/dL      eGFR Non African Amer 12 (L) mL/min/1.73      BUN/Creatinine Ratio 5.7 (L)      Anion Gap 20.1 mmol/L     Narrative:       The MDRD GFR formula is only valid for adults with stable renal function between ages 18 and 70.    Magnesium [[id number]]  (Abnormal) " Collected:  02/01/17 1406    Specimen:  Blood Updated:  02/01/17 1447     Magnesium 2.5 (H) mg/dL     aPTT [[id number]]  (Abnormal) Collected:  02/01/17 1753    Specimen:  Blood from Arm, Right Updated:  02/01/17 1840     PTT 57.8 (H) seconds     Blood Culture [[id number]]  (Normal) Collected:  01/27/17 1952    Specimen:  Blood from Arm, Left Updated:  02/01/17 2101     Blood Culture No growth at 5 days     CBC & Differential [[id number]] Collected:  02/02/17 0430    Specimen:  Blood Updated:  02/02/17 0450    Narrative:       The following orders were created for panel order CBC & Differential.  Procedure                               Abnormality         Status                     ---------                               -----------         ------                     CBC Auto Differential[[id number]]         Abnormal            Final result                 Please view results for these tests on the individual orders.    CBC Auto Differential [[id number]]  (Abnormal) Collected:  02/02/17 0430    Specimen:  Blood Updated:  02/02/17 0450     WBC 10.46 10*3/mm3      RBC 2.89 (L) 10*6/mm3      Hemoglobin 9.3 (L) g/dL      Hematocrit 27.6 (L) %      MCV 95.5 fL      MCH 32.2 pg      MCHC 33.7 g/dL      RDW 16.0 (H) %      RDW-SD 55.9 (H) fl      MPV 10.3 fL      Platelets 357 10*3/mm3      Neutrophil % 74.3 %      Lymphocyte % 14.6 (L) %      Monocyte % 6.2 %      Eosinophil % 3.3 %      Basophil % 1.3 %      Immature Grans % 0.3 %      Neutrophils, Absolute 7.77 10*3/mm3      Lymphocytes, Absolute 1.53 10*3/mm3      Monocytes, Absolute 0.65 10*3/mm3      Eosinophils, Absolute 0.34 10*3/mm3      Basophils, Absolute 0.14 10*3/mm3      Immature Grans, Absolute 0.03 10*3/mm3     Protime-INR [[id number]]  (Abnormal) Collected:  02/02/17 0430    Specimen:  Blood Updated:  02/02/17 0507     Protime 17.7 (H) Seconds      INR 1.51 (H)     aPTT [[id number]]  (Abnormal) Collected:  02/02/17 0430    Specimen:  Blood Updated:  02/02/17 0501      PTT 48.6 (H) seconds     Troponin [[id number]]  (Abnormal) Collected:  02/02/17 0430    Specimen:  Blood Updated:  02/02/17 0528     Troponin T 0.355 (C) ng/mL     Narrative:       Troponin T Reference Ranges:  Less than 0.03 ng/mL:    Negative for AMI  0.03 to 0.09 ng/mL:      Indeterminant for AMI  Greater than 0.09 ng/mL: Positive for AMI    aPTT [[id number]]  (Abnormal) Collected:  02/02/17 0722    Specimen:  Blood Updated:  02/02/17 0759     .4 (C) seconds         Lab Results (last 24 hours)     Procedure Component Value Units Date/Time    Blood Culture [[id number]]  (Normal) Collected:  01/27/17 1952    Specimen:  Blood from Arm, Left Updated:  01/31/17 2101     Blood Culture No growth at 4 days     CBC & Differential [[id number]] Collected:  02/01/17 0548    Specimen:  Blood Updated:  02/01/17 0650    Narrative:       The following orders were created for panel order CBC & Differential.  Procedure                               Abnormality         Status                     ---------                               -----------         ------                     CBC Auto Differential[[id number]]         Abnormal            Final result                 Please view results for these tests on the individual orders.    CBC Auto Differential [[id number]]  (Abnormal) Collected:  02/01/17 0548    Specimen:  Blood Updated:  02/01/17 0650     WBC 13.06 (H) 10*3/mm3      RBC 2.95 (L) 10*6/mm3      Hemoglobin 9.4 (L) g/dL      Hematocrit 28.4 (L) %      MCV 96.3 (H) fL      MCH 31.9 pg      MCHC 33.1 g/dL      RDW 16.1 (H) %      RDW-SD 56.6 (H) fl      MPV 10.6 fL      Platelets 342 10*3/mm3      Neutrophil % 80.4 (H) %      Lymphocyte % 10.3 (L) %      Monocyte % 6.3 %      Eosinophil % 1.8 %      Basophil % 0.9 %      Immature Grans % 0.3 %      Neutrophils, Absolute 10.50 (H) 10*3/mm3      Lymphocytes, Absolute 1.34 10*3/mm3      Monocytes, Absolute 0.82 10*3/mm3      Eosinophils, Absolute 0.24 10*3/mm3      Basophils,  Absolute 0.12 10*3/mm3      Immature Grans, Absolute 0.04 (H) 10*3/mm3     Protime-INR [[id number]]  (Abnormal) Collected:  02/01/17 0548    Specimen:  Blood Updated:  02/01/17 0652     Protime 16.4 (H) Seconds      INR 1.38 (H)     Basic Metabolic Panel [[id number]]  (Abnormal) Collected:  02/01/17 0548    Specimen:  Blood Updated:  02/01/17 0745     Glucose 110 (H) mg/dL      BUN 25 (H) mg/dL      Creatinine 4.16 (H) mg/dL      Sodium 139 mmol/L      Potassium 4.4 mmol/L      Chloride 98 mmol/L      CO2 23.8 mmol/L      Calcium 9.2 mg/dL      eGFR Non African Amer 14 (L) mL/min/1.73      BUN/Creatinine Ratio 6.0 (L)      Anion Gap 17.2 mmol/L     Narrative:       The MDRD GFR formula is only valid for adults with stable renal function between ages 18 and 70.    Troponin [[id number]]  (Abnormal) Collected:  02/01/17 0548    Specimen:  Blood Updated:  02/01/17 0754     Troponin T 0.374 (C) ng/mL     Narrative:       Troponin T Reference Ranges:  Less than 0.03 ng/mL:    Negative for AMI  0.03 to 0.09 ng/mL:      Indeterminant for AMI  Greater than 0.09 ng/mL: Positive for AMI    CBC & Differential [[id number]] Collected:  02/01/17 1016    Specimen:  Blood Updated:  02/01/17 1045    Narrative:       The following orders were created for panel order CBC & Differential.  Procedure                               Abnormality         Status                     ---------                               -----------         ------                     CBC Auto Differential[[id number]]         Abnormal            Final result                 Please view results for these tests on the individual orders.    CBC Auto Differential [[id number]]  (Abnormal) Collected:  02/01/17 1016    Specimen:  Blood Updated:  02/01/17 1045     WBC 11.25 (H) 10*3/mm3      RBC 3.05 (L) 10*6/mm3      Hemoglobin 9.5 (L) g/dL      Hematocrit 29.6 (L) %      MCV 97.0 (H) fL      MCH 31.1 pg      MCHC 32.1 (L) g/dL      RDW 16.2 (H) %      RDW-SD 57.0 (H) fl      MPV  10.4 fL      Platelets 364 10*3/mm3      Neutrophil % 80.1 (H) %      Lymphocyte % 11.2 (L) %      Monocyte % 5.1 %      Eosinophil % 2.1 %      Basophil % 1.1 %      Immature Grans % 0.4 %      Neutrophils, Absolute 9.02 (H) 10*3/mm3      Lymphocytes, Absolute 1.26 10*3/mm3      Monocytes, Absolute 0.57 10*3/mm3      Eosinophils, Absolute 0.24 10*3/mm3      Basophils, Absolute 0.12 10*3/mm3      Immature Grans, Absolute 0.04 (H) 10*3/mm3     aPTT [[id number]]  (Abnormal) Collected:  02/01/17 1016    Specimen:  Blood Updated:  02/01/17 1053     PTT 38.1 (H) seconds     CK-MB [[id number]]  (Normal) Collected:  02/01/17 1016    Specimen:  Blood Updated:  02/01/17 1105     CKMB 1.92 ng/mL         Lab Results (last 24 hours)     Procedure Component Value Units Date/Time    Magnesium [[id number]]  (Normal) Collected:  01/30/17 1718    Specimen:  Blood Updated:  01/30/17 1807     Magnesium 2.2 mg/dL     Potassium [[id number]]  (Normal) Collected:  01/30/17 1718    Specimen:  Blood Updated:  01/30/17 1808     Potassium 4.0 mmol/L     CK-MB [[id number]]  (Normal) Collected:  01/30/17 1718    Specimen:  Blood Updated:  01/30/17 1818     CKMB <1.00 ng/mL     Troponin [[id number]]  (Abnormal) Collected:  01/30/17 1718    Specimen:  Blood Updated:  01/30/17 1819     Troponin T 0.294 (C) ng/mL     Narrative:       Troponin T Reference Ranges:  Less than 0.03 ng/mL:    Negative for AMI  0.03 to 0.09 ng/mL:      Indeterminant for AMI  Greater than 0.09 ng/mL: Positive for AMI    Blood Culture [[id number]]  (Normal) Collected:  01/27/17 1952    Specimen:  Blood from Arm, Left Updated:  01/30/17 2101     Blood Culture No growth at 3 days     Protime-INR [[id number]]  (Abnormal) Collected:  01/31/17 1126    Specimen:  Blood Updated:  01/31/17 1214     Protime 15.6 (H) Seconds      INR 1.29 (H)     Comprehensive Metabolic Panel [[id number]]  (Abnormal) Collected:  01/31/17 1126    Specimen:  Blood Updated:  01/31/17 1217     Glucose 97 mg/dL      BUN  47 (H) mg/dL      Creatinine 7.03 (H) mg/dL      Sodium 136 mmol/L      Potassium 4.4 mmol/L      Chloride 95 (L) mmol/L      CO2 21.7 (L) mmol/L      Calcium 8.8 mg/dL      Total Protein 7.4 g/dL      Albumin 2.70 (L) g/dL      ALT (SGPT) 14 U/L      AST (SGOT) 17 U/L      Alkaline Phosphatase 175 (H) U/L      Total Bilirubin 1.2 mg/dL      eGFR Non African Amer 8 (L) mL/min/1.73      Globulin 4.7 gm/dL      A/G Ratio 0.6 g/dL      BUN/Creatinine Ratio 6.7 (L)      Anion Gap 19.3 mmol/L     Narrative:       The MDRD GFR formula is only valid for adults with stable renal function between ages 18 and 70.        Lab Results (last 24 hours)     Procedure Component Value Units Date/Time    Blood Culture [[id number]]  (Normal) Collected:  01/27/17 1952    Specimen:  Blood from Arm, Left Updated:  01/29/17 2101     Blood Culture No growth at 2 days     CBC (No Diff) [[id number]]  (Abnormal) Collected:  01/30/17 0407    Specimen:  Blood Updated:  01/30/17 0446     WBC 12.21 (H) 10*3/mm3      RBC 2.72 (L) 10*6/mm3      Hemoglobin 8.6 (L) g/dL      Hematocrit 26.3 (L) %      MCV 96.7 (H) fL      MCH 31.6 pg      MCHC 32.7 g/dL      RDW 16.6 (H) %      RDW-SD 58.9 (H) fl      MPV 10.5 fL      Platelets 330 10*3/mm3     Comprehensive Metabolic Panel [[id number]]  (Abnormal) Collected:  01/30/17 0407    Specimen:  Blood Updated:  01/30/17 0505     Glucose 99 mg/dL      BUN 35 (H) mg/dL      Creatinine 5.15 (H) mg/dL      Sodium 139 mmol/L      Potassium 3.6 mmol/L      Chloride 98 mmol/L      CO2 24.4 mmol/L      Calcium 8.4 (L) mg/dL      Total Protein 6.9 g/dL      Albumin 2.90 (L) g/dL      ALT (SGPT) 18 U/L      AST (SGOT) 22 U/L      Alkaline Phosphatase 206 (H) U/L      Total Bilirubin 1.2 mg/dL      eGFR Non African Amer 11 (L) mL/min/1.73      Globulin 4.0 gm/dL      A/G Ratio 0.7 g/dL      BUN/Creatinine Ratio 6.8 (L)      Anion Gap 16.6 mmol/L     Narrative:       The MDRD GFR formula is only valid for adults with stable  renal function between ages 18 and 70.    Vancomycin, Random [[id number]]  (Normal) Collected:  01/30/17 0444    Specimen:  Blood Updated:  01/30/17 0542     Vancomycin Random 21.00 mcg/mL     Blood Culture [[id number]]  (Normal) Collected:  01/24/17 1055    Specimen:  Blood from Arm, Left Updated:  01/30/17 0701     Blood Culture No growth at 5 days         Lab Results (last 24 hours)     Procedure Component Value Units Date/Time    Anaerobic Culture [[id number]]  (Normal) Collected:  01/23/17 1048    Specimen:  Wound from Arm, Right Updated:  01/28/17 1415     Culture No anaerobes isolated at 5 days     Anaerobic Culture [[id number]]  (Normal) Collected:  01/23/17 1120    Specimen:  Tissue from Arm, Right Updated:  01/28/17 1416     Culture No anaerobes isolated at 5 days     Blood Culture [[id number]]  (Normal) Collected:  01/27/17 1952    Specimen:  Blood from Arm, Left Updated:  01/28/17 2101     Blood Culture No growth at 24 hours     Blood Culture [[id number]]  (Normal) Collected:  01/24/17 1055    Specimen:  Blood from Arm, Left Updated:  01/29/17 0701     Blood Culture No growth at 4 days     CBC (No Diff) [[id number]]  (Abnormal) Collected:  01/29/17 0645    Specimen:  Blood Updated:  01/29/17 0713     WBC 12.15 (H) 10*3/mm3      RBC 2.77 (L) 10*6/mm3      Hemoglobin 8.7 (L) g/dL      Hematocrit 26.3 (L) %      MCV 94.9 fL      MCH 31.4 pg      MCHC 33.1 g/dL      RDW 16.7 (H) %      RDW-SD 57.4 (H) fl      MPV 10.8 fL      Platelets 273 10*3/mm3     Basic Metabolic Panel [[id number]]  (Abnormal) Collected:  01/29/17 0645    Specimen:  Blood Updated:  01/29/17 0727     Glucose 101 (H) mg/dL      BUN 24 (H) mg/dL      Creatinine 3.78 (H) mg/dL      Sodium 136 mmol/L      Potassium 3.7 mmol/L      Chloride 96 (L) mmol/L      CO2 24.7 mmol/L      Calcium 8.6 mg/dL      eGFR Non African Amer 16 (L) mL/min/1.73      BUN/Creatinine Ratio 6.3 (L)      Anion Gap 15.3 mmol/L     Narrative:       The MDRD GFR formula is only valid  for adults with stable renal function between ages 18 and 70.    Blood Culture [[id number]]  (Normal) Collected:  01/24/17 1124    Specimen:  Blood from Blood, Central Line Updated:  01/29/17 1201     Blood Culture No growth at 5 days         Lab Results (last 24 hours)     Procedure Component Value Units Date/Time    Blood Culture [[id number]]  (Normal) Collected:  01/24/17 1055    Specimen:  Blood from Arm, Left Updated:  01/28/17 0701     Blood Culture No growth at 3 days     CBC (No Diff) [[id number]]  (Abnormal) Collected:  01/28/17 0647    Specimen:  Blood Updated:  01/28/17 0708     WBC 12.80 (H) 10*3/mm3      RBC 2.59 (L) 10*6/mm3      Hemoglobin 8.4 (L) g/dL      Hematocrit 24.4 (L) %      MCV 94.2 fL      MCH 32.4 pg      MCHC 34.4 g/dL      RDW 17.1 (H) %      RDW-SD 58.9 (H) fl      MPV 11.0 fL      Platelets 240 10*3/mm3     Basic Metabolic Panel [[id number]]  (Abnormal) Collected:  01/28/17 0647    Specimen:  Blood Updated:  01/28/17 0723     Glucose 99 mg/dL      BUN 38 (H) mg/dL      Creatinine 4.86 (H) mg/dL      Sodium 137 mmol/L      Potassium 3.5 mmol/L      Chloride 99 mmol/L      CO2 22.7 mmol/L      Calcium 8.5 (L) mg/dL      eGFR Non African Amer 12 (L) mL/min/1.73      BUN/Creatinine Ratio 7.8      Anion Gap 15.3 mmol/L     Narrative:       The MDRD GFR formula is only valid for adults with stable renal function between ages 18 and 70.    Vancomycin, Random [[id number]]  (Normal) Collected:  01/28/17 0647    Specimen:  Blood Updated:  01/28/17 0725     Vancomycin Random 16.80 mcg/mL     Blood Culture [[id number]]  (Normal) Collected:  01/27/17 1952    Specimen:  Blood from Arm, Left Updated:  01/28/17 0901     Blood Culture No growth at less than 24 hours     Blood Culture [[id number]]  (Normal) Collected:  01/24/17 1124    Specimen:  Blood from Blood, Central Line Updated:  01/28/17 1201     Blood Culture No growth at 4 days         Lab Results (last 24 hours)     Procedure Component Value Units  Date/Time    Blood Culture [[id number]]  (Normal) Collected:  01/21/17 1449    Specimen:  Blood from Arm, Left Updated:  01/26/17 1601     Blood Culture No growth at 5 days     CBC (No Diff) [[id number]]  (Abnormal) Collected:  01/27/17 0606    Specimen:  Blood Updated:  01/27/17 0659     WBC 13.40 (H) 10*3/mm3      RBC 2.86 (L) 10*6/mm3      Hemoglobin 9.1 (L) g/dL      Hematocrit 27.7 (L) %      MCV 96.9 (H) fL      MCH 31.8 pg      MCHC 32.9 g/dL      RDW 17.4 (H) %      RDW-SD 60.4 (H) fl      MPV 11.1 fL      Platelets 227 10*3/mm3     Blood Culture [[id number]]  (Normal) Collected:  01/24/17 1055    Specimen:  Blood from Arm, Left Updated:  01/27/17 0701     Blood Culture No growth at 2 days     Protime-INR [[id number]]  (Abnormal) Collected:  01/27/17 0606    Specimen:  Blood Updated:  01/27/17 0710     Protime 16.1 (H) Seconds      INR 1.34 (H)     Comprehensive Metabolic Panel [[id number]]  (Abnormal) Collected:  01/27/17 0606    Specimen:  Blood Updated:  01/27/17 0732     Glucose 106 (H) mg/dL      BUN 26 (H) mg/dL      Creatinine 3.39 (H) mg/dL      Sodium 139 mmol/L      Potassium 3.6 mmol/L      Chloride 100 mmol/L      CO2 22.9 mmol/L      Calcium 8.7 mg/dL      Total Protein 6.6 g/dL      Albumin 2.80 (L) g/dL      ALT (SGPT) 19 U/L      AST (SGOT) 26 U/L      Alkaline Phosphatase 258 (H) U/L      Total Bilirubin 2.1 (H) mg/dL      eGFR Non African Amer 18 (L) mL/min/1.73      Globulin 3.8 gm/dL      A/G Ratio 0.7 g/dL      BUN/Creatinine Ratio 7.7      Anion Gap 16.1 mmol/L     Narrative:       The MDRD GFR formula is only valid for adults with stable renal function between ages 18 and 70.    Blood Culture [[id number]]  (Normal) Collected:  01/24/17 1124    Specimen:  Blood from Blood, Central Line Updated:  01/27/17 1201     Blood Culture No growth at 3 days         Lab Results (last 24 hours)     Procedure Component Value Units Date/Time    Blood Culture [[id number]]  (Normal) Collected:  01/21/17 1447     Specimen:  Blood from Arm, Left Updated:  01/25/17 1601     Blood Culture No growth at 4 days     Protime-INR [[id number]]  (Abnormal) Collected:  01/26/17 0555    Specimen:  Blood Updated:  01/26/17 0653     Protime 17.2 (H) Seconds      INR 1.46 (H)     Blood Culture [[id number]]  (Normal) Collected:  01/24/17 1055    Specimen:  Blood from Arm, Left Updated:  01/26/17 0701     Blood Culture No growth at 24 hours     Vancomycin, Random [[id number]]  (Normal) Collected:  01/26/17 0555    Specimen:  Blood Updated:  01/26/17 0703     Vancomycin Random 22.70 mcg/mL     Basic Metabolic Panel [[id number]]  (Abnormal) Collected:  01/26/17 0555    Specimen:  Blood Updated:  01/26/17 0704     Glucose 126 (H) mg/dL      BUN 48 (H) mg/dL      Creatinine 4.76 (H) mg/dL      Sodium 139 mmol/L      Potassium 3.8 mmol/L      Chloride 99 mmol/L      CO2 22.8 mmol/L      Calcium 8.6 mg/dL      eGFR Non African Amer 12 (L) mL/min/1.73      BUN/Creatinine Ratio 10.1      Anion Gap 17.2 mmol/L     Narrative:       The MDRD GFR formula is only valid for adults with stable renal function between ages 18 and 70.    CBC (No Diff) [[id number]]  (Abnormal) Collected:  01/26/17 0555    Specimen:  Blood Updated:  01/26/17 0706     WBC 15.18 (H) 10*3/mm3      RBC 2.07 (L) 10*6/mm3      Hemoglobin 6.7 (C) g/dL      Hematocrit 19.6 (C) %      MCV 94.7 fL      MCH 32.4 pg      MCHC 34.2 g/dL      RDW 17.9 (H) %      RDW-SD 61.2 (H) fl      MPV 11.0 fL      Platelets 192 10*3/mm3     Anaerobic Culture [[id number]]  (Normal) Collected:  01/23/17 1048    Specimen:  Wound from Arm, Right Updated:  01/26/17 1116     Culture No anaerobes isolated at 3 days     Anaerobic Culture [[id number]]  (Normal) Collected:  01/23/17 1120    Specimen:  Tissue from Arm, Right Updated:  01/26/17 1116     Culture No anaerobes isolated at 3 days     Blood Culture [[id number]]  (Normal) Collected:  01/24/17 1124    Specimen:  Blood from Blood, Central Line Updated:  01/26/17 1201      "Blood Culture No growth at 2 days         Lab Results (last 24 hours)     Procedure Component Value Units Date/Time    Blood Culture [[id number]]  (Normal) Collected:  01/21/17 1449    Specimen:  Blood from Arm, Left Updated:  01/24/17 1601     Blood Culture No growth at 3 days     Blood Culture [[id number]] Collected:  01/24/17 1055    Specimen:  Blood from Arm, Left Updated:  01/25/17 0606    CBC (No Diff) [[id number]]  (Abnormal) Collected:  01/25/17 0608    Specimen:  Blood Updated:  01/25/17 0640     WBC 14.45 (H) 10*3/mm3      RBC 2.28 (L) 10*6/mm3      Hemoglobin 7.4 (L) g/dL      Hematocrit 22.2 (L) %      MCV 97.4 (H) fL      MCH 32.5 pg      MCHC 33.3 g/dL      RDW 18.0 (H) %      RDW-SD 62.7 (H) fl      MPV 11.2 fL      Platelets 182 10*3/mm3     Protime-INR [[id number]]  (Abnormal) Collected:  01/25/17 0608    Specimen:  Blood Updated:  01/25/17 0648     Protime 18.2 (H) Seconds      INR 1.57 (H)     Blood Culture [[id number]]  (Abnormal)  (Susceptibility) Collected:  01/21/17 1539    Specimen:  Blood from Arm, Left Updated:  01/25/17 0651     Blood Culture Staphylococcus aureus, MRSA (C)       D test is negative. Isolate does not exhibit \"inducible\" resistance to Clindamycin (isolate remains susceptible to Clindamycin).    Methicillin resistant Staphylococcus aureus, Patient may be an isolation risk.  Consider infectious disease consult to rule out distant focus of infection.        Gram Stain Result        Anaerobic Bottle Gram positive cocci in pairs             Aerobic Bottle Gram positive cocci in clusters    Susceptibility      Staphylococcus aureus, MRSA     SOFÍA     Clindamycin <=0.25 ug/ml Susceptible     Erythromycin >=8 ug/ml Resistant     Oxacillin >=4 ug/ml Resistant     Penicillin G >=0.5 ug/ml Resistant     Rifampin <=0.5 ug/ml Susceptible     Tetracycline <=1 ug/ml Susceptible     Trimethoprim + Sulfamethoxazole <=10 ug/ml Susceptible     Vancomycin 1 ug/ml Susceptible                    " "Comprehensive Metabolic Panel [[id number]]  (Abnormal) Collected:  01/25/17 0608    Specimen:  Blood Updated:  01/25/17 0704     Glucose 141 (H) mg/dL      BUN 34 (H) mg/dL      Creatinine 3.57 (H) mg/dL      Sodium 139 mmol/L      Potassium 3.7 mmol/L      Chloride 100 mmol/L      CO2 24.6 mmol/L      Calcium 8.6 mg/dL      Total Protein 6.3 g/dL      Albumin 2.70 (L) g/dL      ALT (SGPT) 26 U/L      AST (SGOT) 43 (H) U/L      Alkaline Phosphatase 329 (H) U/L      Total Bilirubin 2.4 (H) mg/dL      eGFR Non African Amer 17 (L) mL/min/1.73      Globulin 3.6 gm/dL      A/G Ratio 0.8 g/dL      BUN/Creatinine Ratio 9.5      Anion Gap 14.4 mmol/L     Narrative:       The MDRD GFR formula is only valid for adults with stable renal function between ages 18 and 70.    Tissue Culture [[id number]]  (Abnormal)  (Susceptibility) Collected:  01/23/17 1120    Specimen:  Tissue from Arm, Right Updated:  01/25/17 0830     Culture        Moderate growth (3+) Staphylococcus aureus, MRSA (C)      D test is negative. Isolate does not exhibit \"inducible\" resistance to Clindamycin (isolate remains susceptible to Clindamycin).     Methicillin resistant Staphylococcus aureus, Patient may be an isolation risk.        Gram Stain Result Rare (1+) WBCs seen       Few (2+) Gram positive cocci in pairs     Susceptibility      Staphylococcus aureus, MRSA     SOFÍA     Clindamycin <=0.25 ug/ml Susceptible     Erythromycin >=8 ug/ml Resistant     Oxacillin >=4 ug/ml Resistant     Penicillin G >=0.5 ug/ml Resistant     Rifampin <=0.5 ug/ml Susceptible     Tetracycline <=1 ug/ml Susceptible     Trimethoprim + Sulfamethoxazole <=10 ug/ml Susceptible     Vancomycin 1 ug/ml Susceptible                    Wound Culture [[id number]]  (Abnormal) Collected:  01/23/17 1048    Specimen:  Wound from Arm, Right Updated:  01/25/17 0831     Wound Culture        Moderate growth (3+) Staphylococcus aureus, MRSA (C)      Refer to previous tissue culture collected on 1/23 " for SOFÍA    Methicillin resistant Staphylococcus aureus, Patient may be an isolation risk.        Gram Stain Result Rare (1+) WBCs seen       Rare (1+) Gram positive cocci     Blood Culture [[id number]]  (Normal) Collected:  01/24/17 1124    Specimen:  Blood from Blood, Central Line Updated:  01/25/17 1201     Blood Culture No growth at 24 hours         Lab Results (last 24 hours)     Procedure Component Value Units Date/Time    Blood Culture ID, PCR [[id number]]  (Abnormal) Collected:  01/21/17 1539    Specimen:  Blood from Arm, Left Updated:  01/23/17 1552     BCID, PCR        Staphylococcus aureus. mecA (methicillin resistance gene) detected. Identification by BCID PCR. (C)    Blood Culture [[id number]]  (Normal) Collected:  01/21/17 1449    Specimen:  Blood from Arm, Left Updated:  01/23/17 1601     Blood Culture No growth at 2 days     CBC & Differential [[id number]] Collected:  01/24/17 0338    Specimen:  Blood Updated:  01/24/17 0434    Narrative:       The following orders were created for panel order CBC & Differential.  Procedure                               Abnormality         Status                     ---------                               -----------         ------                     CBC Auto Differential[[id number]]         Abnormal            Final result                 Please view results for these tests on the individual orders.    CBC Auto Differential [[id number]]  (Abnormal) Collected:  01/24/17 0338    Specimen:  Blood Updated:  01/24/17 0434     WBC 11.88 (H) 10*3/mm3      RBC 2.56 (L) 10*6/mm3      Hemoglobin 8.5 (L) g/dL      Hematocrit 24.2 (L) %      MCV 94.5 fL      MCH 33.2 (H) pg      MCHC 35.1 g/dL      RDW 17.7 (H) %      RDW-SD 60.6 (H) fl      MPV 12.2 (H) fL      Platelets 132 (L) 10*3/mm3      Neutrophil % 81.0 (H) %      Lymphocyte % 11.0 (L) %      Monocyte % 5.3 %      Eosinophil % 1.5 %      Basophil % 0.3 %      Immature Grans % 0.9 (H) %      Neutrophils, Absolute 9.62 (H) 10*3/mm3       Lymphocytes, Absolute 1.31 10*3/mm3      Monocytes, Absolute 0.63 10*3/mm3      Eosinophils, Absolute 0.18 10*3/mm3      Basophils, Absolute 0.03 10*3/mm3      Immature Grans, Absolute 0.11 (H) 10*3/mm3     aPTT [[id number]]  (Abnormal) Collected:  01/24/17 0338    Specimen:  Blood Updated:  01/24/17 0455     PTT 41.5 (H) seconds     Protime-INR [[id number]]  (Abnormal) Collected:  01/24/17 0338    Specimen:  Blood Updated:  01/24/17 0455     Protime 20.6 (H) Seconds      INR 1.84 (H)     Basic Metabolic Panel [[id number]]  (Abnormal) Collected:  01/24/17 0338    Specimen:  Blood Updated:  01/24/17 0502     Glucose 117 (H) mg/dL      BUN 63 (H) mg/dL      Creatinine 5.17 (H) mg/dL      Sodium 140 mmol/L      Potassium 3.4 (L) mmol/L      Chloride 98 mmol/L      CO2 23.8 mmol/L      Calcium 8.7 mg/dL      eGFR Non African Amer 11 (L) mL/min/1.73      BUN/Creatinine Ratio 12.2      Anion Gap 18.2 mmol/L     Narrative:       The MDRD GFR formula is only valid for adults with stable renal function between ages 18 and 70.    Tissue Culture [[id number]]  (Abnormal) Collected:  01/23/17 1120    Specimen:  Tissue from Arm, Right Updated:  01/24/17 0734     Culture        Moderate growth (3+) Staphylococcus aureus, MRSA (C)        Methicillin resistant Staphylococcus aureus, Patient may be an isolation risk.        Gram Stain Result Rare (1+) WBCs seen       Few (2+) Gram positive cocci in pairs     Wound Culture [[id number]]  (Abnormal) Collected:  01/23/17 1048    Specimen:  Wound from Arm, Right Updated:  01/24/17 0735     Wound Culture        Moderate growth (3+) Staphylococcus aureus, MRSA (C)        Methicillin resistant Staphylococcus aureus, Patient may be an isolation risk.        Gram Stain Result Rare (1+) WBCs seen       Rare (1+) Gram positive cocci     Blood Culture [[id number]] Collected:  01/24/17 1124    Specimen:  Blood from Blood, Central Line Updated:  01/24/17 1135        Lab Results (last 24 hours)      Procedure Component Value Units Date/Time    Blood Culture [[id number]]  (Normal) Collected:  01/21/17 1449    Specimen:  Blood from Arm, Left Updated:  01/22/17 1601     Blood Culture No growth at 24 hours     Occult Blood X 1, Stool [[id number]]  (Abnormal) Collected:  01/22/17 2354    Specimen:  Stool Updated:  01/23/17 0303     Fecal Occult Blood Positive (A)     Protime-INR [[id number]]  (Abnormal) Collected:  01/23/17 0724    Specimen:  Blood Updated:  01/23/17 0759     Protime 17.2 (H) Seconds      INR 1.46 (H)     Vancomycin, Random [[id number]]  (Normal) Collected:  01/23/17 0724    Specimen:  Blood Updated:  01/23/17 0802     Vancomycin Random 22.70 mcg/mL     Comprehensive Metabolic Panel [[id number]]  (Abnormal) Collected:  01/23/17 0724    Specimen:  Blood Updated:  01/23/17 0804     Glucose 138 (H) mg/dL      BUN 49 (H) mg/dL      Creatinine 4.37 (H) mg/dL      Sodium 139 mmol/L      Potassium 3.7 mmol/L      Chloride 97 (L) mmol/L      CO2 22.5 mmol/L      Calcium 9.1 mg/dL      Total Protein 6.3 g/dL      Albumin 2.70 (L) g/dL      ALT (SGPT) 24 U/L      AST (SGOT) 33 U/L      Alkaline Phosphatase 261 (H) U/L      Total Bilirubin 4.2 (H) mg/dL      eGFR Non African Amer 13 (L) mL/min/1.73      Globulin 3.6 gm/dL      A/G Ratio 0.8 g/dL      BUN/Creatinine Ratio 11.2      Anion Gap 19.5 mmol/L     Narrative:       The MDRD GFR formula is only valid for adults with stable renal function between ages 18 and 70.    CBC & Differential [[id number]] Collected:  01/23/17 0724    Specimen:  Blood Updated:  01/23/17 0807    Narrative:       The following orders were created for panel order CBC & Differential.  Procedure                               Abnormality         Status                     ---------                               -----------         ------                     Scan Slide[[id number]]                                                                   CBC Auto Differential[[id number]]         Abnormal             Final result                 Please view results for these tests on the individual orders.    CBC Auto Differential [[id number]]  (Abnormal) Collected:  01/23/17 0724    Specimen:  Blood Updated:  01/23/17 0807     WBC 14.45 (H) 10*3/mm3      RBC 2.83 (L) 10*6/mm3      Hemoglobin 9.1 (L) g/dL      Hematocrit 26.7 (L) %      MCV 94.3 fL      MCH 32.2 pg      MCHC 34.1 g/dL      RDW 17.6 (H) %      RDW-SD 60.2 (H) fl      MPV 11.9 fL      Platelets 118 (L) 10*3/mm3      Neutrophil % 85.1 (H) %      Lymphocyte % 8.2 (L) %      Monocyte % 5.0 %      Eosinophil % 0.6 %      Basophil % 0.3 %      Immature Grans % 0.8 (H) %      Neutrophils, Absolute 12.29 (H) 10*3/mm3      Lymphocytes, Absolute 1.19 10*3/mm3      Monocytes, Absolute 0.72 10*3/mm3      Eosinophils, Absolute 0.08 10*3/mm3      Basophils, Absolute 0.05 10*3/mm3      Immature Grans, Absolute 0.12 (H) 10*3/mm3     POC Glucose Fingerstick [[id number]]  (Normal) Collected:  01/22/17 1150    Specimen:  Blood Updated:  01/23/17 0833     Glucose 110 mg/dL     Narrative:       Meter: [id number] : [id number] Storm Chaney    Anaerobic Culture [[id number]] Collected:  01/23/17 1048    Specimen:  Wound from Arm, Right Updated:  01/23/17 1106    Anaerobic Culture [[id number]] Collected:  01/23/17 1120    Specimen:  Tissue from Arm, Right Updated:  01/23/17 1203    Wound Culture [[id number]] Collected:  01/23/17 1048    Specimen:  Wound from Arm, Right Updated:  01/23/17 1334     Gram Stain Result Rare (1+) WBCs seen       Rare (1+) Gram positive cocci     Blood Culture ID, PCR [[id number]] Collected:  01/21/17 1539    Specimen:  Blood from Arm, Left Updated:  01/23/17 1348    Blood Culture [[id number]]  (Abnormal) Collected:  01/21/17 1539    Specimen:  Blood from Arm, Left Updated:  01/23/17 1355     Blood Culture Abnormal Stain (A)      Gram Stain Result        Anaerobic Bottle Gram positive cocci in pairs    Tissue Culture [[id number]] Collected:  01/23/17 1120     Specimen:  Tissue from Arm, Right Updated:  01/23/17 1423     Gram Stain Result Rare (1+) WBCs seen       Few (2+) Gram positive cocci in pairs         Lab Results (last 24 hours)     Procedure Component Value Units Date/Time    Hemoglobin A1c [[id number]]  (Abnormal) Collected:  01/21/17 1449    Specimen:  Blood Updated:  01/21/17 1517     Hemoglobin A1C 4.59 (L) %     Narrative:       Hemoglobin A1C Ranges:    Increased Risk for Diabetes  5.7% to 6.4%  Diabetes                     >= 6.5%  Diabetic Goal                < 7.0%    TSH Rfx On Abnormal To Free T4 [[id number]]  (Normal) Collected:  01/21/17 1539    Specimen:  Blood Updated:  01/21/17 1627     TSH 1.030 mIU/mL     Troponin [[id number]]  (Normal) Collected:  01/21/17 1539    Specimen:  Blood Updated:  01/21/17 1627     Troponin T 0.019 ng/mL     Narrative:       Troponin T Reference Ranges:  Less than 0.03 ng/mL:    Negative for AMI  0.03 to 0.09 ng/mL:      Indeterminant for AMI  Greater than 0.09 ng/mL: Positive for AMI    Blood Culture [[id number]]  (Normal) Collected:  01/21/17 1449    Specimen:  Blood from Arm, Left Updated:  01/22/17 0303     Blood Culture No growth at less than 24 hours     Blood Culture [[id number]]  (Normal) Collected:  01/21/17 1539    Specimen:  Blood from Arm, Left Updated:  01/22/17 0401     Blood Culture No growth at less than 24 hours     CBC & Differential [[id number]] Collected:  01/22/17 0543    Specimen:  Blood Updated:  01/22/17 0627    Narrative:       The following orders were created for panel order CBC & Differential.  Procedure                               Abnormality         Status                     ---------                               -----------         ------                     Scan Slide[[id number]]                                                                   CBC Auto Differential[[id number]]         Abnormal            Final result                 Please view results for these tests on the individual orders.     CBC Auto Differential [[id number]]  (Abnormal) Collected:  01/22/17 0543    Specimen:  Blood Updated:  01/22/17 0627     WBC 14.31 (H) 10*3/mm3      RBC 2.92 (L) 10*6/mm3      Hemoglobin 9.5 (L) g/dL      Hematocrit 27.6 (L) %      MCV 94.5 fL      MCH 32.5 pg      MCHC 34.4 g/dL      RDW 17.1 (H) %      RDW-SD 59.2 (H) fl      MPV 13.0 (H) fL      Platelets 89 (L) 10*3/mm3      Neutrophil % 84.0 (H) %      Lymphocyte % 9.7 (L) %      Monocyte % 5.2 %      Eosinophil % 0.3 %      Basophil % 0.2 %      Immature Grans % 0.6 (H) %      Neutrophils, Absolute 12.02 (H) 10*3/mm3      Lymphocytes, Absolute 1.39 10*3/mm3      Monocytes, Absolute 0.74 10*3/mm3      Eosinophils, Absolute 0.05 10*3/mm3      Basophils, Absolute 0.03 10*3/mm3      Immature Grans, Absolute 0.08 (H) 10*3/mm3     Vancomycin, Random [[id number]]  (Normal) Collected:  01/22/17 0543    Specimen:  Blood Updated:  01/22/17 0640     Vancomycin Random 6.60 mcg/mL     Lipid Panel [[id number]]  (Abnormal) Collected:  01/22/17 0543    Specimen:  Blood Updated:  01/22/17 0647     Total Cholesterol 100 mg/dL      Triglycerides 132 mg/dL      HDL Cholesterol 4 (L) mg/dL      LDL Cholesterol  70 mg/dL      VLDL Cholesterol 26.4 mg/dL      LDL/HDL Ratio 17.40     Narrative:       Cholesterol Reference Ranges  (U.S. Department of Health and Human Services ATP III Classifications)    Desirable          <200 mg/dL  Borderline High    200-239 mg/dL  High Risk          >240 mg/dL      Triglyceride Reference Ranges  (U.S. Department of Health and Human Services ATP III Classifications)    Normal           <150 mg/dL  Borderline High  150-199 mg/dL  High             200-499 mg/dL  Very High        >500 mg/dL    HDL Reference Ranges  (U.S. Department of Health and Human Services ATP III Classifcations)    Low     <40 mg/dl (major risk factor for CHD)  High    >60 mg/dl ('negative' risk factor for CHD)        LDL Reference Ranges  (U.S. Department of Health and Human Services  ATP III Classifcations)    Optimal          <100 mg/dL  Near Optimal     100-129 mg/dL  Borderline High  130-159 mg/dL  High             160-189 mg/dL  Very High        >189 mg/dL    Comprehensive Metabolic Panel [[id number]]  (Abnormal) Collected:  01/22/17 0543    Specimen:  Blood Updated:  01/22/17 0650     Glucose 121 (H) mg/dL      BUN 88 (H) mg/dL      Creatinine 7.18 (H) mg/dL      Sodium 134 (L) mmol/L      Potassium 4.4 mmol/L      Chloride 88 (L) mmol/L      CO2 23.0 mmol/L      Calcium 9.2 mg/dL      Total Protein 6.0 g/dL      Albumin 2.60 (L) g/dL      ALT (SGPT) 20 U/L      AST (SGOT) 26 U/L       Specimen hemolyzed.  Results may be affected.        Alkaline Phosphatase 148 (H) U/L      Total Bilirubin 3.8 (H) mg/dL      eGFR Non African Amer 7 (L) mL/min/1.73      Globulin 3.4 gm/dL      A/G Ratio 0.8 g/dL      BUN/Creatinine Ratio 12.3      Anion Gap 23.0 mmol/L     Narrative:       The MDRD GFR formula is only valid for adults with stable renal function between ages 18 and 70.    TSH [[id number]]  (Normal) Collected:  01/22/17 0543    Specimen:  Blood Updated:  01/22/17 0653     TSH 1.650 mIU/mL     BNP [[id number]]  (Abnormal) Collected:  01/22/17 0543    Specimen:  Blood Updated:  01/22/17 0716     proBNP >98098.0 (H) pg/mL     Narrative:       Among patients with dyspnea, NT-proBNP is highly sensitive for the detection of acute congestive heart failure. In addition NT-proBNP of <300 pg/ml effectively rules out acute congestive heart failure with 99% negative predictive value.    Troponin [[id number]]  (Abnormal) Collected:  01/22/17 0543    Specimen:  Blood Updated:  01/22/17 0719     Troponin T 0.033 (H) ng/mL     Narrative:       Troponin T Reference Ranges:  Less than 0.03 ng/mL:    Negative for AMI  0.03 to 0.09 ng/mL:      Indeterminant for AMI  Greater than 0.09 ng/mL: Positive for AMI    Protime-INR [[id number]]  (Abnormal) Collected:  01/22/17 0733    Specimen:  Blood Updated:  01/22/17 0759      Protime 19.9 (H) Seconds      INR 1.76 (H)     Hepatitis B Surface Antigen [[id number]]  (Normal) Collected:  01/22/17 0543    Specimen:  Blood Updated:  01/22/17 1041     Hepatitis B Surface Ag Non-Reactive       Potassium 4.1, glucose 105, BUN 63, creatinine 5.9, INR is 9.37, white count 10.1, hemoglobin 10.1, platelets are 66,000,  No other labs available, but I did receive a call that his blood cultures are positive for MRSA. No chest x-ray or EKG available.   Imaging Results (last 72 hours)     Procedure Component Value Units Date/Time    XR Chest 1 View [[id number]] Collected:  01/21/17 1307     Updated:  01/21/17 1313    Narrative:       XR CHEST 1 VW-     HISTORY: Male who is 76 years-old,  pneumonia     TECHNIQUE: Frontal view of the chest     COMPARISON: None available     FINDINGS: A right subclavian catheter extends to the proximal right  atrium, left chest port is apparent extending to the cavoatrial junction  region. Heart is enlarged. Aorta is tortuous. Pulmonary vasculature is  congested.  Areas of opacity are seen at the right lung base, also  medially at the right upper lung, follow-up advised to characterize  resolution and to exclude possibility of underlying lesion. No pleural  effusion or pneumothorax. No acute osseous process.       Impression:       Areas of opacity in the right lung, follow-up recommended.  Mild cardiomegaly, pulmonary mass or congestion. Tortuous aorta.     This report was finalized on 1/21/2017 1:10 PM by Dr. Cesar Mantilla MD.           Current Facility-Administered Medications:   •  [MAR Hold] ALPRAZolam (XANAX) tablet 0.25 mg, 0.25 mg, Oral, 4x Daily PRN, Rc No MD, 0.25 mg at 02/01/17 2047  •  [MAR Hold] aspirin EC tablet 81 mg, 81 mg, Oral, Daily, FRANCISCA Washburn, 81 mg at 02/02/17 0801  •  [MAR Hold] heparin (porcine) 5000 UNIT/ML injection 2,650-4,000 Units, 29.9-45.2 Units/kg, Intravenous, Q6H PRN, FRANCISCA Washburn, 4,000 Units at 02/02/17 0531  •  [MAR  Hold] heparin (porcine) injection 3,000 Units, 3,000 Units, Intracatheter, PRN, Elmer Downs MD, 3,900 Units at 02/02/17 0911  •  hydrALAZINE (APRESOLINE) tablet 25 mg, 25 mg, Oral, Q8H, Kateryna LAZAR Lester, APRN, 25 mg at 02/02/17 0530  •  [MAR Hold] HYDROcodone-acetaminophen (NORCO) 5-325 MG per tablet 1 tablet, 1 tablet, Oral, Q6H PRN, Rc No MD, 1 tablet at 02/02/17 0108  •  [MAR Hold] isosorbide mononitrate (IMDUR) 24 hr tablet 30 mg, 30 mg, Oral, Q24H, Kateryna LAZAR Lester, APRN, 30 mg at 02/02/17 0801  •  [MAR Hold] lansoprazole (FIRST) oral suspension 30 mg, 30 mg, Oral, QAM, Rc No MD, 30 mg at 02/02/17 0531  •  [MAR Hold] levothyroxine (SYNTHROID, LEVOTHROID) tablet 100 mcg, 100 mcg, Oral, Daily, Floyd Fraga MD, 100 mcg at 02/02/17 0530  •  midazolam (VERSED) injection 1 mg, 1 mg, Intravenous, Q5 Min PRN, 0.5 mg at 02/02/17 1409 **OR** midazolam (VERSED) injection 2 mg, 2 mg, Intravenous, Q5 Min PRN, Jairo Almodovar MD  •  [MAR Hold] ondansetron (ZOFRAN) injection 4 mg, 4 mg, Intravenous, Q6H PRN, Rc No MD  •  Pharmacy to dose vancomycin, , Does not apply, Continuous PRN, Rc No MD  •  sodium chloride 0.9 % flush 1-10 mL, 1-10 mL, Intravenous, PRN, Jairo Almodovar MD  •  [MAR Hold] vancomycin 1250 mg/250 mL 0.9% NS IVPB ([facility]), 1,250 mg, Intravenous, Once, Rc No MD  •  [MAR Hold] Vancomycin Pharmacy Intermittent Dosing, , Does not apply, Daily, Rc No MD       ECHO NOTED    ASSESSMENT:  1.   MRSA sepsis. /INFECTED HERO GRAFT/LINE SEPSIS/REPEAT BC NEGATIVE 1/24/17  2.   Pneumonia.   3.   INFECTED GRAFT  S/P EXCISION  4.   Thrombocytopenia.   5.   Chronic AFib WITH LUDIN  6.   Hypertension.   7.   Congestive heart failure.   8.   Hypothyroidism.   9.   History of lymphoma.   10. SEPTIC PULM EMBOLI    PLAN:   1.   CPM  2.   ABX IV FOR 6 WEEKS STOP  3/6/17  3.   PORT REMOVAL TODAY AND NO PPM UNTIL ABX COMPLETED  4.   ADV DIET  5.   S/P IJ CATHETER FOR HD  6.   HD TIW  7.    SUPPORTIVE CARE  8.   CARD/ID/VASULAR/RENAL FOLLOWING  9.   PT/OT  10. STRESS ULCER/DVT PROPHYLAXIS  11. JESSICA No M.D.

## 2017-02-03 NOTE — PROGRESS NOTES
Continued Stay Note  [facility]     Patient Name: Ze Wu  MRN: [id number]  Today's Date: 2/3/2017    Admit Date: 1/20/2017          Discharge Plan       02/03/17 1144    Case Management/Social Work Plan    Plan [facility] skilled with HD - will need precert    Patient/Family In Agreement With Plan yes    Additional Comments Noted patient is in restraints. Called to Leeanne/[facility] and informed. She is needing current PT notes before they can attempt precert. They will need to do HD on a MWF schedule instead of his TTS. Patient may need extra HD with change in schedule before his  discharge. Sticky note updated for MD. Packet is in office with numbers for report and fax. CCP will continue to follow and assist as needed.               Discharge Codes     None        Expected Discharge Date and Time     Expected Discharge Date Expected Discharge Time    Feb 3, 2017             Moshe Wesley RN

## 2017-02-03 NOTE — PROGRESS NOTES
Continued Stay Note  [facility]     Patient Name: Ze Wu  MRN: [id number]  Today's Date: 2/3/2017    Admit Date: 1/20/2017          Discharge Plan       02/03/17 1607    Case Management/Social Work Plan    Plan [facility] skilled with HD - will need precert    Patient/Family In Agreement With Plan yes    Additional Comments Inbound call from Leeanne/[facility] and they are unable to start precert as patient is too confused to work with PT at this point. She will continue to follow and will attempt precert Monday if pt less confused and able to work with PT..              Discharge Codes     None        Expected Discharge Date and Time     Expected Discharge Date Expected Discharge Time    Feb 3, 2017             Moshe Wesley RN

## 2017-02-03 NOTE — NURSING NOTE
Called in Dialysis order after I spoke with Dr. Hare for conformation.  Call number [phone] and left orders.

## 2017-02-03 NOTE — PLAN OF CARE
Problem: Patient Care Overview (Adult)  Goal: Plan of Care Review  Outcome: Ongoing (interventions implemented as appropriate)    02/03/17 1848   Coping/Psychosocial Response Interventions   Plan Of Care Reviewed With son   Patient Care Overview   Progress progress towards functional goals is fair   Outcome Evaluation   Outcome Summary/Follow up Plan VSS, patient is in restraints today d/t confusion and pulling lines         Problem: Chronic Kidney Disease/End Stage Renal Disease (Adult)  Goal: Signs and Symptoms of Listed Potential Problems Will be Absent or Manageable (Chronic Kidney Disease/End Stage Renal Disease)  Outcome: Ongoing (interventions implemented as appropriate)    Problem: Confusion, Chronic (Adult)  Goal: Free from Harm/Injuries  Outcome: Ongoing (interventions implemented as appropriate)    Problem: Pneumonia (Adult)  Goal: Signs and Symptoms of Listed Potential Problems Will be Absent or Manageable (Pneumonia)  Outcome: Ongoing (interventions implemented as appropriate)    Problem: SAFETY - NON-VIOLENT RESTRAINT  Goal: Remains free of injury from restraints (Non-Violent Restraint)  Outcome: Ongoing (interventions implemented as appropriate)  Goal: Free from restraint(s) (Non-Violent Restraint)  Outcome: Ongoing (interventions implemented as appropriate)

## 2017-02-03 NOTE — PLAN OF CARE
Problem: Patient Care Overview (Adult)  Goal: Plan of Care Review  Outcome: Ongoing (interventions implemented as appropriate)    02/03/17 0158   Coping/Psychosocial Response Interventions   Plan Of Care Reviewed With patient   Patient Care Overview   Progress no change   Outcome Evaluation   Outcome Summary/Follow up Plan Pt confused. Heparin gtt restarted per orders.        Goal: Adult Individualization and Mutuality  Outcome: Ongoing (interventions implemented as appropriate)  Goal: Discharge Needs Assessment  Outcome: Ongoing (interventions implemented as appropriate)    Problem: Chronic Kidney Disease/End Stage Renal Disease (Adult)  Goal: Signs and Symptoms of Listed Potential Problems Will be Absent or Manageable (Chronic Kidney Disease/End Stage Renal Disease)  Outcome: Ongoing (interventions implemented as appropriate)    Problem: Confusion, Chronic (Adult)  Goal: Cognitive/Functional Impairments Minimized  Outcome: Ongoing (interventions implemented as appropriate)  Goal: Free from Harm/Injuries  Outcome: Ongoing (interventions implemented as appropriate)    Problem: Pneumonia (Adult)  Goal: Signs and Symptoms of Listed Potential Problems Will be Absent or Manageable (Pneumonia)  Outcome: Ongoing (interventions implemented as appropriate)    Problem: Perioperative Period (Adult)  Goal: Signs and Symptoms of Listed Potential Problems Will be Absent or Manageable (Perioperative Period)  Outcome: Ongoing (interventions implemented as appropriate)  Goal: Signs and Symptoms of Listed Potential Problems Will be Absent or Manageable (Perioperative Period)  Outcome: Ongoing (interventions implemented as appropriate)

## 2017-02-03 NOTE — PLAN OF CARE
Problem: Patient Care Overview (Adult)  Goal: Plan of Care Review  Outcome: Ongoing (interventions implemented as appropriate)    02/03/17 1256   Coping/Psychosocial Response Interventions   Plan Of Care Reviewed With patient   Outcome Evaluation   Outcome Summary/Follow up Plan Pt w/ more flat affect today. Very stiff and resistive to OOB activity. Increased pain in sitting so was unable to tolerate any further activity. Notified RN.

## 2017-02-03 NOTE — PROGRESS NOTES
Floyd Fraga MD       LOS: 14 days   Patient Care Team:  No Known Provider as PCP - General    Subjective     76 y.o. male doing well after removal of left chest port yesterday. Cultures of port pending. Confusion through night from anesthesia better this morning. Sepsis resolved after removal infected right arm av graft and hero catheter with wounds healing well. Antibiotics per infectious disease.    Patient on intravenous heparin. Plans for heart catheterization prior to pacemaker placement per cardiology noted.    Review of Systems  Review of Systems - Negative except history of present illness      Objective     Vital Signs  Temp:  [97.3 °F (36.3 °C)-98.7 °F (37.1 °C)] 97.3 °F (36.3 °C)  Heart Rate:  [40-58] 46  Resp:  [12-20] 20  BP: (104-133)/(2-80) 124/53    Physical Exam  General: No acute distress. Alert and oriented x 4  HEENT: No jugular venous distension, trachea is midline  CV: RRR, S1S2  Resp: Clear unlabored breathing  Abd: Abdomen is soft, nontender, nondistended  Extremities: right palindrome catheter and left port site no signs infection , right arm wounds healing well    Results Review:       Recent Results (from the past 12 hour(s))   aPTT    Collection Time: 02/03/17  3:39 AM   Result Value Ref Range    PTT 37.1 (H) 22.7 - 35.4 seconds   Protime-INR    Collection Time: 02/03/17  3:39 AM   Result Value Ref Range    Protime 17.8 (H) 11.7 - 14.2 Seconds    INR 1.52 (H) 0.90 - 1.10   Comprehensive Metabolic Panel    Collection Time: 02/03/17  3:39 AM   Result Value Ref Range    Glucose 86 65 - 99 mg/dL    BUN 22 8 - 23 mg/dL    Creatinine 4.28 (H) 0.76 - 1.27 mg/dL    Sodium 143 136 - 145 mmol/L    Potassium 5.1 3.5 - 5.2 mmol/L    Chloride 103 98 - 107 mmol/L    CO2 21.1 (L) 22.0 - 29.0 mmol/L    Calcium 9.6 8.6 - 10.5 mg/dL    Total Protein 8.7 (H) 6.0 - 8.5 g/dL    Albumin 3.50 3.50 - 5.20 g/dL    ALT (SGPT) 12 1 - 41 U/L    AST (SGOT) 21 1 - 40 U/L    Alkaline Phosphatase 164 (H) 39 -  117 U/L    Total Bilirubin 1.0 0.1 - 1.2 mg/dL    eGFR Non African Amer 14 (L) >60 mL/min/1.73    Globulin 5.2 gm/dL    A/G Ratio 0.7 g/dL    BUN/Creatinine Ratio 5.1 (L) 7.0 - 25.0    Anion Gap 18.9 mmol/L   CBC Auto Differential    Collection Time: 02/03/17  3:39 AM   Result Value Ref Range    WBC 10.92 (H) 4.50 - 10.70 10*3/mm3    RBC 3.29 (L) 4.60 - 6.00 10*6/mm3    Hemoglobin 10.4 (L) 13.7 - 17.6 g/dL    Hematocrit 32.2 (L) 40.4 - 52.2 %    MCV 97.9 (H) 79.8 - 96.2 fL    MCH 31.6 27.0 - 32.7 pg    MCHC 32.3 (L) 32.6 - 36.4 g/dL    RDW 16.2 (H) 11.5 - 14.5 %    RDW-SD 57.7 (H) 37.0 - 54.0 fl    MPV 11.1 6.0 - 12.0 fL    Platelets 374 140 - 500 10*3/mm3    Neutrophil % 71.4 42.7 - 76.0 %    Lymphocyte % 13.2 (L) 19.6 - 45.3 %    Monocyte % 11.9 5.0 - 12.0 %    Eosinophil % 2.0 0.3 - 6.2 %    Basophil % 1.2 0.0 - 1.5 %    Immature Grans % 0.3 0.0 - 0.5 %    Neutrophils, Absolute 7.80 1.90 - 8.10 10*3/mm3    Lymphocytes, Absolute 1.44 0.90 - 4.80 10*3/mm3    Monocytes, Absolute 1.30 (H) 0.20 - 1.20 10*3/mm3    Eosinophils, Absolute 0.22 0.00 - 0.70 10*3/mm3    Basophils, Absolute 0.13 0.00 - 0.20 10*3/mm3    Immature Grans, Absolute 0.03 0.00 - 0.03 10*3/mm3   ]      Assessment/Plan           Active Problems:    Sepsis      Assessment & Plan  76 y.o. male doing well after port removal yesterday. Cultures of port pending. Continue dialysis with right palindrome. Plans for heart catheterization and pacemaker per cardiology.      Floyd Fraga MD  02/03/17  6:41 AM

## 2017-02-03 NOTE — PROGRESS NOTES
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 Infected venous access port T80.219A 999.31     Patient Active Problem List   Diagnosis   • Sepsis               Adult Rehabilitation Note       17 1128          Rehab Assessment/Intervention    Discipline physical therapy assistant  -RW      Document Type therapy note (daily note)  -RW      Subjective Information agree to therapy  -RW      Patient Effort, Rehab Treatment poor  -RW      Precautions/Limitations fall precautions;other (see comments)   restraint use  -RW      Recorded by [RW] Jane Beltran PTA      Pain Assessment    Pain Assessment [place] FACES  -RW      [place] FACES Pain Rating 10  -RW      Pain Location Back  -RW      Pain Intervention(s) Repositioned  -RW      Response to Interventions not tolerated  -RW      Recorded by [RW] Jane Beltran PTA      Cognitive Assessment/Intervention    Current Cognitive/Communication Assessment impaired  -RW      Orientation Status oriented to;person  -RW      Follows Commands/Answers Questions 50% of the time;able to follow single-step instructions;needs cueing;needs increased time  -RW      Personal Safety severe impairment;at risk behaviors demonstrated  -RW      Personal Safety Interventions fall prevention program maintained;nonskid shoes/slippers when out of bed  -RW      Recorded by [RW] Jane Beltran PTA      Bed Mobility, Assessment/Treatment    Bed Mob, Supine to Sit, [place] verbal cues required;nonverbal cues required (demo/gesture);maximum assist (25% patient effort);2 person assist required  -RW      Bed Mob, Sit to Supine, [place] verbal cues required;nonverbal cues required  (demo/gesture);maximum assist (25% patient effort);2 person assist required  -RW      Bed Mobility, Comment Upon sitting at EOB, pt c/o increased back pain. Only able to tolerate sitting for short time  -RW      Recorded by [RW] Jane Beltran PTA      Transfer Assessment/Treatment    Transfers, Sit-Stand [place] not tested;unable to perform  -RW      Transfer, Comment Back pain limiting OOB activity  -RW      Recorded by [RW] Jaen Beltran PTA      Balance Skills Training    Sitting-Level of Assistance Moderate assistance  -RW      Sitting-Balance Support Right upper extremity supported;Left upper extremity supported;Feet supported  -RW      Sitting # of Minutes 2  -RW      Recorded by [RW] Jane Beltran PTA      Therapy Exercises    Bilateral Lower Extremities --   Attempted ROM in bed, but pt resistive  -RW      Recorded by [RW] Jane Beltran PTA      Positioning and Restraints    Pre-Treatment Position in bed  -RW      Post Treatment Position bed  -RW      In Bed supine;call light within reach;encouraged to call for assist;exit alarm on;notified nsg  -RW      Restraints reapplied:;soft limb  -RW      Recorded by [RW] Jane Beltran PTA        User Key  (r) = Recorded By, (t) = Taken By, (c) = Cosigned By    Initials Name Effective Dates    RW Jane Beltran PTA 04/06/16 -                 IP PT Goals       01/27/17 1601          Bed Mobility PT LTG    Bed Mobility PT LTG, Date Established 01/27/17  -AR      Bed Mobility PT LTG, Time to Achieve 1 wk  -AR      Bed Mobility PT LTG, Activity Type supine to sit/sit to supine  -AR      Bed Mobility PT LTG, [place] Level minimum assist (75% patient effort)  -AR      Transfer Training PT LTG    Transfer Training PT LTG, Date Established 01/27/17  -AR      Transfer Training PT LTG, Time to Achieve 1 wk  -AR      Transfer Training PT LTG, Activity Type bed to chair /chair to bed;sit to stand/stand to sit  -AR      Transfer Training PT LTG,  [place] Level minimum assist (75% patient effort)  -AR      Transfer Training PT LTG, Assist Device walker, rolling  -AR      Gait Training PT LTG    Gait Training Goal PT LTG, Date Established 01/27/17  -AR      Gait Training Goal PT LTG, Time to Achieve 1 wk  -AR      Gait Training Goal PT LTG, [place] Level minimum assist (75% patient effort)  -AR      Gait Training Goal PT LTG, Assist Device walker, rolling  -AR      Gait Training Goal PT LTG, Distance to Achieve 20  -AR        User Key  (r) = Recorded By, (t) = Taken By, (c) = Cosigned By    Initials Name Provider Type    AR Teetee David, PT Physical Therapist          Physical Therapy Education     Title: PT OT SLP Therapies (Active)     Topic: Physical Therapy (Active)     Point: Mobility training (Active)    Learning Progress Summary    Learner Readiness Method Response Comment Documented by Status   Patient Acceptance E,D NR   02/03/17 1256 Active    Acceptance E,TB,D VU,NR   01/30/17 1352 Done    Acceptance E NR  JW 01/29/17 1019 Active    Acceptance E NR  AR 01/27/17 1602 Active               Point: Home exercise program (Active)    Learning Progress Summary    Learner Readiness Method Response Comment Documented by Status   Patient Acceptance E,D NR   02/03/17 1256 Active    Acceptance E,TB,D VU,NR   01/30/17 1352 Done    Acceptance E NR  AR 01/27/17 1602 Active               Point: Body mechanics (Active)    Learning Progress Summary    Learner Readiness Method Response Comment Documented by Status   Patient Acceptance E,D NR   02/03/17 1256 Active    Acceptance E,TB,D VU,NR   01/30/17 1352 Done    Acceptance E NR  AR 01/27/17 1602 Active               Point: Precautions (Active)    Learning Progress Summary    Learner Readiness Method Response Comment Documented by Status   Patient Acceptance E,D NR   02/03/17 1256 Active    Acceptance E,TB,D VU,NR   01/30/17 1352 Done    Acceptance E NR  AR 01/27/17 1602 Active                       User Key     Initials Effective Dates Name Provider Type Discipline    JW 02/18/16 -  Shaina Gannon PTA Physical Therapy Assistant PT    AR 06/27/16 -  Teetee Hernandez PT Physical Therapist PT    RW 04/06/16 -  Jane Beltran PTA Physical Therapy Assistant PT                    PT Recommendation and Plan  Anticipated Discharge Disposition: skilled nursing facility  Planned Therapy Interventions: balance training, bed mobility training, gait training, home exercise program, patient/family education, ROM (Range of Motion), strengthening, transfer training  PT Frequency: daily  Plan of Care Review  Plan Of Care Reviewed With: patient  Outcome Summary/Follow up Plan: Pt w/ more flat affect today. Very stiff and resistive to OOB activity. Increased pain in sitting so was unable to tolerate any further activity. Notified RN.          Outcome Measures       02/03/17 1128          How much help from another person do you currently need...    Turning from your back to your side while in flat bed without using bedrails? 2  -RW      Moving from lying on back to sitting on the side of a flat bed without bedrails? 2  -RW      Moving to and from a bed to a chair (including a wheelchair)? 1  -RW      Standing up from a chair using your arms (e.g., wheelchair, bedside chair)? 1  -RW      Climbing 3-5 steps with a railing? 1  -RW      To walk in hospital room? 1  -RW      AM-PAC 6 Clicks Score 8  -RW      Functional Assessment    Outcome Measure Options AM-PAC 6 Clicks Basic Mobility (PT)  -RW        User Key  (r) = Recorded By, (t) = Taken By, (c) = Cosigned By    Initials Name Provider Type    RW Jane Beltran PTA Physical Therapy Assistant           Time Calculation:         PT Charges       02/03/17 1140          Time Calculation    Start Time 1128  -RW      Stop Time 1138  -RW      Time Calculation (min) 10 min  -RW      PT Received On 02/03/17  -RW      PT - Next Appointment 02/04/17  -RW        User Key   (r) = Recorded By, (t) = Taken By, (c) = Cosigned By    Initials Name Provider Type     Jane Beltran PTA Physical Therapy Assistant          Therapy Charges for Today     Code Description Service Date Service Provider Modifiers Qty    17555892704 HC PT THER PROC EA 15 MIN 2/3/2017 Jane Beltran PTA GP 1    64176924928 HC PT THER SUPP EA 15 MIN 2/3/2017 Jane Beltran PTA GP 1          PT G-Codes  Outcome Measure Options: AM-PAC 6 Clicks Basic Mobility (PT)    Jane Beltran PTA  2/3/2017

## 2017-02-03 NOTE — PROGRESS NOTES
"   DAILY PROGRESS NOTE    CHIEF COMPLAINT  CONFUSE  NO SPECIFIC COMPLAINTS        HISTORY OF PRESENT ILLNESS: A 76-year-old white male with a history of end-stage renal disease, on hemodialysis for the last few years, B-cell lymphoma in remission, thyroid cancer in remission, coronary artery disease, chronic AFib, on anticoagulation, congestive heart failure, hypertension, depression, who transferred to my service from [facility] where he was admitted with a 3-week history of increasing shortness of breath, productive cough. The patient’s workup did reveal pneumonia with sepsis and he was getting treatment for that but his hemodialysis graft was not working. He was transferred to our service for further evaluation with Vascular Surgery and continued the current treatment.     PHYSICAL EXAMINATION:Blood pressure 116/52, pulse 50, temperature 97.4 °F (36.3 °C), temperature source Axillary, resp. rate 20, height 74\" (188 cm), weight 195 lb 1.6 oz (88.5 kg), SpO2 92 %.    GENERAL: Alert, & oriented   HEENT: Unremarkable.   NECK: Supple.   LUNGS: Decreased breath sounds.   HEART: S1, S2 regular.   ABDOMEN: Soft, nontender. Bowel sounds positive.   EXTREMITIES: No edema.   NEUROLOGIC: Moves all 4 extremities. Gait and balance not checked.    DIAGNOSTIC DATA:   Lab Results (last 24 hours)     Procedure Component Value Units Date/Time    Anaerobic Culture [[id number]] Collected:  02/02/17 1541    Specimen:  Surgical Site from Chest, Left Updated:  02/02/17 1656    aPTT [[id number]]  (Normal) Collected:  02/02/17 1826    Specimen:  Blood from Arm, Left Updated:  02/02/17 1909     PTT 33.8 seconds     CBC & Differential [[id number]] Collected:  02/03/17 0339    Specimen:  Blood Updated:  02/03/17 0441    Narrative:       The following orders were created for panel order CBC & Differential.  Procedure                               Abnormality         Status                     ---------                               " -----------         ------                     CBC Auto Differential[[id number]]         Abnormal            Final result                 Please view results for these tests on the individual orders.    CBC Auto Differential [[id number]]  (Abnormal) Collected:  02/03/17 0339    Specimen:  Blood Updated:  02/03/17 0441     WBC 10.92 (H) 10*3/mm3      RBC 3.29 (L) 10*6/mm3      Hemoglobin 10.4 (L) g/dL      Hematocrit 32.2 (L) %      MCV 97.9 (H) fL      MCH 31.6 pg      MCHC 32.3 (L) g/dL      RDW 16.2 (H) %      RDW-SD 57.7 (H) fl      MPV 11.1 fL      Platelets 374 10*3/mm3      Neutrophil % 71.4 %      Lymphocyte % 13.2 (L) %      Monocyte % 11.9 %      Eosinophil % 2.0 %      Basophil % 1.2 %      Immature Grans % 0.3 %      Neutrophils, Absolute 7.80 10*3/mm3      Lymphocytes, Absolute 1.44 10*3/mm3      Monocytes, Absolute 1.30 (H) 10*3/mm3      Eosinophils, Absolute 0.22 10*3/mm3      Basophils, Absolute 0.13 10*3/mm3      Immature Grans, Absolute 0.03 10*3/mm3     aPTT [[id number]]  (Abnormal) Collected:  02/03/17 0339    Specimen:  Blood Updated:  02/03/17 0446     PTT 37.1 (H) seconds     Protime-INR [[id number]]  (Abnormal) Collected:  02/03/17 0339    Specimen:  Blood Updated:  02/03/17 0446     Protime 17.8 (H) Seconds      INR 1.52 (H)     Comprehensive Metabolic Panel [[id number]]  (Abnormal) Collected:  02/03/17 0339    Specimen:  Blood Updated:  02/03/17 0459     Glucose 86 mg/dL      BUN 22 mg/dL      Creatinine 4.28 (H) mg/dL      Sodium 143 mmol/L      Potassium 5.1 mmol/L      Chloride 103 mmol/L      CO2 21.1 (L) mmol/L      Calcium 9.6 mg/dL      Total Protein 8.7 (H) g/dL      Albumin 3.50 g/dL      ALT (SGPT) 12 U/L      AST (SGOT) 21 U/L      Alkaline Phosphatase 164 (H) U/L      Total Bilirubin 1.0 mg/dL      eGFR Non African Amer 14 (L) mL/min/1.73      Globulin 5.2 gm/dL      A/G Ratio 0.7 g/dL      BUN/Creatinine Ratio 5.1 (L)      Anion Gap 18.9 mmol/L     Narrative:       The MDRD GFR formula is  only valid for adults with stable renal function between ages 18 and 70.    Wound Culture [[id number]]  (Abnormal) Collected:  02/02/17 1541    Specimen:  Surgical Site from Chest, Left Updated:  02/03/17 0824     Wound Culture        Scant growth (1+) Staphylococcus aureus, MRSA (C)        Methicillin resistant Staphylococcus aureus, Patient may be an isolation risk.        Gram Stain Result WBCs seen       Rare         No organisms seen     aPTT [[id number]]  (Abnormal) Collected:  02/03/17 1052    Specimen:  Blood Updated:  02/03/17 1114     PTT 52.5 (H) seconds         Lab Results (last 24 hours)     Procedure Component Value Units Date/Time    Basic Metabolic Panel [[id number]]  (Abnormal) Collected:  02/01/17 1406    Specimen:  Blood Updated:  02/01/17 1447     Glucose 103 (H) mg/dL      BUN 28 (H) mg/dL      Creatinine 4.87 (H) mg/dL      Sodium 138 mmol/L      Potassium 4.7 mmol/L      Chloride 98 mmol/L      CO2 19.9 (L) mmol/L      Calcium 9.1 mg/dL      eGFR Non African Amer 12 (L) mL/min/1.73      BUN/Creatinine Ratio 5.7 (L)      Anion Gap 20.1 mmol/L     Narrative:       The MDRD GFR formula is only valid for adults with stable renal function between ages 18 and 70.    Magnesium [[id number]]  (Abnormal) Collected:  02/01/17 1406    Specimen:  Blood Updated:  02/01/17 1447     Magnesium 2.5 (H) mg/dL     aPTT [[id number]]  (Abnormal) Collected:  02/01/17 1753    Specimen:  Blood from Arm, Right Updated:  02/01/17 1840     PTT 57.8 (H) seconds     Blood Culture [[id number]]  (Normal) Collected:  01/27/17 1952    Specimen:  Blood from Arm, Left Updated:  02/01/17 2101     Blood Culture No growth at 5 days     CBC & Differential [[id number]] Collected:  02/02/17 0430    Specimen:  Blood Updated:  02/02/17 0450    Narrative:       The following orders were created for panel order CBC & Differential.  Procedure                               Abnormality         Status                     ---------                                -----------         ------                     CBC Auto Differential[[id number]]         Abnormal            Final result                 Please view results for these tests on the individual orders.    CBC Auto Differential [[id number]]  (Abnormal) Collected:  02/02/17 0430    Specimen:  Blood Updated:  02/02/17 0450     WBC 10.46 10*3/mm3      RBC 2.89 (L) 10*6/mm3      Hemoglobin 9.3 (L) g/dL      Hematocrit 27.6 (L) %      MCV 95.5 fL      MCH 32.2 pg      MCHC 33.7 g/dL      RDW 16.0 (H) %      RDW-SD 55.9 (H) fl      MPV 10.3 fL      Platelets 357 10*3/mm3      Neutrophil % 74.3 %      Lymphocyte % 14.6 (L) %      Monocyte % 6.2 %      Eosinophil % 3.3 %      Basophil % 1.3 %      Immature Grans % 0.3 %      Neutrophils, Absolute 7.77 10*3/mm3      Lymphocytes, Absolute 1.53 10*3/mm3      Monocytes, Absolute 0.65 10*3/mm3      Eosinophils, Absolute 0.34 10*3/mm3      Basophils, Absolute 0.14 10*3/mm3      Immature Grans, Absolute 0.03 10*3/mm3     Protime-INR [[id number]]  (Abnormal) Collected:  02/02/17 0430    Specimen:  Blood Updated:  02/02/17 0507     Protime 17.7 (H) Seconds      INR 1.51 (H)     aPTT [[id number]]  (Abnormal) Collected:  02/02/17 0430    Specimen:  Blood Updated:  02/02/17 0507     PTT 48.6 (H) seconds     Troponin [[id number]]  (Abnormal) Collected:  02/02/17 0430    Specimen:  Blood Updated:  02/02/17 0528     Troponin T 0.355 (C) ng/mL     Narrative:       Troponin T Reference Ranges:  Less than 0.03 ng/mL:    Negative for AMI  0.03 to 0.09 ng/mL:      Indeterminant for AMI  Greater than 0.09 ng/mL: Positive for AMI    aPTT [[id number]]  (Abnormal) Collected:  02/02/17 0722    Specimen:  Blood Updated:  02/02/17 0759     .4 (C) seconds         Lab Results (last 24 hours)     Procedure Component Value Units Date/Time    Blood Culture [[id number]]  (Normal) Collected:  01/27/17 1952    Specimen:  Blood from Arm, Left Updated:  01/31/17 2101     Blood Culture No growth at 4 days     CBC &  Differential [[id number]] Collected:  02/01/17 0548    Specimen:  Blood Updated:  02/01/17 0650    Narrative:       The following orders were created for panel order CBC & Differential.  Procedure                               Abnormality         Status                     ---------                               -----------         ------                     CBC Auto Differential[[id number]]         Abnormal            Final result                 Please view results for these tests on the individual orders.    CBC Auto Differential [[id number]]  (Abnormal) Collected:  02/01/17 0548    Specimen:  Blood Updated:  02/01/17 0650     WBC 13.06 (H) 10*3/mm3      RBC 2.95 (L) 10*6/mm3      Hemoglobin 9.4 (L) g/dL      Hematocrit 28.4 (L) %      MCV 96.3 (H) fL      MCH 31.9 pg      MCHC 33.1 g/dL      RDW 16.1 (H) %      RDW-SD 56.6 (H) fl      MPV 10.6 fL      Platelets 342 10*3/mm3      Neutrophil % 80.4 (H) %      Lymphocyte % 10.3 (L) %      Monocyte % 6.3 %      Eosinophil % 1.8 %      Basophil % 0.9 %      Immature Grans % 0.3 %      Neutrophils, Absolute 10.50 (H) 10*3/mm3      Lymphocytes, Absolute 1.34 10*3/mm3      Monocytes, Absolute 0.82 10*3/mm3      Eosinophils, Absolute 0.24 10*3/mm3      Basophils, Absolute 0.12 10*3/mm3      Immature Grans, Absolute 0.04 (H) 10*3/mm3     Protime-INR [[id number]]  (Abnormal) Collected:  02/01/17 0548    Specimen:  Blood Updated:  02/01/17 0652     Protime 16.4 (H) Seconds      INR 1.38 (H)     Basic Metabolic Panel [[id number]]  (Abnormal) Collected:  02/01/17 0548    Specimen:  Blood Updated:  02/01/17 0745     Glucose 110 (H) mg/dL      BUN 25 (H) mg/dL      Creatinine 4.16 (H) mg/dL      Sodium 139 mmol/L      Potassium 4.4 mmol/L      Chloride 98 mmol/L      CO2 23.8 mmol/L      Calcium 9.2 mg/dL      eGFR Non African Amer 14 (L) mL/min/1.73      BUN/Creatinine Ratio 6.0 (L)      Anion Gap 17.2 mmol/L     Narrative:       The MDRD GFR formula is only valid for adults with stable  renal function between ages 18 and 70.    Troponin [[id number]]  (Abnormal) Collected:  02/01/17 0548    Specimen:  Blood Updated:  02/01/17 0754     Troponin T 0.374 (C) ng/mL     Narrative:       Troponin T Reference Ranges:  Less than 0.03 ng/mL:    Negative for AMI  0.03 to 0.09 ng/mL:      Indeterminant for AMI  Greater than 0.09 ng/mL: Positive for AMI    CBC & Differential [[id number]] Collected:  02/01/17 1016    Specimen:  Blood Updated:  02/01/17 1045    Narrative:       The following orders were created for panel order CBC & Differential.  Procedure                               Abnormality         Status                     ---------                               -----------         ------                     CBC Auto Differential[[id number]]         Abnormal            Final result                 Please view results for these tests on the individual orders.    CBC Auto Differential [[id number]]  (Abnormal) Collected:  02/01/17 1016    Specimen:  Blood Updated:  02/01/17 1045     WBC 11.25 (H) 10*3/mm3      RBC 3.05 (L) 10*6/mm3      Hemoglobin 9.5 (L) g/dL      Hematocrit 29.6 (L) %      MCV 97.0 (H) fL      MCH 31.1 pg      MCHC 32.1 (L) g/dL      RDW 16.2 (H) %      RDW-SD 57.0 (H) fl      MPV 10.4 fL      Platelets 364 10*3/mm3      Neutrophil % 80.1 (H) %      Lymphocyte % 11.2 (L) %      Monocyte % 5.1 %      Eosinophil % 2.1 %      Basophil % 1.1 %      Immature Grans % 0.4 %      Neutrophils, Absolute 9.02 (H) 10*3/mm3      Lymphocytes, Absolute 1.26 10*3/mm3      Monocytes, Absolute 0.57 10*3/mm3      Eosinophils, Absolute 0.24 10*3/mm3      Basophils, Absolute 0.12 10*3/mm3      Immature Grans, Absolute 0.04 (H) 10*3/mm3     aPTT [[id number]]  (Abnormal) Collected:  02/01/17 1016    Specimen:  Blood Updated:  02/01/17 1053     PTT 38.1 (H) seconds     CK-MB [[id number]]  (Normal) Collected:  02/01/17 1016    Specimen:  Blood Updated:  02/01/17 1105     CKMB 1.92 ng/mL         Lab Results (last 24 hours)      Procedure Component Value Units Date/Time    Magnesium [[id number]]  (Normal) Collected:  01/30/17 1718    Specimen:  Blood Updated:  01/30/17 1807     Magnesium 2.2 mg/dL     Potassium [[id number]]  (Normal) Collected:  01/30/17 1718    Specimen:  Blood Updated:  01/30/17 1808     Potassium 4.0 mmol/L     CK-MB [[id number]]  (Normal) Collected:  01/30/17 1718    Specimen:  Blood Updated:  01/30/17 1818     CKMB <1.00 ng/mL     Troponin [[id number]]  (Abnormal) Collected:  01/30/17 1718    Specimen:  Blood Updated:  01/30/17 1819     Troponin T 0.294 (C) ng/mL     Narrative:       Troponin T Reference Ranges:  Less than 0.03 ng/mL:    Negative for AMI  0.03 to 0.09 ng/mL:      Indeterminant for AMI  Greater than 0.09 ng/mL: Positive for AMI    Blood Culture [[id number]]  (Normal) Collected:  01/27/17 1952    Specimen:  Blood from Arm, Left Updated:  01/30/17 2101     Blood Culture No growth at 3 days     Protime-INR [[id number]]  (Abnormal) Collected:  01/31/17 1126    Specimen:  Blood Updated:  01/31/17 1214     Protime 15.6 (H) Seconds      INR 1.29 (H)     Comprehensive Metabolic Panel [[id number]]  (Abnormal) Collected:  01/31/17 1126    Specimen:  Blood Updated:  01/31/17 1217     Glucose 97 mg/dL      BUN 47 (H) mg/dL      Creatinine 7.03 (H) mg/dL      Sodium 136 mmol/L      Potassium 4.4 mmol/L      Chloride 95 (L) mmol/L      CO2 21.7 (L) mmol/L      Calcium 8.8 mg/dL      Total Protein 7.4 g/dL      Albumin 2.70 (L) g/dL      ALT (SGPT) 14 U/L      AST (SGOT) 17 U/L      Alkaline Phosphatase 175 (H) U/L      Total Bilirubin 1.2 mg/dL      eGFR Non African Amer 8 (L) mL/min/1.73      Globulin 4.7 gm/dL      A/G Ratio 0.6 g/dL      BUN/Creatinine Ratio 6.7 (L)      Anion Gap 19.3 mmol/L     Narrative:       The MDRD GFR formula is only valid for adults with stable renal function between ages 18 and 70.        Lab Results (last 24 hours)     Procedure Component Value Units Date/Time    Blood Culture [[id number]]  (Normal)  Collected:  01/27/17 1952    Specimen:  Blood from Arm, Left Updated:  01/29/17 2101     Blood Culture No growth at 2 days     CBC (No Diff) [[id number]]  (Abnormal) Collected:  01/30/17 0407    Specimen:  Blood Updated:  01/30/17 0446     WBC 12.21 (H) 10*3/mm3      RBC 2.72 (L) 10*6/mm3      Hemoglobin 8.6 (L) g/dL      Hematocrit 26.3 (L) %      MCV 96.7 (H) fL      MCH 31.6 pg      MCHC 32.7 g/dL      RDW 16.6 (H) %      RDW-SD 58.9 (H) fl      MPV 10.5 fL      Platelets 330 10*3/mm3     Comprehensive Metabolic Panel [[id number]]  (Abnormal) Collected:  01/30/17 0407    Specimen:  Blood Updated:  01/30/17 0505     Glucose 99 mg/dL      BUN 35 (H) mg/dL      Creatinine 5.15 (H) mg/dL      Sodium 139 mmol/L      Potassium 3.6 mmol/L      Chloride 98 mmol/L      CO2 24.4 mmol/L      Calcium 8.4 (L) mg/dL      Total Protein 6.9 g/dL      Albumin 2.90 (L) g/dL      ALT (SGPT) 18 U/L      AST (SGOT) 22 U/L      Alkaline Phosphatase 206 (H) U/L      Total Bilirubin 1.2 mg/dL      eGFR Non African Amer 11 (L) mL/min/1.73      Globulin 4.0 gm/dL      A/G Ratio 0.7 g/dL      BUN/Creatinine Ratio 6.8 (L)      Anion Gap 16.6 mmol/L     Narrative:       The MDRD GFR formula is only valid for adults with stable renal function between ages 18 and 70.    Vancomycin, Random [[id number]]  (Normal) Collected:  01/30/17 0444    Specimen:  Blood Updated:  01/30/17 0542     Vancomycin Random 21.00 mcg/mL     Blood Culture [[id number]]  (Normal) Collected:  01/24/17 1055    Specimen:  Blood from Arm, Left Updated:  01/30/17 0701     Blood Culture No growth at 5 days         Lab Results (last 24 hours)     Procedure Component Value Units Date/Time    Anaerobic Culture [[id number]]  (Normal) Collected:  01/23/17 1048    Specimen:  Wound from Arm, Right Updated:  01/28/17 1415     Culture No anaerobes isolated at 5 days     Anaerobic Culture [[id number]]  (Normal) Collected:  01/23/17 1120    Specimen:  Tissue from Arm, Right Updated:  01/28/17 8788      Culture No anaerobes isolated at 5 days     Blood Culture [[id number]]  (Normal) Collected:  01/27/17 1952    Specimen:  Blood from Arm, Left Updated:  01/28/17 2101     Blood Culture No growth at 24 hours     Blood Culture [[id number]]  (Normal) Collected:  01/24/17 1055    Specimen:  Blood from Arm, Left Updated:  01/29/17 0701     Blood Culture No growth at 4 days     CBC (No Diff) [[id number]]  (Abnormal) Collected:  01/29/17 0645    Specimen:  Blood Updated:  01/29/17 0713     WBC 12.15 (H) 10*3/mm3      RBC 2.77 (L) 10*6/mm3      Hemoglobin 8.7 (L) g/dL      Hematocrit 26.3 (L) %      MCV 94.9 fL      MCH 31.4 pg      MCHC 33.1 g/dL      RDW 16.7 (H) %      RDW-SD 57.4 (H) fl      MPV 10.8 fL      Platelets 273 10*3/mm3     Basic Metabolic Panel [[id number]]  (Abnormal) Collected:  01/29/17 0645    Specimen:  Blood Updated:  01/29/17 0727     Glucose 101 (H) mg/dL      BUN 24 (H) mg/dL      Creatinine 3.78 (H) mg/dL      Sodium 136 mmol/L      Potassium 3.7 mmol/L      Chloride 96 (L) mmol/L      CO2 24.7 mmol/L      Calcium 8.6 mg/dL      eGFR Non African Amer 16 (L) mL/min/1.73      BUN/Creatinine Ratio 6.3 (L)      Anion Gap 15.3 mmol/L     Narrative:       The MDRD GFR formula is only valid for adults with stable renal function between ages 18 and 70.    Blood Culture [[id number]]  (Normal) Collected:  01/24/17 1124    Specimen:  Blood from Blood, Central Line Updated:  01/29/17 1201     Blood Culture No growth at 5 days         Lab Results (last 24 hours)     Procedure Component Value Units Date/Time    Blood Culture [[id number]]  (Normal) Collected:  01/24/17 1055    Specimen:  Blood from Arm, Left Updated:  01/28/17 0701     Blood Culture No growth at 3 days     CBC (No Diff) [[id number]]  (Abnormal) Collected:  01/28/17 0647    Specimen:  Blood Updated:  01/28/17 0708     WBC 12.80 (H) 10*3/mm3      RBC 2.59 (L) 10*6/mm3      Hemoglobin 8.4 (L) g/dL      Hematocrit 24.4 (L) %      MCV 94.2 fL      MCH 32.4 pg       MCHC 34.4 g/dL      RDW 17.1 (H) %      RDW-SD 58.9 (H) fl      MPV 11.0 fL      Platelets 240 10*3/mm3     Basic Metabolic Panel [[id number]]  (Abnormal) Collected:  01/28/17 0647    Specimen:  Blood Updated:  01/28/17 0723     Glucose 99 mg/dL      BUN 38 (H) mg/dL      Creatinine 4.86 (H) mg/dL      Sodium 137 mmol/L      Potassium 3.5 mmol/L      Chloride 99 mmol/L      CO2 22.7 mmol/L      Calcium 8.5 (L) mg/dL      eGFR Non African Amer 12 (L) mL/min/1.73      BUN/Creatinine Ratio 7.8      Anion Gap 15.3 mmol/L     Narrative:       The MDRD GFR formula is only valid for adults with stable renal function between ages 18 and 70.    Vancomycin, Random [[id number]]  (Normal) Collected:  01/28/17 0647    Specimen:  Blood Updated:  01/28/17 0725     Vancomycin Random 16.80 mcg/mL     Blood Culture [[id number]]  (Normal) Collected:  01/27/17 1952    Specimen:  Blood from Arm, Left Updated:  01/28/17 0901     Blood Culture No growth at less than 24 hours     Blood Culture [[id number]]  (Normal) Collected:  01/24/17 1124    Specimen:  Blood from Blood, Central Line Updated:  01/28/17 1201     Blood Culture No growth at 4 days         Lab Results (last 24 hours)     Procedure Component Value Units Date/Time    Blood Culture [[id number]]  (Normal) Collected:  01/21/17 1449    Specimen:  Blood from Arm, Left Updated:  01/26/17 1601     Blood Culture No growth at 5 days     CBC (No Diff) [[id number]]  (Abnormal) Collected:  01/27/17 0606    Specimen:  Blood Updated:  01/27/17 0659     WBC 13.40 (H) 10*3/mm3      RBC 2.86 (L) 10*6/mm3      Hemoglobin 9.1 (L) g/dL      Hematocrit 27.7 (L) %      MCV 96.9 (H) fL      MCH 31.8 pg      MCHC 32.9 g/dL      RDW 17.4 (H) %      RDW-SD 60.4 (H) fl      MPV 11.1 fL      Platelets 227 10*3/mm3     Blood Culture [[id number]]  (Normal) Collected:  01/24/17 1055    Specimen:  Blood from Arm, Left Updated:  01/27/17 0701     Blood Culture No growth at 2 days     Protime-INR [[id number]]  (Abnormal)  Collected:  01/27/17 0606    Specimen:  Blood Updated:  01/27/17 0710     Protime 16.1 (H) Seconds      INR 1.34 (H)     Comprehensive Metabolic Panel [[id number]]  (Abnormal) Collected:  01/27/17 0606    Specimen:  Blood Updated:  01/27/17 0732     Glucose 106 (H) mg/dL      BUN 26 (H) mg/dL      Creatinine 3.39 (H) mg/dL      Sodium 139 mmol/L      Potassium 3.6 mmol/L      Chloride 100 mmol/L      CO2 22.9 mmol/L      Calcium 8.7 mg/dL      Total Protein 6.6 g/dL      Albumin 2.80 (L) g/dL      ALT (SGPT) 19 U/L      AST (SGOT) 26 U/L      Alkaline Phosphatase 258 (H) U/L      Total Bilirubin 2.1 (H) mg/dL      eGFR Non African Amer 18 (L) mL/min/1.73      Globulin 3.8 gm/dL      A/G Ratio 0.7 g/dL      BUN/Creatinine Ratio 7.7      Anion Gap 16.1 mmol/L     Narrative:       The MDRD GFR formula is only valid for adults with stable renal function between ages 18 and 70.    Blood Culture [[id number]]  (Normal) Collected:  01/24/17 1124    Specimen:  Blood from Blood, Central Line Updated:  01/27/17 1201     Blood Culture No growth at 3 days         Lab Results (last 24 hours)     Procedure Component Value Units Date/Time    Blood Culture [[id number]]  (Normal) Collected:  01/21/17 1449    Specimen:  Blood from Arm, Left Updated:  01/25/17 1601     Blood Culture No growth at 4 days     Protime-INR [[id number]]  (Abnormal) Collected:  01/26/17 0555    Specimen:  Blood Updated:  01/26/17 0653     Protime 17.2 (H) Seconds      INR 1.46 (H)     Blood Culture [[id number]]  (Normal) Collected:  01/24/17 1055    Specimen:  Blood from Arm, Left Updated:  01/26/17 0701     Blood Culture No growth at 24 hours     Vancomycin, Random [[id number]]  (Normal) Collected:  01/26/17 0555    Specimen:  Blood Updated:  01/26/17 0703     Vancomycin Random 22.70 mcg/mL     Basic Metabolic Panel [[id number]]  (Abnormal) Collected:  01/26/17 0555    Specimen:  Blood Updated:  01/26/17 0704     Glucose 126 (H) mg/dL      BUN 48 (H) mg/dL      Creatinine  4.76 (H) mg/dL      Sodium 139 mmol/L      Potassium 3.8 mmol/L      Chloride 99 mmol/L      CO2 22.8 mmol/L      Calcium 8.6 mg/dL      eGFR Non African Amer 12 (L) mL/min/1.73      BUN/Creatinine Ratio 10.1      Anion Gap 17.2 mmol/L     Narrative:       The MDRD GFR formula is only valid for adults with stable renal function between ages 18 and 70.    CBC (No Diff) [[id number]]  (Abnormal) Collected:  01/26/17 0555    Specimen:  Blood Updated:  01/26/17 0706     WBC 15.18 (H) 10*3/mm3      RBC 2.07 (L) 10*6/mm3      Hemoglobin 6.7 (C) g/dL      Hematocrit 19.6 (C) %      MCV 94.7 fL      MCH 32.4 pg      MCHC 34.2 g/dL      RDW 17.9 (H) %      RDW-SD 61.2 (H) fl      MPV 11.0 fL      Platelets 192 10*3/mm3     Anaerobic Culture [[id number]]  (Normal) Collected:  01/23/17 1048    Specimen:  Wound from Arm, Right Updated:  01/26/17 1116     Culture No anaerobes isolated at 3 days     Anaerobic Culture [[id number]]  (Normal) Collected:  01/23/17 1120    Specimen:  Tissue from Arm, Right Updated:  01/26/17 1116     Culture No anaerobes isolated at 3 days     Blood Culture [[id number]]  (Normal) Collected:  01/24/17 1124    Specimen:  Blood from Blood, Central Line Updated:  01/26/17 1201     Blood Culture No growth at 2 days         Lab Results (last 24 hours)     Procedure Component Value Units Date/Time    Blood Culture [[id number]]  (Normal) Collected:  01/21/17 1449    Specimen:  Blood from Arm, Left Updated:  01/24/17 1601     Blood Culture No growth at 3 days     Blood Culture [[id number]] Collected:  01/24/17 1055    Specimen:  Blood from Arm, Left Updated:  01/25/17 0606    CBC (No Diff) [[id number]]  (Abnormal) Collected:  01/25/17 0608    Specimen:  Blood Updated:  01/25/17 0640     WBC 14.45 (H) 10*3/mm3      RBC 2.28 (L) 10*6/mm3      Hemoglobin 7.4 (L) g/dL      Hematocrit 22.2 (L) %      MCV 97.4 (H) fL      MCH 32.5 pg      MCHC 33.3 g/dL      RDW 18.0 (H) %      RDW-SD 62.7 (H) fl      MPV 11.2 fL      Platelets  "182 10*3/mm3     Protime-INR [[id number]]  (Abnormal) Collected:  01/25/17 0608    Specimen:  Blood Updated:  01/25/17 0648     Protime 18.2 (H) Seconds      INR 1.57 (H)     Blood Culture [[id number]]  (Abnormal)  (Susceptibility) Collected:  01/21/17 1539    Specimen:  Blood from Arm, Left Updated:  01/25/17 0651     Blood Culture Staphylococcus aureus, MRSA (C)       D test is negative. Isolate does not exhibit \"inducible\" resistance to Clindamycin (isolate remains susceptible to Clindamycin).    Methicillin resistant Staphylococcus aureus, Patient may be an isolation risk.  Consider infectious disease consult to rule out distant focus of infection.        Gram Stain Result        Anaerobic Bottle Gram positive cocci in pairs             Aerobic Bottle Gram positive cocci in clusters    Susceptibility      Staphylococcus aureus, MRSA     SOFÍA     Clindamycin <=0.25 ug/ml Susceptible     Erythromycin >=8 ug/ml Resistant     Oxacillin >=4 ug/ml Resistant     Penicillin G >=0.5 ug/ml Resistant     Rifampin <=0.5 ug/ml Susceptible     Tetracycline <=1 ug/ml Susceptible     Trimethoprim + Sulfamethoxazole <=10 ug/ml Susceptible     Vancomycin 1 ug/ml Susceptible                    Comprehensive Metabolic Panel [[id number]]  (Abnormal) Collected:  01/25/17 0608    Specimen:  Blood Updated:  01/25/17 0704     Glucose 141 (H) mg/dL      BUN 34 (H) mg/dL      Creatinine 3.57 (H) mg/dL      Sodium 139 mmol/L      Potassium 3.7 mmol/L      Chloride 100 mmol/L      CO2 24.6 mmol/L      Calcium 8.6 mg/dL      Total Protein 6.3 g/dL      Albumin 2.70 (L) g/dL      ALT (SGPT) 26 U/L      AST (SGOT) 43 (H) U/L      Alkaline Phosphatase 329 (H) U/L      Total Bilirubin 2.4 (H) mg/dL      eGFR Non African Amer 17 (L) mL/min/1.73      Globulin 3.6 gm/dL      A/G Ratio 0.8 g/dL      BUN/Creatinine Ratio 9.5      Anion Gap 14.4 mmol/L     Narrative:       The MDRD GFR formula is only valid for adults with stable renal function between ages " "18 and 70.    Tissue Culture [[id number]]  (Abnormal)  (Susceptibility) Collected:  01/23/17 1120    Specimen:  Tissue from Arm, Right Updated:  01/25/17 0830     Culture        Moderate growth (3+) Staphylococcus aureus, MRSA (C)      D test is negative. Isolate does not exhibit \"inducible\" resistance to Clindamycin (isolate remains susceptible to Clindamycin).     Methicillin resistant Staphylococcus aureus, Patient may be an isolation risk.        Gram Stain Result Rare (1+) WBCs seen       Few (2+) Gram positive cocci in pairs     Susceptibility      Staphylococcus aureus, MRSA     SOFÍA     Clindamycin <=0.25 ug/ml Susceptible     Erythromycin >=8 ug/ml Resistant     Oxacillin >=4 ug/ml Resistant     Penicillin G >=0.5 ug/ml Resistant     Rifampin <=0.5 ug/ml Susceptible     Tetracycline <=1 ug/ml Susceptible     Trimethoprim + Sulfamethoxazole <=10 ug/ml Susceptible     Vancomycin 1 ug/ml Susceptible                    Wound Culture [[id number]]  (Abnormal) Collected:  01/23/17 1048    Specimen:  Wound from Arm, Right Updated:  01/25/17 0831     Wound Culture        Moderate growth (3+) Staphylococcus aureus, MRSA (C)      Refer to previous tissue culture collected on 1/23 for SOFÍA    Methicillin resistant Staphylococcus aureus, Patient may be an isolation risk.        Gram Stain Result Rare (1+) WBCs seen       Rare (1+) Gram positive cocci     Blood Culture [[id number]]  (Normal) Collected:  01/24/17 1124    Specimen:  Blood from Blood, Central Line Updated:  01/25/17 1201     Blood Culture No growth at 24 hours         Lab Results (last 24 hours)     Procedure Component Value Units Date/Time    Blood Culture ID, PCR [[id number]]  (Abnormal) Collected:  01/21/17 1539    Specimen:  Blood from Arm, Left Updated:  01/23/17 1552     BCID, PCR        Staphylococcus aureus. mecA (methicillin resistance gene) detected. Identification by BCID PCR. (C)    Blood Culture [[id number]]  (Normal) Collected:  01/21/17 1449    Specimen: "  Blood from Arm, Left Updated:  01/23/17 1601     Blood Culture No growth at 2 days     CBC & Differential [[id number]] Collected:  01/24/17 0338    Specimen:  Blood Updated:  01/24/17 0434    Narrative:       The following orders were created for panel order CBC & Differential.  Procedure                               Abnormality         Status                     ---------                               -----------         ------                     CBC Auto Differential[[id number]]         Abnormal            Final result                 Please view results for these tests on the individual orders.    CBC Auto Differential [[id number]]  (Abnormal) Collected:  01/24/17 0338    Specimen:  Blood Updated:  01/24/17 0434     WBC 11.88 (H) 10*3/mm3      RBC 2.56 (L) 10*6/mm3      Hemoglobin 8.5 (L) g/dL      Hematocrit 24.2 (L) %      MCV 94.5 fL      MCH 33.2 (H) pg      MCHC 35.1 g/dL      RDW 17.7 (H) %      RDW-SD 60.6 (H) fl      MPV 12.2 (H) fL      Platelets 132 (L) 10*3/mm3      Neutrophil % 81.0 (H) %      Lymphocyte % 11.0 (L) %      Monocyte % 5.3 %      Eosinophil % 1.5 %      Basophil % 0.3 %      Immature Grans % 0.9 (H) %      Neutrophils, Absolute 9.62 (H) 10*3/mm3      Lymphocytes, Absolute 1.31 10*3/mm3      Monocytes, Absolute 0.63 10*3/mm3      Eosinophils, Absolute 0.18 10*3/mm3      Basophils, Absolute 0.03 10*3/mm3      Immature Grans, Absolute 0.11 (H) 10*3/mm3     aPTT [[id number]]  (Abnormal) Collected:  01/24/17 0338    Specimen:  Blood Updated:  01/24/17 0455     PTT 41.5 (H) seconds     Protime-INR [[id number]]  (Abnormal) Collected:  01/24/17 0338    Specimen:  Blood Updated:  01/24/17 0455     Protime 20.6 (H) Seconds      INR 1.84 (H)     Basic Metabolic Panel [[id number]]  (Abnormal) Collected:  01/24/17 0338    Specimen:  Blood Updated:  01/24/17 0502     Glucose 117 (H) mg/dL      BUN 63 (H) mg/dL      Creatinine 5.17 (H) mg/dL      Sodium 140 mmol/L      Potassium 3.4 (L) mmol/L      Chloride 98  mmol/L      CO2 23.8 mmol/L      Calcium 8.7 mg/dL      eGFR Non African Amer 11 (L) mL/min/1.73      BUN/Creatinine Ratio 12.2      Anion Gap 18.2 mmol/L     Narrative:       The MDRD GFR formula is only valid for adults with stable renal function between ages 18 and 70.    Tissue Culture [[id number]]  (Abnormal) Collected:  01/23/17 1120    Specimen:  Tissue from Arm, Right Updated:  01/24/17 0734     Culture        Moderate growth (3+) Staphylococcus aureus, MRSA (C)        Methicillin resistant Staphylococcus aureus, Patient may be an isolation risk.        Gram Stain Result Rare (1+) WBCs seen       Few (2+) Gram positive cocci in pairs     Wound Culture [[id number]]  (Abnormal) Collected:  01/23/17 1048    Specimen:  Wound from Arm, Right Updated:  01/24/17 0735     Wound Culture        Moderate growth (3+) Staphylococcus aureus, MRSA (C)        Methicillin resistant Staphylococcus aureus, Patient may be an isolation risk.        Gram Stain Result Rare (1+) WBCs seen       Rare (1+) Gram positive cocci     Blood Culture [[id number]] Collected:  01/24/17 1124    Specimen:  Blood from Blood, Central Line Updated:  01/24/17 1135        Lab Results (last 24 hours)     Procedure Component Value Units Date/Time    Blood Culture [[id number]]  (Normal) Collected:  01/21/17 1449    Specimen:  Blood from Arm, Left Updated:  01/22/17 1601     Blood Culture No growth at 24 hours     Occult Blood X 1, Stool [[id number]]  (Abnormal) Collected:  01/22/17 2354    Specimen:  Stool Updated:  01/23/17 0303     Fecal Occult Blood Positive (A)     Protime-INR [[id number]]  (Abnormal) Collected:  01/23/17 0724    Specimen:  Blood Updated:  01/23/17 0759     Protime 17.2 (H) Seconds      INR 1.46 (H)     Vancomycin, Random [[id number]]  (Normal) Collected:  01/23/17 0724    Specimen:  Blood Updated:  01/23/17 0802     Vancomycin Random 22.70 mcg/mL     Comprehensive Metabolic Panel [[id number]]  (Abnormal) Collected:  01/23/17 0724    Specimen:   Blood Updated:  01/23/17 0804     Glucose 138 (H) mg/dL      BUN 49 (H) mg/dL      Creatinine 4.37 (H) mg/dL      Sodium 139 mmol/L      Potassium 3.7 mmol/L      Chloride 97 (L) mmol/L      CO2 22.5 mmol/L      Calcium 9.1 mg/dL      Total Protein 6.3 g/dL      Albumin 2.70 (L) g/dL      ALT (SGPT) 24 U/L      AST (SGOT) 33 U/L      Alkaline Phosphatase 261 (H) U/L      Total Bilirubin 4.2 (H) mg/dL      eGFR Non African Amer 13 (L) mL/min/1.73      Globulin 3.6 gm/dL      A/G Ratio 0.8 g/dL      BUN/Creatinine Ratio 11.2      Anion Gap 19.5 mmol/L     Narrative:       The MDRD GFR formula is only valid for adults with stable renal function between ages 18 and 70.    CBC & Differential [[id number]] Collected:  01/23/17 0724    Specimen:  Blood Updated:  01/23/17 0807    Narrative:       The following orders were created for panel order CBC & Differential.  Procedure                               Abnormality         Status                     ---------                               -----------         ------                     Scan Slide[[id number]]                                                                   CBC Auto Differential[[id number]]         Abnormal            Final result                 Please view results for these tests on the individual orders.    CBC Auto Differential [[id number]]  (Abnormal) Collected:  01/23/17 0724    Specimen:  Blood Updated:  01/23/17 0807     WBC 14.45 (H) 10*3/mm3      RBC 2.83 (L) 10*6/mm3      Hemoglobin 9.1 (L) g/dL      Hematocrit 26.7 (L) %      MCV 94.3 fL      MCH 32.2 pg      MCHC 34.1 g/dL      RDW 17.6 (H) %      RDW-SD 60.2 (H) fl      MPV 11.9 fL      Platelets 118 (L) 10*3/mm3      Neutrophil % 85.1 (H) %      Lymphocyte % 8.2 (L) %      Monocyte % 5.0 %      Eosinophil % 0.6 %      Basophil % 0.3 %      Immature Grans % 0.8 (H) %      Neutrophils, Absolute 12.29 (H) 10*3/mm3      Lymphocytes, Absolute 1.19 10*3/mm3      Monocytes, Absolute 0.72 10*3/mm3       Eosinophils, Absolute 0.08 10*3/mm3      Basophils, Absolute 0.05 10*3/mm3      Immature Grans, Absolute 0.12 (H) 10*3/mm3     POC Glucose Fingerstick [[id number]]  (Normal) Collected:  01/22/17 1150    Specimen:  Blood Updated:  01/23/17 0833     Glucose 110 mg/dL     Narrative:       Meter: [id number] : [id number] Storm Ritu    Anaerobic Culture [[id number]] Collected:  01/23/17 1048    Specimen:  Wound from Arm, Right Updated:  01/23/17 1106    Anaerobic Culture [[id number]] Collected:  01/23/17 1120    Specimen:  Tissue from Arm, Right Updated:  01/23/17 1203    Wound Culture [[id number]] Collected:  01/23/17 1048    Specimen:  Wound from Arm, Right Updated:  01/23/17 1334     Gram Stain Result Rare (1+) WBCs seen       Rare (1+) Gram positive cocci     Blood Culture ID, PCR [[id number]] Collected:  01/21/17 1539    Specimen:  Blood from Arm, Left Updated:  01/23/17 1348    Blood Culture [[id number]]  (Abnormal) Collected:  01/21/17 1539    Specimen:  Blood from Arm, Left Updated:  01/23/17 1355     Blood Culture Abnormal Stain (A)      Gram Stain Result        Anaerobic Bottle Gram positive cocci in pairs    Tissue Culture [[id number]] Collected:  01/23/17 1120    Specimen:  Tissue from Arm, Right Updated:  01/23/17 1423     Gram Stain Result Rare (1+) WBCs seen       Few (2+) Gram positive cocci in pairs         Lab Results (last 24 hours)     Procedure Component Value Units Date/Time    Hemoglobin A1c [[id number]]  (Abnormal) Collected:  01/21/17 1449    Specimen:  Blood Updated:  01/21/17 1517     Hemoglobin A1C 4.59 (L) %     Narrative:       Hemoglobin A1C Ranges:    Increased Risk for Diabetes  5.7% to 6.4%  Diabetes                     >= 6.5%  Diabetic Goal                < 7.0%    TSH Rfx On Abnormal To Free T4 [[id number]]  (Normal) Collected:  01/21/17 1539    Specimen:  Blood Updated:  01/21/17 1627     TSH 1.030 mIU/mL     Troponin [[id number]]  (Normal) Collected:  01/21/17 1539    Specimen:  Blood Updated:   01/21/17 1627     Troponin T 0.019 ng/mL     Narrative:       Troponin T Reference Ranges:  Less than 0.03 ng/mL:    Negative for AMI  0.03 to 0.09 ng/mL:      Indeterminant for AMI  Greater than 0.09 ng/mL: Positive for AMI    Blood Culture [[id number]]  (Normal) Collected:  01/21/17 1449    Specimen:  Blood from Arm, Left Updated:  01/22/17 0303     Blood Culture No growth at less than 24 hours     Blood Culture [[id number]]  (Normal) Collected:  01/21/17 1539    Specimen:  Blood from Arm, Left Updated:  01/22/17 0401     Blood Culture No growth at less than 24 hours     CBC & Differential [[id number]] Collected:  01/22/17 0543    Specimen:  Blood Updated:  01/22/17 0627    Narrative:       The following orders were created for panel order CBC & Differential.  Procedure                               Abnormality         Status                     ---------                               -----------         ------                     Scan Slide[[id number]]                                                                   CBC Auto Differential[[id number]]         Abnormal            Final result                 Please view results for these tests on the individual orders.    CBC Auto Differential [[id number]]  (Abnormal) Collected:  01/22/17 0543    Specimen:  Blood Updated:  01/22/17 0627     WBC 14.31 (H) 10*3/mm3      RBC 2.92 (L) 10*6/mm3      Hemoglobin 9.5 (L) g/dL      Hematocrit 27.6 (L) %      MCV 94.5 fL      MCH 32.5 pg      MCHC 34.4 g/dL      RDW 17.1 (H) %      RDW-SD 59.2 (H) fl      MPV 13.0 (H) fL      Platelets 89 (L) 10*3/mm3      Neutrophil % 84.0 (H) %      Lymphocyte % 9.7 (L) %      Monocyte % 5.2 %      Eosinophil % 0.3 %      Basophil % 0.2 %      Immature Grans % 0.6 (H) %      Neutrophils, Absolute 12.02 (H) 10*3/mm3      Lymphocytes, Absolute 1.39 10*3/mm3      Monocytes, Absolute 0.74 10*3/mm3      Eosinophils, Absolute 0.05 10*3/mm3      Basophils, Absolute 0.03 10*3/mm3      Immature Grans,  Absolute 0.08 (H) 10*3/mm3     Vancomycin, Random [[id number]]  (Normal) Collected:  01/22/17 0543    Specimen:  Blood Updated:  01/22/17 0640     Vancomycin Random 6.60 mcg/mL     Lipid Panel [[id number]]  (Abnormal) Collected:  01/22/17 0543    Specimen:  Blood Updated:  01/22/17 0647     Total Cholesterol 100 mg/dL      Triglycerides 132 mg/dL      HDL Cholesterol 4 (L) mg/dL      LDL Cholesterol  70 mg/dL      VLDL Cholesterol 26.4 mg/dL      LDL/HDL Ratio 17.40     Narrative:       Cholesterol Reference Ranges  (U.[place] Department of Health and Human Services ATP III Classifications)    Desirable          <200 mg/dL  Borderline High    200-239 mg/dL  High Risk          >240 mg/dL      Triglyceride Reference Ranges  (U.S. Department of Health and Human Services ATP III Classifications)    Normal           <150 mg/dL  Borderline High  150-199 mg/dL  High             200-499 mg/dL  Very High        >500 mg/dL    HDL Reference Ranges  (U.S. Department of Health and Human Services ATP III Classifcations)    Low     <40 mg/dl (major risk factor for CHD)  High    >60 mg/dl ('negative' risk factor for CHD)        LDL Reference Ranges  (U.S. Department of Health and Human Services ATP III Classifcations)    Optimal          <100 mg/dL  Near Optimal     100-129 mg/dL  Borderline High  130-159 mg/dL  High             160-189 mg/dL  Very High        >189 mg/dL    Comprehensive Metabolic Panel [[id number]]  (Abnormal) Collected:  01/22/17 0543    Specimen:  Blood Updated:  01/22/17 0650     Glucose 121 (H) mg/dL      BUN 88 (H) mg/dL      Creatinine 7.18 (H) mg/dL      Sodium 134 (L) mmol/L      Potassium 4.4 mmol/L      Chloride 88 (L) mmol/L      CO2 23.0 mmol/L      Calcium 9.2 mg/dL      Total Protein 6.0 g/dL      Albumin 2.60 (L) g/dL      ALT (SGPT) 20 U/L      AST (SGOT) 26 U/L       Specimen hemolyzed.  Results may be affected.        Alkaline Phosphatase 148 (H) U/L      Total Bilirubin 3.8 (H) mg/dL      eGFR Non   Amer 7 (L) mL/min/1.73      Globulin 3.4 gm/dL      A/G Ratio 0.8 g/dL      BUN/Creatinine Ratio 12.3      Anion Gap 23.0 mmol/L     Narrative:       The MDRD GFR formula is only valid for adults with stable renal function between ages 18 and 70.    TSH [[id number]]  (Normal) Collected:  01/22/17 0543    Specimen:  Blood Updated:  01/22/17 0653     TSH 1.650 mIU/mL     BNP [[id number]]  (Abnormal) Collected:  01/22/17 0543    Specimen:  Blood Updated:  01/22/17 0716     proBNP >71590.0 (H) pg/mL     Narrative:       Among patients with dyspnea, NT-proBNP is highly sensitive for the detection of acute congestive heart failure. In addition NT-proBNP of <300 pg/ml effectively rules out acute congestive heart failure with 99% negative predictive value.    Troponin [[id number]]  (Abnormal) Collected:  01/22/17 0543    Specimen:  Blood Updated:  01/22/17 0719     Troponin T 0.033 (H) ng/mL     Narrative:       Troponin T Reference Ranges:  Less than 0.03 ng/mL:    Negative for AMI  0.03 to 0.09 ng/mL:      Indeterminant for AMI  Greater than 0.09 ng/mL: Positive for AMI    Protime-INR [[id number]]  (Abnormal) Collected:  01/22/17 0733    Specimen:  Blood Updated:  01/22/17 0759     Protime 19.9 (H) Seconds      INR 1.76 (H)     Hepatitis B Surface Antigen [[id number]]  (Normal) Collected:  01/22/17 0543    Specimen:  Blood Updated:  01/22/17 1041     Hepatitis B Surface Ag Non-Reactive       Potassium 4.1, glucose 105, BUN 63, creatinine 5.9, INR is 9.37, white count 10.1, hemoglobin 10.1, platelets are 66,000,  No other labs available, but I did receive a call that his blood cultures are positive for MRSA. No chest x-ray or EKG available.   Imaging Results (last 72 hours)     Procedure Component Value Units Date/Time    XR Chest 1 View [[id number]] Collected:  01/21/17 1307     Updated:  01/21/17 1313    Narrative:       XR CHEST 1 VW-     HISTORY: Male who is 76 years-old,  pneumonia     TECHNIQUE: Frontal view of the chest      COMPARISON: None available     FINDINGS: A right subclavian catheter extends to the proximal right  atrium, left chest port is apparent extending to the cavoatrial junction  region. Heart is enlarged. Aorta is tortuous. Pulmonary vasculature is  congested.  Areas of opacity are seen at the right lung base, also  medially at the right upper lung, follow-up advised to characterize  resolution and to exclude possibility of underlying lesion. No pleural  effusion or pneumothorax. No acute osseous process.       Impression:       Areas of opacity in the right lung, follow-up recommended.  Mild cardiomegaly, pulmonary mass or congestion. Tortuous aorta.     This report was finalized on 1/21/2017 1:10 PM by Dr. Cesar Mantilla MD.           Current Facility-Administered Medications:   •  ALPRAZolam (XANAX) tablet 0.25 mg, 0.25 mg, Oral, 4x Daily PRN, Rc No MD, 0.25 mg at 02/03/17 0630  •  aspirin EC tablet 81 mg, 81 mg, Oral, Daily, FRANCISCA Washburn, 81 mg at 02/03/17 0822  •  heparin (porcine) 5000 UNIT/ML injection 2,650-4,000 Units, 29.9-45.2 Units/kg, Intravenous, Q6H PRN, FRANCISCA Washburn, 4,000 Units at 02/03/17 1151  •  heparin (porcine) injection 3,000 Units, 3,000 Units, Intracatheter, PRN, Elmer Downs MD, 3,900 Units at 02/02/17 0911  •  heparin infusion 16711 units in 250 mL 0.45 % NaCl, 11.3 Units/kg/hr (Adjusted), Intravenous, Titrated, Floyd Fraga MD, Last Rate: 11.86 mL/hr at 02/03/17 1153, 14 Units/kg/hr at 02/03/17 1153  •  hydrALAZINE (APRESOLINE) tablet 25 mg, 25 mg, Oral, Q8H, FRANCISCA Washburn, 25 mg at 02/03/17 0629  •  HYDROcodone-acetaminophen (NORCO) 5-325 MG per tablet 1 tablet, 1 tablet, Oral, Q6H PRN, Rc No MD, 1 tablet at 02/02/17 1827  •  isosorbide mononitrate (IMDUR) 24 hr tablet 30 mg, 30 mg, Oral, Q24H, FRANCISCA Washburn, 30 mg at 02/03/17 0822  •  lansoprazole (FIRST) oral suspension 30 mg, 30 mg, Oral, Rc ROCHE MD, 30 mg at 02/03/17  0629  •  levothyroxine (SYNTHROID, LEVOTHROID) tablet 100 mcg, 100 mcg, Oral, Daily, Floyd Fraga MD, 100 mcg at 02/03/17 0629  •  ondansetron (ZOFRAN) injection 4 mg, 4 mg, Intravenous, Q6H PRN, Rc No MD  •  Pharmacy to dose vancomycin, , Does not apply, Continuous PRN, Rc No MD  •  vancomycin 1250 mg/250 mL 0.9% NS IVPB ([facility]), 1,250 mg, Intravenous, Once, Rc No MD  •  Vancomycin Pharmacy Intermittent Dosing, , Does not apply, Daily, Rc No MD, 1,250 mg at 02/02/17 1552       ECHO NOTED    ASSESSMENT:  1.   MRSA sepsis. /INFECTED HERO GRAFT/LINE SEPSIS/REPEAT BC NEGATIVE 1/24/17  2.   Pneumonia.   3.   INFECTED GRAFT  S/P EXCISION  4.   Thrombocytopenia.   5.   Chronic AFib WITH LUDIN  6.   Hypertension.   7.   Congestive heart failure.   8.   Hypothyroidism.   9.   History of lymphoma.   10. SEPTIC PULM EMBOLI    PLAN:   1.   CPM  2.   ABX IV FOR 6 WEEKS STOP  3/6/17  3.   S/P PORT REMOVAL  AND NO PPM UNTIL ABX COMPLETED  4.   ADV DIET  5.   S/P IJ CATHETER FOR HD  6.   HD TIW  7.   SUPPORTIVE CARE  8.   CARD/ID/VASULAR/RENAL FOLLOWING  9.   PT/OT  10. STRESS ULCER/DVT PROPHYLAXIS  11. JESSICA No M.D.

## 2017-02-03 NOTE — PROGRESS NOTES
NEPHROLOGY PROGRESS NOTE    PATIENT IDENTIFICATION:   Name:  Ze Wu      MRN:  [id number]     76 y.o.  male             Reason for visit: ESRD    SUBJECTIVE:   Seen and examined. Remains confused. S/P port removal yesterday. Was agitated earlier. Now restrained.    OBJECTIVE:  Vitals:    02/02/17 1900 02/02/17 2300 02/03/17 0732 02/03/17 1100   BP: 133/62 124/53 133/68 116/52   BP Location: Left arm Left arm Left arm Left arm   Patient Position: Lying Lying Lying Lying   Pulse: 55 (!) 46 50    Resp: 20 20 20 20   Temp: 98.7 °F (37.1 °C) 97.3 °F (36.3 °C) 97.2 °F (36.2 °C) 97.4 °F (36.3 °C)   TempSrc: Oral Oral Axillary Axillary   SpO2: 92% 92% 92%    Weight:       Height:         FiO2 (%): 100 %     Body mass index is 25.05 kg/(m^2).    Intake/Output Summary (Last 24 hours) at 02/03/17 1250  Last data filed at 02/03/17 1215   Gross per 24 hour   Intake    525 ml   Output      0 ml   Net    525 ml         Exam:  GEN:   appears stated age. Confused  EYES:   Anicteric sclera  ENT:    External ears/nose normal, MM are   NECK:  No adenopathy, JVP   LUNGS: Normal chest on inspection; not labored  CV:  Normal S1S2, without murmur. Bradycardic  ABD:  Non-tender, non-distended, no hepatosplenomegaly, +BS  EXT:  No edema; no cyanosis; clubbing, RUE w/ excision site open, no purulence or drainage. Rt TDC    Scheduled meds:      aspirin 81 mg Oral Daily   hydrALAZINE 25 mg Oral Q8H   isosorbide mononitrate 30 mg Oral Q24H   lansoprazole 30 mg Oral QAM   levothyroxine 100 mcg Oral Daily   vancomycin 1,250 mg Intravenous Once   Vancomycin Pharmacy Intermittent Dosing  Does not apply Daily     IV meds:                          heparin (porcine) 11.3 Units/kg/hr (Adjusted) Last Rate: 14 Units/kg/hr (02/03/17 1153)   Pharmacy to dose vancomycin         Data Review:      Results from last 7 days  Lab Units 02/03/17  0339 02/01/17  1406 02/01/17  0548 01/31/17  1126  01/30/17  0407   SODIUM mmol/L 143 138 139 136  --  139  "  POTASSIUM mmol/L 5.1 4.7 4.4 4.4  < > 3.6   CHLORIDE mmol/L 103 98 98 95*  --  98   TOTAL CO2 mmol/L 21.1* 19.9* 23.8 21.7*  --  24.4   BUN mg/dL 22 28* 25* 47*  --  35*   CREATININE mg/dL 4.28* 4.87* 4.16* 7.03*  --  5.15*   CALCIUM mg/dL 9.6 9.1 9.2 8.8  --  8.4*   BILIRUBIN mg/dL 1.0  --   --  1.2  --  1.2   ALK PHOS U/L 164*  --   --  175*  --  206*   ALT (SGPT) U/L 12  --   --  14  --  18   AST (SGOT) U/L 21  --   --  17  --  22   GLUCOSE mg/dL 86 103* 110* 97  --  99   < > = values in this interval not displayed.    Estimated Creatinine Clearance: 17.3 mL/min (by C-G formula based on Cr of 4.28).      Results from last 7 days  Lab Units 02/01/17  1406 01/30/17  1718   MAGNESIUM mg/dL 2.5* 2.2         Results from last 7 days  Lab Units 02/03/17  0339 02/02/17  0430 02/01/17  1016 02/01/17  0548 01/30/17  0407   WBC 10*3/mm3 10.92* 10.46 11.25* 13.06* 12.21*   HEMOGLOBIN g/dL 10.4* 9.3* 9.5* 9.4* 8.6*   PLATELETS 10*3/mm3 374 357 364 342 330         Results from last 7 days  Lab Units 02/03/17  0339 02/02/17  0430 02/01/17  0548 01/31/17  1126   INR  1.52* 1.51* 1.38* 1.29*             ASSESSMENT:   Active Problems:    Sepsis    1. ESRD on HD TTS  2. Hypercaogulopathy with bleeding graft: Improved  3. MRSA bacteremia: Infected graft is the most likely source.  4. Encephalopathy\" improved.  Still not at baseline.   5. Anemia of ESRD  6. Secondary hyperparathyroidism  7.  Pulmonary septic emboli  8. HTN     Plan:     HD at AM  S/P graft excision. S/P TDC catheter 01/31.   S/P  port removal 02/02  PPM placement once ABX completed and we have neg blood culture  Minimize narcotics and sedative for confusion  Vanc 1250 mg  post dialysis for 6 weeks With stop date 03/06/2017  Repeat bood Cx one week post ABX  Surveillance labs    Ariadne Gosnales MD  2/3/2017    12:50 PM     "

## 2017-02-03 NOTE — NURSING NOTE
Pt seen for small open, partial thickness areas in gluteal fold and buttocks;  R/t shear/friction and incontience.  Staff using Z Guard moisture barrier.  Spoke with staff RN, Venancio and asked that a pump be added to the accumax mattress and to obtain waffle cushion.  Pt thin, frail, confused today.

## 2017-02-03 NOTE — SIGNIFICANT NOTE
02/03/17 1436   Rehab Treatment   Discipline occupational therapist   Rehab Evaluation   Evaluation Not Performed other (see comments)  (Pt confused. Not following commands for particpation with OT. Discussed with nurse [id number])

## 2017-02-04 NOTE — PROGRESS NOTES
NEPHROLOGY PROGRESS NOTE    PATIENT IDENTIFICATION:   Name:  Ze Wu      MRN:  [id number]     76 y.o.  male             Reason for visit: ESRD    SUBJECTIVE:   Seen and examined. Remains confused. Seen on HD. Tolerating it well.No new events    OBJECTIVE:  Vitals:    02/04/17 0819 02/04/17 1004 02/04/17 1049 02/04/17 1458   BP: 123/60 142/51 (!) 108/38 115/50   BP Location: Left arm Left arm Left arm Left arm   Patient Position: Lying Lying Lying Lying   Pulse: 51 75 83 63   Resp: 18 18 16 16   Temp:   97.4 °F (36.3 °C) 97.3 °F (36.3 °C)   TempSrc:   Oral Oral   SpO2:  98%     Weight:       Height:         FiO2 (%): 100 %     Body mass index is 25.05 kg/(m^2).    Intake/Output Summary (Last 24 hours) at 02/04/17 1526  Last data filed at 02/04/17 1500   Gross per 24 hour   Intake    721 ml   Output   1000 ml   Net   -279 ml         Exam:  GEN:   appears stated age. Confused  EYES:   Anicteric sclera  ENT:    External ears/nose normal, MM are   NECK:  No adenopathy, JVP   LUNGS: Normal chest on inspection; not labored  CV:  Normal S1S2, without murmur. Bradycardic  ABD:  Non-tender, non-distended, no hepatosplenomegaly, +BS  EXT:  No edema; no cyanosis; clubbing, RUE w/ excision site open, no purulence or drainage. Rt TDC    Scheduled meds:      aspirin 81 mg Oral Daily   hydrALAZINE 25 mg Oral Q8H   isosorbide mononitrate 30 mg Oral Q24H   lansoprazole 30 mg Oral QAM   levothyroxine 100 mcg Oral Daily   vancomycin 1,250 mg Intravenous Once   Vancomycin Pharmacy Intermittent Dosing  Does not apply Daily     IV meds:                          heparin (porcine) 11.3 Units/kg/hr Last Rate: 20 Units/kg/hr (02/04/17 0818)   Pharmacy to dose vancomycin         Data Review:      Results from last 7 days  Lab Units 02/04/17  0615 02/03/17  0339 02/01/17  1406  01/31/17  1126  01/30/17  0407   SODIUM mmol/L 138 143 138  < > 136  --  139   POTASSIUM mmol/L 5.2 5.1 4.7  < > 4.4  < > 3.6   CHLORIDE mmol/L 101 103 98  < >  "95*  --  98   TOTAL CO2 mmol/L 17.3* 21.1* 19.9*  < > 21.7*  --  24.4   BUN mg/dL 35* 22 28*  < > 47*  --  35*   CREATININE mg/dL 5.89* 4.28* 4.87*  < > 7.03*  --  5.15*   CALCIUM mg/dL 9.4 9.6 9.1  < > 8.8  --  8.4*   BILIRUBIN mg/dL  --  1.0  --   --  1.2  --  1.2   ALK PHOS U/L  --  164*  --   --  175*  --  206*   ALT (SGPT) U/L  --  12  --   --  14  --  18   AST (SGOT) U/L  --  21  --   --  17  --  22   GLUCOSE mg/dL 94 86 103*  < > 97  --  99   < > = values in this interval not displayed.    Estimated Creatinine Clearance: 12.6 mL/min (by C-G formula based on Cr of 5.89).      Results from last 7 days  Lab Units 02/01/17  1406 01/30/17  1718   MAGNESIUM mg/dL 2.5* 2.2         Results from last 7 days  Lab Units 02/04/17  0629 02/03/17  0339 02/02/17  0430 02/01/17  1016 02/01/17  0548   WBC 10*3/mm3 9.23 10.92* 10.46 11.25* 13.06*   HEMOGLOBIN g/dL 10.0* 10.4* 9.3* 9.5* 9.4*   PLATELETS 10*3/mm3 202 374 357 364 342         Results from last 7 days  Lab Units 02/04/17  0629 02/03/17  0339 02/02/17  0430 02/01/17  0548 01/31/17  1126   INR  1.41* 1.52* 1.51* 1.38* 1.29*             ASSESSMENT:   Active Problems:    Sepsis    1. ESRD on HD TTS  2. Hypercaogulopathy with bleeding graft: Improved  3. MRSA bacteremia: Infected graft is the most likely source.  4. Encephalopathy\" improved.  Still not at baseline.   5. Anemia of ESRD  6. Secondary hyperparathyroidism  7.  Pulmonary septic emboli  8. HTN     Plan:     Seen on HD  S/P graft excision. S/P TDC catheter 01/31.   S/P  port removal 02/02  PPM placement once ABX completed and we have neg blood culture per ID  Minimize narcotics and sedative for confusion  Vanc 1250 mg  post dialysis for 6 weeks With stop date 03/06/2017  Repeat bood Cx one week post ABX  Surveillance labs    Ariadne Gonsales MD  2/4/2017    3:26 PM     "

## 2017-02-04 NOTE — PROGRESS NOTES
"Pharmacy to Dose Vancomycin IV    Day 15  Consult for Dr. No  Treating: Sepsis/Bacteremia, infected graft s/p removal  Goal random pre-HD level: ~20 mcg/mL  Plan: 6 weeks (Stop date: 03/06/17)    Current Vancomycin dose: Intermittent, ESRD on HD TTS    IV Anti-Infectives     Ordered     Dose/Rate Route Frequency Start Stop    02/02/17 1216  vancomycin 1250 mg/250 mL 0.9% NS IVPB ([facility])     Ordering Provider:  Rc No MD    1,250 mg  over 125 Minutes Intravenous Once 02/02/17 1600      01/31/17 1448  vancomycin 1250 mg/250 mL 0.9% NS IVPB ([facility])     Ordering Provider:  Naif Lozano MD    1,250 mg  over 125 Minutes Intravenous Once 01/31/17 1530 01/31/17 1907    01/28/17 0922  vancomycin 1250 mg/250 mL 0.9% NS IVPB ([facility])     Ordering Provider:  Rc No MD    1,250 mg  over 125 Minutes Intravenous Once 01/28/17 1600 01/28/17 1743    01/26/17 1138  vancomycin (VANCOCIN) IVPB 1 g (premix) in Dextrose 5% 200 mL     Ordering Provider:  Rc No MD    1,000 mg  over 100 Minutes Intravenous Once 01/26/17 1600 01/26/17 1851    01/24/17 1258  vancomycin (VANCOCIN) IVPB 1 g (premix) in Dextrose 5% 200 mL     Ordering Provider:  Rc No MD    1,000 mg  over 100 Minutes Intravenous Once 01/24/17 1500 01/24/17 2017    01/22/17 1159  vancomycin 1750 mg/500 mL 0.9% NS IVPB ([facility])     Ordering Provider:  Rc No MD    1,750 mg  over 180 Minutes Intravenous Once 01/22/17 1230 01/22/17 2120    01/21/17 1444  Vancomycin Pharmacy Intermittent Dosing     Ordering Provider:  Rc No MD     Does not apply Daily 01/21/17 1515        Relevant clinical data and objective history reviewed:  76 y.o. male 74\" (188 cm) 195 lb 1.6 oz (88.5 kg)  Body mass index is 25.05 kg/(m^2).    Results from last 7 days  Lab Units 02/04/17  0615 02/03/17  0339 02/01/17  1406   CREATININE mg/dL 5.89* 4.28* 4.87*     Lab Results   Component Value Date    WBC 9.23 02/04/2017    WBC 10.92 (H) 02/03/2017    WBC 10.46 " 02/02/2017     Temp:  [97 °F (36.1 °C)-98.1 °F (36.7 °C)] 97.4 °F (36.3 °C)  Heart Rate:  [51-83] 83  Resp:  [16-22] 16  BP: (108-142)/(38-75) 108/38    IV Meds:  heparin (porcine) 11.3 Units/kg/hr Last Rate: 20 Units/kg/hr (02/04/17 0818)     Baseline cultures/labs/radiology:  1/19 Blood cx 2/2: MRSA+ ([facility])  1/20 Sputum cx: Pending ([facility])  1/21 Blood cx= MRSA+  1/21 CXR: Mild cardiomegaly, pulmonary mass or congestion. Areas of opacity in the right lung.  1/23 Wound cx (RUE AV graft): MRSA+  1/23 Tissue cx( Rt arm): 3+ MRSA  1/24 Blood cx 2/2: NG x 5 days  1/27 Blood cx: NG x 5 days  2/02 Wound cx (Lt chest): MRSA+    Assessment/Plan:   77 y/o M with a history of ESRD on hemodialysis for the last few years, B-cell lymphoma in remission, thyroid cancer in remission, coronary artery disease, chronic AFib on anticoagulation, congestive heart failure, hypertension & depression. Pt transferred from [facility]. Admitted w a 3 week hx of increasing SOB & productive cough. Pt treated for sepsis & PNA. Pt transferred to [facility] for vascular surgery evaluation of dialysis access graft. Plan for surgery on 01/23/17 to remove RUE AV infected graft (1/21/17 INR=9.37 FFP & Vit K given).     HD 3 times weekly. Vancomycin 1250mg IV after each dialysis session     Dialysis yesterday. Vancomycin 1250mg IV x1 given yesterday 2/2 at 1552    Lab Results   Component Value Date    VANCO RANDOM 21.00 mcg/mL 01/30/2017     Vancomycin 1250 mg IV dose ordered on 02/02 (Thursday) given at 15:52 per previous notes.  HD x 4 hrs today. Will give Vancomycin 1250 mg IV x1 post-HD & follow w random level on 02/06 AM    Thank you for your consult,    Claudia Elena [facility]  02/04/17 1:22 PM

## 2017-02-04 NOTE — PLAN OF CARE
Problem: Patient Care Overview (Adult)  Goal: Plan of Care Review  Outcome: Ongoing (interventions implemented as appropriate)    02/04/17 0416   Coping/Psychosocial Response Interventions   Plan Of Care Reviewed With patient   Patient Care Overview   Progress improving   Outcome Evaluation   Outcome Summary/Follow up Plan VSS; patient remains in restraints to bilateral wrists; patient oriented to person only; HD scheduled for today and pacemaker scheduled for monday.       Goal: Adult Individualization and Mutuality  Outcome: Ongoing (interventions implemented as appropriate)    Problem: Confusion, Chronic (Adult)  Goal: Cognitive/Functional Impairments Minimized  Outcome: Ongoing (interventions implemented as appropriate)    02/04/17 0416   Confusion, Chronic (Adult)   Cognitive/Functional Impairments Minimized making progress toward outcome       Goal: Free from Harm/Injuries  Outcome: Ongoing (interventions implemented as appropriate)    02/04/17 0416   Confusion, Chronic (Adult)   Free from Harm/Injuries making progress toward outcome         Problem: Pneumonia (Adult)  Goal: Signs and Symptoms of Listed Potential Problems Will be Absent or Manageable (Pneumonia)  Outcome: Ongoing (interventions implemented as appropriate)    02/04/17 0416   Pneumonia   Problems Assessed (Pneumonia) all   Problems Present (Pneumonia) none         Problem: SAFETY - NON-VIOLENT RESTRAINT  Goal: Remains free of injury from restraints (Non-Violent Restraint)  Outcome: Ongoing (interventions implemented as appropriate)  VSS; patient unable to tolerate being out of restraints as pt pulls at IV lines and tunneled catheter.  No signs of injury or skin breakdown; ROM performed and patient repositioned prn.  RN monitors patient every 2 hours for safety, hydration needs and comfort.

## 2017-02-04 NOTE — PROGRESS NOTES
Name: Ze Wu ADMIT: 2017   : 1940  PCP: No Known Provider    MRN: [id number] LOS: 15 days   AGE/SEX: 76 y.o. male  ROOM: [address] [place] [address]     76 y.o. male status post removal of infected right arm hero graft and ultimately a left Mediport.    Review of Systems    Objective     Vital Signs  Temp:  [97 °F (36.1 °C)-98.1 °F (36.7 °C)] 97.5 °F (36.4 °C)  Heart Rate:  [51-65] 51  Resp:  [16-22] 18  BP: (116-138)/(46-75) 123/60    I/O this shift:  In: 120 [P.O.:120]  Out: -     Physical Exam  Vascular: Left port site closed.  No evidence of erythema or signs cellulitis.  Right arm wounds healing slowly.  Chronic venous stasis changes of bilateral lower extremity is noted.      Results from last 7 days  Lab Units 17  0629 17  0339 17  0430 17  1016 17  0548 17  0407 17  0645   WBC 10*3/mm3 9.23 10.92* 10.46 11.25* 13.06* 12.21* 12.15*   HEMOGLOBIN g/dL 10.0* 10.4* 9.3* 9.5* 9.4* 8.6* 8.7*   PLATELETS 10*3/mm3 202 374 357 364 342 330 273     Results from last 7 days  Lab Units 17  0615 17  0339 17  1406 17  0548 17  1126 17  1718 17  0407 17  0645   SODIUM mmol/L 138 143 138 139 136  --  139 136   POTASSIUM mmol/L 5.2 5.1 4.7 4.4 4.4 4.0 3.6 3.7   CHLORIDE mmol/L 101 103 98 98 95*  --  98 96*   TOTAL CO2 mmol/L 17.3* 21.1* 19.9* 23.8 21.7*  --  24.4 24.7   BUN mg/dL 35* 22 28* 25* 47*  --  35* 24*   CREATININE mg/dL 5.89* 4.28* 4.87* 4.16* 7.03*  --  5.15* 3.78*   GLUCOSE mg/dL 94 86 103* 110* 97  --  99 101*   Estimated Creatinine Clearance: 12.6 mL/min (by C-G formula based on Cr of 5.89).  Results from last 7 days  Lab Units 17  0629 17  0339 17  0430 17  0548 17  1126   PROTIME Seconds 16.8* 17.8* 17.7* 16.4* 15.6*   INR  1.41* 1.52* 1.51* 1.38* 1.29*       Results from last 7 days  Lab Units 17  1541   WOUNDCX  Scant growth (1+) Staphylococcus aureus, MRSA*   GRAM STAIN  RESULT  WBCs seen  No organisms seen         Assessment/Plan     Active Hospital Problems (** Indicates Principal Problem)    Diagnosis Date Noted   • Sepsis [A41.9] 01/21/2017      Resolved Hospital Problems    Diagnosis Date Noted Date Resolved   No resolved problems to display.        Assessment & Plan  76 y.o. male doing well after port removal two days ago. Continue dialysis with right palindrome. Plans for heart catheterization and pacemaker per cardiology - looks like Monday.    Continue local wound care.  We will see periodically.    Rishi Barrientos MD  02/04/17  9:55 AM  Office Number [phone]

## 2017-02-04 NOTE — PROGRESS NOTES
[facility]  Cardiology Progress Note    Patient Identification:  Name: Ze Wu  Age: 76 y.o.  Sex: male  :  1940  MRN: [id number]                 Follow Up / Chief Complaint: CAD, (permanent) afib,variable vr with bradycardia,CHF,  HTN    Interval History:  This is a 76-year-old  male with a history of  coronary artery disease, chronic A. fib on anticoagulation with Coumadin, history of congestive heart failure, hypertension. We have been asked to consult regarding atrial fibrillation with variable ventricular rates and supratherapuetic INR.       Subjective:     Denies chest pain, pressure, dizziness or  SOA    Objective:  Ventricular rates 50-60s with infrequent, transient (less than 2 seconds) decrease his ventricular rate to 48.  No pauses > 3 seconds and no RVR.      New anterior ST changes noted on this am EKG (see below)  Troponin 0.294 ->  0.374        Past Medical History:  Past Medical History   Diagnosis Date   • Anemia    • Anxiety    • CHF (congestive heart failure)    • Chronic a-fib    • Chronic anticoagulation    • Confusion    • Disease of thyroid gland    • GERD (gastroesophageal reflux disease)    • Hypertension    • Hypothyroidism    • Kidney disease    • Lymphoma    • Staph aureus infection 2017     labs called from [facility]     Past Surgical History:  Past Surgical History   Procedure Laterality Date   • Mediport insertion, single     • Thumb amputation Left    • Av fistula placement Right    • Arteriovenous fistula/shunt surgery Right 2017     Procedure: ultrasound access of right internal jugular vein with mahurkar dialysis catheter placement, EXCISION INFECTED RIGTH ARM AV GRAFT WITH REMOVAL OF HERO CATHETER, REMOVAL OF FEMORAL DIALYSIS CATHETER;  Surgeon: Floyd Fraga MD;  Location: [facility] OR ;  Service:    • Pr insj tunneled cvc w/o subq port/ age 6 yr/> Right 2017     Procedure: RT INTERNAL JUGULAR  PALINDROME CATHETER PLACEMENT;  Surgeon: Floyd Fraga MD;  Location: [facility] OR;  Service: Vascular   • Venous access device (port) removal Left 2/2/2017     Procedure: LT CHEST PORT REMOVAL ;  Surgeon: Floyd Fraga MD;  Location: [facility] OR;  Service:         Social History:   Social History   Substance Use Topics   • Smoking status: Never Smoker   • Smokeless tobacco: Not on file   • Alcohol use No      Family History:  History reviewed. No pertinent family history.       Allergies:  No Known Allergies     Scheduled Meds:    aspirin 81 mg Daily   hydrALAZINE 25 mg Q8H   isosorbide mononitrate 30 mg Q24H   lansoprazole 30 mg QAM   levothyroxine 100 mcg Daily   vancomycin 1,250 mg Once   Vancomycin Pharmacy Intermittent Dosing  Daily       INTAKE AND OUTPUT:    Intake/Output Summary (Last 24 hours) at 02/03/17 2008  Last data filed at 02/03/17 1809   Gross per 24 hour   Intake    725 ml   Output      0 ml   Net    725 ml       Review of Systems:   GI: no abdominal pain  Cardiac  Cp or pressure  Pulmonary:  No SOA    Constitutional:  Temp:  [97 °F (36.1 °C)-98.1 °F (36.7 °C)] 98.1 °F (36.7 °C)  Heart Rate:  [46-65] 65  Resp:  [20-22] 20  BP: (116-138)/(52-75) 130/60    Physical Exam by Tristan Mojica MD    General:  Awake, resting quietly.   Appears in no acute distress  Eyes: PERTL,  HEENT:  Thyroid not visibly enlarged.  Oral mucosal dry, no cyanosis  Respiratory: Respirations regular and unlabored at rest. BBS with a diminished air entry in bases  No crackles or wheezes auscultated.  Left subclavian Mediport and  right IJ  catheter noted  Cardiovascular: S1S2 irregular rate and rhythm. Unchanged I/VI systolic murmur without rub or gallop.. No pretibial pitting edema.  Gastrointestinal: Abdomen soft, non tender. Bowel sounds present.   Musculoskeletal: ALEGRIA x4. No abnormal movements  Extremities: No digital cyanosis.  No RUE edema,  Skin: Color pale. pink. Skin warm and dry to touch.    Neuro: AAO x2 CN II-XII grossly intact  Psych: pleasant and cooperative          Cardiographics    Telemetry: afib, VR 40's-60 with infrequent,PVC .  No pauses  >2.0 sec.     EKG 2-2-17      Echocardiogram:   · All left ventricular wall segments contract normally.  · Left atrial cavity size is mildly dilated.  · Mild to moderate aortic valve stenosis is present.  · Mild to moderate tricuspid valve regurgitation is present.  · Left Ventricle: Calculated EF = 66.7%.  · There is no evidence of pericardial effusion           Lab Review      1/30/2017 17:18 2/1/2017 05:48 2/2/2017 04:30   Troponin T 0.294 (C) 0.374 (C) 0.355 (C)       Results from last 7 days  Lab Units 02/03/17  0339 02/02/17  0430 02/01/17  1016   WBC 10*3/mm3 10.92* 10.46 11.25*   HEMOGLOBIN g/dL 10.4* 9.3* 9.5*   HEMATOCRIT % 32.2* 27.6* 29.6*   PLATELETS 10*3/mm3 374 357 364       Results from last 7 days  Lab Units 02/03/17  1052 02/03/17  0339 02/02/17  1826  02/02/17  0430  02/01/17  0548   INR   --  1.52*  --   --  1.51*  --  1.38*   APTT seconds 52.5* 37.1* 33.8  < > 48.6*  < >  --    < > = values in this interval not displayed.      Assessment:  - (Permanent) atrial fibrillation  - sick sinus syndrome  - elevated [id number] score  - elevated troponin  - CAD  - HTN    - a/c anemia -> IM, Renal  - Pneumonia  - Pulmonary septic emboli -> ID  - Acute hypoxic respiratory failure -> pulmonary  - MSSA septicemia -> ID  - Infected AV fistula -> ID, Renal, vascular surgery  - ESRD-> Renal  - B-cell lymphoma (in remission)  - Thyroid cancer (in remission)      Plan:  For permanent pacemaker on Monday, had a long discussion with the patient's family coronary artery disease would be treated medically spatially as the patient is confused    Heparin will stop the night before    Procedure risk and options have been explained to the patient's family and the patient, patient is confused but          I reviewed the patient's new clinical results and  treatment plan . I personally viewed and interpreted the patient's EKG/Telemetry data    )2/3/2017  Tristan Mojica MD          EMR Dragon/Transcription disclaimer:   Much of this encounter note is an electronic transcription/translation of spoken language to printed text. The electronic translation of spoken language may permit erroneous, or at times, nonsensical words or phrases to be inadvertently transcribed; Although I have reviewed the note for such errors, some may still exist.

## 2017-02-04 NOTE — PROGRESS NOTES
"DAILY PROGRESS NOTE  [facility]    Patient Identification:  Name: Ze Wu  Age: 76 y.o.  Sex: male  :  1940  MRN: [id number]         Primary Care Physician: No Known Provider    Subjective:  Interval History: dialysis is in progress     Objective:    Scheduled Meds:  aspirin 81 mg Oral Daily   heparin (porcine)      hydrALAZINE 25 mg Oral Q8H   isosorbide mononitrate 30 mg Oral Q24H   lansoprazole 30 mg Oral QAM   levothyroxine 100 mcg Oral Daily   vancomycin 1,250 mg Intravenous Once   Vancomycin Pharmacy Intermittent Dosing  Does not apply Daily     Continuous Infusions:  heparin (porcine) 11.3 Units/kg/hr Last Rate: 20 Units/kg/hr (17 0818)   Pharmacy to dose vancomycin         Vital signs in last 24 hours:  Temp:  [97 °F (36.1 °C)-98.1 °F (36.7 °C)] 97.4 °F (36.3 °C)  Heart Rate:  [51-83] 83  Resp:  [16-22] 16  BP: (108-142)/(38-75) 108/38    Intake/Output:    Intake/Output Summary (Last 24 hours) at 17 1443  Last data filed at 17 0828   Gross per 24 hour   Intake    721 ml   Output      0 ml   Net    721 ml       Exam:  Visit Vitals   • BP (!) 108/38 (BP Location: Left arm, Patient Position: Lying)   • Pulse 83   • Temp 97.4 °F (36.3 °C) (Oral)   • Resp 16   • Ht 74\" (188 cm)   • Wt 195 lb 1.6 oz (88.5 kg)   • SpO2 98%   • BMI 25.05 kg/m2       General Appearance:    Alert, cooperative, no distress, AAOx3                          Head:    Normocephalic, without obvious abnormality, atraumatic                           Eyes:    PERRL, conjunctiva/corneas clear, EOM's intact, both eyes                         Throat:   Lips, tongue, gums normal; oral mucosa pink and moist                           Neck:   Supple, symmetrical, trachea midline, no JVD                         Lungs:    Clear to auscultation bilaterally, respirations unlabored                 Chest Wall:    No tenderness or deformity                          Heart:    Regular rate and rhythm, S1 and S2 " normal, no murmur,no  Rub                                      or gallop                  Abdomen:     Soft, non-tender, bowel sounds active, no masses, no                                                        organomegaly                  Extremities:   Extremities normal, atraumatic, no cyanosis or edema                        Pulses:   Pulses palpable in all extremities                            Skin:   Skin is warm and dry,  no rashes or palpable lesions                  Neurologic:   CNII-XII intact, motor strength grossly intact, sensation grossly                                         intact to light touch, no focal deficits noted       Data Review:  Lab Results   Component Value Date    WBC 9.23 02/04/2017    HGB 10.0 (L) 02/04/2017    HCT 32.2 (L) 02/04/2017     02/04/2017     Lab Results   Component Value Date     02/04/2017    K 5.2 02/04/2017     02/04/2017    CO2 17.3 (L) 02/04/2017    BUN 35 (H) 02/04/2017    CREATININE 5.89 (H) 02/04/2017    GLUCOSE 94 02/04/2017     Lab Results   Component Value Date    CALCIUM 9.4 02/04/2017     Lab Results   Component Value Date    AST 21 02/03/2017    ALT 12 02/03/2017    ALKPHOS 164 (H) 02/03/2017     Patient Active Problem List   Diagnosis Code   • Sepsis A41.9       Assessment:  Active Problems:    Sepsis  esrd on hd  S/p port removal  Atrial fibrillation chronic  hypertension    Plan:  Pacemaker is planned for monday  Dialysis is in progress  Rehab is planned once stable for discharge  Monitor labs  Ana Luke MD  2/4/2017  2:43 PM

## 2017-02-04 NOTE — PLAN OF CARE
Problem: Patient Care Overview (Adult)  Goal: Plan of Care Review  Outcome: Ongoing (interventions implemented as appropriate)    02/04/17 1711   Coping/Psychosocial Response Interventions   Plan Of Care Reviewed With patient   Patient Care Overview   Progress no change   Outcome Evaluation   Outcome Summary/Follow up Plan VSS. Pt remains stable in restraints. Pt tolerated dialysis well. 1 L removed. Plans for pacemaker to be placed Monday. Will continue to monitor.       Goal: Adult Individualization and Mutuality  Outcome: Ongoing (interventions implemented as appropriate)  Goal: Discharge Needs Assessment  Outcome: Ongoing (interventions implemented as appropriate)    Problem: Chronic Kidney Disease/End Stage Renal Disease (Adult)  Goal: Signs and Symptoms of Listed Potential Problems Will be Absent or Manageable (Chronic Kidney Disease/End Stage Renal Disease)  Outcome: Ongoing (interventions implemented as appropriate)    Problem: Confusion, Chronic (Adult)  Goal: Cognitive/Functional Impairments Minimized  Outcome: Ongoing (interventions implemented as appropriate)  Goal: Free from Harm/Injuries  Outcome: Ongoing (interventions implemented as appropriate)    Problem: Pneumonia (Adult)  Goal: Signs and Symptoms of Listed Potential Problems Will be Absent or Manageable (Pneumonia)  Outcome: Ongoing (interventions implemented as appropriate)    Problem: SAFETY - NON-VIOLENT RESTRAINT  Goal: Remains free of injury from restraints (Non-Violent Restraint)  Outcome: Ongoing (interventions implemented as appropriate)  Goal: Free from restraint(s) (Non-Violent Restraint)  Outcome: Ongoing (interventions implemented as appropriate)    Problem: Perioperative Period (Adult)  Goal: Signs and Symptoms of Listed Potential Problems Will be Absent or Manageable (Perioperative Period)  Outcome: Ongoing (interventions implemented as appropriate)  Goal: Signs and Symptoms of Listed Potential Problems Will be Absent or Manageable  (Perioperative Period)  Outcome: Ongoing (interventions implemented as appropriate)

## 2017-02-04 NOTE — PROGRESS NOTES
Acute Care - Physical Therapy Treatment Note  [facility]     Patient Name: Ze Wu  : 1940  MRN: [id number]  Today's Date: 2017  Onset of Illness/Injury or Date of Surgery Date: 17          Admit Date: 2017    Visit Dx:    ICD-10-CM ICD-9-CM   1. SSS (sick sinus syndrome) I49.5 427.81   2. 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 Infected venous access port T80.219A 999.31     Patient Active Problem List   Diagnosis   • Sepsis               Adult Rehabilitation Note       17 1000 17 1128       Rehab Assessment/Intervention    Discipline physical therapy assistant  -SI physical therapy assistant  -RW     Document Type therapy note (daily note)  -SI therapy note (daily note)  -RW     Subjective Information agree to therapy   but poor following command for movement with confusion  -SI agree to therapy  -RW     Patient Effort, Rehab Treatment poor  -SI poor  -RW     Precautions/Limitations  fall precautions;other (see comments)   restraint use  -RW     Recorded by [SI] Debbie Stout PTA [RW] Jane Beltran PTA     Pain Assessment    Pain Assessment [place] FACES  -SI [place] FACES  -RW     [place] FACES Pain Rating 0   but did not get to much activity as tx interrupted  -SI 10  -RW     Pain Location  Back  -RW     Pain Intervention(s)  Repositioned  -RW     Response to Interventions  not tolerated  -RW     Recorded by [SI] Debbie Stout PTA [RW] Jane Beltran PTA     Cognitive Assessment/Intervention    Current Cognitive/Communication Assessment impaired  -SI impaired  -RW     Orientation Status oriented to;person  -SI oriented to;person  -RW     Follows Commands/Answers Questions 25% of the time  -SI 50% of the time;able to follow single-step instructions;needs cueing;needs increased time  -RW     Personal Safety severe impairment  -SI severe impairment;at risk behaviors demonstrated  -RW     Personal Safety Interventions fall prevention program  maintained  -SI fall prevention program maintained;nonskid shoes/slippers when out of bed  -RW     Recorded by [SI] Debbie Stout PTA [RW] Jane Beltran PTA     Bed Mobility, Assessment/Treatment    Bed Mobility, Assistive Device other (see comments)   tx interrupted by transported to take to dialysis  -SI      Bed Mob, Supine to Sit, [place]  verbal cues required;nonverbal cues required (demo/gesture);maximum assist (25% patient effort);2 person assist required  -RW     Bed Mob, Sit to Supine, [place]  verbal cues required;nonverbal cues required (demo/gesture);maximum assist (25% patient effort);2 person assist required  -RW     Bed Mobility, Comment  Upon sitting at EOB, pt c/o increased back pain. Only able to tolerate sitting for short time  -RW     Recorded by [SI] Debbie Stout PTA [RW] Jane Beltran PTA     Transfer Assessment/Treatment    Transfers, Sit-Stand [place]  not tested;unable to perform  -RW     Transfer, Comment  Back pain limiting OOB activity  -RW     Recorded by  [RW] Jane Beltran PTA     Balance Skills Training    Sitting-Level of Assistance  Moderate assistance  -RW     Sitting-Balance Support  Right upper extremity supported;Left upper extremity supported;Feet supported  -RW     Sitting # of Minutes  2  -RW     Recorded by  [RW] Jane Beltran PTA     Therapy Exercises    Bilateral Lower Extremities AAROM:;20 reps   then had to stop for pt to go to dialysis  -SI --   Attempted ROM in bed, but pt resistive  -RW     Recorded by [SI] Debbie Stout PTA [RW] Jane Beltran PTA     Positioning and Restraints    Pre-Treatment Position in bed  -SI in bed  -RW     Post Treatment Position bed  -SI bed  -RW     In Bed with other staff  -SI supine;call light within reach;encouraged to call for assist;exit alarm on;notified nsg  -RW     Restraints  reapplied:;soft limb  -RW     Recorded by [SI] Debbie Stout PTA [RW] Jane Beltran PTA       User Key  (r) =  Recorded By, (t) = Taken By, (c) = Cosigned By    Initials Name Effective Dates    SI Debbie Stout, PTA 05/18/15 -     RW Janemayte Beltran, PTA 04/06/16 -                 IP PT Goals       01/27/17 1601          Bed Mobility PT LTG    Bed Mobility PT LTG, Date Established 01/27/17  -AR      Bed Mobility PT LTG, Time to Achieve 1 wk  -AR      Bed Mobility PT LTG, Activity Type supine to sit/sit to supine  -AR      Bed Mobility PT LTG, [place] Level minimum assist (75% patient effort)  -AR      Transfer Training PT LTG    Transfer Training PT LTG, Date Established 01/27/17  -AR      Transfer Training PT LTG, Time to Achieve 1 wk  -AR      Transfer Training PT LTG, Activity Type bed to chair /chair to bed;sit to stand/stand to sit  -AR      Transfer Training PT LTG, [place] Level minimum assist (75% patient effort)  -AR      Transfer Training PT LTG, Assist Device walker, rolling  -AR      Gait Training PT LTG    Gait Training Goal PT LTG, Date Established 01/27/17  -AR      Gait Training Goal PT LTG, Time to Achieve 1 wk  -AR      Gait Training Goal PT LTG, [place] Level minimum assist (75% patient effort)  -AR      Gait Training Goal PT LTG, Assist Device walker, rolling  -AR      Gait Training Goal PT LTG, Distance to Achieve 20  -AR        User Key  (r) = Recorded By, (t) = Taken By, (c) = Cosigned By    Initials Name Provider Type    AR Teetee David, PT Physical Therapist          Physical Therapy Education     Title: PT OT SLP Therapies (Active)     Topic: Physical Therapy (Active)     Point: Mobility training (Active)    Learning Progress Summary    Learner Readiness Method Response Comment Documented by Status   Patient Acceptance E,D NR  RW 02/03/17 1256 Active    Acceptance E,TB,D VU,NR  RW 01/30/17 1352 Done    Acceptance E NR  JW 01/29/17 1019 Active    Acceptance E NR  AR 01/27/17 1602 Active               Point: Home exercise program (Active)    Learning Progress Summary    Learner  Readiness Method Response Comment Documented by Status   Patient Acceptance E,D NR  RW 02/03/17 1256 Active    Acceptance E,TB,D VU,NR  RW 01/30/17 1352 Done    Acceptance E NR  AR 01/27/17 1602 Active               Point: Body mechanics (Active)    Learning Progress Summary    Learner Readiness Method Response Comment Documented by Status   Patient Acceptance E,D NR  RW 02/03/17 1256 Active    Acceptance E,TB,D VU,NR  RW 01/30/17 1352 Done    Acceptance E NR  AR 01/27/17 1602 Active               Point: Precautions (Active)    Learning Progress Summary    Learner Readiness Method Response Comment Documented by Status   Patient Acceptance E,D NR  RW 02/03/17 1256 Active    Acceptance E,TB,D VU,NR  RW 01/30/17 1352 Done    Acceptance E NR  AR 01/27/17 1602 Active                      User Key     Initials Effective Dates Name Provider Type Discipline     02/18/16 -  Shaina Gannon, PTA Physical Therapy Assistant PT    AR 06/27/16 -  Teetee Hernandez, PT Physical Therapist PT     04/06/16 -  Jane Beltran PTA Physical Therapy Assistant PT                    PT Recommendation and Plan  Anticipated Discharge Disposition: skilled nursing facility  Planned Therapy Interventions: balance training, bed mobility training, gait training, home exercise program, patient/family education, ROM (Range of Motion), strengthening, transfer training  PT Frequency: daily             Outcome Measures       02/04/17 1000 02/03/17 1128       How much help from another person do you currently need...    Turning from your back to your side while in flat bed without using bedrails? 2  -SI 2  -RW     Moving from lying on back to sitting on the side of a flat bed without bedrails? 2  -SI 2  -RW     Moving to and from a bed to a chair (including a wheelchair)? 1  -SI 1  -RW     Standing up from a chair using your arms (e.g., wheelchair, bedside chair)? 1  -SI 1  -RW     Climbing 3-5 steps with a railing? 1  -SI 1  -RW     To walk in  hospital room? 1  -SI 1  -RW     AM-PAC 6 Clicks Score 8  -SI 8  -RW     Functional Assessment    Outcome Measure Options AM-PAC 6 Clicks Basic Mobility (PT)  -SI AM-PAC 6 Clicks Basic Mobility (PT)  -RW       User Key  (r) = Recorded By, (t) = Taken By, (c) = Cosigned By    Initials Name Provider Type    ANY Stout PTA Physical Therapy Assistant    MANINDER Beltran PTA Physical Therapy Assistant           Time Calculation:         PT Charges       02/04/17 1041          Time Calculation    Start Time 1030  -SI      Stop Time 1041  -SI      Time Calculation (min) 11 min  -SI      PT Received On 02/04/17  -SI      PT - Next Appointment 02/05/17  -SI        User Key  (r) = Recorded By, (t) = Taken By, (c) = Cosigned By    Initials Name Provider Type    ANY Stout PTA Physical Therapy Assistant          Therapy Charges for Today     Code Description Service Date Service Provider Modifiers Qty    [id number] HC PT THER PROC EA 15 MIN 2/4/2017 Debbie Stout PTA GP 1          PT G-Codes  Outcome Measure Options: AM-PAC 6 Clicks Basic Mobility (PT)    Debbie Stout PTA  2/4/2017

## 2017-02-05 NOTE — PROGRESS NOTES
"DAILY PROGRESS NOTE  [facility]    Patient Identification:  Name: Ze Wu  Age: 76 y.o.  Sex: male  :  1940  MRN: [id number]         Primary Care Physician: No Known Provider    Subjective:  Interval History: patient is doing well; no complaints     Objective:    Scheduled Meds:  aspirin 81 mg Oral Daily   hydrALAZINE 25 mg Oral Q8H   isosorbide mononitrate 30 mg Oral Q24H   lansoprazole 30 mg Oral QAM   levothyroxine 100 mcg Oral Daily   Vancomycin Pharmacy Intermittent Dosing  Does not apply Daily     Continuous Infusions:  heparin (porcine) 11.3 Units/kg/hr Last Rate: 18 Units/kg/hr (17 1036)   Pharmacy to dose vancomycin         Vital signs in last 24 hours:  Temp:  [97.3 °F (36.3 °C)-98.1 °F (36.7 °C)] 98 °F (36.7 °C)  Heart Rate:  [55-95] 60  Resp:  [16-20] 16  BP: (112-149)/(47-67) 116/53    Intake/Output:    Intake/Output Summary (Last 24 hours) at 17 1411  Last data filed at 17 1221   Gross per 24 hour   Intake 1534.42 ml   Output   1000 ml   Net 534.42 ml       Exam:  Visit Vitals   • /53 (BP Location: Left arm, Patient Position: Lying)   • Pulse 60   • Temp 98 °F (36.7 °C) (Oral)   • Resp 16   • Ht 74\" (188 cm)   • Wt 195 lb 1.6 oz (88.5 kg)   • SpO2 98%   • BMI 25.05 kg/m2       General Appearance:    Alert, cooperative, no distress, AAOx3                          Head:    Normocephalic, without obvious abnormality, atraumatic                           Eyes:    PERRL, conjunctiva/corneas clear, EOM's intact, both eyes                         Throat:   Lips, tongue, gums normal; oral mucosa pink and moist                           Neck:   Supple, symmetrical, trachea midline, no JVD                         Lungs:    Clear to auscultation bilaterally, respirations unlabored                 Chest Wall:    No tenderness or deformity                          Heart:    Regular rate and rhythm, S1 and S2 normal, no murmur,no  Rub                                 "      or gallop                  Abdomen:     Soft, non-tender, bowel sounds active, no masses, no                                                        organomegaly                  Extremities:   Extremities normal, atraumatic, no cyanosis or edema                        Pulses:   Pulses palpable in all extremities                            Skin:   Skin is warm and dry,  no rashes or palpable lesions                  Neurologic:   CNII-XII intact, motor strength grossly intact, sensation grossly                                         intact to light touch, no focal deficits noted       Data Review:  Lab Results   Component Value Date    WBC 9.02 02/05/2017    HGB 9.3 (L) 02/05/2017    HCT 29.3 (L) 02/05/2017     02/05/2017     Lab Results   Component Value Date     02/04/2017    K 5.2 02/04/2017     02/04/2017    CO2 17.3 (L) 02/04/2017    BUN 35 (H) 02/04/2017    CREATININE 5.89 (H) 02/04/2017    GLUCOSE 94 02/04/2017     Lab Results   Component Value Date    CALCIUM 9.4 02/04/2017     Lab Results   Component Value Date    AST 21 02/03/2017    ALT 12 02/03/2017    ALKPHOS 164 (H) 02/03/2017     Lab Results   Component Value Date    INR 1.43 (H) 02/05/2017     Patient Active Problem List   Diagnosis Code   • Sepsis A41.9       Assessment:  Active Problems:    Sepsis  esrd on hd  Atrial fibrillation permanent  Hypertension  mrsa bacteremia    Plan:  Has had his av graft and port removed  Continues on vancomycin  Pacemaker to be placed tomorrow per dr waller  Ask id to see again to clarify if they have cleared him for the procedure    Ana Luke MD  2/5/2017  2:11 PM

## 2017-02-05 NOTE — PROGRESS NOTES
ID rec's RE: NOT placing PPM noted (note 2/2/17)  Cardiology and ID need to talk on Monday before moving forward.

## 2017-02-05 NOTE — PLAN OF CARE
Problem: Chronic Kidney Disease/End Stage Renal Disease (Adult)  Goal: Signs and Symptoms of Listed Potential Problems Will be Absent or Manageable (Chronic Kidney Disease/End Stage Renal Disease)  Outcome: Ongoing (interventions implemented as appropriate)    02/04/17 2230   Chronic Kidney Disease/End Stage Renal Disease   Problems Assessed (Chronic Kidney Disease/ESRD) all   Problems Present (Chronic Kidney Disease/ESRD) electrolyte imbalance;infection;vascular access complications         Problem: Confusion, Chronic (Adult)  Goal: Cognitive/Functional Impairments Minimized  Outcome: Ongoing (interventions implemented as appropriate)    02/04/17 2230   Confusion, Chronic (Adult)   Cognitive/Functional Impairments Minimized making progress toward outcome       Goal: Free from Harm/Injuries  Outcome: Ongoing (interventions implemented as appropriate)    02/04/17 2230   Confusion, Chronic (Adult)   Free from Harm/Injuries making progress toward outcome         Problem: Pneumonia (Adult)  Goal: Signs and Symptoms of Listed Potential Problems Will be Absent or Manageable (Pneumonia)  Outcome: Ongoing (interventions implemented as appropriate)    02/04/17 2230   Pneumonia   Problems Assessed (Pneumonia) all   Problems Present (Pneumonia) none

## 2017-02-05 NOTE — PLAN OF CARE
Problem: Inpatient Physical Therapy  Goal: Bed Mobility Goal LTG- PT  Outcome: Ongoing (interventions implemented as appropriate)

## 2017-02-05 NOTE — PLAN OF CARE
Problem: Patient Care Overview (Adult)  Goal: Plan of Care Review  Outcome: Ongoing (interventions implemented as appropriate)    02/05/17 1527   Coping/Psychosocial Response Interventions   Plan Of Care Reviewed With patient   Patient Care Overview   Progress no change   Outcome Evaluation   Outcome Summary/Follow up Plan VSS. Pt remains in restraints. Very agitated and restless last night. More calm, cooperative, and oriented today, especially with son in the room. Consents signed for pacemaker placement tomorrow, however need to make sure cardiology knows ID advises against the procedure. Will continue to monitor.        Goal: Adult Individualization and Mutuality  Outcome: Ongoing (interventions implemented as appropriate)  Goal: Discharge Needs Assessment  Outcome: Ongoing (interventions implemented as appropriate)    Problem: Chronic Kidney Disease/End Stage Renal Disease (Adult)  Goal: Signs and Symptoms of Listed Potential Problems Will be Absent or Manageable (Chronic Kidney Disease/End Stage Renal Disease)  Outcome: Ongoing (interventions implemented as appropriate)    Problem: Confusion, Chronic (Adult)  Goal: Cognitive/Functional Impairments Minimized  Outcome: Ongoing (interventions implemented as appropriate)  Goal: Free from Harm/Injuries  Outcome: Ongoing (interventions implemented as appropriate)    Problem: SAFETY - NON-VIOLENT RESTRAINT  Goal: Remains free of injury from restraints (Non-Violent Restraint)  Outcome: Ongoing (interventions implemented as appropriate)    Problem: Perioperative Period (Adult)  Goal: Signs and Symptoms of Listed Potential Problems Will be Absent or Manageable (Perioperative Period)  Outcome: Ongoing (interventions implemented as appropriate)  Goal: Signs and Symptoms of Listed Potential Problems Will be Absent or Manageable (Perioperative Period)  Outcome: Ongoing (interventions implemented as appropriate)

## 2017-02-05 NOTE — PROGRESS NOTES
Acute Care - Physical Therapy Treatment Note  [facility]     Patient Name: Ze Wu  : 1940  MRN: [id number]  Today's Date: 2017  Onset of Illness/Injury or Date of Surgery Date: 17          Admit Date: 2017    Visit Dx:    ICD-10-CM ICD-9-CM   1. SSS (sick sinus syndrome) I49.5 427.81   2. 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 Infected venous access port T80.219A 999.31     Patient Active Problem List   Diagnosis   • Sepsis               Adult Rehabilitation Note       17 1100 17 1000 17 1128    Rehab Assessment/Intervention    Discipline physical therapy assistant  -SI physical therapy assistant  -SI physical therapy assistant  -RW    Document Type therapy note (daily note)  -SI therapy note (daily note)  -SI therapy note (daily note)  -RW    Subjective Information agree to therapy  -SI agree to therapy   but poor following command for movement with confusion  -SI agree to therapy  -RW    Patient Effort, Rehab Treatment poor  -SI poor  -SI poor  -RW    Patient Effort, Rehab Treatment Comment agreeable and followed for ROM ex  but when sitting EOB quickly decided he wanted to lie down  -SI      Symptoms Noted During/After Treatment other (see comments)   confusion  -SI      Precautions/Limitations fall precautions  -SI  fall precautions;other (see comments)   restraint use  -RW    Recorded by [SI] Debbie Stout PTA [SI] Debbie Stout PTA [RW] Jane Beltran PTA    Pain Assessment    Pain Assessment [place] FACES  -SI [place] FACES  -SI [place] FACES  -RW    [place] FACES Pain Rating 0  -SI 0   but did not get to much activity as tx interrupted  -SI 10  -RW    Pain Type Other (Comment)   denied nor showed signs of pain  -SI      Pain Location   Back  -RW    Pain Intervention(s)   Repositioned  -RW    Response to Interventions   not tolerated  -RW    Recorded by [SI] Debbie Stout PTA [SI] Debbie Stout PTA [RW] Jane Beltran,  PTA    Cognitive Assessment/Intervention    Current Cognitive/Communication Assessment impaired  -SI impaired  -SI impaired  -RW    Orientation Status oriented to;person  -SI oriented to;person  -SI oriented to;person  -RW    Follows Commands/Answers Questions 25% of the time  -SI 25% of the time  -SI 50% of the time;able to follow single-step instructions;needs cueing;needs increased time  -RW    Personal Safety severe impairment  -SI severe impairment  -SI severe impairment;at risk behaviors demonstrated  -RW    Personal Safety Interventions fall prevention program maintained  -SI fall prevention program maintained  -SI fall prevention program maintained;nonskid shoes/slippers when out of bed  -RW    Recorded by [SI] Debbie Stout PTA [SI] Debbie Stout PTA [RW] Jane Beltran PTA    Bed Mobility, Assessment/Treatment    Bed Mobility, Assistive Device  other (see comments)   tx interrupted by transported to take to dialysis  -SI     Bed Mob, Supine to Sit, [place] moderate assist (50% patient effort);2 person assist required  -SI  verbal cues required;nonverbal cues required (demo/gesture);maximum assist (25% patient effort);2 person assist required  -RW    Bed Mob, Sit to Supine, [place] moderate assist (50% patient effort);2 person assist required  -SI  verbal cues required;nonverbal cues required (demo/gesture);maximum assist (25% patient effort);2 person assist required  -RW    Bed Mobility, Comment sat EOB about 3 min with fair balalnce   -SI  Upon sitting at EOB, pt c/o increased back pain. Only able to tolerate sitting for short time  -RW    Recorded by [SI] Debbie Stout PTA [SI] Debbie Stout PTA [RW] Jane Beltran PTA    Transfer Assessment/Treatment    Transfers, Sit-Stand [place] other (see comments)   not tested as pt decided wanted to lie down and proceeded to  -SI  not tested;unable to perform  -RW    Transfer, Comment   Back pain limiting OOB activity  -RW    Recorded by  [SI] Debbie Stout PTA  [RW] Jane Beltran PTA    Balance Skills Training    Sitting-Level of Assistance   Moderate assistance  -RW    Sitting-Balance Support   Right upper extremity supported;Left upper extremity supported;Feet supported  -RW    Sitting # of Minutes   2  -RW    Recorded by   [RW] Jane Beltran PTA    Therapy Exercises    Bilateral Lower Extremities AAROM:;20 reps  -SI AAROM:;20 reps   then had to stop for pt to go to dialysis  -SI --   Attempted ROM in bed, but pt resistive  -RW    Recorded by [SI] Debbie Stout PTA [SI] Debbie Stout, ERICA [RW] Jane Beltran PTA    Positioning and Restraints    Pre-Treatment Position in bed  -SI in bed  -SI in bed  -RW    Post Treatment Position bed  -SI bed  -SI bed  -RW    In Bed supine;notified nsg;call light within reach;exit alarm on  -SI with other staff  -SI supine;call light within reach;encouraged to call for assist;exit alarm on;notified nsg  -RW    Restraints   reapplied:;soft limb  -RW    Recorded by [SI] Debbie Stout PTA [SI] Debbie Stout, ERICA [RW] Jane Beltran PTA      User Key  (r) = Recorded By, (t) = Taken By, (c) = Cosigned By    Initials Name Effective Dates    SI Debbie Stout PTA 05/18/15 -     RW Jane Beltran PTA 04/06/16 -                 IP PT Goals       01/27/17 1601          Bed Mobility PT LTG    Bed Mobility PT LTG, Date Established 01/27/17  -AR      Bed Mobility PT LTG, Time to Achieve 1 wk  -AR      Bed Mobility PT LTG, Activity Type supine to sit/sit to supine  -AR      Bed Mobility PT LTG, [place] Level minimum assist (75% patient effort)  -AR      Transfer Training PT LTG    Transfer Training PT LTG, Date Established 01/27/17  -AR      Transfer Training PT LTG, Time to Achieve 1 wk  -AR      Transfer Training PT LTG, Activity Type bed to chair /chair to bed;sit to stand/stand to sit  -AR      Transfer Training PT LTG, [place] Level minimum assist (75% patient effort)  -AR      Transfer  Training PT LTG, Assist Device walker, rolling  -AR      Gait Training PT LTG    Gait Training Goal PT LTG, Date Established 01/27/17  -AR      Gait Training Goal PT LTG, Time to Achieve 1 wk  -AR      Gait Training Goal PT LTG, [place] Level minimum assist (75% patient effort)  -AR      Gait Training Goal PT LTG, Assist Device walker, rolling  -AR      Gait Training Goal PT LTG, Distance to Achieve 20  -AR        User Key  (r) = Recorded By, (t) = Taken By, (c) = Cosigned By    Initials Name Provider Type    AR Teeteebeba Hernandez, PT Physical Therapist          Physical Therapy Education     Title: PT OT SLP Therapies (Active)     Topic: Physical Therapy (Active)     Point: Mobility training (Active)    Learning Progress Summary    Learner Readiness Method Response Comment Documented by Status   Patient Acceptance E NR confusion limits education SI 02/05/17 1146 Active    Acceptance E,D NR   02/03/17 1256 Active    Acceptance E,TB,D VU,NR   01/30/17 1352 Done    Acceptance E NR  JW 01/29/17 1019 Active    Acceptance E NR  AR 01/27/17 1602 Active               Point: Home exercise program (Active)    Learning Progress Summary    Learner Readiness Method Response Comment Documented by Status   Patient Acceptance E,D NR  RW 02/03/17 1256 Active    Acceptance E,TB,D VU,NR  RW 01/30/17 1352 Done    Acceptance E NR  AR 01/27/17 1602 Active               Point: Body mechanics (Active)    Learning Progress Summary    Learner Readiness Method Response Comment Documented by Status   Patient Acceptance E,D NR  RW 02/03/17 1256 Active    Acceptance E,TB,D VU,NR  RW 01/30/17 1352 Done    Acceptance E NR  AR 01/27/17 1602 Active               Point: Precautions (Active)    Learning Progress Summary    Learner Readiness Method Response Comment Documented by Status   Patient Acceptance E,D NR  RW 02/03/17 1256 Active    Acceptance E,TB,D VU,NR  RW 01/30/17 1352 Done    Acceptance E NR  AR 01/27/17 1602 Active                       User Key     Initials Effective Dates Name Provider Type Discipline    SI 05/18/15 -  Debbie Stout, ERICA Physical Therapy Assistant PT    JW 02/18/16 -  Shaina Gannon PTA Physical Therapy Assistant PT    AR 06/27/16 -  Teetee Hernandez, PT Physical Therapist PT    RW 04/06/16 -  Jane Beltran PTA Physical Therapy Assistant PT                    PT Recommendation and Plan  Anticipated Discharge Disposition: skilled nursing facility  Planned Therapy Interventions: balance training, bed mobility training, gait training, home exercise program, patient/family education, ROM (Range of Motion), strengthening, transfer training  PT Frequency: daily             Outcome Measures       02/05/17 1100 02/04/17 1000 02/03/17 1128    How much help from another person do you currently need...    Turning from your back to your side while in flat bed without using bedrails? 2  -SI 2  -SI 2  -RW    Moving from lying on back to sitting on the side of a flat bed without bedrails? 2  -SI 2  -SI 2  -RW    Moving to and from a bed to a chair (including a wheelchair)? 1  -SI 1  -SI 1  -RW    Standing up from a chair using your arms (e.g., wheelchair, bedside chair)? 1  -SI 1  -SI 1  -RW    Climbing 3-5 steps with a railing? 1  -SI 1  -SI 1  -RW    To walk in hospital room? 1  -SI 1  -SI 1  -RW    AM-PAC 6 Clicks Score 8  -SI 8  -SI 8  -RW    Functional Assessment    Outcome Measure Options AM-PAC 6 Clicks Basic Mobility (PT)  -SI AM-PAC 6 Clicks Basic Mobility (PT)  -SI AM-PAC 6 Clicks Basic Mobility (PT)  -RW      User Key  (r) = Recorded By, (t) = Taken By, (c) = Cosigned By    Initials Name Provider Type    SI Debbie Stout, ERICA Physical Therapy Assistant    RW Jane Beltran PTA Physical Therapy Assistant           Time Calculation:         PT Charges       02/05/17 1148          Time Calculation    Start Time 1100  -SI      Stop Time 1117  -SI      Time Calculation (min) 17 min  -SI        User Key  (r) = Recorded By,  (t) = Taken By, (c) = Cosigned By    Initials Name Provider Type    SI Debbie Stout PTA Physical Therapy Assistant          Therapy Charges for Today     Code Description Service Date Service Provider Modifiers Qty    28774464770 HC PT THER PROC EA 15 MIN 2/4/2017 Debbie Stout PTA GP 1    86652090715 HC PT THER PROC EA 15 MIN 2/5/2017 Debbie Stout PTA GP 1    92991800392 HC PT THER SUPP EA 15 MIN 2/5/2017 Debbie Stout PTA GP 1          PT G-Codes  Outcome Measure Options: AM-PAC 6 Clicks Basic Mobility (PT)    Debbie Stout PTA  2/5/2017

## 2017-02-05 NOTE — PLAN OF CARE
Problem: Confusion, Chronic (Adult)  Goal: Cognitive/Functional Impairments Minimized  Outcome: Ongoing (interventions implemented as appropriate)

## 2017-02-05 NOTE — PROGRESS NOTES
[facility]  Cardiology Progress Note    Patient Identification:  Name: Ze Wu  Age: 76 y.o.  Sex: male  :  1940  MRN: [id number]                 Follow Up / Chief Complaint: CAD, (permanent) afib,variable vr with bradycardia,CHF,  HTN    Interval History:  This is a 76-year-old  male with a history of  coronary artery disease, chronic A. fib on anticoagulation with Coumadin, history of congestive heart failure, hypertension. We have been asked to consult regarding atrial fibrillation with variable ventricular rates and supratherapuetic INR.       Subjective:     Denies chest pain, pressure, dizziness or  SOA    Objective:  Ventricular rates 50-60s with infrequent, transient (less than 2 seconds) decrease his ventricular rate to 48.  No pauses > 3 seconds and no RVR.      New anterior ST changes noted on this am EKG (see below)  Troponin 0.294 ->  0.374        Past Medical History:  Past Medical History   Diagnosis Date   • Anemia    • Anxiety    • CHF (congestive heart failure)    • Chronic a-fib    • Chronic anticoagulation    • Confusion    • Disease of thyroid gland    • GERD (gastroesophageal reflux disease)    • Hypertension    • Hypothyroidism    • Kidney disease    • Lymphoma    • Staph aureus infection 2017     labs [facility] from [facility]     Past Surgical History:  Past Surgical History   Procedure Laterality Date   • Mediport insertion, single     • Thumb amputation Left    • Av fistula placement Right    • Arteriovenous fistula/shunt surgery Right 2017     Procedure: ultrasound access of right internal jugular vein with mahurkar dialysis catheter placement, EXCISION INFECTED RIGTH ARM AV GRAFT WITH REMOVAL OF HERO CATHETER, REMOVAL OF FEMORAL DIALYSIS CATHETER;  Surgeon: Floyd Fraga MD;  Location: [facility] OR ;  Service:    • Pr insj tunneled cvc w/o subq port/ age 6 yr/> Right 2017     Procedure: RT INTERNAL JUGULAR  PALINDROME CATHETER PLACEMENT;  Surgeon: Floyd Fraga MD;  Location: [facility] OR;  Service: Vascular   • Venous access device (port) removal Left 2/2/2017     Procedure: LT CHEST PORT REMOVAL ;  Surgeon: Floyd Fraga MD;  Location: [facility] OR;  Service:         Social History:   Social History   Substance Use Topics   • Smoking status: Never Smoker   • Smokeless tobacco: Not on file   • Alcohol use No      Family History:  History reviewed. No pertinent family history.       Allergies:  No Known Allergies     Scheduled Meds:    aspirin 81 mg Daily   hydrALAZINE 25 mg Q8H   isosorbide mononitrate 30 mg Q24H   lansoprazole 30 mg QAM   levothyroxine 100 mcg Daily   Vancomycin Pharmacy Intermittent Dosing  Daily       INTAKE AND OUTPUT:    Intake/Output Summary (Last 24 hours) at 02/04/17 2356  Last data filed at 02/04/17 1759   Gross per 24 hour   Intake 945.42 ml   Output   1000 ml   Net -54.58 ml       Review of Systems:   GI: no abdominal pain  Cardiac  Cp or pressure  Pulmonary:  No SOA    Constitutional:  Temp:  [97.3 °F (36.3 °C)-98.1 °F (36.7 °C)] 98.1 °F (36.7 °C)  Heart Rate:  [57-95] 73  Resp:  [16-18] 16  BP: (108-142)/(38-68) 117/48    Physical Exam by Cesar Russo MD    General:  Awake, resting quietly.   Appears in no acute distress  Eyes: PERTL,  HEENT:  Thyroid not visibly enlarged.  Oral mucosal dry, no cyanosis  Respiratory: Respirations regular and unlabored at rest. BBS with a diminished air entry in bases  No crackles or wheezes auscultated.  Left subclavian Mediport and  right IJ  catheter noted  Cardiovascular: S1S2 irregular rate and rhythm. Unchanged I/VI systolic murmur without rub or gallop.. No pretibial pitting edema.  Gastrointestinal: Abdomen soft, non tender. Bowel sounds present.   Musculoskeletal: ALEGRIA x4. No abnormal movements  Extremities: No digital cyanosis.  No RUE edema,  Skin: Color pale. pink. Skin warm and dry to touch.   Neuro: AAO x2 CN II-XII grossly  intact  Psych: pleasant and cooperative          Cardiographics    Telemetry: afib, VR 40's-60 with infrequent,PVC .  No pauses  >2.0 sec.     EKG 2-2-17      Echocardiogram:   · All left ventricular wall segments contract normally.  · Left atrial cavity size is mildly dilated.  · Mild to moderate aortic valve stenosis is present.  · Mild to moderate tricuspid valve regurgitation is present.  · Left Ventricle: Calculated EF = 66.7%.  · There is no evidence of pericardial effusion           Lab Review      1/30/2017 17:18 2/1/2017 05:48 2/2/2017 04:30   Troponin T 0.294 (C) 0.374 (C) 0.355 (C)       Results from last 7 days  Lab Units 02/04/17  0629 02/03/17  0339 02/02/17  0430   WBC 10*3/mm3 9.23 10.92* 10.46   HEMOGLOBIN g/dL 10.0* 10.4* 9.3*   HEMATOCRIT % 32.2* 32.2* 27.6*   PLATELETS 10*3/mm3 202 374 357       Results from last 7 days  Lab Units 02/04/17  1433 02/04/17  0629 02/03/17  2311  02/03/17  0339  02/02/17  0430   INR   --  1.41*  --   --  1.52*  --  1.51*   APTT seconds 68.6* 33.2 34.9  < > 37.1*  < > 48.6*   < > = values in this interval not displayed.      Assessment:  - (Permanent) atrial fibrillation  - sick sinus syndrome  - elevated [id number] score  - elevated troponin  - CAD  - HTN    - a/c anemia -> IM, Renal  - Pneumonia  - Pulmonary septic emboli -> ID  - Acute hypoxic respiratory failure -> pulmonary  - MSSA septicemia -> ID  - Infected AV fistula -> ID, Renal, vascular surgery  - ESRD-> Renal  - B-cell lymphoma (in remission)  - Thyroid cancer (in remission)      Assessment & Plan:    2/3    For permanent pacemaker on Monday, had a long discussion with the patient's family coronary artery disease would be treated medically spatially as the patient is confused    Heparin will stop the night before    Procedure risk and options have been explained to the patient's family and the patient, patient is confused    2/4    Patient denies chest pain shortness of breath or dizziness.  He at this  time is refusing pacemaker but is confused.  I will defer to Dr. Mojica with regard to explaining risks and benefits to the patient and his family.  The pacemaker scheduled for 2 days from now, Monday.    Patient is in chronic A. fib.  Rate is controlled.  Coumadin has been held for pacemaker.    Patient's troponin has remained elevated, with 0.375 peak..  CPK-MB is normal.  ECG shows nonspecific anterior T-wave changes.  At this time, cardiac catheterization is not in the plans.      I reviewed the patient's new clinical results and treatment plan . I personally viewed and interpreted the patient's EKG/Telemetry data    )2/4/2017  Cesar Russo MD          EMR Dragon/Transcription disclaimer:   Much of this encounter note is an electronic transcription/translation of spoken language to printed text. The electronic translation of spoken language may permit erroneous, or at times, nonsensical words or phrases to be inadvertently transcribed; Although I have reviewed the note for such errors, some may still exist.

## 2017-02-05 NOTE — PROGRESS NOTES
I was asked to comment again on pacemaker insertion in setting of MRSA septicemia. Please also see my prior note on this topic from 2/2/17.     I would recommend against placement of a pacemaker until he has completed his course of antibiotics and then had surveillance BCx a week after finishing the antibiotics. This would be around 3/13/17.      The risk is just too high with his recent high grade MRSA bacteremia including septic pulmonary emboli, especially taking into consideration that his bradycardia is asymptomatic.      Please call me at [phone] if any questions.

## 2017-02-05 NOTE — PROGRESS NOTES
NEPHROLOGY PROGRESS NOTE    PATIENT IDENTIFICATION:   Name:  Ze Wu      MRN:  [id number]     76 y.o.  male             Reason for visit: ESRD    SUBJECTIVE:   Seen and examined. Remains confused.  Physical therapy is on bedside  OBJECTIVE:  Vitals:    02/05/17 0814 02/05/17 1000 02/05/17 1033 02/05/17 1200   BP: 123/67 115/62 114/52 116/53   BP Location: Left arm Left arm Left arm Left arm   Patient Position: Lying Lying Lying Lying   Pulse:  63 55 60   Resp: 18 18 18 16   Temp:   98 °F (36.7 °C)    TempSrc:   Oral    SpO2:       Weight:       Height:         FiO2 (%): 100 %     Body mass index is 25.05 kg/(m^2).    Intake/Output Summary (Last 24 hours) at 02/05/17 1332  Last data filed at 02/05/17 1221   Gross per 24 hour   Intake 1534.42 ml   Output   1000 ml   Net 534.42 ml         Exam:  GEN:   appears stated age. Confused  EYES:   Anicteric sclera  ENT:    External ears/nose normal, MM are   NECK:  No adenopathy, JVP   LUNGS: Normal chest on inspection; not labored  CV:  Normal S1S2, without murmur. Bradycardic  ABD:  Non-tender, non-distended, no hepatosplenomegaly, +BS  EXT:  No edema; no cyanosis; clubbing, RUE w/ excision site open, no purulence or drainage. Rt TDC    Scheduled meds:      aspirin 81 mg Oral Daily   hydrALAZINE 25 mg Oral Q8H   isosorbide mononitrate 30 mg Oral Q24H   lansoprazole 30 mg Oral QAM   levothyroxine 100 mcg Oral Daily   Vancomycin Pharmacy Intermittent Dosing  Does not apply Daily     IV meds:                          heparin (porcine) 11.3 Units/kg/hr Last Rate: 18 Units/kg/hr (02/05/17 1036)   Pharmacy to dose vancomycin         Data Review:      Results from last 7 days  Lab Units 02/04/17  0615 02/03/17  0339 02/01/17  1406  01/31/17  1126  01/30/17  0407   SODIUM mmol/L 138 143 138  < > 136  --  139   POTASSIUM mmol/L 5.2 5.1 4.7  < > 4.4  < > 3.6   CHLORIDE mmol/L 101 103 98  < > 95*  --  98   TOTAL CO2 mmol/L 17.3* 21.1* 19.9*  < > 21.7*  --  24.4   BUN mg/dL 35*  "22 28*  < > 47*  --  35*   CREATININE mg/dL 5.89* 4.28* 4.87*  < > 7.03*  --  5.15*   CALCIUM mg/dL 9.4 9.6 9.1  < > 8.8  --  8.4*   BILIRUBIN mg/dL  --  1.0  --   --  1.2  --  1.2   ALK PHOS U/L  --  164*  --   --  175*  --  206*   ALT (SGPT) U/L  --  12  --   --  14  --  18   AST (SGOT) U/L  --  21  --   --  17  --  22   GLUCOSE mg/dL 94 86 103*  < > 97  --  99   < > = values in this interval not displayed.    Estimated Creatinine Clearance: 12.6 mL/min (by C-G formula based on Cr of 5.89).      Results from last 7 days  Lab Units 02/01/17  1406 01/30/17  1718   MAGNESIUM mg/dL 2.5* 2.2         Results from last 7 days  Lab Units 02/05/17  0705 02/04/17  0629 02/03/17  0339 02/02/17 0430 02/01/17  1016   WBC 10*3/mm3 9.02 9.23 10.92* 10.46 11.25*   HEMOGLOBIN g/dL 9.3* 10.0* 10.4* 9.3* 9.5*   PLATELETS 10*3/mm3 330 202 374 357 364         Results from last 7 days  Lab Units 02/05/17  0705 02/04/17  0629 02/03/17  0339 02/02/17  0430 02/01/17  0548   INR  1.43* 1.41* 1.52* 1.51* 1.38*             ASSESSMENT:   Active Problems:    Sepsis    1. ESRD on HD TTS  2. Hypercaogulopathy with bleeding graft: Improved  3. MRSA bacteremia: Infected graft is the most likely source.  4. Encephalopathy\" improved.  Still not at baseline.   5. Anemia of ESRD  6. Secondary hyperparathyroidism  7.  Pulmonary septic emboli  8. HTN     Plan:     Dialysis on Tuesday  S/P graft excision. S/P TDC catheter 01/31.   S/P  port removal 02/02  PPM placement once ABX completed and we have neg blood culture per ID.   plan for pacemaker on Monday  Minimize narcotics and sedative for confusion  Vanc 1250 mg  post dialysis for 6 weeks With stop date 03/06/2017  Repeat bood Cx one week post ABX  Surveillance labs    Ariadne Gonsales MD  2/5/2017    1:32 PM     "

## 2017-02-06 NOTE — PLAN OF CARE
Problem: Patient Care Overview (Adult)  Goal: Plan of Care Review  Outcome: Ongoing (interventions implemented as appropriate)    Problem: Chronic Kidney Disease/End Stage Renal Disease (Adult)  Goal: Signs and Symptoms of Listed Potential Problems Will be Absent or Manageable (Chronic Kidney Disease/End Stage Renal Disease)  Outcome: Ongoing (interventions implemented as appropriate)    Problem: Confusion, Chronic (Adult)  Goal: Cognitive/Functional Impairments Minimized  Outcome: Ongoing (interventions implemented as appropriate)    Problem: Pneumonia (Adult)  Goal: Signs and Symptoms of Listed Potential Problems Will be Absent or Manageable (Pneumonia)  Outcome: Ongoing (interventions implemented as appropriate)    Problem: SAFETY - NON-VIOLENT RESTRAINT  Goal: Remains free of injury from restraints (Non-Violent Restraint)  Outcome: Ongoing (interventions implemented as appropriate)  Goal: Free from restraint(s) (Non-Violent Restraint)  Outcome: Ongoing (interventions implemented as appropriate)    Problem: Perioperative Period (Adult)  Goal: Signs and Symptoms of Listed Potential Problems Will be Absent or Manageable (Perioperative Period)  Outcome: Ongoing (interventions implemented as appropriate)  Goal: Signs and Symptoms of Listed Potential Problems Will be Absent or Manageable (Perioperative Period)  Outcome: Ongoing (interventions implemented as appropriate)

## 2017-02-06 NOTE — SIGNIFICANT NOTE
02/06/17 1446   Rehab Treatment   Discipline occupational therapist   Rehab Evaluation   Evaluation Not Performed other (see comments)  (Multiple attempts to see pt. Pt remains confused today for OT eval. Will d/c from OT at this time as confusion limits partticipation with OT)

## 2017-02-06 NOTE — PROGRESS NOTES
"Pharmacy to dose Vancomycin    Day 17  Consult for Dr No  Treating: Sepsis/bacteremia, infected graft s/p removal  Goal random pre-HD level ~20  Plan 6 weeks therapy (stop date 3/6/2017)    Current Vancomycin dose 1250mg with each HD T/T/Sa    Lab Results   Component Value Date    LOBO 34.80 02/06/2017       IV Anti-Infectives     Ordered     Dose/Rate Route Frequency Start Stop    02/04/17 1340  vancomycin 1250 mg/250 mL 0.9% NS IVPB ([facility])     Ordering Provider:  Rc No MD    1,250 mg  over 120 Minutes Intravenous Once 02/04/17 1415 02/04/17 1743    01/31/17 1448  vancomycin 1250 mg/250 mL 0.9% NS IVPB ([facility])     Ordering Provider:  Naif Lozano MD    1,250 mg  over 125 Minutes Intravenous Once 01/31/17 1530 01/31/17 1907    01/28/17 0922  vancomycin 1250 mg/250 mL 0.9% NS IVPB ([facility])     Ordering Provider:  Rc No MD    1,250 mg  over 125 Minutes Intravenous Once 01/28/17 1600 01/28/17 1743    01/26/17 1138  vancomycin (VANCOCIN) IVPB 1 g (premix) in Dextrose 5% 200 mL     Ordering Provider:  Rc No MD    1,000 mg  over 100 Minutes Intravenous Once 01/26/17 1600 01/26/17 1851    01/24/17 1258  vancomycin (VANCOCIN) IVPB 1 g (premix) in Dextrose 5% 200 mL     Ordering Provider:  Rc No MD    1,000 mg  over 100 Minutes Intravenous Once 01/24/17 1500 01/24/17 2017 01/22/17 1159  vancomycin 1750 mg/500 mL 0.9% NS IVPB ([facility])     Ordering Provider:  Rc No MD    1,750 mg  over 180 Minutes Intravenous Once 01/22/17 1230 01/22/17 2120    01/21/17 1444  Vancomycin Pharmacy Intermittent Dosing     Ordering Provider:  Rc No MD     Does not apply Daily 01/21/17 1515      01/21/17 1037  Pharmacy to dose vancomycin     Ordering Provider:  Rc No MD     Does not apply Continuous PRN 01/21/17 1037             Relevant clinical data and objective history reviewed:  76 y.o. male 74\" (188 cm) 195 lb 1.6 oz (88.5 kg)    Lab Results   Component Value Date    " CREATININE 5.97 (H) 02/06/2017    CREATININE 5.29 (H) 02/05/2017    CREATININE 5.89 (H) 02/04/2017     Lab Results   Component Value Date    WBC 8.62 02/06/2017    WBC 8.79 02/05/2017    WBC 9.02 02/05/2017     Temp Readings from Last 3 Encounters:   02/06/17 97.4 °F (36.3 °C) (Oral)       Baseline cultures:  1/19 Blood cx 2/2: MRSA+ ([facility])  1/20 Sputum cx: Pending ([facility])  1/21 Blood cx= MRSA+  1/21 CXR: Mild cardiomegaly, pulmonary mass or congestion. Areas of opacity in the right lung.  1/23 Wound cx (RUE AV graft): MRSA+  1/23 Tissue cx( Rt arm): 3+ MRSA  1/24 Blood cx 2/2: NG x 5 days  1/25 CT chest: Numerous pulmonary nodules within both lungs. Two of these lesions are cavitary along the anterior superior aspect of the right upper lobe. The largest measures up to 4 cm. Margins are ill-defined. There is some surrounding inflammation or infiltrate within the right upper lobe. Differential considerations include cavitary infectious etiologies or septic emboli. An atypical or fungal process cannot be excluded. Cavitary malignancy and metastatic disease cannot be excluded.  1/27 Blood cx: NG x 5 days  2/02 Wound cx (Lt chest): MRSA+    PLAN: HD planned for tomorrow, will recheck random level in am since level of 34.8 today.  Will re-dose if level appropriate.    Ayla Morelos PharmD, BCPS  02/06/17 11:42 AM

## 2017-02-06 NOTE — PROGRESS NOTES
NEPHROLOGY PROGRESS NOTE    PATIENT IDENTIFICATION:   Name:  Ze Wu      MRN:  [id number]     76 y.o.  male             Reason for visit: ESRD    SUBJECTIVE:   Seen and examined. Remains confused.  Physical therapy is on bedside. No acute events  OBJECTIVE:  Vitals:    02/06/17 0000 02/06/17 0600 02/06/17 0736 02/06/17 1100   BP:  122/66 117/54 113/51   BP Location:  Left arm Left arm Left arm   Patient Position:  Lying Lying Lying   Pulse: 57 79 61 70   Resp:  16 16 16   Temp:   97.4 °F (36.3 °C) 98 °F (36.7 °C)   TempSrc:   Oral Oral   SpO2:       Weight:       Height:         FiO2 (%): 100 %     Body mass index is 25.05 kg/(m^2).    Intake/Output Summary (Last 24 hours) at 02/06/17 1451  Last data filed at 02/06/17 1259   Gross per 24 hour   Intake    800 ml   Output      0 ml   Net    800 ml         Exam:  GEN:   appears stated age. Confused  EYES:   Anicteric sclera  ENT:    External ears/nose normal, MM are   NECK:  No adenopathy, JVP   LUNGS: Normal chest on inspection; not labored  CV:  Normal S1S2, without murmur. Bradycardic  ABD:  Non-tender, non-distended, no hepatosplenomegaly, +BS  EXT:  No edema; no cyanosis; clubbing, RUE w/ excision site open, no purulence or drainage. Rt TDC    Scheduled meds:      aspirin 81 mg Oral Daily   hydrALAZINE 25 mg Oral Q8H   isosorbide mononitrate 30 mg Oral Q24H   lansoprazole 30 mg Oral QAM   levothyroxine 100 mcg Oral Daily   Vancomycin Pharmacy Intermittent Dosing  Does not apply Daily     IV meds:                          Pharmacy to dose vancomycin        Data Review:      Results from last 7 days  Lab Units 02/06/17  0531 02/05/17  2107 02/04/17  0615 02/03/17  0339  01/31/17  1126   SODIUM mmol/L 135* 139 138 143  < > 136   POTASSIUM mmol/L 4.4 4.5 5.2 5.1  < > 4.4   CHLORIDE mmol/L 98 101 101 103  < > 95*   TOTAL CO2 mmol/L 20.4* 21.5* 17.3* 21.1*  < > 21.7*   BUN mg/dL 31* 27* 35* 22  < > 47*   CREATININE mg/dL 5.97* 5.29* 5.89* 4.28*  < > 7.03*  "  CALCIUM mg/dL 9.3 9.3 9.4 9.6  < > 8.8   BILIRUBIN mg/dL  --   --   --  1.0  --  1.2   ALK PHOS U/L  --   --   --  164*  --  175*   ALT (SGPT) U/L  --   --   --  12  --  14   AST (SGOT) U/L  --   --   --  21  --  17   GLUCOSE mg/dL 96 106* 94 86  < > 97   < > = values in this interval not displayed.    Estimated Creatinine Clearance: 12.4 mL/min (by C-G formula based on Cr of 5.97).      Results from last 7 days  Lab Units 02/06/17  0531 02/01/17  1406 01/30/17  1718   MAGNESIUM mg/dL 2.2 2.5* 2.2         Results from last 7 days  Lab Units 02/06/17  0531 02/05/17  2107 02/05/17  0705 02/04/17  0629 02/03/17  0339   WBC 10*3/mm3 8.62 8.79 9.02 9.23 10.92*   HEMOGLOBIN g/dL 9.2* 8.9* 9.3* 10.0* 10.4*   PLATELETS 10*3/mm3 307 326 330 202 374         Results from last 7 days  Lab Units 02/06/17  0531 02/05/17  0705 02/04/17  0629 02/03/17  0339 02/02/17  0430   INR  1.31* 1.43* 1.41* 1.52* 1.51*             ASSESSMENT:   Active Problems:    Sepsis    1. ESRD on HD TTS  2. Hypercaogulopathy with bleeding graft: Improved  3. MRSA bacteremia: Infected graft is the most likely source.  4. Encephalopathy\" improved.  Still not at baseline.   5. Anemia of ESRD  6. Secondary hyperparathyroidism  7.  Pulmonary septic emboli  8. HTN     Plan:     Dialysis on Tuesday  S/P graft excision. S/P TDC catheter 01/31.   S/P  port removal 02/02  PPM placement once ABX completed and we have neg blood culture per ID.  Minimize narcotics and sedative for confusion  Vanc 1250 mg  post dialysis for 6 weeks With stop date 03/06/2017  Repeat bood Cx one week post ABX  Surveillance labs    Ariadne Gonsales MD  2/6/2017    2:51 PM     "

## 2017-02-06 NOTE — PROGRESS NOTES
[facility]  Cardiology Progress Note    Patient Identification:  Name: Ze Wu  Age: 76 y.o.  Sex: male  :  1940  MRN: [id number]                 Follow Up / Chief Complaint: CAD, (permanent) afib,variable vr with bradycardia,CHF,  HTN    Interval History:  This is a 76-year-old  male with a history of  coronary artery disease, chronic A. fib on anticoagulation with Coumadin, history of congestive heart failure, hypertension. We have been asked to consult regarding atrial fibrillation with variable ventricular rates and supratherapuetic INR.       Subjective:     Denies chest pain, pressure, dizziness or  SOA    Objective:  Ventricular rates 50-60s with infrequent, transient (less than 2 seconds) decrease his ventricular rate to 48.  No pauses > 3 seconds and no RVR.      New anterior ST changes noted on this am EKG (see below)  Troponin 0.294 ->  0.374        Past Medical History:  Past Medical History   Diagnosis Date   • Anemia    • Anxiety    • CHF (congestive heart failure)    • Chronic a-fib    • Chronic anticoagulation    • Confusion    • Disease of thyroid gland    • GERD (gastroesophageal reflux disease)    • Hypertension    • Hypothyroidism    • Kidney disease    • Lymphoma    • Staph aureus infection 2017     labs called from [facility]     Past Surgical History:  Past Surgical History   Procedure Laterality Date   • Mediport insertion, single     • Thumb amputation Left    • Av fistula placement Right    • Arteriovenous fistula/shunt surgery Right 2017     Procedure: ultrasound access of right internal jugular vein with mahurkar dialysis catheter placement, EXCISION INFECTED RIGTH ARM AV GRAFT WITH REMOVAL OF HERO CATHETER, REMOVAL OF FEMORAL DIALYSIS CATHETER;  Surgeon: Floyd Fraga MD;  Location: [facility] OR ;  Service:    • Pr insj tunneled cvc w/o subq port/ age 6 yr/> Right 2017     Procedure: RT INTERNAL JUGULAR  PALINDROME CATHETER PLACEMENT;  Surgeon: Floyd Fraga MD;  Location: [facility] OR;  Service: Vascular   • Venous access device (port) removal Left 2/2/2017     Procedure: LT CHEST PORT REMOVAL ;  Surgeon: Floyd Fraga MD;  Location: [facility] OR;  Service:         Social History:   Social History   Substance Use Topics   • Smoking status: Never Smoker   • Smokeless tobacco: Not on file   • Alcohol use No      Family History:  History reviewed. No pertinent family history.       Allergies:  No Known Allergies     Scheduled Meds:    aspirin 81 mg Daily   hydrALAZINE 25 mg Q8H   isosorbide mononitrate 30 mg Q24H   lansoprazole 30 mg QAM   levothyroxine 100 mcg Daily   Vancomycin Pharmacy Intermittent Dosing  Daily       INTAKE AND OUTPUT:    Intake/Output Summary (Last 24 hours) at 02/06/17 1220  Last data filed at 02/06/17 0247   Gross per 24 hour   Intake    640 ml   Output      0 ml   Net    640 ml       Review of Systems:   GI: no abdominal pain  Cardiac  Cp or pressure  Pulmonary:  No SOA    Constitutional:  Temp:  [97.4 °F (36.3 °C)-98.6 °F (37 °C)] 97.4 °F (36.3 °C)  Heart Rate:  [52-79] 61  Resp:  [16-18] 16  BP: ()/(46-66) 117/54    Physical Exam by Tristan Mojica MD    General:  Awake, resting quietly.   Appears in no acute distress  Eyes: PERTL,  HEENT:  Thyroid not visibly enlarged.  Oral mucosal dry, no cyanosis  Respiratory: Respirations regular and unlabored at rest. BBS with a diminished air entry in bases  No crackles or wheezes auscultated.  Left subclavian Mediport and  right IJ  catheter noted  Cardiovascular: S1S2 irregular rate and rhythm. Unchanged I/VI systolic murmur without rub or gallop.. No pretibial pitting edema.  Gastrointestinal: Abdomen soft, non tender. Bowel sounds present.   Musculoskeletal: ALEGRIA x4. No abnormal movements  Extremities: No digital cyanosis.  No RUE edema,  Skin: Color pale. pink. Skin warm and dry to touch.   Neuro: AAO x2 CN II-XII  grossly intact  Psych: pleasant and cooperative          Cardiographics    Telemetry: afib, VR 40's-60 with infrequent,PVC .  No pauses  >2.0 sec.     EKG 2-2-17      Echocardiogram:   · All left ventricular wall segments contract normally.  · Left atrial cavity size is mildly dilated.  · Mild to moderate aortic valve stenosis is present.  · Mild to moderate tricuspid valve regurgitation is present.  · Left Ventricle: Calculated EF = 66.7%.  · There is no evidence of pericardial effusion           Lab Review      1/30/2017 17:18 2/1/2017 05:48 2/2/2017 04:30   Troponin T 0.294 (C) 0.374 (C) 0.355 (C)       Results from last 7 days  Lab Units 02/06/17  0531 02/05/17  2107 02/05/17  0705   WBC 10*3/mm3 8.62 8.79 9.02   HEMOGLOBIN g/dL 9.2* 8.9* 9.3*   HEMATOCRIT % 28.2* 27.3* 29.3*   PLATELETS 10*3/mm3 307 326 330       Results from last 7 days  Lab Units 02/06/17  0531 02/05/17  1529 02/05/17  0705  02/04/17  0629   INR  1.31*  --  1.43*  --  1.41*   APTT seconds 40.5* 63.1* 97.6*  < > 33.2   < > = values in this interval not displayed.      Assessment:  - (Permanent) atrial fibrillation  - sick sinus syndrome  - elevated [id number] score  - elevated troponin  - CAD  - HTN    - a/c anemia -> IM, Renal  - Pneumonia  - Pulmonary septic emboli -> ID  - Acute hypoxic respiratory failure -> pulmonary  - MSSA septicemia -> ID  - Infected AV fistula -> ID, Renal, vascular surgery  - ESRD-> Renal  - B-cell lymphoma (in remission)  - Thyroid cancer (in remission)      Assessment & Plan:    Patient's heart rate continues to remain low, pacemaker has been deferred, as that is no clearance from infectious disease    Continue supportive management    Tristan Mojica MD  2/5/17      EMR Dragon/Transcription disclaimer:   Much of this encounter note is an electronic transcription/translation of spoken language to printed text. The electronic translation of spoken language may permit erroneous, or at times, nonsensical words  or phrases to be inadvertently transcribed; Although I have reviewed the note for such errors, some may still exist.

## 2017-02-06 NOTE — PLAN OF CARE
Problem: SAFETY - NON-VIOLENT RESTRAINT  Goal: Remains free of injury from restraints (Non-Violent Restraint)  Outcome: Ongoing (interventions implemented as appropriate)  Monitor for safety every 2 hrs and prn; no signs of injury; patient is still confused and unable to tolerate being out of restraints at this time.  Patient family aware he is in restraints.  Goal: Free from restraint(s) (Non-Violent Restraint)  Outcome: Ongoing (interventions implemented as appropriate)  Restraint order is current for today 2/6.  RN continues to try and reorient patient and encourage cooperation to allow for restraints to be dc'd.  Patient is not tolerating these measures at this time.

## 2017-02-06 NOTE — PROGRESS NOTES
Continued Stay Note  [facility]     Patient Name: Ze Wu  MRN: [id number]  Today's Date: 2/6/2017    Admit Date: 1/20/2017          Discharge Plan       02/06/17 1415    Case Management/Social Work Plan    Plan [facility] - will need precert    Patient/Family In Agreement With Plan yes    Additional Comments Leeanne/Kori here and following for discharge. Patient is in restraints.               Discharge Codes     None        Expected Discharge Date and Time     Expected Discharge Date Expected Discharge Time    Feb 3, 2017             Moshe Wesley RN

## 2017-02-06 NOTE — PROGRESS NOTES
"DAILY PROGRESS NOTE  [facility]    Patient Identification:  Name: Ze Wu  Age: 76 y.o.  Sex: male  :  1940  MRN: [id number]         Primary Care Physician: No Known Provider    Subjective:  DOING SAME  NO NEW COMPLAINTS    Objective:    Scheduled Meds:    aspirin 81 mg Oral Daily   hydrALAZINE 25 mg Oral Q8H   isosorbide mononitrate 30 mg Oral Q24H   lansoprazole 30 mg Oral QAM   levothyroxine 100 mcg Oral Daily   Vancomycin Pharmacy Intermittent Dosing  Does not apply Daily     Continuous Infusions:    Pharmacy to dose vancomycin        Vital signs in last 24 hours:  Temp:  [97.4 °F (36.3 °C)-98.6 °F (37 °C)] 98 °F (36.7 °C)  Heart Rate:  [52-79] 70  Resp:  [16-18] 16  BP: ()/(46-66) 113/51    Intake/Output:    Intake/Output Summary (Last 24 hours) at 17 1452  Last data filed at 17 1259   Gross per 24 hour   Intake    800 ml   Output      0 ml   Net    800 ml       Exam:  Visit Vitals   • /51 (BP Location: Left arm, Patient Position: Lying)   • Pulse 70   • Temp 98 °F (36.7 °C) (Oral)   • Resp 16   • Ht 74\" (188 cm)   • Wt 195 lb 1.6 oz (88.5 kg)   • SpO2 97%   • BMI 25.05 kg/m2       General Appearance:    Alert, cooperative, no distress, AAOx3                          Head:    Normocephalic, without obvious abnormality, atraumatic                           Eyes:    PERRL, conjunctiva/corneas clear, EOM's intact, both eyes                         Throat:   Lips, tongue, gums normal; oral mucosa pink and moist                           Neck:   Supple, symmetrical, trachea midline, no JVD                         Lungs:    Clear to auscultation bilaterally, respirations unlabored                 Chest Wall:    No tenderness or deformity                          Heart:    Regular rate and rhythm, S1 and S2 normal, no murmur,no  Rub                                      or gallop                  Abdomen:     Soft, non-tender, bowel sounds active, no masses, no         "                                                organomegaly                  Extremities:   Extremities normal, atraumatic, no cyanosis or edema                        Pulses:   Pulses palpable in all extremities                            Skin:   Skin is warm and dry,  no rashes or palpable lesions                  Neurologic:   CNII-XII intact, motor strength grossly intact, sensation grossly                                         intact to light touch, no focal deficits noted       Data Review:  Lab Results   Component Value Date    WBC 8.62 02/06/2017    HGB 9.2 (L) 02/06/2017    HCT 28.2 (L) 02/06/2017     02/06/2017     Lab Results   Component Value Date     (L) 02/06/2017    K 4.4 02/06/2017    CL 98 02/06/2017    CO2 20.4 (L) 02/06/2017    BUN 31 (H) 02/06/2017    CREATININE 5.97 (H) 02/06/2017    GLUCOSE 96 02/06/2017     Lab Results   Component Value Date    CALCIUM 9.3 02/06/2017    MG 2.2 02/06/2017     No results found for: AST, ALT, ALKPHOS  Lab Results   Component Value Date    INR 1.31 (H) 02/06/2017       Assessment:  Active Problems:    Sepsis  esrd on hd  Atrial fibrillation permanent  Hypertension  mrsa bacteremia  BRADYCARDIA      Plan:  CPM  IV ABX  AC  NO PPM UNTIL ABX COMPLETED  HDC TIW  PT/OT  SUPPORTIVE CARE  JESSICA No MD  2/6/2017  2:52 PM

## 2017-02-06 NOTE — PROGRESS NOTES
[facility]  Cardiology Progress Note    Patient Identification:  Name: Ze Wu  Age: 76 y.o.  Sex: male  :  1940  MRN: [id number]                 Follow Up / Chief Complaint: CAD, (permanent) afib,variable vr with bradycardia,CHF,  HTN    Interval History:  This is a 76-year-old  male with a history of  coronary artery disease, chronic A. fib on anticoagulation with Coumadin, history of congestive heart failure, hypertension. We have been asked to consult regarding atrial fibrillation with variable ventricular rates and supratherapuetic INR.       Subjective:     Denies chest pain, pressure, dizziness or  SOA    Objective:  Ventricular rates 50-60s with infrequent, transient (less than 2 seconds) decrease his ventricular rate to 48.  No pauses > 3 seconds and no RVR.      New anterior ST changes noted on this am EKG (see below)  Troponin 0.294 ->  0.374        Past Medical History:  Past Medical History   Diagnosis Date   • Anemia    • Anxiety    • CHF (congestive heart failure)    • Chronic a-fib    • Chronic anticoagulation    • Confusion    • Disease of thyroid gland    • GERD (gastroesophageal reflux disease)    • Hypertension    • Hypothyroidism    • Kidney disease    • Lymphoma    • Staph aureus infection 2017     labs called from [facility]     Past Surgical History:  Past Surgical History   Procedure Laterality Date   • Mediport insertion, single     • Thumb amputation Left    • Av fistula placement Right    • Arteriovenous fistula/shunt surgery Right 2017     Procedure: ultrasound access of right internal jugular vein with mahurkar dialysis catheter placement, EXCISION INFECTED RIGTH ARM AV GRAFT WITH REMOVAL OF HERO CATHETER, REMOVAL OF FEMORAL DIALYSIS CATHETER;  Surgeon: Floyd Fraga MD;  Location: [facility] OR ;  Service:    • Pr insj tunneled cvc w/o subq port/ age 6 yr/> Right 2017     Procedure: RT INTERNAL JUGULAR  PALINDROME CATHETER PLACEMENT;  Surgeon: Floyd Fraga MD;  Location: [facility] OR;  Service: Vascular   • Venous access device (port) removal Left 2/2/2017     Procedure: LT CHEST PORT REMOVAL ;  Surgeon: Floyd Fraga MD;  Location: [facility] OR;  Service:         Social History:   Social History   Substance Use Topics   • Smoking status: Never Smoker   • Smokeless tobacco: Not on file   • Alcohol use No      Family History:  History reviewed. No pertinent family history.       Allergies:  No Known Allergies     Scheduled Meds:    aspirin 81 mg Daily   hydrALAZINE 25 mg Q8H   isosorbide mononitrate 30 mg Q24H   lansoprazole 30 mg QAM   levothyroxine 100 mcg Daily   Vancomycin Pharmacy Intermittent Dosing  Daily       INTAKE AND OUTPUT:    Intake/Output Summary (Last 24 hours) at 02/06/17 0151  Last data filed at 02/05/17 1500   Gross per 24 hour   Intake   1220 ml   Output      0 ml   Net   1220 ml       Review of Systems:   GI: no abdominal pain  Cardiac  Cp or pressure  Pulmonary:  No SOA    Constitutional:  Temp:  [97.6 °F (36.4 °C)-98.6 °F (37 °C)] 97.6 °F (36.4 °C)  Heart Rate:  [52-84] 64  Resp:  [16-20] 18  BP: ()/(46-67) 124/50    Physical Exam by Cesar Russo MD    General:  Awake, resting quietly.   Appears in no acute distress  Eyes: PERTL,  HEENT:  Thyroid not visibly enlarged.  Oral mucosal dry, no cyanosis  Respiratory: Respirations regular and unlabored at rest. BBS with a diminished air entry in bases  No crackles or wheezes auscultated.  Left subclavian Mediport and  right IJ  catheter noted  Cardiovascular: S1S2 irregular rate and rhythm. Unchanged I/VI systolic murmur without rub or gallop.. No pretibial pitting edema.  Gastrointestinal: Abdomen soft, non tender. Bowel sounds present.   Musculoskeletal: ALEGRIA x4. No abnormal movements  Extremities: No digital cyanosis.  No RUE edema,  Skin: Color pale. pink. Skin warm and dry to touch.   Neuro: AAO x2 CN II-XII grossly  intact  Psych: pleasant and cooperative          Cardiographics    Telemetry: afib, VR 40's-60 with infrequent,PVC .  No pauses  >2.0 sec.     EKG 2-2-17      Echocardiogram:   · All left ventricular wall segments contract normally.  · Left atrial cavity size is mildly dilated.  · Mild to moderate aortic valve stenosis is present.  · Mild to moderate tricuspid valve regurgitation is present.  · Left Ventricle: Calculated EF = 66.7%.  · There is no evidence of pericardial effusion           Lab Review      1/30/2017 17:18 2/1/2017 05:48 2/2/2017 04:30   Troponin T 0.294 (C) 0.374 (C) 0.355 (C)       Results from last 7 days  Lab Units 02/05/17  2107 02/05/17  0705 02/04/17  0629   WBC 10*3/mm3 8.79 9.02 9.23   HEMOGLOBIN g/dL 8.9* 9.3* 10.0*   HEMATOCRIT % 27.3* 29.3* 32.2*   PLATELETS 10*3/mm3 326 330 202       Results from last 7 days  Lab Units 02/05/17  1529 02/05/17  0705 02/04/17  1433 02/04/17  0629  02/03/17  0339   INR   --  1.43*  --  1.41*  --  1.52*   APTT seconds 63.1* 97.6* 68.6* 33.2  < > 37.1*   < > = values in this interval not displayed.      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  He at this time is  refusing pacemaker but is confused.  I will defer to Dr. Mojica with regard to explaining risks and benefits to the patient and his family.  The pacemaker scheduled for 2 days from now, Monday.    Patient is in chronic A. fib.  Rate is controlled.  Coumadin has been held for pacemaker.    Patient's troponin has remained elevated, with 0.375 peak..  CPK-MB is normal.  ECG shows nonspecific anterior T-wave changes.  At this time, cardiac catheterization is not in the plans.    2/5    Patient denies chest pain shortness of breath or dizziness.  He at this time is refusing pacemaker but is confused.  I will defer to Dr. Mojica with regard to explaining risks and benefits to the patient and his family.  The pacemaker scheduled for tomorrow.    Patient is in chronic A. fib.  Rate is controlled.  Coumadin has been held for pacemaker.    Patient's troponin has remained elevated, with 0.375 peak..  CPK-MB is normal.  ECG shows nonspecific anterior T-wave changes.  At this time, cardiac catheterization is not in the plans.    The patient is in renal failure getting dialysis.    Echo normal ejection fraction, and mild to moderate aortic stenosis, not significant at this time.    I reviewed the patient's new clinical results and treatment plan . I personally viewed and interpreted the patient's EKG/Telemetry data    Cesar Russo MD  2/5/17      EMR Dragon/Transcription disclaimer:   Much of this encounter note is an electronic transcription/translation of spoken language to printed text. The electronic translation of spoken language may permit erroneous, or at times, nonsensical words or phrases to be inadvertently transcribed; Although I have reviewed the note for such errors, some may still exist.

## 2017-02-06 NOTE — PROGRESS NOTES
Floyd Fraga MD       LOS: 17 days   Patient Care Team:  No Known Provider as PCP - General    Subjective     76 y.o. male doing well after left chest port removal.  Cultures from port or groin MRSA.  Cardiology considering pacemaker insertion however infectious disease does not recommend until next month.  Concern patient cardiac status may not per minute.  Decisions to be made ultimately per cardiology.  Antibiotics per infectious disease.    Right arm wounds healing after removal of infected arteriovenous graft and hero catheter.  Continue local care.  Right palindrome catheter site clean and continued dialysis with catheter.    Patient does have adequate left upper arm basilic vein and could have basilic vein fistula created and future once infection clears and cardiac status stable.    Review of Systems  Review of Systems - Negative except history of present illness      Objective     Vital Signs  Temp:  [97.6 °F (36.4 °C)-98.6 °F (37 °C)] 97.6 °F (36.4 °C)  Heart Rate:  [52-84] 57  Resp:  [16-18] 18  BP: ()/(46-67) 124/50    Physical Exam  General: No acute distress. Alert and oriented x 4  HEENT: No jugular venous distension, trachea is midline  CV: RRR, S1S2  Resp: Clear unlabored breathing  Abd: Abdomen is soft, nontender, nondistended  Extremities: Right arm wounds healing well.  Right palindrome site clean.  Left chest incision healing nicely with no signs infection after port removal.    Results Review:       Recent Results (from the past 12 hour(s))   CBC (No Diff)    Collection Time: 02/05/17  9:07 PM   Result Value Ref Range    WBC 8.79 4.50 - 10.70 10*3/mm3    RBC 2.85 (L) 4.60 - 6.00 10*6/mm3    Hemoglobin 8.9 (L) 13.7 - 17.6 g/dL    Hematocrit 27.3 (L) 40.4 - 52.2 %    MCV 95.8 79.8 - 96.2 fL    MCH 31.2 27.0 - 32.7 pg    MCHC 32.6 32.6 - 36.4 g/dL    RDW 16.1 (H) 11.5 - 14.5 %    RDW-SD 56.3 (H) 37.0 - 54.0 fl    MPV 9.9 6.0 - 12.0 fL    Platelets 326 140 - 500 10*3/mm3   Basic  Metabolic Panel    Collection Time: 02/05/17  9:07 PM   Result Value Ref Range    Glucose 106 (H) 65 - 99 mg/dL    BUN 27 (H) 8 - 23 mg/dL    Creatinine 5.29 (H) 0.76 - 1.27 mg/dL    Sodium 139 136 - 145 mmol/L    Potassium 4.5 3.5 - 5.2 mmol/L    Chloride 101 98 - 107 mmol/L    CO2 21.5 (L) 22.0 - 29.0 mmol/L    Calcium 9.3 8.6 - 10.5 mg/dL    eGFR Non African Amer 11 (L) >60 mL/min/1.73    BUN/Creatinine Ratio 5.1 (L) 7.0 - 25.0    Anion Gap 16.5 mmol/L   Protime-INR    Collection Time: 02/06/17  5:31 AM   Result Value Ref Range    Protime 15.8 (H) 11.7 - 14.2 Seconds    INR 1.31 (H) 0.90 - 1.10   aPTT    Collection Time: 02/06/17  5:31 AM   Result Value Ref Range    PTT 40.5 (H) 22.7 - 35.4 seconds   Vancomycin, Random    Collection Time: 02/06/17  5:31 AM   Result Value Ref Range    Vancomycin Random 34.80 5.00 - 40.00 mcg/mL   Basic Metabolic Panel    Collection Time: 02/06/17  5:31 AM   Result Value Ref Range    Glucose 96 65 - 99 mg/dL    BUN 31 (H) 8 - 23 mg/dL    Creatinine 5.97 (H) 0.76 - 1.27 mg/dL    Sodium 135 (L) 136 - 145 mmol/L    Potassium 4.4 3.5 - 5.2 mmol/L    Chloride 98 98 - 107 mmol/L    CO2 20.4 (L) 22.0 - 29.0 mmol/L    Calcium 9.3 8.6 - 10.5 mg/dL    eGFR Non African Amer 9 (L) >60 mL/min/1.73    BUN/Creatinine Ratio 5.2 (L) 7.0 - 25.0    Anion Gap 16.6 mmol/L   Magnesium    Collection Time: 02/06/17  5:31 AM   Result Value Ref Range    Magnesium 2.2 1.6 - 2.4 mg/dL   CBC Auto Differential    Collection Time: 02/06/17  5:31 AM   Result Value Ref Range    WBC 8.62 4.50 - 10.70 10*3/mm3    RBC 2.94 (L) 4.60 - 6.00 10*6/mm3    Hemoglobin 9.2 (L) 13.7 - 17.6 g/dL    Hematocrit 28.2 (L) 40.4 - 52.2 %    MCV 95.9 79.8 - 96.2 fL    MCH 31.3 27.0 - 32.7 pg    MCHC 32.6 32.6 - 36.4 g/dL    RDW 16.0 (H) 11.5 - 14.5 %    RDW-SD 55.2 (H) 37.0 - 54.0 fl    MPV 10.4 6.0 - 12.0 fL    Platelets 307 140 - 500 10*3/mm3    Neutrophil % 69.8 42.7 - 76.0 %    Lymphocyte % 18.1 (L) 19.6 - 45.3 %    Monocyte %  6.4 5.0 - 12.0 %    Eosinophil % 5.0 0.3 - 6.2 %    Basophil % 0.5 0.0 - 1.5 %    Immature Grans % 0.2 0.0 - 0.5 %    Neutrophils, Absolute 6.02 1.90 - 8.10 10*3/mm3    Lymphocytes, Absolute 1.56 0.90 - 4.80 10*3/mm3    Monocytes, Absolute 0.55 0.20 - 1.20 10*3/mm3    Eosinophils, Absolute 0.43 0.00 - 0.70 10*3/mm3    Basophils, Absolute 0.04 0.00 - 0.20 10*3/mm3    Immature Grans, Absolute 0.02 0.00 - 0.03 10*3/mm3   ]      Assessment/Plan           Active Problems:    Sepsis      Assessment & Plan  76 y.o. male doing well after removal infected right arm arteriovenous graft and left chest port.  Antibiotics per infectious disease.  Timing of pacemaker per cardiology and infectious disease.      Floyd Fraga MD  02/06/17  6:52 AM

## 2017-02-06 NOTE — PROGRESS NOTES
Acute Care - Physical Therapy Treatment Note  [facility]     Patient Name: Ze Wu  : 1940  MRN: [id number]  Today's Date: 2017  Onset of Illness/Injury or Date of Surgery Date: 17          Admit Date: 2017    Visit Dx:    ICD-10-CM ICD-9-CM   1. SSS (sick sinus syndrome) I49.5 427.81   2. 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 Infected venous access port T80.219A 999.31     Patient Active Problem List   Diagnosis   • Sepsis               Adult Rehabilitation Note       17 1400 17 1100 17 1000    Rehab Assessment/Intervention    Discipline physical therapy assistant  -CW physical therapy assistant  -SI physical therapy assistant  -SI    Document Type therapy note (daily note)  -CW therapy note (daily note)  -SI therapy note (daily note)  -SI    Subjective Information agree to therapy;complains of;pain  -CW agree to therapy  -SI agree to therapy   but poor following command for movement with confusion  -SI    Patient Effort, Rehab Treatment fair  -CW poor  -SI poor  -SI    Patient Effort, Rehab Treatment Comment  agreeable and followed for ROM ex  but when sitting EOB quickly decided he wanted to lie down  -SI     Symptoms Noted During/After Treatment  other (see comments)   confusion  -SI     Precautions/Limitations fall precautions  -CW fall precautions  -SI     Recorded by [CW] Moe Wise [SI] Debbie Stout PTA [SI] Debbie Stout PTA    Pain Assessment    Pain Assessment 0-10  -CW Moon-[place] FACES  -SI Moon-Baker FACES  -SI    M[place] FACES Pain Rating  0  -SI 0   but did not get to much activity as tx interrupted  -SI    Pain Score 6  -CW      Pain Type  Other (Comment)   denied nor showed signs of pain  -SI     Pain Location Ear  -CW      Pain Orientation Right  -CW      Recorded by [CW] Moe Wise [SI] Debbie Stout PTA [SI] Debbie Stout PTA    Cognitive Assessment/Intervention    Current Cognitive/Communication  Assessment impaired  -CW impaired  -SI impaired  -SI    Orientation Status oriented to;person  -CW oriented to;person  -SI oriented to;person  -SI    Follows Commands/Answers Questions 25% of the time;able to follow single-step instructions;needs cueing  -CW 25% of the time  -SI 25% of the time  -SI    Personal Safety severe impairment;decreased awareness, need for safety;decreased insight to deficits  -CW severe impairment  -SI severe impairment  -SI    Personal Safety Interventions fall prevention program maintained;gait belt;nonskid shoes/slippers when out of bed  -CW fall prevention program maintained  -SI fall prevention program maintained  -SI    Recorded by [CW] Moe Wise [SI] Debbie Stout PTA [SI] Debbie Stout PTA    Bed Mobility, Assessment/Treatment    Bed Mobility, Assistive Device   other (see comments)   tx interrupted by transported to take to dialysis  -SI    Bed Mob, Supine to Sit, Boss moderate assist (50% patient effort);2 person assist required  -CW moderate assist (50% patient effort);2 person assist required  -SI     Bed Mob, Sit to Supine, Boss moderate assist (50% patient effort);2 person assist required  -CW moderate assist (50% patient effort);2 person assist required  -SI     Bed Mobility, Comment  sat EOB about 3 min with fair balalnce   -SI     Recorded by [CW] Moe Wise [SI] Debbie Stout PTA [SI] Debbie Stout PTA    Transfer Assessment/Treatment    Transfers, Sit-Stand Boss not tested  -CW other (see comments)   not tested as pt decided wanted to lie down and proceeded to  -SI     Recorded by [CW] Moe Wise [SI] Debbie Stout PTA     Therapy Exercises    Bilateral Lower Extremities AAROM:;20 reps  -CW AAROM:;20 reps  -SI AAROM:;20 reps   then had to stop for pt to go to dialysis  -SI    Recorded by [CW] Moe Wise [SI] Debbie Stout PTA [SI] Debbie Stout PTA    Positioning and Restraints    Pre-Treatment  Position in bed  -CW in bed  -SI in bed  -SI    Post Treatment Position bed  -CW bed  -SI bed  -SI    In Bed supine;call light within reach;encouraged to call for assist;exit alarm on  -CW supine;notified nsg;call light within reach;exit alarm on  -SI with other staff  -SI    Recorded by [CW] Moe Wise [SI] Debbie LAZAR ERICA Stout [SI] Debbie LAZAR Argelia, ERICA      User Key  (r) = Recorded By, (t) = Taken By, (c) = Cosigned By    Initials Name Effective Dates    SI Debbie LAZAR Argelia, PTA 05/18/15 -     CW Moe Wise 12/13/16 -                 IP PT Goals       01/27/17 1601          Bed Mobility PT LTG    Bed Mobility PT LTG, Date Established 01/27/17  -AR      Bed Mobility PT LTG, Time to Achieve 1 wk  -AR      Bed Mobility PT LTG, Activity Type supine to sit/sit to supine  -AR      Bed Mobility PT LTG, Hooker Level minimum assist (75% patient effort)  -AR      Transfer Training PT LTG    Transfer Training PT LTG, Date Established 01/27/17  -AR      Transfer Training PT LTG, Time to Achieve 1 wk  -AR      Transfer Training PT LTG, Activity Type bed to chair /chair to bed;sit to stand/stand to sit  -AR      Transfer Training PT LTG, Hooker Level minimum assist (75% patient effort)  -AR      Transfer Training PT LTG, Assist Device walker, rolling  -AR      Gait Training PT LTG    Gait Training Goal PT LTG, Date Established 01/27/17  -AR      Gait Training Goal PT LTG, Time to Achieve 1 wk  -AR      Gait Training Goal PT LTG, Hooker Level minimum assist (75% patient effort)  -AR      Gait Training Goal PT LTG, Assist Device walker, rolling  -AR      Gait Training Goal PT LTG, Distance to Achieve 20  -AR        User Key  (r) = Recorded By, (t) = Taken By, (c) = Cosigned By    Initials Name Provider Type    AR Teetee Hernandez PT Physical Therapist          Physical Therapy Education     Title: PT OT SLP Therapies (Done)     Topic: Physical Therapy (Done)     Point: Mobility training (Done)     Learning Progress Summary    Learner Readiness Method Response Comment Documented by Status   Patient Acceptance E,TB DU,VU   02/06/17 1405 Done    Acceptance E NR confusion limits education SI 02/05/17 1146 Active    Acceptance E,D NR   02/03/17 1256 Active    Acceptance E,TB,D VU,NR   01/30/17 1352 Done    Acceptance E NR   01/29/17 1019 Active    Acceptance E NR  AR 01/27/17 1602 Active               Point: Home exercise program (Done)    Learning Progress Summary    Learner Readiness Method Response Comment Documented by Status   Patient Acceptance E,TB DU,VU   02/06/17 1405 Done    Acceptance E,D NR   02/03/17 1256 Active    Acceptance E,TB,D VU,NR   01/30/17 1352 Done    Acceptance E NR  AR 01/27/17 1602 Active               Point: Body mechanics (Done)    Learning Progress Summary    Learner Readiness Method Response Comment Documented by Status   Patient Acceptance E,TB DU,VU   02/06/17 1405 Done    Acceptance E,D NR   02/03/17 1256 Active    Acceptance E,TB,D VU,NR   01/30/17 1352 Done    Acceptance E NR  AR 01/27/17 1602 Active               Point: Precautions (Done)    Learning Progress Summary    Learner Readiness Method Response Comment Documented by Status   Patient Acceptance E,TB DU,VU   02/06/17 1405 Done    Acceptance E,D NR   02/03/17 1256 Active    Acceptance E,TB,D VU,NR   01/30/17 1352 Done    Acceptance E NR  AR 01/27/17 1602 Active                      User Key     Initials Effective Dates Name Provider Type Discipline    SI 05/18/15 -  Debbie Stout, PTA Physical Therapy Assistant PT     02/18/16 -  Shaina Gannon PTA Physical Therapy Assistant PT    AR 06/27/16 -  Teetee Hernandez, PT Physical Therapist PT     04/06/16 -  Jane Beltran PTA Physical Therapy Assistant PT     12/13/16 -  Moe Wise Physical Therapy Assistant PT                    PT Recommendation and Plan  Anticipated Discharge Disposition: skilled nursing facility  Planned  Therapy Interventions: balance training, bed mobility training, gait training, home exercise program, patient/family education, ROM (Range of Motion), strengthening, transfer training  PT Frequency: daily  Plan of Care Review  Plan Of Care Reviewed With: patient  Progress: improving  Outcome Summary/Follow up Plan: Pt able to tolerate all ther ex with no pain increase          Outcome Measures       02/06/17 1400 02/05/17 1100 02/04/17 1000    How much help from another person do you currently need...    Turning from your back to your side while in flat bed without using bedrails? 2  -CW 2  -SI 2  -SI    Moving from lying on back to sitting on the side of a flat bed without bedrails? 2  -CW 2  -SI 2  -SI    Moving to and from a bed to a chair (including a wheelchair)? 1  -CW 1  -SI 1  -SI    Standing up from a chair using your arms (e.g., wheelchair, bedside chair)? 1  -CW 1  -SI 1  -SI    Climbing 3-5 steps with a railing? 1  -CW 1  -SI 1  -SI    To walk in hospital room? 1  -CW 1  -SI 1  -SI    AM-PAC 6 Clicks Score 8  -CW 8  -SI 8  -SI    Functional Assessment    Outcome Measure Options AM-PAC 6 Clicks Basic Mobility (PT)  -CW AM-PAC 6 Clicks Basic Mobility (PT)  -SI AM-PAC 6 Clicks Basic Mobility (PT)  -SI      User Key  (r) = Recorded By, (t) = Taken By, (c) = Cosigned By    Initials Name Provider Type    SI Debbie Stout PTA Physical Therapy Assistant    RAJIV Wise Physical Therapy Assistant           Time Calculation:         PT Charges       02/06/17 1406          Time Calculation    Start Time 1349  -CW      Stop Time 1406  -CW      Time Calculation (min) 17 min  -CW      PT Received On 02/06/17  -CW      PT - Next Appointment 02/07/17  -CW        User Key  (r) = Recorded By, (t) = Taken By, (c) = Cosigned By    Initials Name Provider Type    RAJIV Wise Physical Therapy Assistant          Therapy Charges for Today     Code Description Service Date Service Provider Modifiers Qty     [id number]  PT THER PROC EA 15 MIN 2/6/2017 Moe Wise GP 1          PT G-Codes  Outcome Measure Options: AM-PAC 6 Clicks Basic Mobility (PT)    Moe Wise  2/6/2017

## 2017-02-07 NOTE — PROGRESS NOTES
NEPHROLOGY PROGRESS NOTE    PATIENT IDENTIFICATION:   Name:  Ze Wu      MRN:  [id number]     76 y.o.  male             Reason for visit: ESRD    SUBJECTIVE:   On dialysis.  Confused.  Says he is running from people.  Denies pain.  Feels cold.   OBJECTIVE:  Vitals:    02/07/17 0805 02/07/17 0955 02/07/17 1005 02/07/17 1240   BP: 113/54 126/66 109/59 124/54   BP Location:  Left arm  Left arm   Patient Position:  Lying  Lying   Pulse: 50 109 67 77   Resp:  14  16   Temp:  99.8 °F (37.7 °C)  97.7 °F (36.5 °C)   TempSrc:  Oral  Oral   SpO2: 93%      Weight:       Height:         FiO2 (%): 100 %     Body mass index is 25.05 kg/(m^2).    Intake/Output Summary (Last 24 hours) at 02/07/17 1658  Last data filed at 02/07/17 1124   Gross per 24 hour   Intake     10 ml   Output      0 ml   Net     10 ml         Exam:  GEN:  Frail.  appears stated age. Confused. On dialysis. Qb 350  EYES:   Anicteric sclera  ENT:    External ears/nose normal, MM are dry  NECK:  No adenopathy, JVP . RIJ TDC  LUNGS: Clear to auscultation   CV:  Irreg,  Bradycardic  ABD:  Non-tender, non-distended, +BS  EXT: No lower ext edema. . Rt TDC    Scheduled meds:      aspirin 81 mg Oral Daily   hydrALAZINE 25 mg Oral Q8H   isosorbide mononitrate 30 mg Oral Q24H   lansoprazole 30 mg Oral QAM   levothyroxine 100 mcg Oral Daily   Vancomycin Pharmacy Intermittent Dosing  Does not apply Daily   warfarin 5 mg Oral Once     IV meds:                          heparin (porcine) 1,000 Units/hr   Pharmacy to dose vancomycin        Data Review:      Results from last 7 days  Lab Units 02/07/17  0412 02/06/17  0531 02/05/17  2107  02/03/17  0339   SODIUM mmol/L 133* 135* 139  < > 143   POTASSIUM mmol/L 4.8 4.4 4.5  < > 5.1   CHLORIDE mmol/L 94* 98 101  < > 103   TOTAL CO2 mmol/L 18.9* 20.4* 21.5*  < > 21.1*   BUN mg/dL 40* 31* 27*  < > 22   CREATININE mg/dL 7.21* 5.97* 5.29*  < > 4.28*   CALCIUM mg/dL 9.1 9.3 9.3  < > 9.6   BILIRUBIN mg/dL 1.1  --   --   --   1.0   ALK PHOS U/L 123*  --   --   --  164*   ALT (SGPT) U/L 14  --   --   --  12   AST (SGOT) U/L 18  --   --   --  21   GLUCOSE mg/dL 96 96 106*  < > 86   < > = values in this interval not displayed.    Estimated Creatinine Clearance: 10.3 mL/min (by C-G formula based on Cr of 7.21).      Results from last 7 days  Lab Units 02/06/17  0531 02/01/17  1406   MAGNESIUM mg/dL 2.2 2.5*         Results from last 7 days  Lab Units 02/07/17  0412 02/06/17  0531 02/05/17  2107 02/05/17  0705 02/04/17  0629   WBC 10*3/mm3 7.75 8.62 8.79 9.02 9.23   HEMOGLOBIN g/dL 8.8* 9.2* 8.9* 9.3* 10.0*   PLATELETS 10*3/mm3 286 307 326 330 202         Results from last 7 days  Lab Units 02/07/17 0412 02/06/17  0531 02/05/17  0705 02/04/17  0629 02/03/17  0339   INR  1.30* 1.31* 1.43* 1.41* 1.52*             ASSESSMENT:   Active Problems:    Sepsis    1. ESRD on HD TTS.  2. Hypercaogulopathy .  3. MRSA bacteremia: SP graft excision and port removal.   4. Encephalopathy. Still confused.   5. Anemia of ESRD. Procrit with HD.   6. Secondary hyperparathyroidism  7.  Pulmonary septic emboli  8. HTN. Too controlled. Dc hydralazine.    9. Hypothyroid on replacement.     Plan:     1. Dialysis today.   2. Procrit with HD.  3. Dc Hydralazine.     Deedee Freeman MD  2/7/2017    4:58 PM

## 2017-02-07 NOTE — PROGRESS NOTES
"[facility]  Cardiology Progress Note    Patient Identification:  Name: Ze Wu  Age: 76 y.o.  Sex: male  :  1940  MRN: [id number]                 Follow Up / Chief Complaint: CAD, (permanent) afib,variable vr with bradycardia,CHF,  HTN    Interval History:  This is a 76-year-old  male with a history of  coronary artery disease, chronic A. fib on anticoagulation with Coumadin, history of congestive heart failure, hypertension. We have been asked to consult regarding atrial fibrillation with variable ventricular rates and supratherapuetic INR.       Subjective:     Denies chest pain, pressure, dizziness or  SOA  Complaining of bilateral \"ear ache\"    Objective:   No pauses > 3 seconds and no RVR.  BP stable.  INR 1.3 today      Past Medical History:  Past Medical History   Diagnosis Date   • Anemia    • Anxiety    • CHF (congestive heart failure)    • Chronic a-fib    • Chronic anticoagulation    • Confusion    • Disease of thyroid gland    • GERD (gastroesophageal reflux disease)    • Hypertension    • Hypothyroidism    • Kidney disease    • Lymphoma    • Staph aureus infection 2017     labs called from [facility]     Past Surgical History:  Past Surgical History   Procedure Laterality Date   • Mediport insertion, single     • Thumb amputation Left    • Av fistula placement Right    • Arteriovenous fistula/shunt surgery Right 2017     Procedure: ultrasound access of right internal jugular vein with mahurkar dialysis catheter placement, EXCISION INFECTED RIGTH ARM AV GRAFT WITH REMOVAL OF HERO CATHETER, REMOVAL OF FEMORAL DIALYSIS CATHETER;  Surgeon: Floyd Fraga MD;  Location: [facility] OR ;  Service:    • Pr insj tunneled cvc w/o subq port/ age 6 yr/> Right 2017     Procedure: RT INTERNAL JUGULAR PALINDROME CATHETER PLACEMENT;  Surgeon: Floyd Fraga MD;  Location[facility];  Service: Vascular   • Venous access device " (port) removal Left 2/2/2017     Procedure: LT CHEST PORT REMOVAL ;  Surgeon: Folyd Fraga MD;  Location: [facility] OR;  Service:         Social History:   Social History   Substance Use Topics   • Smoking status: Never Smoker   • Smokeless tobacco: Not on file   • Alcohol use No      Family History:  History reviewed. No pertinent family history.       Allergies:  No Known Allergies     Scheduled Meds:    aspirin 81 mg Daily   hydrALAZINE 25 mg Q8H   isosorbide mononitrate 30 mg Q24H   lansoprazole 30 mg QAM   levothyroxine 100 mcg Daily   Vancomycin Pharmacy Intermittent Dosing  Daily       INTAKE AND OUTPUT:    Intake/Output Summary (Last 24 hours) at 02/07/17 1232  Last data filed at 02/07/17 1124   Gross per 24 hour   Intake    250 ml   Output      0 ml   Net    250 ml       Review of Systems:   GI: no n/v  Cardiac  No chest pain or pressure  Pulmonary:  No SOA or cough    Constitutional:  Temp:  [97.6 °F (36.4 °C)-99.8 °F (37.7 °C)] 99.8 °F (37.7 °C)  Heart Rate:  [] 67  Resp:  [14-22] 14  BP: (109-131)/(46-66) 109/59    Physical Exam by Tristan Mojica MD    General:  Resting quietly during HD.   Appears in no acute distress  Eyes: PERTL,  HEENT:  Thyroid not visibly enlarged.  Oral mucosal dry, no cyanosis  Respiratory: Respirations regular and unlabored at rest. BBS with a diminished air entry in bases  No crackles or wheezes auscultated.  Left subclavian Mediport and  Right HD catheter noted  Cardiovascular: S1S2 irregular rate and rhythm. Unchanged I/VI systolic murmur without  gallop.. No pretibial pitting edema.    Gastrointestinal: Abdomen soft, non tender. Bowel sounds present.   Musculoskeletal: ALEGRIA x4. No abnormal movements  Extremities: No digital cyanosis.   Skin: Color pale. pink. Skin warm and dry to touch.   Neuro: AAO x2 CN II-XII grossly intact  Psych: pleasant and cooperative          Cardiographics    Telemetry: afib VR 60-70's, occasional PVC, no pauses >3.0 sec          Echocardiogram:   · All left ventricular wall segments contract normally.  · Left atrial cavity size is mildly dilated.  · Mild to moderate aortic valve stenosis is present.  · Mild to moderate tricuspid valve regurgitation is present.  · Left Ventricle: Calculated EF = 66.7%.  · There is no evidence of pericardial effusion           Lab Review         Results from last 7 days  Lab Units 02/07/17  0412 02/06/17  0531 02/05/17  2107   WBC 10*3/mm3 7.75 8.62 8.79   HEMOGLOBIN g/dL 8.8* 9.2* 8.9*   HEMATOCRIT % 27.0* 28.2* 27.3*   PLATELETS 10*3/mm3 286 307 326       Results from last 7 days  Lab Units 02/07/17  0412 02/06/17  0531 02/05/17  1529 02/05/17  0705   INR  1.30* 1.31*  --  1.43*   APTT seconds 38.5* 40.5* 63.1* 97.6*         Assessment:  - (Permanent) atrial fibrillation  - sick sinus syndrome  - elevated [id number] score  - elevated troponin  - CAD  - HTN    - a/c anemia -> IM, Renal  - Pneumonia  - Pulmonary septic emboli -> antibiotic as per ID  - Acute hypoxic respiratory failure -> pulmonary  - MSSA septicemia -> antibiotic as per ID  - Infected AV fistula -> ID, Renal, vascular surgery  - ESRD-> Renal      Assessment & Plan:  - (Permanent) atrial fibrillation - elevated [id number] score => on Coumadin.  Add low-dose heparin drip bridge therapy per protocol until INR >/= 2.0, then dose Coumadin to maintain target INR 2.0-3.0 range    - sick sinus syndrome  - pacemaker has been deferred, as not cleared from infectious disease. Avoiding use of beta blocker and calcium channel blocker due to bradycardia    - elevated troponin - CPK-MB is normal. No angina.  Antianginal therapy optimized with aspirin and nitrate, no BB or CCB due to sick sinus syndrome/bradycardia    - HTN - controlled on  hydralazine.  Avoiding use of beta blocker and calcium channel blocker due to bradycardia      Add low-dose heparin bridge therapy until INR >/= 2.0  Continue supportive management. ID has recommended against  placement of permanent pacemaker pending repeat blood cultures one week after finishing current antibiotic therapy (approximately 3/13/2017).  INR ordered for mariela.    Tristan Mojica MD  2/7/17      EMR Dragon/Transcription disclaimer:   Much of this encounter note is an electronic transcription/translation of spoken language to printed text. The electronic translation of spoken language may permit erroneous, or at times, nonsensical words or phrases to be inadvertently transcribed; Although I have reviewed the note for such errors, some may still exist.

## 2017-02-07 NOTE — PROGRESS NOTES
Floyd Fraga MD       LOS: 18 days   Patient Care Team:  No Known Provider as PCP - General    Subjective     76 y.o. male doing well after right palindrome and excision of infected right arm graft hero catheter and left port.  Pacemaker deferred to cardiology noted with continued antibiotics per infectious disease.    Review of Systems  Review of Systems - Negative except history of present illness      Objective     Vital Signs  Temp:  [97.4 °F (36.3 °C)-98.6 °F (37 °C)] 98.2 °F (36.8 °C)  Heart Rate:  [61-88] 88  Resp:  [16-22] 18  BP: (109-127)/(46-63) 114/49    Physical Exam  General: No acute distress. Alert and oriented x 4  HEENT: No jugular venous distension, trachea is midline  CV: RRR, S1S2  Resp: Clear unlabored breathing  Abd: Abdomen is soft, nontender, nondistended  Extremities: Right arm wounds clean and granulating.    Results Review:       Recent Results (from the past 12 hour(s))   Protime-INR    Collection Time: 02/07/17  4:12 AM   Result Value Ref Range    Protime 15.7 (H) 11.7 - 14.2 Seconds    INR 1.30 (H) 0.90 - 1.10   aPTT    Collection Time: 02/07/17  4:12 AM   Result Value Ref Range    PTT 38.5 (H) 22.7 - 35.4 seconds   Vancomycin, Random    Collection Time: 02/07/17  4:12 AM   Result Value Ref Range    Vancomycin Random 32.60 5.00 - 40.00 mcg/mL   Comprehensive Metabolic Panel    Collection Time: 02/07/17  4:12 AM   Result Value Ref Range    Glucose 96 65 - 99 mg/dL    BUN 40 (H) 8 - 23 mg/dL    Creatinine 7.21 (H) 0.76 - 1.27 mg/dL    Sodium 133 (L) 136 - 145 mmol/L    Potassium 4.8 3.5 - 5.2 mmol/L    Chloride 94 (L) 98 - 107 mmol/L    CO2 18.9 (L) 22.0 - 29.0 mmol/L    Calcium 9.1 8.6 - 10.5 mg/dL    Total Protein 7.8 6.0 - 8.5 g/dL    Albumin 3.00 (L) 3.50 - 5.20 g/dL    ALT (SGPT) 14 1 - 41 U/L    AST (SGOT) 18 1 - 40 U/L    Alkaline Phosphatase 123 (H) 39 - 117 U/L    Total Bilirubin 1.1 0.1 - 1.2 mg/dL    eGFR Non African Amer 7 (L) >60 mL/min/1.73    Globulin 4.8 gm/dL     A/G Ratio 0.6 g/dL    BUN/Creatinine Ratio 5.5 (L) 7.0 - 25.0    Anion Gap 20.1 mmol/L   CBC Auto Differential    Collection Time: 02/07/17  4:12 AM   Result Value Ref Range    WBC 7.75 4.50 - 10.70 10*3/mm3    RBC 2.83 (L) 4.60 - 6.00 10*6/mm3    Hemoglobin 8.8 (L) 13.7 - 17.6 g/dL    Hematocrit 27.0 (L) 40.4 - 52.2 %    MCV 95.4 79.8 - 96.2 fL    MCH 31.1 27.0 - 32.7 pg    MCHC 32.6 32.6 - 36.4 g/dL    RDW 15.7 (H) 11.5 - 14.5 %    RDW-SD 54.4 (H) 37.0 - 54.0 fl    MPV 10.8 6.0 - 12.0 fL    Platelets 286 140 - 500 10*3/mm3    Neutrophil % 72.4 42.7 - 76.0 %    Lymphocyte % 14.5 (L) 19.6 - 45.3 %    Monocyte % 7.2 5.0 - 12.0 %    Eosinophil % 4.8 0.3 - 6.2 %    Basophil % 0.8 0.0 - 1.5 %    Immature Grans % 0.3 0.0 - 0.5 %    Neutrophils, Absolute 5.62 1.90 - 8.10 10*3/mm3    Lymphocytes, Absolute 1.12 0.90 - 4.80 10*3/mm3    Monocytes, Absolute 0.56 0.20 - 1.20 10*3/mm3    Eosinophils, Absolute 0.37 0.00 - 0.70 10*3/mm3    Basophils, Absolute 0.06 0.00 - 0.20 10*3/mm3    Immature Grans, Absolute 0.02 0.00 - 0.03 10*3/mm3   ]      Assessment/Plan           Active Problems:    Sepsis      Assessment & Plan  76 y.o. male continue antibiotics per infectious disease.  Continue catheter-based dialysis.  Continue local care right arm wounds.  Plans for pacemaker per cardiology.      Floyd Fraga MD  02/07/17  7:29 AM

## 2017-02-07 NOTE — PLAN OF CARE
Problem: Patient Care Overview (Adult)  Goal: Plan of Care Review  Outcome: Ongoing (interventions implemented as appropriate)    02/07/17 1155   Coping/Psychosocial Response Interventions   Plan Of Care Reviewed With patient   Outcome Evaluation   Outcome Summary/Follow up Plan Limited progress towards goals during therapy today. Declined all out of bed activity multiple times. Pt performed exercises in bed w/ near constant cueing. Recommend DC to [facility].          Problem: Inpatient Physical Therapy  Goal: Bed Mobility Goal LTG- PT    02/07/17 1155   Bed Mobility PT LTG   Bed Mobility PT LTG, Time to Achieve 1 wk   Bed Mobility PT LTG, Date Goal Reviewed 02/07/17   Bed Mobility PT LTG, Outcome goal ongoing       Goal: Transfer Training Goal 1 LTG- PT    02/07/17 1155   Transfer Training PT LTG   Transfer Training PT LTG, Time to Achieve 1 wk   Transfer Training PT LTG, Date Goal Reviewed 02/07/17   Transfer Training PT LTG, Outcome goal ongoing       Goal: Gait Training Goal LTG- PT    02/07/17 1155   Gait Training PT LTG   Gait Training Goal PT LTG, Time to Achieve 1 wk   Gait Training Goal PT LTG, Date Goal Reviewed 02/07/17   Gait Training Goal PT LTG, Outcome goal ongoing

## 2017-02-07 NOTE — PROGRESS NOTES
Acute Care - Physical Therapy Progress Note  [facility]     Patient Name: Ze Wu  : 1940  MRN: [id number]  Today's Date: 2017  Onset of Illness/Injury or Date of Surgery Date: 17          Admit Date: 2017    Visit Dx:    ICD-10-CM ICD-9-CM   1. SSS (sick sinus syndrome) I49.5 427.81   2. 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 Infected venous access port T80.219A 999.31     Patient Active Problem List   Diagnosis   • Sepsis               Adult Rehabilitation Note       17 1152 17 1400 17 1100    Rehab Assessment/Intervention    Discipline physical therapist  -AR physical therapy assistant  -CW physical therapy assistant  -SI    Document Type progress note;therapy note (daily note)  -AR therapy note (daily note)  -CW therapy note (daily note)  -SI    Subjective Information agree to therapy;complains of;pain  -AR agree to therapy;complains of;pain  -CW agree to therapy  -SI    Patient Effort, Rehab Treatment  fair  -CW poor  -SI    Patient Effort, Rehab Treatment Comment   agreeable and followed for ROM ex  but when sitting EOB quickly decided he wanted to lie down  -SI    Symptoms Noted During/After Treatment fatigue  -AR  other (see comments)   confusion  -SI    Precautions/Limitations fall precautions  -AR fall precautions  -CW fall precautions  -SI    Recorded by [AR] Teetee Hernandez, PT [CW] Moe Wise [SI] Debbie Stout, PTA    Pain Assessment    Pain Assessment FLACC  -AR 0-10  -CW [place] FACES  -SI    [place] FACES Pain Rating   0  -SI    Pain Score  6  -CW     Pain Type   Other (Comment)   denied nor showed signs of pain  -SI    Pain Location  Ear  -CW     Pain Orientation  Right  -CW     Recorded by [AR] Teetee Hernandez, PT [CW] Moe Wise [SI] Debbie Stout, PTA    FLACC (Face, Legs, Activity, Crying, Consolability)    Pain Rating: FLACC (Activity) - Face 1  -AR      Pain Rating: FLACC (Activity) - Legs 1  -AR       Pain Rating: FLACC (Activity) 0  -AR      Pain Rating: FLACC (Activity) - Cry 1  -AR      Pain Rating: FLACC (Activity) - Consolability 1  -AR      Score: FLACC (Activity) 4  -AR      Recorded by [AR] Teetee Hernandez PT      Cognitive Assessment/Intervention    Current Cognitive/Communication Assessment impaired  -AR impaired  -CW impaired  -SI    Orientation Status  oriented to;person  -CW oriented to;person  -SI    Follows Commands/Answers Questions 75% of the time;able to follow single-step instructions;needs cueing;needs increased time;needs repetition  -AR 25% of the time;able to follow single-step instructions;needs cueing  -CW 25% of the time  -SI    Personal Safety moderate impairment;unaware of cognitive deficits  -AR severe impairment;decreased awareness, need for safety;decreased insight to deficits  -CW severe impairment  -SI    Personal Safety Interventions  fall prevention program maintained;gait belt;nonskid shoes/slippers when out of bed  -CW fall prevention program maintained  -SI    Recorded by [AR] Teetee Hernandez, PT [CW] Moe Wise [SI] Debbie Stout PTA    Bed Mobility, Assessment/Treatment    Bed Mob, Supine to Sit, [place]  moderate assist (50% patient effort);2 person assist required  -CW moderate assist (50% patient effort);2 person assist required  -SI    Bed Mob, Sit to Supine, [place]  moderate assist (50% patient effort);2 person assist required  -CW moderate assist (50% patient effort);2 person assist required  -SI    Bed Mobility, Comment pt declined OOB activity due to headache  -AR  sat EOB about 3 min with fair balalnce   -SI    Recorded by [AR] Teetee Hernandez, PT [CW] Moe Wise [SI] Debbie Stout PTA    Transfer Assessment/Treatment    Transfers, Sit-Stand [place]  not tested  -CW other (see comments)   not tested as pt decided wanted to lie down and proceeded to  -SI    Recorded by  [CW] Moe Wise [SI] Debbie Stout PTA    Therapy  Exercises    Bilateral Lower Extremities 10 reps;ankle pumps/circles;SAQ;heel slides  -AR AAROM:;20 reps  -CW AAROM:;20 reps  -SI    Bilateral Upper Extremity elbow flexion/extension;shoulder extension/flexion;10 reps;AAROM:  -AR      Recorded by [AR] Teetee Hernandez, PT [CW] Moe Wise [SI] Debbie Stout PTA    Positioning and Restraints    Pre-Treatment Position in bed  -AR in bed  -CW in bed  -SI    Post Treatment Position bed  -AR bed  -CW bed  -SI    In Bed supine;call light within reach;encouraged to call for assist  -AR supine;call light within reach;encouraged to call for assist;exit alarm on  -CW supine;notified nsg;call light within reach;exit alarm on  -SI    Recorded by [AR] Teetee Hernandez, PT [CW] Moe Wise [SI] Debbie Stout PTA      User Key  (r) = Recorded By, (t) = Taken By, (c) = Cosigned By    Initials Name Effective Dates    SI Debbie Stout PTA 05/18/15 -     AR Teetee Hernandez, PT 06/27/16 -     CW Moe Wise 12/13/16 -                 IP PT Goals       02/07/17 1155 01/27/17 1601       Bed Mobility PT LTG    Bed Mobility PT LTG, Date Established  01/27/17  -AR     Bed Mobility PT LTG, Time to Achieve 1 wk  -AR 1 wk  -AR     Bed Mobility PT LTG, Activity Type  supine to sit/sit to supine  -AR     Bed Mobility PT LTG, [place] Level  minimum assist (75% patient effort)  -AR     Bed Mobility PT LTG, Date Goal Reviewed 02/07/17  -AR      Bed Mobility PT LTG, Outcome goal ongoing  -AR      Transfer Training PT LTG    Transfer Training PT LTG, Date Established  01/27/17  -AR     Transfer Training PT LTG, Time to Achieve 1 wk  -AR 1 wk  -AR     Transfer Training PT LTG, Activity Type  bed to chair /chair to bed;sit to stand/stand to sit  -AR     Transfer Training PT LTG, [place] Level  minimum assist (75% patient effort)  -AR     Transfer Training PT LTG, Assist Device  walker, rolling  -AR     Transfer Training PT  LTG, Date Goal Reviewed 02/07/17  -AR       Transfer Training PT LTG, Outcome goal ongoing  -AR      Gait Training PT LTG    Gait Training Goal PT LTG, Date Established  01/27/17  -AR     Gait Training Goal PT LTG, Time to Achieve 1 wk  -AR 1 wk  -AR     Gait Training Goal PT LTG, [place] Level  minimum assist (75% patient effort)  -AR     Gait Training Goal PT LTG, Assist Device  walker, rolling  -AR     Gait Training Goal PT LTG, Distance to Achieve  20  -AR     Gait Training Goal PT LTG, Date Goal Reviewed 02/07/17  -AR      Gait Training Goal PT LTG, Outcome goal ongoing  -AR        User Key  (r) = Recorded By, (t) = Taken By, (c) = Cosigned By    Initials Name Provider Type    AR Teetee Hernandez PT Physical Therapist          Physical Therapy Education     Title: PT OT SLP Therapies (Active)     Topic: Physical Therapy (Active)     Point: Mobility training (Active)    Learning Progress Summary    Learner Readiness Method Response Comment Documented by Status   Patient Acceptance E NL  AR 02/07/17 1157 Active    Acceptance E,TB DU,VU   02/06/17 1405 Done    Acceptance E NR confusion limits education SI 02/05/17 1146 Active    Acceptance E,D NR   02/03/17 1256 Active    Acceptance E,TB,D VU,NR  RW 01/30/17 1352 Done    Acceptance E NR  JW 01/29/17 1019 Active    Acceptance E NR  AR 01/27/17 1602 Active               Point: Home exercise program (Active)    Learning Progress Summary    Learner Readiness Method Response Comment Documented by Status   Patient Acceptance E NL  AR 02/07/17 1157 Active    Acceptance E,TB DU,VU   02/06/17 1405 Done    Acceptance E,D NR   02/03/17 1256 Active    Acceptance E,TB,D VU,NR  RW 01/30/17 1352 Done    Acceptance E NR  AR 01/27/17 1602 Active               Point: Body mechanics (Active)    Learning Progress Summary    Learner Readiness Method Response Comment Documented by Status   Patient Acceptance E NL  AR 02/07/17 1157 Active    Acceptance E,TB DU,VU   02/06/17 1405 Done    Acceptance E,D NR    02/03/17 1256 Active    Acceptance E,TB,D VU,NR  RW 01/30/17 1352 Done    Acceptance E NR  AR 01/27/17 1602 Active               Point: Precautions (Active)    Learning Progress Summary    Learner Readiness Method Response Comment Documented by Status   Patient Acceptance E NL  AR 02/07/17 1157 Active    Acceptance E,TB DU,VU   02/06/17 1405 Done    Acceptance E,D NR  RW 02/03/17 1256 Active    Acceptance E,TB,D VU,NR  RW 01/30/17 1352 Done    Acceptance E NR  AR 01/27/17 1602 Active                      User Key     Initials Effective Dates Name Provider Type Discipline    SI 05/18/15 -  Debbie Stout, PTA Physical Therapy Assistant PT    JW 02/18/16 -  Shaina Gannon, PTA Physical Therapy Assistant PT    AR 06/27/16 -  Teetee Hernandez, PT Physical Therapist PT    RW 04/06/16 -  Jane Beltran, PTA Physical Therapy Assistant PT     12/13/16 -  Moe Wise Physical Therapy Assistant PT                    PT Recommendation and Plan  Anticipated Discharge Disposition: skilled nursing facility  Planned Therapy Interventions: balance training, bed mobility training, gait training, home exercise program, patient/family education, ROM (Range of Motion), strengthening, transfer training  PT Frequency: 5 times/wk  Plan of Care Review  Plan Of Care Reviewed With: patient  Outcome Summary/Follow up Plan: Limited progress towards goals during therapy today.  Declined all out of bed activity multiple times.  Pt performed exercises in bed w/ near constant cueing.  Recommend DC to JESSICA.            Outcome Measures       02/07/17 1100 02/06/17 1400 02/05/17 1100    How much help from another person do you currently need...    Turning from your back to your side while in flat bed without using bedrails? 2  -AR 2  -CW 2  -SI    Moving from lying on back to sitting on the side of a flat bed without bedrails? 2  -AR 2  -CW 2  -SI    Moving to and from a bed to a chair (including a wheelchair)? 1  -AR 1  -CW 1  -SI     Standing up from a chair using your arms (e.g., wheelchair, bedside chair)? 1  -AR 1  -CW 1  -SI    Climbing 3-5 steps with a railing? 1  -AR 1  -CW 1  -SI    To walk in hospital room? 1  -AR 1  -CW 1  -SI    AM-PAC 6 Clicks Score 8  -AR 8  -CW 8  -SI    Functional Assessment    Outcome Measure Options  AM-PAC 6 Clicks Basic Mobility (PT)  -CW AM-PAC 6 Clicks Basic Mobility (PT)  -SI      User Key  (r) = Recorded By, (t) = Taken By, (c) = Cosigned By    Initials Name Provider Type    SI Debbie Stout, PTA Physical Therapy Assistant    AR Teetee Hernandez PT Physical Therapist    RAJIV Wise Physical Therapy Assistant           Time Calculation:         PT Charges       02/07/17 1157          Time Calculation    Start Time 1143  -AR      Stop Time 1157  -AR      Time Calculation (min) 14 min  -AR      PT Received On 02/07/17  -AR      PT - Next Appointment 02/08/17  -AR      PT Goal Re-Cert Due Date 02/14/17  -AR        User Key  (r) = Recorded By, (t) = Taken By, (c) = Cosigned By    Initials Name Provider Type    AR Teetee Hernandez PT Physical Therapist          Therapy Charges for Today     Code Description Service Date Service Provider Modifiers Qty    [id number] HC PT THER PROC EA 15 MIN 2/7/2017 Teetee Hernandez PT GP 1          PT G-Codes  Outcome Measure Options: AM-PAC 6 Clicks Basic Mobility (PT)    Teetee Hernandez PT  2/7/2017

## 2017-02-07 NOTE — PROGRESS NOTES
"Pharmacy to dose Vancomycin    Day 18  Consult for Dr No  Treating: Sepsis/bactermia, infected graft s/p removal  Goal per-HD level ~20  Plan for 6 weeks therapy (stop date 3/6/2017)    Current Vancomycin dose pulse dose for HD on Tu/Th/Sa    Lab Results   Component Value Date    LOBO 32.60 02/07/2017       IV Anti-Infectives     Ordered     Dose/Rate Route Frequency Start Stop    02/04/17 1340  vancomycin 1250 mg/250 mL 0.9% NS IVPB ([facility])     Ordering Provider:  Rc No MD    1,250 mg  over 120 Minutes Intravenous Once 02/04/17 1415 02/04/17 1743    01/31/17 1448  vancomycin 1250 mg/250 mL 0.9% NS IVPB ([facility])     Ordering Provider:  Naif Lozano MD    1,250 mg  over 125 Minutes Intravenous Once 01/31/17 1530 01/31/17 1907    01/28/17 0922  vancomycin 1250 mg/250 mL 0.9% NS IVPB ([facility])     Ordering Provider:  Rc No MD    1,250 mg  over 125 Minutes Intravenous Once 01/28/17 1600 01/28/17 1743    01/26/17 1138  vancomycin (VANCOCIN) IVPB 1 g (premix) in Dextrose 5% 200 mL     Ordering Provider:  Rc No MD    1,000 mg  over 100 Minutes Intravenous Once 01/26/17 1600 01/26/17 1851    01/24/17 1258  vancomycin (VANCOCIN) IVPB 1 g (premix) in Dextrose 5% 200 mL     Ordering Provider:  Rc No MD    1,000 mg  over 100 Minutes Intravenous Once 01/24/17 1500 01/24/17 2017 01/22/17 1159  vancomycin 1750 mg/500 mL 0.9% NS IVPB ([facility])     Ordering Provider:  Rc No MD    1,750 mg  over 180 Minutes Intravenous Once 01/22/17 1230 01/22/17 2120    01/21/17 1444  Vancomycin Pharmacy Intermittent Dosing     Ordering Provider:  Rc No MD     Does not apply Daily 01/21/17 1515      01/21/17 1037  Pharmacy to dose vancomycin     Ordering Provider:  Rc No MD     Does not apply Continuous PRN 01/21/17 1037             Relevant clinical data and objective history reviewed:  76 y.o. male 74\" (188 cm) 195 lb 1.6 oz (88.5 kg)    Lab Results   Component Value Date    CREATININE " 7.21 (H) 02/07/2017    CREATININE 5.97 (H) 02/06/2017    CREATININE 5.29 (H) 02/05/2017     Estimated Creatinine Clearance: 10.3 mL/min (by C-G formula based on Cr of 7.21).    Lab Results   Component Value Date    WBC 7.75 02/07/2017    WBC 8.62 02/06/2017    WBC 8.79 02/05/2017     Temp Readings from Last 3 Encounters:   02/07/17 99.8 °F (37.7 °C) (Oral)       Baseline cultures:  1/19 Blood cx 2/2: MRSA+ ([facility])  1/20 Sputum cx: Pending ([facility])  1/21 Blood cx= MRSA+  1/21 CXR: Mild cardiomegaly, pulmonary mass or congestion. Areas of opacity in the right lung.  1/23 Wound cx (RUE AV graft): MRSA+  1/23 Tissue cx( Rt arm): 3+ MRSA  1/24 Blood cx 2/2: NG x 5 days  1/25 CT chest: Numerous pulmonary nodules within both lungs. Two of these lesions are cavitary along the anterior superior aspect of the right upper lobe. The largest measures up to 4 cm. Margins are ill-defined. There is some surrounding inflammation or infiltrate within the right upper lobe. Differential considerations include cavitary infectious etiologies or septic emboli. An atypical or fungal process cannot be excluded. Cavitary malignancy and metastatic disease cannot be excluded.  1/27 Blood cx: NG x 5 days  2/02 Wound cx (Lt chest): MRSA+    PLAN: Will not give dose today after HD based on level of 32.6.  Will re-check random level in am Thu 2/8 and dose after HD on Thu based on level.    Ayla Morelos PharmD, BCPS  02/07/17 11:25 AM

## 2017-02-07 NOTE — PROGRESS NOTES
Continued Stay Note  [facility]     Patient Name: Ze Wu  MRN: [id number]  Today's Date: 2/7/2017    Admit Date: 1/20/2017          Discharge Plan       02/07/17 1447    Case Management/Social Work Plan    Plan [facility] skilled with HD - will need precert    Patient/Family In Agreement With Plan other (see comments)    Additional Comments Patient remains in restraints. Leeanne/[facility] following for discharge.              Discharge Codes     None        Expected Discharge Date and Time     Expected Discharge Date Expected Discharge Time    Feb 3, 2017             Moshe Wesley RN

## 2017-02-07 NOTE — PROGRESS NOTES
"DAILY PROGRESS NOTE  [facility]    Patient Identification:  Name: Ze Wu  Age: 76 y.o.  Sex: male  :  1940  MRN: [id number]         Primary Care Physician: No Known Provider    Subjective:  DOING SAME  NO NEW COMPLAINTS    Objective:    Scheduled Meds:    aspirin 81 mg Oral Daily   hydrALAZINE 25 mg Oral Q8H   isosorbide mononitrate 30 mg Oral Q24H   lansoprazole 30 mg Oral QAM   levothyroxine 100 mcg Oral Daily   Vancomycin Pharmacy Intermittent Dosing  Does not apply Daily   warfarin 7.5 mg Oral Once     Continuous Infusions:    heparin (porcine) 1,000 Units/hr   Pharmacy to dose vancomycin        Vital signs in last 24 hours:  Temp:  [97.6 °F (36.4 °C)-99.8 °F (37.7 °C)] 97.7 °F (36.5 °C)  Heart Rate:  [] 77  Resp:  [14-22] 16  BP: (109-131)/(46-66) 124/54    Intake/Output:    Intake/Output Summary (Last 24 hours) at 17 1531  Last data filed at 17 1124   Gross per 24 hour   Intake     10 ml   Output      0 ml   Net     10 ml       Exam:  Visit Vitals   • /54 (BP Location: Left arm, Patient Position: Lying)   • Pulse 77   • Temp 97.7 °F (36.5 °C) (Oral)   • Resp 16   • Ht 74\" (188 cm)   • Wt 195 lb 1.6 oz (88.5 kg)   • SpO2 93%   • BMI 25.05 kg/m2       General Appearance:    Alert, cooperative, no distress, AAOx3                          Head:    Normocephalic, without obvious abnormality, atraumatic                           Eyes:    PERRL, conjunctiva/corneas clear, EOM's intact, both eyes                         Throat:   Lips, tongue, gums normal; oral mucosa pink and moist                           Neck:   Supple, symmetrical, trachea midline, no JVD                         Lungs:    Clear to auscultation bilaterally, respirations unlabored                 Chest Wall:    No tenderness or deformity                          Heart:    Regular rate and rhythm, S1 and S2 normal, no murmur,no  Rub                                      or gallop                  " Abdomen:     Soft, non-tender, bowel sounds active, no masses, no                                                        organomegaly                  Extremities:   Extremities normal, atraumatic, no cyanosis or edema                        Pulses:   Pulses palpable in all extremities                            Skin:   Skin is warm and dry,  no rashes or palpable lesions                  Neurologic:   CNII-XII intact, motor strength grossly intact, sensation grossly                                         intact to light touch, no focal deficits noted       Data Review:  Lab Results   Component Value Date    WBC 7.75 02/07/2017    HGB 8.8 (L) 02/07/2017    HCT 27.0 (L) 02/07/2017     02/07/2017     Lab Results   Component Value Date     (L) 02/07/2017    K 4.8 02/07/2017    CL 94 (L) 02/07/2017    CO2 18.9 (L) 02/07/2017    BUN 40 (H) 02/07/2017    CREATININE 7.21 (H) 02/07/2017    GLUCOSE 96 02/07/2017     Lab Results   Component Value Date    CALCIUM 9.1 02/07/2017    MG 2.2 02/06/2017     Lab Results   Component Value Date    AST 18 02/07/2017    ALT 14 02/07/2017    ALKPHOS 123 (H) 02/07/2017     Lab Results   Component Value Date    INR 1.30 (H) 02/07/2017       Assessment:  Active Problems:    Sepsis  esrd on hd  Atrial fibrillation permanent  Hypertension  mrsa bacteremia  BRADYCARDIA NO PPM FOR NOW SEC TO INFECTION      Plan:  CPM  D/C RESTRAINS  IV ABX  AC  NO PPM UNTIL ABX COMPLETED  HDC TIW  PT/OT  SUPPORTIVE CARE  JESSICA No MD  2/7/2017  3:31 PM

## 2017-02-07 NOTE — PLAN OF CARE
Problem: Patient Care Overview (Adult)  Goal: Plan of Care Review  Outcome: Ongoing (interventions implemented as appropriate)    02/07/17 0218   Coping/Psychosocial Response Interventions   Plan Of Care Reviewed With patient   Patient Care Overview   Progress improving   Outcome Evaluation   Outcome Summary/Follow up Plan Pt remains in restraints. VSS. Turning Q2. Re-orientation needed. Will monitor.       Goal: Adult Individualization and Mutuality  Outcome: Ongoing (interventions implemented as appropriate)  Goal: Discharge Needs Assessment  Outcome: Ongoing (interventions implemented as appropriate)    Problem: Chronic Kidney Disease/End Stage Renal Disease (Adult)  Goal: Signs and Symptoms of Listed Potential Problems Will be Absent or Manageable (Chronic Kidney Disease/End Stage Renal Disease)  Outcome: Ongoing (interventions implemented as appropriate)    Problem: Confusion, Chronic (Adult)  Goal: Cognitive/Functional Impairments Minimized  Outcome: Ongoing (interventions implemented as appropriate)  Goal: Free from Harm/Injuries  Outcome: Ongoing (interventions implemented as appropriate)    Problem: Pneumonia (Adult)  Goal: Signs and Symptoms of Listed Potential Problems Will be Absent or Manageable (Pneumonia)  Outcome: Ongoing (interventions implemented as appropriate)    Problem: SAFETY - NON-VIOLENT RESTRAINT  Goal: Remains free of injury from restraints (Non-Violent Restraint)  Outcome: Ongoing (interventions implemented as appropriate)  Goal: Free from restraint(s) (Non-Violent Restraint)  Outcome: Ongoing (interventions implemented as appropriate)    Problem: Perioperative Period (Adult)  Goal: Signs and Symptoms of Listed Potential Problems Will be Absent or Manageable (Perioperative Period)  Outcome: Ongoing (interventions implemented as appropriate)  Goal: Signs and Symptoms of Listed Potential Problems Will be Absent or Manageable (Perioperative Period)  Outcome: Ongoing (interventions  implemented as appropriate)

## 2017-02-08 NOTE — PROGRESS NOTES
Acute Care - Speech Language Pathology   Swallow Re-Evaluation [facility]     Patient Name: Ze Wu  : 1940  MRN: [id number]  Today's Date: 2017  Onset of Illness/Injury or Date of Surgery Date: 17            Admit Date: 2017    SPEECH-LANGUAGE PATHOLOGY EVALUATION - SWALLOW  Subjective: The patient was seen on this date for a Clinical Swallow evaluation.  Pt pleasantly confused.    The patient's history is significant for dysphagia following admit with sepsis. RN reports pt tolerates uncomfortable procedures well despite confusion.    Objective: Textures given included thin liquid and nectar thick liquid.  Assessment: Difficulties were noted with thin liquid, characterized by audible swallow with thins.  SLP Findings:  Patient appears to be improving at the bedside.   Recommendations: Diet Textures: nectar thick liquid, mechanical soft consistency with no mixed textures food.  Medications should be taken whole with thickened liquids or puree. May have ice between meals after oral care, under staff or family supervision and with the recommended strategies for safe swallowing.   Recommended Strategies: Upright for PO. Oral care before breakfast, after all meals and PRN.  Other Recommended Evaluations: FEES d/t recommendation from SLP who completed VFSS  Dysphagia therapy is recommended. Rationale: to improve swallow function.    Visit Dx:     ICD-10-CM ICD-9-CM   1. SSS (sick sinus syndrome) I49.5 427.81   2. 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 Infected venous access port T80.219A 999.31     Patient Active Problem List   Diagnosis   • Sepsis     Past Medical History   Diagnosis Date   • Anemia    • Anxiety    • CHF (congestive heart failure)    • Chronic a-fib    • Chronic anticoagulation    • Confusion    • Disease of thyroid gland    • GERD (gastroesophageal reflux disease)    • Hypertension    • Hypothyroidism    • Kidney disease    • Lymphoma    • Staph aureus  infection 01/21/2017     labs called from [facility]     Past Surgical History   Procedure Laterality Date   • Mediport insertion, single     • Thumb amputation Left    • Av fistula placement Right    • Arteriovenous fistula/shunt surgery Right 1/23/2017     Procedure: ultrasound access of right internal jugular vein with mahurkar dialysis catheter placement, EXCISION INFECTED RIGTH ARM AV GRAFT WITH REMOVAL OF HERO CATHETER, REMOVAL OF FEMORAL DIALYSIS CATHETER;  Surgeon: Floyd Fraga MD;  Location: [facility] OR 18/19;  Service:    • Pr insj tunneled cvc w/o subq port/ age 6 yr/> Right 1/31/2017     Procedure: RT INTERNAL JUGULAR PALINDROME CATHETER PLACEMENT;  Surgeon: Floyd Fraga MD;  Location: [facility] MAIN OR;  Service: Vascular   • Venous access device (port) removal Left 2/2/2017     Procedure: LT CHEST PORT REMOVAL ;  Surgeon: Floyd Fraga MD;  Location: [facility] MAIN OR;  Service:           SWALLOW EVALUATION (last 72 hours)      Swallow Evaluation       02/08/17 2562                Rehab Evaluation    Document Type re-evaluation  -SA        Subjective Information no complaints;agree to therapy  -SA        Patient Effort, Rehab Treatment excellent  -SA        General Information    Patient Profile Review yes  -SA        Subjective Patient Observations Pt pleasantly confused  -SA        Current Diet Limitations nectar thick liquids;mechanical soft  -SA        Plans/Goals Discussed With patient  -SA        Barriers to Rehab cognitive status  -SA        Clinical Impression    Patient's Goals For Discharge patient did not state  -SA        SLP Swallowing Diagnosis pharyngeal dysfunction  -SA        Rehab Potential/Prognosis, Swallowing good, to achieve stated therapy goals  -SA        Criteria for Skilled Therapeutic Interventions Met skilled criteria for dysphagia intervention met  -SA        FCM, Swallowing 4-->Level 4  -SA        Therapy Frequency PRN  -SA         Predicted Duration Therapy Interv (days) until discharge  -        SLP Diet Recommendation IV - mechanical soft, no mixed consistencies;nectar/syrup-thick liquids  -        Recommended Diagnostics FEES  -        SLP Rec. for Method of Medication Administration meds whole with thickened liquid;meds whole in pudding/applesauce  -        Monitor For Signs Of Aspiration elevated WBC count;fever  -        Anticipated Discharge Disposition other (see comments)   to be determined  -        Pain Assessment    Pain Assessment No/denies pain  -          User Key  (r) = Recorded By, (t) = Taken By, (c) = Cosigned By    Initials Name Effective Dates     Anaya Namrata Hamilton 12/11/15 -         EDUCATION  The patient has been educated in the following areas:   Modified Diet Instruction.    SLP Recommendation and Plan  SLP Swallowing Diagnosis: pharyngeal dysfunction  SLP Diet Recommendation: IV - mechanical soft, no mixed consistencies, nectar/syrup-thick liquids     SLP Rec. for Method of Medication Administration: meds whole with thickened liquid, meds whole in pudding/applesauce  Monitor For Signs Of Aspiration: elevated WBC count, fever  Recommended Diagnostics: FEES  Criteria for Skilled Therapeutic Interventions Met: skilled criteria for dysphagia intervention met  Anticipated Discharge Disposition: other (see comments) (to be determined)  Rehab Potential/Prognosis, Swallowing: good, to achieve stated therapy goals  Therapy Frequency: PRN             Plan of Care Review  Plan Of Care Reviewed With: patient  Progress: improving  Outcome Summary/Follow up Plan: Reduction of s/s of asp at the bedside           SLP Goals       02/08/17 1502          Safely Consume Diet    Safely Consume Diet- SLP, Outcome goal ongoing  -        User Key  (r) = Recorded By, (t) = Taken By, (c) = Cosigned By    Initials Name Provider Type    SA Alvarezah Namrata Hamilton Speech and Language Pathologist             SLP Outcome  Measures (last 72 hours)      SLP Outcome Measures       02/08/17 1500          SLP Outcome Measures    Outcome Measure Used? Adult NOMS  -      FCM Scores    FCM Chosen Swallowing  -      Swallowing FCM Score 4  -        User Key  (r) = Recorded By, (t) = Taken By, (c) = Cosigned By    Initials Name Effective Dates     Anaya Hamilton 12/11/15 -            Time Calculation:         Time Calculation- SLP       02/08/17 1503          Time Calculation- SLP    SLP Received On 02/08/17  -        User Key  (r) = Recorded By, (t) = Taken By, (c) = Cosigned By    Initials Name Provider Type     Anaya Hamilton Speech and Language Pathologist          Therapy Charges for Today     Code Description Service Date Service Provider Modifiers Qty    [id number]  ST TREATMENT SWALLOW 3 2/8/2017 Anaya Hamilton GN 1               Anaya Hamilton  2/8/2017

## 2017-02-08 NOTE — PROGRESS NOTES
Continued Stay Note  [facility]     Patient Name: Ze Wu  MRN: [id number]  Today's Date: 2/8/2017    Admit Date: 1/20/2017          Discharge Plan       02/08/17 0943    Case Management/Social Work Plan    Plan [facility] skilled with HD - will need precert    Patient/Family In Agreement With Plan other (see comments)    Additional Comments Patient remains in restraints. Psych to see today. Casey continues to follow for discharge.   2/8/17 1145 Casey here for updates.               Discharge Codes     None        Expected Discharge Date and Time     Expected Discharge Date Expected Discharge Time    Feb 3, 2017             Moshe Wesley RN

## 2017-02-08 NOTE — PROGRESS NOTES
NEPHROLOGY PROGRESS NOTE    PATIENT IDENTIFICATION:   Name:  Ze Wu      MRN:  [id number]     76 y.o.  male             Reason for visit: ESRD    SUBJECTIVE:   Seen and examined. Confusion improved. No new complaints.   OBJECTIVE:  Vitals:    02/08/17 0800 02/08/17 0824 02/08/17 0857 02/08/17 1000   BP: 117/53   111/61   BP Location: Left arm   Left arm   Patient Position: Lying   Lying   Pulse: 74   86   Resp: 18   17   Temp: 98.6 °F (37 °C)  98.6 °F (37 °C)    TempSrc:       SpO2:  97%  94%   Weight:       Height:         FiO2 (%): 100 %     Body mass index is 25.05 kg/(m^2).    Intake/Output Summary (Last 24 hours) at 02/08/17 1135  Last data filed at 02/08/17 0900   Gross per 24 hour   Intake    294 ml   Output   1400 ml   Net  -1106 ml         Exam:  GEN:  Frail.  appears stated age. Confused. On dialysis. Qb 350  EYES:   Anicteric sclera  ENT:    External ears/nose normal, MM are dry  NECK:  No adenopathy, JVP . RIJ TDC  LUNGS: Clear to auscultation   CV:  Irreg,  Bradycardic  ABD:  Non-tender, non-distended, +BS  EXT: No lower ext edema. . Rt TDC    Scheduled meds:      aspirin 81 mg Oral Daily   epoetin bacilio 10,000 Units Intravenous Once per day on Mon Wed Fri   isosorbide mononitrate 30 mg Oral Q24H   lansoprazole 30 mg Oral QAM   levothyroxine 100 mcg Oral Daily   Vancomycin Pharmacy Intermittent Dosing  Does not apply Daily     IV meds:                          heparin (porcine) 1,000 Units/hr Last Rate: 12.3 Units/kg/hr (02/08/17 0302)   Pharmacy to dose vancomycin         Data Review:      Results from last 7 days  Lab Units 02/08/17  0215 02/07/17  0412 02/06/17  0531  02/03/17  0339   SODIUM mmol/L 139 133* 135*  < > 143   POTASSIUM mmol/L 4.3 4.8 4.4  < > 5.1   CHLORIDE mmol/L 99 94* 98  < > 103   TOTAL CO2 mmol/L 20.8* 18.9* 20.4*  < > 21.1*   BUN mg/dL 27* 40* 31*  < > 22   CREATININE mg/dL 4.95* 7.21* 5.97*  < > 4.28*   CALCIUM mg/dL 9.0 9.1 9.3  < > 9.6   BILIRUBIN mg/dL 1.1 1.1  --   --   1.0   ALK PHOS U/L 117 123*  --   --  164*   ALT (SGPT) U/L 11 14  --   --  12   AST (SGOT) U/L 14 18  --   --  21   GLUCOSE mg/dL 95 96 96  < > 86   < > = values in this interval not displayed.    Estimated Creatinine Clearance: 15 mL/min (by C-G formula based on Cr of 4.95).      Results from last 7 days  Lab Units 02/06/17 0531 02/01/17  1406   MAGNESIUM mg/dL 2.2 2.5*         Results from last 7 days  Lab Units 02/08/17 0215 02/07/17 0412 02/06/17 0531 02/05/17  2107 02/05/17  0705   WBC 10*3/mm3 6.52 7.75 8.62 8.79 9.02   HEMOGLOBIN g/dL 8.6* 8.8* 9.2* 8.9* 9.3*   PLATELETS 10*3/mm3 206 286 307 326 330         Results from last 7 days  Lab Units 02/08/17 0215 02/07/17 0412 02/06/17  0531 02/05/17  0705 02/04/17  0629   INR  1.56* 1.30* 1.31* 1.43* 1.41*             ASSESSMENT:   Active Problems:    Sepsis    1. ESRD on HD TTS.  2. Hypercaogulopathy .  3. MRSA bacteremia: SP graft excision and port removal.   4. Encephalopathy. Still confused.   5. Anemia of ESRD. Procrit with HD.   6. Secondary hyperparathyroidism  7.  Pulmonary septic emboli  8. HTN. Too controlled. Dc hydralazine.    9. Hypothyroid on replacement.     Plan:     1. Dialysis tomorrow   2. Procrit with HD.      Ariadne Gonsales MD  2/8/2017    11:35 AM

## 2017-02-08 NOTE — PLAN OF CARE
Problem: Patient Care Overview (Adult)  Goal: Plan of Care Review    02/08/17 1502   Coping/Psychosocial Response Interventions   Plan Of Care Reviewed With patient   Patient Care Overview   Progress improving   Outcome Evaluation   Outcome Summary/Follow up Plan Reduction of s/s of asp at the bedside         Problem: Inpatient SLP  Goal: Dysphagia- Patient will safely consume diet as per recommendation with no signs/symptoms of aspiration    02/08/17 1502   Safely Consume Diet   Safely Consume Diet- SLP, Outcome goal ongoing

## 2017-02-08 NOTE — PLAN OF CARE
Problem: Patient Care Overview (Adult)  Goal: Plan of Care Review  Outcome: Ongoing (interventions implemented as appropriate)    02/07/17 1938   Coping/Psychosocial Response Interventions   Plan Of Care Reviewed With patient   Patient Care Overview   Progress no change   Outcome Evaluation   Outcome Summary/Follow up Plan VSS. HAD DIALYSIS TODAY. CONFUSION CONTINUES, WITH AUDITORY AND VISUAL HALLUCINATIONS. PSYCH CONSULTED TO HELP MGMT OF CONDITION. ORDER TO CONTINUE RESTRAINTS D/T PULLING ON LINES. CONTINUE TO REORIENT BUT PT CONFUSED AND MISTRUSTING OF STAFF. PLAN TO START HEPARIN GTT, WAITING ON PTT TO RESULT TO GET STARTED. CONTINUE TO MONITOR CLOSELY.       Goal: Adult Individualization and Mutuality  Outcome: Ongoing (interventions implemented as appropriate)    Problem: Chronic Kidney Disease/End Stage Renal Disease (Adult)  Goal: Signs and Symptoms of Listed Potential Problems Will be Absent or Manageable (Chronic Kidney Disease/End Stage Renal Disease)  Outcome: Ongoing (interventions implemented as appropriate)    Problem: Confusion, Chronic (Adult)  Goal: Cognitive/Functional Impairments Minimized  Outcome: Ongoing (interventions implemented as appropriate)  Goal: Free from Harm/Injuries  Outcome: Ongoing (interventions implemented as appropriate)    Problem: Pneumonia (Adult)  Goal: Signs and Symptoms of Listed Potential Problems Will be Absent or Manageable (Pneumonia)  Outcome: Ongoing (interventions implemented as appropriate)    Problem: SAFETY - NON-VIOLENT RESTRAINT  Goal: Remains free of injury from restraints (Non-Violent Restraint)  Outcome: Ongoing (interventions implemented as appropriate)  Goal: Free from restraint(s) (Non-Violent Restraint)  Outcome: Ongoing (interventions implemented as appropriate)    Problem: Perioperative Period (Adult)  Goal: Signs and Symptoms of Listed Potential Problems Will be Absent or Manageable (Perioperative Period)  Outcome: Ongoing (interventions implemented  as appropriate)

## 2017-02-08 NOTE — PROGRESS NOTES
"DAILY PROGRESS NOTE  [facility]    Patient Identification:  Name: Ze Wu  Age: 76 y.o.  Sex: male  :  1940  MRN: [id number]         Primary Care Physician: No Known Provider    Subjective:  DOING SAME  NO NEW COMPLAINTS    Objective:    Scheduled Meds:    aspirin 81 mg Oral Daily   epoetin bacilio 10,000 Units Intravenous Once per day on    isosorbide mononitrate 30 mg Oral Q24H   lansoprazole 30 mg Oral QAM   levothyroxine 100 mcg Oral Daily   Vancomycin Pharmacy Intermittent Dosing  Does not apply Daily     Continuous Infusions:    heparin (porcine) 1,000 Units/hr Last Rate: 13.3 Units/kg/hr (17 1354)   Pharmacy to dose vancomycin         Vital signs in last 24 hours:  Temp:  [97.5 °F (36.4 °C)-98.6 °F (37 °C)] 98.1 °F (36.7 °C)  Heart Rate:  [63-93] 76  Resp:  [16-18] 17  BP: (105-133)/(45-63) 123/49    Intake/Output:    Intake/Output Summary (Last 24 hours) at 17 1523  Last data filed at 17 1300   Gross per 24 hour   Intake    654 ml   Output   1400 ml   Net   -746 ml       Exam:  Visit Vitals   • /49 (BP Location: Left arm, Patient Position: Lying)   • Pulse 76   • Temp 98.1 °F (36.7 °C) (Oral)   • Resp 17   • Ht 74\" (188 cm)   • Wt 195 lb 1.6 oz (88.5 kg)   • SpO2 100%   • BMI 25.05 kg/m2       General Appearance:    Alert, cooperative, no distress, AAOx3                          Head:    Normocephalic, without obvious abnormality, atraumatic                           Eyes:    PERRL, conjunctiva/corneas clear, EOM's intact, both eyes                         Throat:   Lips, tongue, gums normal; oral mucosa pink and moist                           Neck:   Supple, symmetrical, trachea midline, no JVD                         Lungs:    Clear to auscultation bilaterally, respirations unlabored                 Chest Wall:    No tenderness or deformity                          Heart:    Regular rate and rhythm, S1 and S2 normal, no murmur,no  Rub              "                         or gallop                  Abdomen:     Soft, non-tender, bowel sounds active, no masses, no                                                        organomegaly                  Extremities:   Extremities normal, atraumatic, no cyanosis or edema                        Pulses:   Pulses palpable in all extremities                            Skin:   Skin is warm and dry,  no rashes or palpable lesions                  Neurologic:   CNII-XII intact, motor strength grossly intact, sensation grossly                                         intact to light touch, no focal deficits noted       Data Review:  Lab Results   Component Value Date    WBC 6.52 02/08/2017    HGB 8.6 (L) 02/08/2017    HCT 26.0 (L) 02/08/2017     02/08/2017     Lab Results   Component Value Date     02/08/2017    K 4.3 02/08/2017    CL 99 02/08/2017    CO2 20.8 (L) 02/08/2017    BUN 27 (H) 02/08/2017    CREATININE 4.95 (H) 02/08/2017    GLUCOSE 95 02/08/2017     Lab Results   Component Value Date    CALCIUM 9.0 02/08/2017    MG 2.2 02/06/2017     Lab Results   Component Value Date    AST 14 02/08/2017    ALT 11 02/08/2017    ALKPHOS 117 02/08/2017     Lab Results   Component Value Date    INR 1.56 (H) 02/08/2017       Assessment:  Active Problems:    Sepsis  esrd on hd  Atrial fibrillation permanent  Hypertension  mrsa bacteremia  BRADYCARDIA NO PPM FOR NOW SEC TO INFECTION      Plan:  CPM  CHANGE XANX TO VALIUM  D/ RESTRAIS  IV ABX  AC  NO PPM UNTIL ABX COMPLETED  HDC TIW  PT/OT  SUPPORTIVE CARE  JESSICA No MD  2/8/2017  3:23 PM

## 2017-02-08 NOTE — PROGRESS NOTES
Floyd Fraga MD       LOS: 19 days   Patient Care Team:  No Known Provider as PCP - General    Subjective     76 y.o. male with functioning right palindrome catheter for dialysis.  Right arm wounds healing nicely after removal of infected arteriovenous shunt and hero catheter.  Left port site clean and healing without signs infection.    Review of Systems  Review of Systems - Negative except history of present illness      Objective     Vital Signs  Temp:  [97.5 °F (36.4 °C)-99.8 °F (37.7 °C)] 98.3 °F (36.8 °C)  Heart Rate:  [] 63  Resp:  [14-18] 16  BP: (105-133)/(45-66) 105/48    Physical Exam  General: No acute distress. Alert and oriented x 4  HEENT: No jugular venous distension, trachea is midline  CV: RRR, S1S2  Resp: Clear unlabored breathing  Abd: Abdomen is soft, nontender, nondistended  Extremities: Right arm wounds healing and granulating.  Right palindrome catheter site clean.  Left port removal incision healing with no signs infection.    Results Review:       Recent Results (from the past 12 hour(s))   aPTT    Collection Time: 02/07/17  7:03 PM   Result Value Ref Range    PTT 40.8 (H) 22.7 - 35.4 seconds   aPTT    Collection Time: 02/08/17  2:15 AM   Result Value Ref Range    PTT 59.5 (H) 22.7 - 35.4 seconds   Protime-INR    Collection Time: 02/08/17  2:15 AM   Result Value Ref Range    Protime 18.1 (H) 11.7 - 14.2 Seconds    INR 1.56 (H) 0.90 - 1.10   Comprehensive Metabolic Panel    Collection Time: 02/08/17  2:15 AM   Result Value Ref Range    Glucose 95 65 - 99 mg/dL    BUN 27 (H) 8 - 23 mg/dL    Creatinine 4.95 (H) 0.76 - 1.27 mg/dL    Sodium 139 136 - 145 mmol/L    Potassium 4.3 3.5 - 5.2 mmol/L    Chloride 99 98 - 107 mmol/L    CO2 20.8 (L) 22.0 - 29.0 mmol/L    Calcium 9.0 8.6 - 10.5 mg/dL    Total Protein 7.6 6.0 - 8.5 g/dL    Albumin 2.90 (L) 3.50 - 5.20 g/dL    ALT (SGPT) 11 1 - 41 U/L    AST (SGOT) 14 1 - 40 U/L    Alkaline Phosphatase 117 39 - 117 U/L    Total Bilirubin 1.1  0.1 - 1.2 mg/dL    eGFR Non African Amer 12 (L) >60 mL/min/1.73    Globulin 4.7 gm/dL    A/G Ratio 0.6 g/dL    BUN/Creatinine Ratio 5.5 (L) 7.0 - 25.0    Anion Gap 19.2 mmol/L   CBC Auto Differential    Collection Time: 02/08/17  2:15 AM   Result Value Ref Range    WBC 6.52 4.50 - 10.70 10*3/mm3    RBC 2.73 (L) 4.60 - 6.00 10*6/mm3    Hemoglobin 8.6 (L) 13.7 - 17.6 g/dL    Hematocrit 26.0 (L) 40.4 - 52.2 %    MCV 95.2 79.8 - 96.2 fL    MCH 31.5 27.0 - 32.7 pg    MCHC 33.1 32.6 - 36.4 g/dL    RDW 15.8 (H) 11.5 - 14.5 %    RDW-SD 54.9 (H) 37.0 - 54.0 fl    MPV 9.8 6.0 - 12.0 fL    Platelets 206 140 - 500 10*3/mm3    Neutrophil % 68.1 42.7 - 76.0 %    Lymphocyte % 17.8 (L) 19.6 - 45.3 %    Monocyte % 6.9 5.0 - 12.0 %    Eosinophil % 6.3 (H) 0.3 - 6.2 %    Basophil % 0.9 0.0 - 1.5 %    Immature Grans % 0.0 0.0 - 0.5 %    Neutrophils, Absolute 4.44 1.90 - 8.10 10*3/mm3    Lymphocytes, Absolute 1.16 0.90 - 4.80 10*3/mm3    Monocytes, Absolute 0.45 0.20 - 1.20 10*3/mm3    Eosinophils, Absolute 0.41 0.00 - 0.70 10*3/mm3    Basophils, Absolute 0.06 0.00 - 0.20 10*3/mm3    Immature Grans, Absolute 0.00 0.00 - 0.03 10*3/mm3   ]      Assessment/Plan           Active Problems:    Sepsis      Assessment & Plan  76 y.o. male doing well after multiple surgeries.  Patient back on intravenous heparin to warfarin.  Plans for pacemaker timing per cardiology and infectious disease and medicine.  Placement underway.  Continue wound care right arm.  Continue catheter-based dialysis.  I will see as needed.      Floyd Fraga MD  02/08/17  6:45 AM

## 2017-02-08 NOTE — PROGRESS NOTES
[facility]  Cardiology Progress Note    Patient Identification:  Name: Ze Wu  Age: 76 y.o.  Sex: male  :  1940  MRN: [id number]                 Follow Up / Chief Complaint: CAD, (permanent) afib,variable vr with bradycardia,CHF,  HTN    Interval History:  This is a 76-year-old  male with a history of  coronary artery disease, chronic A. fib on anticoagulation with Coumadin, history of congestive heart failure, hypertension. We have been asked to consult regarding atrial fibrillation with variable ventricular rates and supratherapuetic INR.       Subjective:     Denies chest pain/pressure, n/v or SOA    Objective:   Remains on heparin drip bridge therapy.  INR 1.56 today   No bradycardia, pauses > 3 seconds or RVR.       Past Medical History:  Past Medical History   Diagnosis Date   • Anemia    • Anxiety    • CHF (congestive heart failure)    • Chronic a-fib    • Chronic anticoagulation    • Confusion    • Disease of thyroid gland    • GERD (gastroesophageal reflux disease)    • Hypertension    • Hypothyroidism    • Kidney disease    • Lymphoma    • Staph aureus infection 2017     labs called from [facility]     Past Surgical History:  Past Surgical History   Procedure Laterality Date   • Mediport insertion, single     • Thumb amputation Left    • Av fistula placement Right    • Arteriovenous fistula/shunt surgery Right 2017     Procedure: ultrasound access of right internal jugular vein with mahurkar dialysis catheter placement, EXCISION INFECTED RIGTH ARM AV GRAFT WITH REMOVAL OF HERO CATHETER, REMOVAL OF FEMORAL DIALYSIS CATHETER;  Surgeon: Floyd Fraga MD;  Location: [facility] OR ;  Service:    • Pr insj tunneled cvc w/o subq port/ age 6 yr/> Right 2017     Procedure: RT INTERNAL JUGULAR PALINDROME CATHETER PLACEMENT;  Surgeon: Floyd Fraga MD;  Location: [facility];  Service: Vascular   • Venous access device  (port) removal Left 2/2/2017     Procedure: LT CHEST PORT REMOVAL ;  Surgeon: Floyd Fraga MD;  Location: [facility] OR;  Service:         Social History:   Social History   Substance Use Topics   • Smoking status: Never Smoker   • Smokeless tobacco: Not on file   • Alcohol use No      Family History:  History reviewed. No pertinent family history.       Allergies:  No Known Allergies     Scheduled Meds:    aspirin 81 mg Daily   epoetin bacilio 10,000 Units Once per day on Mon Wed Fri   isosorbide mononitrate 30 mg Q24H   lansoprazole 30 mg QAM   levothyroxine 100 mcg Daily   Vancomycin Pharmacy Intermittent Dosing  Daily       INTAKE AND OUTPUT:    Intake/Output Summary (Last 24 hours) at 02/08/17 1008  Last data filed at 02/08/17 0900   Gross per 24 hour   Intake    294 ml   Output   1400 ml   Net  -1106 ml       Review of Systems:   GI: no abdominal pain  Cardiac  No chest pain or palpitations  Pulmonary:  No SOA or cough    Constitutional:  Temp:  [97.5 °F (36.4 °C)-98.6 °F (37 °C)] 98.6 °F (37 °C)  Heart Rate:  [63-93] 74  Resp:  [16-18] 18  BP: (105-133)/(45-63) 117/53    Physical Exam by Tristan Mojica MD    General:  Resting quietly   Appears in no acute distress  Eyes: PERTL,  HEENT:  Thyroid not visibly enlarged.  Oral mucosal moist, no cyanosis  Respiratory: Respirations regular and unlabored at rest. BBS with air entry in all fields  No crackles or wheezes auscultated.  Left subclavian Mediport and  Right HD catheter noted  Cardiovascular: S1S2 irregular rate and rhythm. Unchanged I/VI systolic murmur without  gallop.. No pretibial pitting edema.    Gastrointestinal: Abdomen soft, non tender. Bowel sounds present.   Musculoskeletal: ALEGRIA x4. No abnormal movements  Extremities: No digital cyanosis.   Skin: Color pale. pink. Skin warm and dry to touch.   Neuro: AAO x2 CN II-XII grossly intact  Psych: pleasant and cooperative          Cardiographics    Telemetry: afib VR 60-70's, occasional PVC,  no pauses >3.0 sec         Echocardiogram:   · All left ventricular wall segments contract normally.  · Left atrial cavity size is mildly dilated.  · Mild to moderate aortic valve stenosis is present.  · Mild to moderate tricuspid valve regurgitation is present.  · Left Ventricle: Calculated EF = 66.7%.  · There is no evidence of pericardial effusion           Lab Review      2/8/2017 02:15   Protime 18.1 (H)   INR 1.56 (H)       Results from last 7 days  Lab Units 02/08/17 0215 02/07/17 0412 02/06/17  0531   WBC 10*3/mm3 6.52 7.75 8.62   HEMOGLOBIN g/dL 8.6* 8.8* 9.2*   HEMATOCRIT % 26.0* 27.0* 28.2*   PLATELETS 10*3/mm3 206 286 307       Results from last 7 days  Lab Units 02/08/17 0215 02/07/17  1903 02/07/17  0412 02/06/17  0531   INR  1.56*  --  1.30* 1.31*   APTT seconds 59.5* 40.8* 38.5* 40.5*         Assessment:  - (Permanent) atrial fibrillation  - sick sinus syndrome  - elevated [id number] score  - elevated troponin  - CAD  - HTN    - a/c anemia -> IM, Renal  - Pneumonia  - Pulmonary septic emboli -> antibiotic as per ID  - Acute hypoxic respiratory failure -> pulmonary  - MSSA septicemia -> antibiotic as per ID  - Infected AV fistula -> ID, Renal, vascular surgery  - ESRD-> Renal      Assessment & Plan:  - (Permanent) atrial fibrillation - elevated [id number] score => reloading Coumadin with low-dose heparin drip bridge therapy  until INR >/= 2.0. Then dose Coumadin to maintain target INR 2.0-3.0 range    - sick sinus syndrome  - Awaiting clearance from ID for permanent pacemaker implant . Avoiding use of beta blocker and calcium channel blocker due to bradycardia    - elevated troponin - CPK-MB is normal. No angina. Plan ooptimized antianginal therapy with aspirin and nitrate, no BB or CCB due to sick sinus syndrome/bradycardia    - HTN - controlled on  hydralazine.  Avoiding use of beta blocker and calcium channel blocker due to bradycardia      Continue heparin bridge therapy until INR >/= 2.0    INR ordered for a.m.    Continue supportive management. ID has recommended against placement of permanent pacemaker pending repeat blood cultures one week after finishing current antibiotic therapy (approximately 3/13/2017)    . Tristan Mojica MD  2/8/17      EMR Dragon/Transcription disclaimer:   Much of this encounter note is an electronic transcription/translation of spoken language to printed text. The electronic translation of spoken language may permit erroneous, or at times, nonsensical words or phrases to be inadvertently transcribed; Although I have reviewed the note for such errors, some may still exist.
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
## 2017-02-08 NOTE — CONSULTS
"  Referring Provider:  Dr. Rc No  Reason for Consultation: HALLUCINATIONS    Patient Care Team:  No Known Provider as PCP - General    Subjective .     History of present illness:  76 y.o. wm transferred her from [facility] in January for complications from end stage renal disease, pneumonia, sepsis, and anemia in addition to various comorbid issues.  Pt seen for ongoing delirium and \"hallucinations.\"  Staff reports no \"hallucinations\" noted in over 24 hours.  The pt was pleasant, but confused and oriented only to self.  This appears to be improving though per staff; previously, he was not even coherent enough in presentation to even be understood.  He knew, with some time, that he was in [place], but not for how long or for what reasons.  He denied any \"hallucinations\" at this time and there was none noted with his presentation.  He denied any mood issues or SI/HI.  When asked about the date, he was certain it was august and when corrected wouldn't believe me that it was February.  Per report from the Access staff interaction with the pt's family, he is usually functional and cognitively intact, but has continued to have delirium more and more with illness episodes.  That appears to be the case here as well.  Staff has not needed to give any xanax today.  Despite some improvement noted, the pt remains a poor historian.    Review of Systems   Pertinent items are noted in HPI    History    Psych hx:  Unable to fully obtain, but no overt hx reported by family.  No overt CD issues of note.  No hx/o SI or HI or innate psychosis noted. Pt is , but has excellent support network with family.    Past Medical History   Diagnosis Date   • Anemia    • Anxiety    • CHF (congestive heart failure)    • Chronic a-fib    • Chronic anticoagulation    • Confusion    • Disease of thyroid gland    • GERD (gastroesophageal reflux disease)    • Hypertension    • Hypothyroidism    • Kidney disease    • Lymphoma    • " Staph aureus infection 01/21/2017     labs called from [facility]   ,   Past Surgical History   Procedure Laterality Date   • Mediport insertion, single     • Thumb amputation Left    • Av fistula placement Right    • Arteriovenous fistula/shunt surgery Right 1/23/2017     Procedure: ultrasound access of right internal jugular vein with mahurkar dialysis catheter placement, EXCISION INFECTED RIGTH ARM AV GRAFT WITH REMOVAL OF HERO CATHETER, REMOVAL OF FEMORAL DIALYSIS CATHETER;  Surgeon: Floyd Fraga MD;  Location: [facility] OR 18/19;  Service:    • Pr insj tunneled cvc w/o subq port/ age 6 yr/> Right 1/31/2017     Procedure: RT INTERNAL JUGULAR PALINDROME CATHETER PLACEMENT;  Surgeon: Floyd Fraga MD;  Location: [facility] OR;  Service: Vascular   • Venous access device (port) removal Left 2/2/2017     Procedure: LT CHEST PORT REMOVAL ;  Surgeon: Floyd Fraga MD;  Location: [facility] OR;  Service:    , History reviewed. No pertinent family history.,   Social History   Substance Use Topics   • Smoking status: Never Smoker   • Smokeless tobacco: None   • Alcohol use No    and   Prescriptions Prior to Admission   Medication Sig Dispense Refill Last Dose   • ALPRAZolam (XANAX) 0.25 MG tablet Take 0.25 mg by mouth Every Night.      • atenolol (TENORMIN) 50 MG tablet Take 50 mg by mouth 2 (Two) Times a Day.      • diltiaZEM (CARDIZEM) 30 MG tablet Take 30 mg by mouth 2 (Two) Times a Day.      • furosemide (LASIX) 80 MG tablet Take 80 mg by mouth Daily.      • hydrALAZINE (APRESOLINE) 25 MG tablet Take 25 mg by mouth Daily.      • levothyroxine (SYNTHROID, LEVOTHROID) 100 MCG tablet Take 100 mcg by mouth Daily.      • warfarin (COUMADIN) 2.5 MG tablet Take 2.5 mg by mouth Daily.          Objective     Vital Signs   Temp:  [97.5 °F (36.4 °C)-99.8 °F (37.7 °C)] 98.6 °F (37 °C)  Heart Rate:  [] 74  Resp:  [14-18] 18  BP: (105-133)/(45-66) 117/53    Mental Status Exam:  "   Hygiene:   limited  Cooperation:  mod coop  Eye Contact:  mod  Psychomotor Behavior:  Restless  Affect:  irritable at times, blunted  Hopelessness: Denies  Speech:  Rambling  Thought Progress:  Disorganized  Thought Content:  impaired, but no avh noted now  Suicidal:  None  Homicidal:  None  Hallucinations:  Not demonstrated today  Delusion:  None  Memory:  global deficits  Orientation:  Person and \"[place]\"  Reliability:  poor  Insight:  impaired  Judgement:  Impaired    Medications and allergies reviewed yes    Assessment/Plan     Active Problems:    Sepsis     Diagnostic impression:  1.  Delirium from recent pneumonia, sepsis, anemia, ERD, and complications form age/comorbid health issues.    Recommendations:  1.  Continue with ongoing medical care and tx.  Pt reportedly making progress, but given hx, it is likely to be a protracted process.    2.  Pt remains non-decisional.  3.  Recommend valium as alternative to xanax, longer acting and less likely to be agitating for the pt.   4.  F/u per primary team.  5.  No overt psych issues of note at this time.  6.  No evidence of dementia or other chronic cognitive issues from chart or report from family.  7.  Appreciate consult, case discussed with pt and staff.     Rivas Ham MD  02/08/17  9:52 AM          "

## 2017-02-08 NOTE — PLAN OF CARE
Problem: Patient Care Overview (Adult)  Goal: Plan of Care Review  Outcome: Ongoing (interventions implemented as appropriate)    02/08/17 0327   Coping/Psychosocial Response Interventions   Plan Of Care Reviewed With patient   Patient Care Overview   Progress progress toward functional goals is gradual   Outcome Evaluation   Outcome Summary/Follow up Plan Patient restless at times and still pulls at lines. Heparin gtt started per orders.         Problem: SAFETY - NON-VIOLENT RESTRAINT  Goal: Remains free of injury from restraints (Non-Violent Restraint)  Outcome: Ongoing (interventions implemented as appropriate)  Goal: Free from restraint(s) (Non-Violent Restraint)  Outcome: Ongoing (interventions implemented as appropriate)

## 2017-02-08 NOTE — PLAN OF CARE
Problem: Patient Care Overview (Adult)  Goal: Plan of Care Review  Outcome: Ongoing (interventions implemented as appropriate)    02/08/17 1057   Coping/Psychosocial Response Interventions   Plan Of Care Reviewed With patient   Patient Care Overview   Progress improving   Outcome Evaluation   Outcome Summary/Follow up Plan Pt VSS. Not pulling at lines and pleasantly confused. D/c'd restraints a little after 1000. Frequent rounding. Dressing changed. Bed alarm on. Q2 turns. Tolerating diet. Heparin gtt adjusted per orders. Will continue to monitor.         Problem: Chronic Kidney Disease/End Stage Renal Disease (Adult)  Goal: Signs and Symptoms of Listed Potential Problems Will be Absent or Manageable (Chronic Kidney Disease/End Stage Renal Disease)  Outcome: Ongoing (interventions implemented as appropriate)    Problem: Confusion, Chronic (Adult)  Goal: Cognitive/Functional Impairments Minimized  Outcome: Ongoing (interventions implemented as appropriate)  Goal: Free from Harm/Injuries  Outcome: Ongoing (interventions implemented as appropriate)    Problem: Pneumonia (Adult)  Goal: Signs and Symptoms of Listed Potential Problems Will be Absent or Manageable (Pneumonia)  Outcome: Ongoing (interventions implemented as appropriate)    Problem: Perioperative Period (Adult)  Goal: Signs and Symptoms of Listed Potential Problems Will be Absent or Manageable (Perioperative Period)  Outcome: Ongoing (interventions implemented as appropriate)  Goal: Signs and Symptoms of Listed Potential Problems Will be Absent or Manageable (Perioperative Period)  Outcome: Ongoing (interventions implemented as appropriate)

## 2017-02-09 NOTE — SIGNIFICANT NOTE
02/09/17 1506   Rehab Treatment   Discipline speech language pathologist   Rehab Evaluation   Evaluation Not Performed patient unavailable for evaluation  (pt at dialysis; looking over notes, trace penetration of thin; currently tolerating Nectar thick & mech soft.  lungs are  clear.  )   Recommendations   SLP - Next Appointment (Suggest follow w/ diet tolerance & do repeat VFSS if diet upgrade is warranted once pt is fully back to baseline. )

## 2017-02-09 NOTE — SIGNIFICANT NOTE
02/09/17 1118   Rehab Treatment   Discipline physical therapy assistant   Treatment Not Performed patient unavailable for treatment  (Pt off floor at dialysis this am. Will follow-up in pm.)   Recommendation   PT - Next Appointment 02/09/17

## 2017-02-09 NOTE — SIGNIFICANT NOTE
02/09/17 1556   Rehab Treatment   Discipline physical therapist   Treatment Not Performed patient unavailable for treatment  (Pt yelling out, PT entered room.  Attempted re-orientation, distraction, education pt continued to yell out, refuse, and speak about his .  RN present and attempted as well.  Plan to attempt PT 2/10.  )   Recommendation   PT - Next Appointment 02/10/17

## 2017-02-09 NOTE — PROGRESS NOTES
"Pharmacokinetic Consult - Vancomycin Dosing (Follow-up Note)  Patient: Ze Wu  : 1940  MRN: [id number]  Admit date: 2017  8:35 PM    Day 20  Consult for Dr No  Treating: sepsis/bacteremia, infected graft s/p removal  Goal pre-HD level: 20 mcg/ml    Relevant clinical data and objective history reviewed:  76 y.o. male 74\" (188 cm) 159 lb 6.4 oz (72.3 kg)  Plan for 6 weeks therapy (stop date 3/6/17)    CREATININE   Date Value Ref Range Status   2017 4.95 (H) 0.76 - 1.27 mg/dL Final   2017 7.21 (H) 0.76 - 1.27 mg/dL Final     BUN   Date Value Ref Range Status   2017 27 (H) 8 - 23 mg/dL Final     ESRD, HD qTTSa    Lab Results   Component Value Date    WBC 6.70 2017    WBC 6.52 2017    WBC 7.75 2017     Temp Readings from Last 3 Encounters:   17 98.1 °F (36.7 °C) (Oral)     Baseline cultures/labs/radiology:   Blood cx 2/2: MRSA+ ([facility])   Sputum cx: Pending ([facility])   Blood cx= MRSA+   CXR: Mild cardiomegaly, pulmonary mass or congestion. Areas of opacity in the right lung.   Wound cx (RUE AV graft): MRSA+   Tissue cx( Rt arm): 3+ MRSA   Blood cx 2/2: NG x 5 days   CT chest: Numerous pulmonary nodules within both lungs. Two of these lesions are cavitary along the anterior superior aspect of the right upper lobe. The largest measures up to 4 cm. Margins are ill-defined. There is some surrounding inflammation or infiltrate within the right upper lobe. Differential considerations include cavitary infectious etiologies or septic emboli. An atypical or fungal process cannot be excluded. Cavitary malignancy and metastatic disease cannot be excluded.   Blood cx: NG x 5 days   Wound cx (Lt chest): MRSA+    IV Anti-Infectives     Ordered     Dose/Rate Route Frequency Start Stop    17 1444  Vancomycin Pharmacy Intermittent Dosing     Ordering Provider:  Rc No MD     Does not apply Daily " 01/21/17 1515      01/21/17 1037  Pharmacy to dose vancomycin     Ordering Provider:  Rc No MD     Does not apply Continuous PRN 01/21/17 1037           Vancomycin dosing history:   Dosing intermittently based on HD. Previously receiving vanco 1250mg IV after each HD session based on initial dosing history. Last dose on 2/4. Random level supratherapeutic on 2/6, dosing held.  2/9 0339 random= 24.6 mcg/ml    Assessment/Plan  1. Level is back in desired pre-HD range of 20-25 mcg/ml. Will give vancomycin 500mg post-HD today.  2. Will recheck random level on 2/11 AM to evaluate.  Pharmacy will continue to follow daily while on vancomycin and adjust as needed.     Thank you,  Dia Anguiano, PharmD, BCPS  02/09/17 1:37 PM

## 2017-02-09 NOTE — PROGRESS NOTES
"[facility]  Cardiology Progress Note    Patient Identification:  Name: Ze Wu  Age: 76 y.o.  Sex: male  :  1940  MRN: [id number]                 Follow Up / Chief Complaint: CAD, (permanent) afib,variable vr with bradycardia,CHF,  HTN    Interval History:  This is a 76-year-old  male with a history of  coronary artery disease, chronic A. fib on anticoagulation with Coumadin, history of congestive heart failure, hypertension. We have been asked to consult regarding atrial fibrillation with variable ventricular rates and supratherapuetic INR.       Subjective:     \"Didn't sleep well last night\". Denies chest pain, pressure or SOA      Objective:   INR 2.11 today   Tele reviewed - episodes of poor lead contact, but no sustained bradycardia, pauses > 3 seconds or RVR. H/H 8.0/24.2 (on Epogen)      Past Medical History:  Past Medical History   Diagnosis Date   • Anemia    • Anxiety    • CHF (congestive heart failure)    • Chronic a-fib    • Chronic anticoagulation    • Confusion    • Disease of thyroid gland    • GERD (gastroesophageal reflux disease)    • Hypertension    • Hypothyroidism    • Kidney disease    • Lymphoma    • Staph aureus infection 2017     labs called from [facility]     Past Surgical History:  Past Surgical History   Procedure Laterality Date   • Mediport insertion, single     • Thumb amputation Left    • Av fistula placement Right    • Arteriovenous fistula/shunt surgery Right 2017     Procedure: ultrasound access of right internal jugular vein with mahurkar dialysis catheter placement, EXCISION INFECTED RIGTH ARM AV GRAFT WITH REMOVAL OF HERO CATHETER, REMOVAL OF FEMORAL DIALYSIS CATHETER;  Surgeon: Floyd Fraga MD;  Location: [facility] OR ;  Service:    • Pr insj tunneled cvc w/o subq port/ age 5 yr/> Right 2017     Procedure: RT INTERNAL JUGULAR PALINDROME CATHETER PLACEMENT;  Surgeon: Floyd Frgaa MD;  " Location: [facility] OR;  Service: Vascular   • Venous access device (port) removal Left 2/2/2017     Procedure: LT CHEST PORT REMOVAL ;  Surgeon: Floyd Fraga MD;  Location: [facility];  Service:         Social History:   Social History   Substance Use Topics   • Smoking status: Never Smoker   • Smokeless tobacco: Not on file   • Alcohol use No      Family History:  History reviewed. No pertinent family history.       Allergies:  No Known Allergies     Scheduled Meds:    aspirin 81 mg Daily   epoetin bacilio 10,000 Units Once per day on Mon Wed Fri   isosorbide mononitrate 30 mg Q24H   lansoprazole 30 mg QAM   levothyroxine 100 mcg Daily   Vancomycin Pharmacy Intermittent Dosing  Daily       INTAKE AND OUTPUT:    Intake/Output Summary (Last 24 hours) at 02/09/17 0742  Last data filed at 02/09/17 0556   Gross per 24 hour   Intake 798.48 ml   Output      0 ml   Net 798.48 ml       Review of Systems:   GI: no n/v  Cardiac  No chest pain or pressure  Pulmonary:  No SOA or cough    Constitutional:  Temp:  [97.8 °F (36.6 °C)-98.6 °F (37 °C)] 97.8 °F (36.6 °C)  Heart Rate:  [70-89] 89  Resp:  [17-20] 18  BP: (111-130)/(49-61) 130/51    Physical Exam by Tristan Mojica MD    General:  Resting quietly   Appears in no acute distress  Eyes: PERTL,  HEENT:  Thyroid not visibly enlarged.  Oral mucosal moist, no cyanosis  Respiratory: Respirations regular and unlabored at rest. BBS with air entry in all fields  No crackles or wheezes auscultated.  Left subclavian Mediport and  Right HD catheter noted  Cardiovascular: S1S2 irregular rate and rhythm. Unchanged I/VI systolic murmur without rub or gallop.No pretibial pitting edema.    Gastrointestinal: Abdomen soft, flat and non tender. Bowel sounds present.   Musculoskeletal: ALEGRIA x4. No abnormal movements  Extremities: No digital cyanosis.   Skin: Color pale pink. Skin warm and dry to touch.   Neuro: AAO x2 CN II-XII grossly intact  Psych: pleasant and  cooperative          Cardiographics    Telemetry: afib VR 60-70's, occasional PVC, no pauses >3.0 sec         Echocardiogram:   · All left ventricular wall segments contract normally.  · Left atrial cavity size is mildly dilated.  · Mild to moderate aortic valve stenosis is present.  · Mild to moderate tricuspid valve regurgitation is present.  · Left Ventricle: Calculated EF = 66.7%.  · There is no evidence of pericardial effusion           Lab Review     Results from last 7 days  Lab Units 02/09/17  0339 02/08/17 0215 02/07/17 0412   WBC 10*3/mm3 6.70 6.52 7.75   HEMOGLOBIN g/dL 8.0* 8.6* 8.8*   HEMATOCRIT % 24.2* 26.0* 27.0*   PLATELETS 10*3/mm3 208 206 286       Results from last 7 days  Lab Units 02/09/17  0339 02/08/17  1821 02/08/17  1119 02/08/17 0215  02/07/17 0412   INR  2.11*  --   --  1.56*  --  1.30*   APTT seconds 84.4* 39.0* 54.5* 59.5*  < > 38.5*   < > = values in this interval not displayed.      Assessment:  - (Permanent) atrial fibrillation  - sick sinus syndrome  - elevated [id number] score  - elevated troponin  - CAD  - HTN    - a/c anemia -> IM, Renal  - s/p Pneumonia  - Pulmonary septic emboli -> antibiotic as per ID  - s/p acute hypoxic respiratory failure -> pulmonary  - MSSA septicemia -> antibiotic as per ID  - Infected AV fistula -> ID, Renal, vascular surgery  - ESRD-> Renal      Assessment & Plan:  - (Permanent) atrial fibrillation - elevated [id number] score => on Coumadin. INR therapeutic. Dose Coumadin to maintain target INR 2.0-3.0 range    - sick sinus syndrome  - Awaiting clearance from ID for permanent pacemaker implant . Avoiding use of beta blocker and calcium channel blocker due to bradycardia    - elevated troponin - CPK-MB is normal. No angina. Plan optimized antianginal therapy with aspirin and nitrate. No BB or CCB due to sick sinus syndrome/bradycardia    - HTN - controlled on  hydralazine.  Avoiding use of beta blocker and calcium channel blocker due to  bradycardia      Stop heparin. Dose Coumadin to maintain target INR 2.0-3.0 range   Continue supportive management. ID has recommended against placement of permanent pacemaker pending repeat blood cultures one week after finishing current antibiotic therapy (approximately 3/13/2017)    . Tristan Mojica MD  2/9/17      EMR Dragon/Transcription disclaimer:   Much of this encounter note is an electronic transcription/translation of spoken language to printed text. The electronic translation of spoken language may permit erroneous, or at times, nonsensical words or phrases to be inadvertently transcribed; Although I have reviewed the note for such errors, some may still exist.

## 2017-02-09 NOTE — PROGRESS NOTES
NEPHROLOGY PROGRESS NOTE    PATIENT IDENTIFICATION:   Name:  Ze Wu      MRN:  [id number]     76 y.o.  male             Reason for visit: ESRD    SUBJECTIVE:   Seen and examined. Confusion improved. Answered questions properly. No SOA or CP  OBJECTIVE:  Vitals:    02/09/17 0500 02/09/17 0700 02/09/17 0730 02/09/17 1020   BP:   125/57 90/50   BP Location:   Left arm Left arm   Patient Position:   Lying Lying   Pulse:  69 70 69   Resp:   17 16   Temp:   98 °F (36.7 °C) 98.1 °F (36.7 °C)   TempSrc:   Oral Oral   SpO2:   96%    Weight: 159 lb 6.4 oz (72.3 kg)      Height:         FiO2 (%): 100 %     Body mass index is 20.47 kg/(m^2).    Intake/Output Summary (Last 24 hours) at 02/09/17 1413  Last data filed at 02/09/17 0900   Gross per 24 hour   Intake 656.72 ml   Output      0 ml   Net 656.72 ml         Exam:  GEN:  Frail.  appears stated age. Confused. On dialysis. Qb 350  EYES:   Anicteric sclera  ENT:    External ears/nose normal, MM are dry  NECK:  No adenopathy, JVP . RIJ TDC  LUNGS: Clear to auscultation   CV:  Irreg,  Bradycardic  ABD:  Non-tender, non-distended, +BS  EXT: No lower ext edema. . Rt TDC    Scheduled meds:      aspirin 81 mg Oral Daily   epoetin bacilio 10,000 Units Intravenous Once per day on Mon Wed Fri   heparin (porcine)      isosorbide mononitrate 30 mg Oral Q24H   lansoprazole 30 mg Oral QAM   levothyroxine 100 mcg Oral Daily   vancomycin 500 mg Intravenous Once   Vancomycin Pharmacy Intermittent Dosing  Does not apply Daily   warfarin 2 mg Oral Daily     IV meds:                          Pharmacy to dose vancomycin        Data Review:      Results from last 7 days  Lab Units 02/08/17  0215 02/07/17  0412 02/06/17  0531  02/03/17  0339   SODIUM mmol/L 139 133* 135*  < > 143   POTASSIUM mmol/L 4.3 4.8 4.4  < > 5.1   CHLORIDE mmol/L 99 94* 98  < > 103   TOTAL CO2 mmol/L 20.8* 18.9* 20.4*  < > 21.1*   BUN mg/dL 27* 40* 31*  < > 22   CREATININE mg/dL 4.95* 7.21* 5.97*  < > 4.28*   CALCIUM  mg/dL 9.0 9.1 9.3  < > 9.6   BILIRUBIN mg/dL 1.1 1.1  --   --  1.0   ALK PHOS U/L 117 123*  --   --  164*   ALT (SGPT) U/L 11 14  --   --  12   AST (SGOT) U/L 14 18  --   --  21   GLUCOSE mg/dL 95 96 96  < > 86   < > = values in this interval not displayed.    Estimated Creatinine Clearance: 13.2 mL/min (by C-G formula based on Cr of 4.95).      Results from last 7 days  Lab Units 02/06/17  0531   MAGNESIUM mg/dL 2.2         Results from last 7 days  Lab Units 02/09/17  0339 02/08/17 0215 02/07/17 0412 02/06/17  0531 02/05/17  2107   WBC 10*3/mm3 6.70 6.52 7.75 8.62 8.79   HEMOGLOBIN g/dL 8.0* 8.6* 8.8* 9.2* 8.9*   PLATELETS 10*3/mm3 208 206 286 307 326         Results from last 7 days  Lab Units 02/09/17  0339 02/08/17 0215 02/07/17  0412 02/06/17  0531 02/05/17  0705   INR  2.11* 1.56* 1.30* 1.31* 1.43*             ASSESSMENT:   Active Problems:    Sepsis    1. ESRD on HD TTS.  2. Hypercaogulopathy .  3. MRSA bacteremia: SP graft excision and port removal.   4. Encephalopathy. Still confused.   5. Anemia of ESRD. Procrit with HD.   6. Secondary hyperparathyroidism  7.  Pulmonary septic emboli  8. HTN. Too controlled. Dc hydralazine.    9. Hypothyroid on replacement.     Plan:     1. Seen on HD   2. Procrit with HD.      Ariadne Gonsales MD  2/9/2017    2:13 PM

## 2017-02-09 NOTE — PLAN OF CARE
Problem: Patient Care Overview (Adult)  Goal: Plan of Care Review  Outcome: Ongoing (interventions implemented as appropriate)    02/09/17 1021   Coping/Psychosocial Response Interventions   Plan Of Care Reviewed With patient   Patient Care Overview   Progress improving   Outcome Evaluation   Outcome Summary/Follow up Plan Pt VSS. More oriented than yesterday. Heparin gtt stopped and coumadin continued. Dialysis today. Pt tolerating being out of restraints. Needs physical therapy to work with pt before able to d/c. Pain well controlled with lortab. Q2 turns. Will continue to monitor.         Problem: Chronic Kidney Disease/End Stage Renal Disease (Adult)  Goal: Signs and Symptoms of Listed Potential Problems Will be Absent or Manageable (Chronic Kidney Disease/End Stage Renal Disease)  Outcome: Ongoing (interventions implemented as appropriate)    Problem: Confusion, Chronic (Adult)  Goal: Cognitive/Functional Impairments Minimized  Outcome: Ongoing (interventions implemented as appropriate)  Goal: Free from Harm/Injuries  Outcome: Ongoing (interventions implemented as appropriate)    Problem: Pneumonia (Adult)  Goal: Signs and Symptoms of Listed Potential Problems Will be Absent or Manageable (Pneumonia)  Outcome: Ongoing (interventions implemented as appropriate)    Problem: Perioperative Period (Adult)  Goal: Signs and Symptoms of Listed Potential Problems Will be Absent or Manageable (Perioperative Period)  Outcome: Ongoing (interventions implemented as appropriate)  Goal: Signs and Symptoms of Listed Potential Problems Will be Absent or Manageable (Perioperative Period)  Outcome: Ongoing (interventions implemented as appropriate)

## 2017-02-09 NOTE — PROGRESS NOTES
Continued Stay Note  [facility]     Patient Name: Ze Wu  MRN: [id number]  Today's Date: 2/9/2017    Admit Date: 1/20/2017          Discharge Plan       02/09/17 1157    Case Management/Social Work Plan    Plan [facility]ori Wingo skilled with HD - will need precert    Patient/Family In Agreement With Plan yes    Additional Comments At 0757 emailed Casey that patient has been out of restraints since 1000 yesterday. She says she will need to see where he works with PT to attempt precert. Spoke with RN about being sure PT works with patient after HD today. Dr. No has been here and was informed.   2/9/17 1410 Called to Lukas to inform that PT is going to try to work with patient after HD. She will watch for PT notes to attempt precert for DC tomorrow.               Discharge Codes     None        Expected Discharge Date and Time     Expected Discharge Date Expected Discharge Time    Feb 3, 2017             Moshe Wesley RN

## 2017-02-09 NOTE — PROGRESS NOTES
"DAILY PROGRESS NOTE  [facility]    Patient Identification:  Name: Ze Wu  Age: 76 y.o.  Sex: male  :  1940  MRN: [id number]         Primary Care Physician: No Known Provider    Subjective:  DOING SAME  NO NEW COMPLAINTS    Objective:    Scheduled Meds:    aspirin 81 mg Oral Daily   epoetin bacilio 10,000 Units Intravenous Once per day on    isosorbide mononitrate 30 mg Oral Q24H   lansoprazole 30 mg Oral QAM   levothyroxine 100 mcg Oral Daily   vancomycin 500 mg Intravenous Once   Vancomycin Pharmacy Intermittent Dosing  Does not apply Daily   warfarin 2 mg Oral Daily     Continuous Infusions:    Pharmacy to dose vancomycin        Vital signs in last 24 hours:  Temp:  [97 °F (36.1 °C)-98.3 °F (36.8 °C)] 97 °F (36.1 °C)  Heart Rate:  [69-89] 85  Resp:  [15-20] 15  BP: ()/(50-63) 133/63    Intake/Output:    Intake/Output Summary (Last 24 hours) at 17 1508  Last data filed at 17 1400   Gross per 24 hour   Intake 656.72 ml   Output   2000 ml   Net -1343.28 ml       Exam:  Visit Vitals   • /63 (BP Location: Left arm, Patient Position: Lying)   • Pulse 85   • Temp 97 °F (36.1 °C) (Oral)   • Resp 15   • Ht 74\" (188 cm)   • Wt 159 lb 6.4 oz (72.3 kg)   • SpO2 95%   • BMI 20.47 kg/m2       General Appearance:    Alert, cooperative, no distress, AAOx3                          Head:    Normocephalic, without obvious abnormality, atraumatic                           Eyes:    PERRL, conjunctiva/corneas clear, EOM's intact, both eyes                         Throat:   Lips, tongue, gums normal; oral mucosa pink and moist                           Neck:   Supple, symmetrical, trachea midline, no JVD                         Lungs:    Clear to auscultation bilaterally, respirations unlabored                 Chest Wall:    No tenderness or deformity                          Heart:    Regular rate and rhythm, S1 and S2 normal, no murmur,no  Rub                                  "     or gallop                  Abdomen:     Soft, non-tender, bowel sounds active, no masses, no                                                        organomegaly                  Extremities:   Extremities normal, atraumatic, no cyanosis or edema                        Pulses:   Pulses palpable in all extremities                            Skin:   Skin is warm and dry,  no rashes or palpable lesions                  Neurologic:   CNII-XII intact, motor strength grossly intact, sensation grossly                                         intact to light touch, no focal deficits noted       Data Review:  Lab Results   Component Value Date    WBC 6.70 02/09/2017    HGB 8.0 (L) 02/09/2017    HCT 24.2 (L) 02/09/2017     02/09/2017     Lab Results   Component Value Date     02/08/2017    K 4.3 02/08/2017    CL 99 02/08/2017    CO2 20.8 (L) 02/08/2017    BUN 27 (H) 02/08/2017    CREATININE 4.95 (H) 02/08/2017    GLUCOSE 95 02/08/2017     Lab Results   Component Value Date    CALCIUM 9.0 02/08/2017     Lab Results   Component Value Date    AST 14 02/08/2017    ALT 11 02/08/2017    ALKPHOS 117 02/08/2017     Lab Results   Component Value Date    INR 2.11 (H) 02/09/2017       Assessment:  Active Problems:    Sepsis  esrd on hd  Atrial fibrillation permanent  Hypertension  mrsa bacteremia  BRADYCARDIA NO PPM FOR NOW SEC TO INFECTION      Plan:  CPM  CHANGE XANX TO VALIUM  D/C  RESTRAINS  IV ABX  AC  NO PPM UNTIL ABX COMPLETED  HDC TIW  PT/OT  SUPPORTIVE CARE  JESSICA IN AM    Rc No MD  2/9/2017  3:08 PM

## 2017-02-09 NOTE — PLAN OF CARE
Problem: Patient Care Overview (Adult)  Goal: Plan of Care Review  Outcome: Ongoing (interventions implemented as appropriate)    02/09/17 0453   Coping/Psychosocial Response Interventions   Plan Of Care Reviewed With patient   Patient Care Overview   Progress improving   Outcome Evaluation   Outcome Summary/Follow up Plan Patient more alert and oriented. Restraints off since yesterday morning. Needs some redirection about tubes and bandages.          Problem: Confusion, Chronic (Adult)  Goal: Cognitive/Functional Impairments Minimized  Outcome: Ongoing (interventions implemented as appropriate)  Goal: Free from Harm/Injuries  Outcome: Ongoing (interventions implemented as appropriate)

## 2017-02-10 NOTE — PROGRESS NOTES
"DAILY PROGRESS NOTE  [facility]    Patient Identification:  Name: Ze Wu  Age: 76 y.o.  Sex: male  :  1940  MRN: [id number]         Primary Care Physician: No Known Provider    Subjective:  DOING SAME  NO NEW COMPLAINTS    Objective:    Scheduled Meds:    aspirin 81 mg Oral Daily   epoetin bacilio 10,000 Units Intravenous Once per day on    isosorbide mononitrate 30 mg Oral Q24H   lansoprazole 30 mg Oral QAM   levothyroxine 100 mcg Oral Daily   vancomycin 500 mg Intravenous Once   Vancomycin Pharmacy Intermittent Dosing  Does not apply Daily   warfarin 2 mg Oral Daily     Continuous Infusions:    Pharmacy to dose vancomycin        Vital signs in last 24 hours:  Temp:  [97 °F (36.1 °C)-97.7 °F (36.5 °C)] 97.4 °F (36.3 °C)  Heart Rate:  [71-88] 77  Resp:  [15-20] 20  BP: (116-138)/(46-64) 116/46    Intake/Output:    Intake/Output Summary (Last 24 hours) at 02/10/17 1355  Last data filed at 02/10/17 1303   Gross per 24 hour   Intake    210 ml   Output   2000 ml   Net  -1790 ml       Exam:  Visit Vitals   • /46 (BP Location: Left arm, Patient Position: Lying)   • Pulse 77   • Temp 97.4 °F (36.3 °C) (Oral)   • Resp 20   • Ht 74\" (188 cm)   • Wt 159 lb 6.4 oz (72.3 kg)   • SpO2 92%   • BMI 20.47 kg/m2       General Appearance:    Alert, cooperative, no distress, AAOx3                          Head:    Normocephalic, without obvious abnormality, atraumatic                           Eyes:    PERRL, conjunctiva/corneas clear, EOM's intact, both eyes                         Throat:   Lips, tongue, gums normal; oral mucosa pink and moist                           Neck:   Supple, symmetrical, trachea midline, no JVD                         Lungs:    Clear to auscultation bilaterally, respirations unlabored                 Chest Wall:    No tenderness or deformity                          Heart:    Regular rate and rhythm, S1 and S2 normal, no murmur,no  Rub                               "        or gallop                  Abdomen:     Soft, non-tender, bowel sounds active, no masses, no                                                        organomegaly                  Extremities:   Extremities normal, atraumatic, no cyanosis or edema                        Pulses:   Pulses palpable in all extremities                            Skin:   Skin is warm and dry,  no rashes or palpable lesions                  Neurologic:   CNII-XII intact, motor strength grossly intact, sensation grossly                                         intact to light touch, no focal deficits noted       Data Review:  Lab Results   Component Value Date    WBC 6.70 02/09/2017    HGB 8.0 (L) 02/09/2017    HCT 24.2 (L) 02/09/2017     02/09/2017     Lab Results   Component Value Date     02/08/2017    K 4.3 02/08/2017    CL 99 02/08/2017    CO2 20.8 (L) 02/08/2017    BUN 27 (H) 02/08/2017    CREATININE 4.95 (H) 02/08/2017    GLUCOSE 95 02/08/2017     Lab Results   Component Value Date    CALCIUM 9.0 02/08/2017     Lab Results   Component Value Date    AST 14 02/08/2017    ALT 11 02/08/2017    ALKPHOS 117 02/08/2017     Lab Results   Component Value Date    INR 2.62 (H) 02/10/2017       Assessment:  Active Problems:    Sepsis  esrd on hd  Atrial fibrillation permanent  Hypertension  mrsa bacteremia  BRADYCARDIA NO PPM FOR NOW SEC TO INFECTION      Plan:  CPM  CHANGE XANX TO VALIUM  D/C  RESTRAINS  IV ABX  AC  NO PPM UNTIL ABX COMPLETED  HDC TIW  PT/OT  SUPPORTIVE CARE  JESSICA ONCE BED AVAILABLE    Rc No MD  2/10/2017  1:55 PM

## 2017-02-10 NOTE — PROGRESS NOTES
"Pharmacokinetic Consult - Vancomycin Dosing (Follow-up Note)  Patient: Ze Wu  : 1940  MRN: [id number]  Admit date: 2017  8:35 PM    Day 21  Consult for Dr No (Dr. Lozano following for ID)  Treating: sepsis/bacteremia, infected graft s/p removal  Goal pre-HD level ~20 mcg/ml    Relevant clinical data and objective history reviewed:  76 y.o. male 74\" (188 cm) 159 lb 6.4 oz (72.3 kg)  Plan is for 6 weeks therapy, stop date 3/6/17.    CREATININE   Date Value Ref Range Status   2017 4.95 (H) 0.76 - 1.27 mg/dL Final     BUN   Date Value Ref Range Status   2017 27 (H) 8 - 23 mg/dL Final     ESRD on HD qTTSa    Lab Results   Component Value Date    WBC 6.70 2017    WBC 6.52 2017    WBC 7.75 2017     Temp Readings from Last 3 Encounters:   02/10/17 97.4 °F (36.3 °C) (Oral)     Baseline cultures/labs/radiology:   Blood cx 2/2: MRSA+ ([facility])   Sputum cx: Pending ([facility])   Blood cx= MRSA+   CXR: Mild cardiomegaly, pulmonary mass or congestion. Areas of opacity in the right lung.   Wound cx (RUE AV graft): MRSA+   Tissue cx( Rt arm): 3+ MRSA   Blood cx 22: NG x 5 days   CT chest: Numerous pulmonary nodules within both lungs. Two of these lesions are cavitary along the anterior superior aspect of the right upper lobe. The largest measures up to 4 cm. Margins are ill-defined. There is some surrounding inflammation or infiltrate within the right upper lobe. Differential considerations include cavitary infectious etiologies or septic emboli. An atypical or fungal process cannot be excluded. Cavitary malignancy and metastatic disease cannot be excluded.   Blood cx: NG x 5 days   Wound cx (Lt chest): MRSA+    IV Anti-Infectives     Ordered     Dose/Rate Route Frequency Start Stop    17 8664  Vancomycin Pharmacy Intermittent Dosing     Ordering Provider:  Rc No MD     Does not apply Daily 17 1515  "     01/21/17 1037  Pharmacy to dose vancomycin     Ordering Provider:  Rc No MD     Does not apply Continuous PRN 01/21/17 1037           Vancomycin dosing history:   Dosing intermittently based on HD. Previously receiving vanco 1250mg IV after each HD session based on initial dosing history. Last dose on 2/4. Random level supratherapeutic on 2/6, dosing held.  2/9 0339 random= 24.6 mcg/ml, 366gem1 dose ordered, pt refused the dose.    Assessment/Plan  1. Continue to dose intermittently. Spoke with RN, pt refused dose yesterday and was never given per charting in [facility]. Will re-order dose for today (500mg IV x1). Next HD should be tomorrow.  2. Continue with vancomycin random level tomorrow AM.  Pharmacy will continue to follow daily while on vancomycin and adjust as needed.     Thank you,  Dia Anguiano, PharmD, BCPS  02/10/17 1:26 PM

## 2017-02-10 NOTE — PLAN OF CARE
Problem: Patient Care Overview (Adult)  Goal: Plan of Care Review  Outcome: Ongoing (interventions implemented as appropriate)    02/10/17 4595   Coping/Psychosocial Response Interventions   Plan Of Care Reviewed With patient;zachary   Patient Care Overview   Progress improving   Outcome Evaluation   Outcome Summary/Follow up Plan patient is more oriented today and much more cooperative. pt was able to walk with PT today to the door. hopefully he will leave for rehab tomorrow or sunday. c/o pain once--lortab given. IV vanc given. HR afib w/controlled rate 60-80s, BP stable, on room air--IDA Goodson RN       Goal: Adult Individualization and Mutuality  Outcome: Ongoing (interventions implemented as appropriate)  Goal: Discharge Needs Assessment  Outcome: Ongoing (interventions implemented as appropriate)    Problem: Confusion, Chronic (Adult)  Goal: Cognitive/Functional Impairments Minimized  Outcome: Ongoing (interventions implemented as appropriate)  Goal: Free from Harm/Injuries  Outcome: Ongoing (interventions implemented as appropriate)

## 2017-02-10 NOTE — PROGRESS NOTES
Acute Care - Physical Therapy Treatment Note  [facility]     Patient Name: Ze Wu  : 1940  MRN: [id number]  Today's Date: 2/10/2017  Onset of Illness/Injury or Date of Surgery Date: 17          Admit Date: 2017    Visit Dx:    ICD-10-CM ICD-9-CM   1. SSS (sick sinus syndrome) I49.5 427.81   2. 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 Infected venous access port T80.219A 999.31     Patient Active Problem List   Diagnosis   • Sepsis               Adult Rehabilitation Note       02/10/17 1124          Rehab Assessment/Intervention    Discipline physical therapy assistant  -RW      Document Type therapy note (daily note)  -RW      Subjective Information agree to therapy  -RW      Patient Effort, Rehab Treatment fair  -RW      Precautions/Limitations fall precautions  -RW      Recorded by [RW] Jane Beltran PTA      Pain Assessment    Pain Assessment No/denies pain  -RW      Recorded by [RW] Jane Beltran PTA      Cognitive Assessment/Intervention    Current Cognitive/Communication Assessment impaired  -RW      Orientation Status oriented to;person  -RW      Follows Commands/Answers Questions 75% of the time;able to follow single-step instructions;needs cueing  -RW      Personal Safety mild impairment;at risk behaviors demonstrated  -RW      Personal Safety Interventions fall prevention program maintained;gait belt;nonskid shoes/slippers when out of bed  -RW      Recorded by [RW] Jane Beltran PTA      Bed Mobility, Assessment/Treatment    Bed Mobility, Assistive Device bed rails;head of bed elevated  -RW      Bed Mob, Supine to Sit, [place] verbal cues required;contact guard assist;minimum assist (75% patient effort)  -RW      Bed Mob, Sit to Supine, [place] verbal cues required;minimum assist (75% patient effort)  -RW      Recorded by [RW] Jane Beltran PTA      Transfer Assessment/Treatment    Transfers, Sit-Stand [place] verbal cues  required;nonverbal cues required (demo/gesture);contact guard assist;2 person assist required  -RW      Transfers, Stand-Sit [place] verbal cues required;nonverbal cues required (demo/gesture);contact guard assist;2 person assist required  -RW      Transfers, Sit-Stand-Sit, Assist Device rolling walker  -RW      Recorded by [RW] Jane Beltran PTA      Gait Assessment/Treatment    Gait, [place] Level verbal cues required;contact guard assist;minimum assist (75% patient effort);2 person assist required  -RW      Gait, Assistive Device rolling walker  -RW      Gait, Distance (Feet) 60  -RW      Gait, Gait Deviations forward flexed posture;bilateral:;andria decreased;narrow base;step length decreased;stride length decreased  -RW      Gait, Safety Issues balance decreased during turns;step length decreased  -RW      Gait, Comment Pt increased mobility w/ max encouragement  -RW      Recorded by [RW] Jane Beltran PTA      Positioning and Restraints    Pre-Treatment Position in bed  -RW      Post Treatment Position bed  -RW      In Bed supine;call light within reach;encouraged to call for assist;exit alarm on  -RW      Recorded by [RW] Jane Beltran PTA        User Key  (r) = Recorded By, (t) = Taken By, (c) = Cosigned By    Initials Name Effective Dates    RW Jane Beltran PTA 04/06/16 -                 IP PT Goals       02/07/17 1155          Bed Mobility PT LTG    Bed Mobility PT LTG, Time to Achieve 1 wk  -AR      Bed Mobility PT LTG, Date Goal Reviewed 02/07/17  -AR      Bed Mobility PT LTG, Outcome goal ongoing  -AR      Transfer Training PT LTG    Transfer Training PT LTG, Time to Achieve 1 wk  -AR      Transfer Training PT  LTG, Date Goal Reviewed 02/07/17  -AR      Transfer Training PT LTG, Outcome goal ongoing  -AR      Gait Training PT LTG    Gait Training Goal PT LTG, Time to Achieve 1 wk  -AR      Gait Training Goal PT LTG, Date Goal Reviewed 02/07/17  -AR      Gait Training Goal PT LTG,  Outcome goal ongoing  -AR        User Key  (r) = Recorded By, (t) = Taken By, (c) = Cosigned By    Initials Name Provider Type    EILEEN Hernandez PT Physical Therapist          Physical Therapy Education     Title: PT OT SLP Therapies (Active)     Topic: Physical Therapy (Active)     Point: Mobility training (Done)    Learning Progress Summary    Learner Readiness Method Response Comment Documented by Status   Patient Acceptance E,TB,D VU,NR   02/10/17 1244 Done    Acceptance E NL  AR 02/07/17 1157 Active    Acceptance E,TB DU,VU   02/06/17 1405 Done    Acceptance E NR confusion limits education SI 02/05/17 1146 Active    Acceptance E,D NR   02/03/17 1256 Active    Acceptance E,TB,D VU,NR   01/30/17 1352 Done    Acceptance E NR   01/29/17 1019 Active    Acceptance E NR  AR 01/27/17 1602 Active               Point: Home exercise program (Active)    Learning Progress Summary    Learner Readiness Method Response Comment Documented by Status   Patient Acceptance E NL  AR 02/07/17 1157 Active    Acceptance E,TB DU,VU   02/06/17 1405 Done    Acceptance E,D NR   02/03/17 1256 Active    Acceptance E,TB,D VU,NR   01/30/17 1352 Done    Acceptance E NR  AR 01/27/17 1602 Active               Point: Body mechanics (Done)    Learning Progress Summary    Learner Readiness Method Response Comment Documented by Status   Patient Acceptance E,TB,D VU,NR   02/10/17 1244 Done    Acceptance E NL  AR 02/07/17 1157 Active    Acceptance E,TB DU,VU   02/06/17 1405 Done    Acceptance E,D NR   02/03/17 1256 Active    Acceptance E,TB,D VU,NR   01/30/17 1352 Done    Acceptance E NR  AR 01/27/17 1602 Active               Point: Precautions (Done)    Learning Progress Summary    Learner Readiness Method Response Comment Documented by Status   Patient Acceptance E,TB,D VU,NR   02/10/17 1244 Done    Acceptance E NL  AR 02/07/17 1157 Active    Acceptance E,TB DU,VU   02/06/17 1405 Done    Acceptance E,D NR   02/03/17  1256 Active    Acceptance E,TB,D VU,NR  RW 01/30/17 1352 Done    Acceptance E NR  AR 01/27/17 1602 Active                      User Key     Initials Effective Dates Name Provider Type Discipline    SI 05/18/15 -  Debbie Stout, PTA Physical Therapy Assistant PT    JW 02/18/16 -  Shaina Gannon, PTA Physical Therapy Assistant PT    AR 06/27/16 -  Teetee Hernandez, PT Physical Therapist PT    RW 04/06/16 -  Jane Beltran PTA Physical Therapy Assistant PT    CW 12/13/16 -  Moe Wise Physical Therapy Assistant PT                    PT Recommendation and Plan  Anticipated Discharge Disposition: skilled nursing facility  Planned Therapy Interventions: balance training, bed mobility training, gait training, home exercise program, patient/family education, ROM (Range of Motion), strengthening, transfer training  PT Frequency: 5 times/wk  Plan of Care Review  Plan Of Care Reviewed With: patient  Outcome Summary/Follow up Plan: Pt increased mobility w/ max cueing to participate.           Outcome Measures       02/10/17 1124          How much help from another person do you currently need...    Turning from your back to your side while in flat bed without using bedrails? 3  -RW      Moving from lying on back to sitting on the side of a flat bed without bedrails? 3  -RW      Moving to and from a bed to a chair (including a wheelchair)? 3  -RW      Standing up from a chair using your arms (e.g., wheelchair, bedside chair)? 3  -RW      Climbing 3-5 steps with a railing? 2  -RW      To walk in hospital room? 3  -RW      AM-PAC 6 Clicks Score 17  -RW      Functional Assessment    Outcome Measure Options AM-PAC 6 Clicks Basic Mobility (PT)  -RW        User Key  (r) = Recorded By, (t) = Taken By, (c) = Cosigned By    Initials Name Provider Type    RW Jane Beltran, ERICA Physical Therapy Assistant           Time Calculation:         PT Charges       02/10/17 1240          Time Calculation    Start Time 1124  -RW       Stop Time 1137  -      Time Calculation (min) 13 min  -RW      PT Received On 02/10/17  -RW      PT - Next Appointment 02/11/17  -        User Key  (r) = Recorded By, (t) = Taken By, (c) = Cosigned By    Initials Name Provider Type     Jane Beltran PTA Physical Therapy Assistant          Therapy Charges for Today     Code Description Service Date Service Provider Modifiers Qty    85942140456 HC PT THER PROC EA 15 MIN 2/10/2017 Jane Beltran PTA GP 1    41649164770 HC PT THER SUPP EA 15 MIN 2/10/2017 Jane Beltran PTA GP 1          PT G-Codes  Outcome Measure Options: AM-PAC 6 Clicks Basic Mobility (PT)    Jane Beltran PTA  2/10/2017

## 2017-02-10 NOTE — PLAN OF CARE
Problem: Patient Care Overview (Adult)  Goal: Plan of Care Review  Outcome: Ongoing (interventions implemented as appropriate)    02/10/17 1244   Coping/Psychosocial Response Interventions   Plan Of Care Reviewed With patient   Outcome Evaluation   Outcome Summary/Follow up Plan Pt increased mobility w/ max cueing to participate.

## 2017-02-10 NOTE — PLAN OF CARE
Problem: Patient Care Overview (Adult)  Goal: Plan of Care Review  Outcome: Ongoing (interventions implemented as appropriate)    02/10/17 0202   Coping/Psychosocial Response Interventions   Plan Of Care Reviewed With patient   Patient Care Overview   Progress improving   Outcome Evaluation   Outcome Summary/Follow up Plan PT VSS. PT REFUSING ALL MEDICATIONS. AT BEGINNING OF SHIFT PT AGGRESSIVE/AGITATED. CALLED MD. PT NOW CALM AND COOPERATIVE.. DISORIENTATION CONTINUES. POSSIBLE D/C 02/10. PT RESTING WITH CALL LIGHT IN REACH         Problem: Chronic Kidney Disease/End Stage Renal Disease (Adult)  Goal: Signs and Symptoms of Listed Potential Problems Will be Absent or Manageable (Chronic Kidney Disease/End Stage Renal Disease)  Outcome: Ongoing (interventions implemented as appropriate)    02/07/17 1938 02/08/17 1057   Chronic Kidney Disease/End Stage Renal Disease   Problems Assessed (Chronic Kidney Disease/ESRD) --  all   Problems Present (Chronic Kidney Disease/ESRD) functional decline/self-care deficit;infection;vascular access complications --          Problem: Confusion, Chronic (Adult)  Goal: Cognitive/Functional Impairments Minimized  Outcome: Ongoing (interventions implemented as appropriate)    02/10/17 0202   Confusion, Chronic (Adult)   Cognitive/Functional Impairments Minimized making progress toward outcome       Goal: Free from Harm/Injuries  Outcome: Ongoing (interventions implemented as appropriate)    02/10/17 0202   Confusion, Chronic (Adult)   Free from Harm/Injuries making progress toward outcome         Problem: Pneumonia (Adult)  Goal: Signs and Symptoms of Listed Potential Problems Will be Absent or Manageable (Pneumonia)  Outcome: Ongoing (interventions implemented as appropriate)    02/07/17 1938 02/08/17 1057   Pneumonia   Problems Assessed (Pneumonia) --  all   Problems Present (Pneumonia) none --          Problem: Perioperative Period (Adult)  Goal: Signs and Symptoms of Listed Potential  Problems Will be Absent or Manageable (Perioperative Period)  Outcome: Ongoing (interventions implemented as appropriate)    02/07/17 1938 02/09/17 1021   Perioperative Period   Problems Assessed (Perioperative Period) --  all   Problems Present (Perioperative Period) pain --        Goal: Signs and Symptoms of Listed Potential Problems Will be Absent or Manageable (Perioperative Period)  Outcome: Ongoing (interventions implemented as appropriate)    02/07/17 1938 02/09/17 1021   Perioperative Period   Problems Assessed (Perioperative Period) --  all   Problems Present (Perioperative Period) pain --

## 2017-02-10 NOTE — PROGRESS NOTES
Continued Stay Note  [facility]     Patient Name: Ze Wu  MRN: [id number]  Today's Date: 2/10/2017    Admit Date: 1/20/2017          Discharge Plan       02/10/17 1205    Case Management/Social Work Plan    Plan [facility] skilled with HD - [facility] starting precert    Patient/Family In Agreement With Plan yes    Additional Comments RN informs that patient walked with PT. Called to Leeanne/[facility] and they will start precert. Called to Claudette/ERICK liaison and she will inform Dr. No.       02/10/17 1018    Case Management/Social Work Plan    Plan [facility] skilled with HD - will need precert    Patient/Family In Agreement With Plan yes    Additional Comments [facility] here and she is watching for PT notes to attempt precert for skilled rehab. PT to see and attempt to work with patient today.               Discharge Codes     None        Expected Discharge Date and Time     Expected Discharge Date Expected Discharge Time    Feb 3, 2017             Moshe Wesley RN

## 2017-02-10 NOTE — PROGRESS NOTES
Continued Stay Note  [facility]     Patient Name: Ze Wu  MRN: [id number]  Today's Date: 2/10/2017    Admit Date: 1/20/2017          Discharge Plan       02/10/17 1018    Case Management/Social Work Plan    Plan [facility] skilled with HD - will need precert    Patient/Family In Agreement With Plan yes    Additional Comments Lee[facility] here and she is watching for PT notes to attempt precert for skilled rehab. PT to see and attempt to work with patient today.               Discharge Codes     None        Expected Discharge Date and Time     Expected Discharge Date Expected Discharge Time    Feb 3, 2017             Moshe Wesley RN

## 2017-02-10 NOTE — PROGRESS NOTES
[facility]  Cardiology Progress Note    Patient Identification:  Name: Ze Wu  Age: 76 y.o.  Sex: male  :  1940  MRN: [id number]                 Follow Up / Chief Complaint: CAD, (permanent) afib,variable vr with bradycardia,CHF,  HTN    Interval History:  This is a 76-year-old  male with a history of  coronary artery disease, chronic A. fib on anticoagulation with Coumadin, history of congestive heart failure, hypertension. We have been asked to consult regarding atrial fibrillation with variable ventricular rates and supratherapuetic INR.       Subjective:     Denies chest pain, pressure or SOA      Objective:   INR 2.60 today   Tele reviewed -  no sustained bradycardia, pauses > 3 seconds or RVR.  Blood pressure is stable    Past Medical History:  Past Medical History   Diagnosis Date   • Anemia    • Anxiety    • CHF (congestive heart failure)    • Chronic a-fib    • Chronic anticoagulation    • Confusion    • Disease of thyroid gland    • GERD (gastroesophageal reflux disease)    • Hypertension    • Hypothyroidism    • Kidney disease    • Lymphoma    • Staph aureus infection 2017     labs called from [facility]     Past Surgical History:  Past Surgical History   Procedure Laterality Date   • Mediport insertion, single     • Thumb amputation Left    • Av fistula placement Right    • Arteriovenous fistula/shunt surgery Right 2017     Procedure: ultrasound access of right internal jugular vein with mahurkar dialysis catheter placement, EXCISION INFECTED RIGTH ARM AV GRAFT WITH REMOVAL OF HERO CATHETER, REMOVAL OF FEMORAL DIALYSIS CATHETER;  Surgeon: Floyd Fraga MD;  Location: [facility] OR ;  Service:    • Pr insj tunneled cvc w/o subq port/ age 6 yr/> Right 2017     Procedure: RT INTERNAL JUGULAR PALINDROME CATHETER PLACEMENT;  Surgeon: Floyd Fraga MD;  Location: [facility];  Service: Vascular   • Venous access  device (port) removal Left 2/2/2017     Procedure: LT CHEST PORT REMOVAL ;  Surgeon: Floyd Fraga MD;  Location: [facility] OR;  Service:         Social History:   Social History   Substance Use Topics   • Smoking status: Never Smoker   • Smokeless tobacco: Not on file   • Alcohol use No      Family History:  History reviewed. No pertinent family history.       Allergies:  No Known Allergies     Scheduled Meds:    aspirin 81 mg Daily   epoetin bacilio 10,000 Units Once per day on Mon Wed Fri   isosorbide mononitrate 30 mg Q24H   lansoprazole 30 mg QAM   levothyroxine 100 mcg Daily   vancomycin 500 mg Once   Vancomycin Pharmacy Intermittent Dosing  Daily   warfarin 2 mg Daily       INTAKE AND OUTPUT:    Intake/Output Summary (Last 24 hours) at 02/10/17 1211  Last data filed at 02/09/17 1400   Gross per 24 hour   Intake      0 ml   Output   2000 ml   Net  -2000 ml       Review of Systems:   GI: no n/v  Cardiac  No chest pain or pressure  Pulmonary:  No SOA or cough    Constitutional:  Temp:  [97 °F (36.1 °C)-97.7 °F (36.5 °C)] 97.4 °F (36.3 °C)  Heart Rate:  [71-88] 77  Resp:  [15-20] 20  BP: (116-138)/(46-64) 116/46    Physical Exam by Tristan Mojica MD    General:  Resting quietly   Appears in no acute distress  Eyes: PERTL,  HEENT:  Thyroid not visibly enlarged.  Oral mucosal moist, no cyanosis  Respiratory: Respirations regular and unlabored at rest. BBS with air entry in all fields  No crackles or wheezes auscultated.  Left subclavian Mediport and  Right HD catheter noted  Cardiovascular: S1S2 irregular rate and rhythm. Unchanged I/VI systolic murmur without rub or gallop.No pretibial pitting edema.    Gastrointestinal: Abdomen soft, flat and non tender. Bowel sounds present.   Musculoskeletal: ALEGRIA x4. No abnormal movements  Extremities: No digital cyanosis.   Skin: Color pale pink. Skin warm and dry to touch.   Neuro: AAO x2 CN II-XII grossly intact  Psych: pleasant and  cooperative          Cardiographics    Telemetry: afib VR 60-70's, occasional PVC, no pauses >3.0 sec         Echocardiogram:   · All left ventricular wall segments contract normally.  · Left atrial cavity size is mildly dilated.  · Mild to moderate aortic valve stenosis is present.  · Mild to moderate tricuspid valve regurgitation is present.  · Left Ventricle: Calculated EF = 66.7%.  · There is no evidence of pericardial effusion           Lab Review     Results from last 7 days  Lab Units 02/09/17  0339 02/08/17  0215 02/07/17  0412   WBC 10*3/mm3 6.70 6.52 7.75   HEMOGLOBIN g/dL 8.0* 8.6* 8.8*   HEMATOCRIT % 24.2* 26.0* 27.0*   PLATELETS 10*3/mm3 208 206 286       Results from last 7 days  Lab Units 02/10/17  0534 02/09/17  0339 02/08/17  1821 02/08/17  1119 02/08/17 0215   INR  2.62* 2.11*  --   --  1.56*   APTT seconds  --  84.4* 39.0* 54.5* 59.5*         Assessment:  - (Permanent) atrial fibrillation  - sick sinus syndrome  - elevated [id number] score  - elevated troponin  - CAD  - HTN    - a/c anemia -> IM, Renal  - s/p Pneumonia  - Pulmonary septic emboli -> antibiotic as per ID  - s/p acute hypoxic respiratory failure -> pulmonary  - MSSA septicemia -> antibiotic as per ID  - Infected AV fistula -> ID, Renal, vascular surgery  - ESRD-> Renal      Assessment & Plan:  - (Permanent) atrial fibrillation - elevated [id number] score => on Coumadin. INR therapeutic. Dose Coumadin to maintain target INR 2.0-3.0 range    - sick sinus syndrome  - no sustained bradycardia or recurrent pauses  Awaiting clearance from ID for permanent pacemaker implant . Avoiding use of beta blocker and calcium channel blocker due to bradycardia    - elevated troponin - CPK-MB is normal. No angina.  No plan for cardiac catheterization at this time.  Continue optimized antianginal therapy with aspirin and nitrate. No BB or CCB due to sick sinus syndrome/bradycardia    - HTN - controlled on  hydralazine.  Avoiding use of beta blocker  and calcium channel blocker due to bradycardia      Dose Coumadin to maintain target INR 2.0-3.0 range   Continue supportive management. ID has recommended against placement of permanent pacemaker pending repeat blood cultures one week after finishing current antibiotic therapy (approximately 3/13/2017)    . Tristan Mojica MD  2/10/17      EMR Dragon/Transcription disclaimer:   Much of this encounter note is an electronic transcription/translation of spoken language to printed text. The electronic translation of spoken language may permit erroneous, or at times, nonsensical words or phrases to be inadvertently transcribed; Although I have reviewed the note for such errors, some may still exist.

## 2017-02-11 NOTE — PROGRESS NOTES
[facility]  Cardiology Progress Note    Patient Identification:  Name: Ze Wu  Age: 76 y.o.  Sex: male  :  1940  MRN: [id number]                 Follow Up / Chief Complaint: CAD, (permanent) afib,variable vr with bradycardia,CHF,  HTN    Interval History:    Denies any cp/pressure or shob.  Wants to go home.       INR 2.80 (2.62)=. Tele AFib with VR 60-70's.  BP stable 110-120's / 40-60's      HPI:   This is a 76-year-old  male with a history of  coronary artery disease, chronic A. fib on anticoagulation with Coumadin, history of congestive heart failure, hypertension. We have been asked to consult regarding atrial fibrillation with variable ventricular rates and supratherapuetic INR.       Past Medical History:  Past Medical History   Diagnosis Date   • Anemia    • Anxiety    • CHF (congestive heart failure)    • Chronic a-fib    • Chronic anticoagulation    • Confusion    • Disease of thyroid gland    • GERD (gastroesophageal reflux disease)    • Hypertension    • Hypothyroidism    • Kidney disease    • Lymphoma    • Staph aureus infection 2017     labs called from [facility]     Past Surgical History:  Past Surgical History   Procedure Laterality Date   • Mediport insertion, single     • Thumb amputation Left    • Av fistula placement Right    • Arteriovenous fistula/shunt surgery Right 2017     Procedure: ultrasound access of right internal jugular vein with mahurkar dialysis catheter placement, EXCISION INFECTED RIGTH ARM AV GRAFT WITH REMOVAL OF HERO CATHETER, REMOVAL OF FEMORAL DIALYSIS CATHETER;  Surgeon: Floyd Fraga MD;  Location: [facility] OR ;  Service:    • Pr insj tunneled cvc w/o subq port/ age 6 yr/> Right 2017     Procedure: RT INTERNAL JUGULAR PALINDROME CATHETER PLACEMENT;  Surgeon: Floyd Fraga MD;  Location: [facility];  Service: Vascular   • Venous access device (port) removal Left 2017      Procedure: LT CHEST PORT REMOVAL ;  Surgeon: Floyd Fraga MD;  Location: [facility];  Service:         Social History:   Social History   Substance Use Topics   • Smoking status: Never Smoker   • Smokeless tobacco: Not on file   • Alcohol use No      Family History:  History reviewed. No pertinent family history.       Allergies:  No Known Allergies     Scheduled Meds:    aspirin 81 mg Daily   epoetin bacilio 10,000 Units Once per day on Tue Thu Sat   isosorbide mononitrate 30 mg Q24H   lansoprazole 30 mg QAM   levothyroxine 100 mcg Daily   Vancomycin Pharmacy Intermittent Dosing  Daily   warfarin 1 mg Daily       INTAKE AND OUTPUT:    Intake/Output Summary (Last 24 hours) at 02/11/17 1525  Last data filed at 02/11/17 1200   Gross per 24 hour   Intake    430 ml   Output    400 ml   Net     30 ml       Review of Systems:   GI: no n/v  Cardiac  No chest pain or pressure  Pulmonary:  No SOA or cough    Constitutional:  Temp:  [96.4 °F (35.8 °C)-98.5 °F (36.9 °C)] 97.7 °F (36.5 °C)  Heart Rate:  [62-92] 82  Resp:  [18] 18  BP: (112-135)/(46-62) 113/54    Physical Exam by Dr. Guerrero    General:  Resting quietly   Appears in no acute distress  Eyes: PERTL,  HEENT:  Thyroid not visibly enlarged.  Oral mucosal moist, no cyanosis  Respiratory: Respirations regular and unlabored at rest. BBS with air entry in all fields  No crackles or wheezes auscultated.  Left subclavian Mediport and  Right HD catheter noted  Cardiovascular: S1S2 irregular rate and rhythm. Unchanged I/VI systolic murmur without rub or gallop.No pretibial pitting edema.    Gastrointestinal: Abdomen soft, flat and non tender. Bowel sounds present.   Musculoskeletal: ALEGRIA x4. No abnormal movements  Extremities: No digital cyanosis.   Skin: Color pale pink. Skin warm and dry to touch.   Neuro: AAO x2 CN II-XII grossly intact  Psych: pleasant and cooperative      Cardiographics    Telemetry: afib VR 60-70's, , no pauses.    Echocardiogram:  1/23/17  · All left ventricular wall segments contract normally.  · Left atrial cavity size is mildly dilated.  · Mild to moderate aortic valve stenosis is present.  · Mild to moderate tricuspid valve regurgitation is present.  · Left Ventricle: Calculated EF = 66.7%.  · There is no evidence of pericardial effusion       Lab Review     Results from last 7 days  Lab Units 02/09/17  0339 02/08/17  0215 02/07/17  0412   WBC 10*3/mm3 6.70 6.52 7.75   HEMOGLOBIN g/dL 8.0* 8.6* 8.8*   HEMATOCRIT % 24.2* 26.0* 27.0*   PLATELETS 10*3/mm3 208 206 286       Results from last 7 days  Lab Units 02/11/17  0541 02/10/17  0534 02/09/17  0339 02/08/17  1821 02/08/17  1119   INR  2.80* 2.62* 2.11*  --   --    APTT seconds  --   --  84.4* 39.0* 54.5*         Assessment:  - (Permanent) atrial fibrillation  - sick sinus syndrome  - elevated [id number] score  - elevated troponin  - CAD  - HTN  - a/c anemia -> IM, Renal  - s/p Pneumonia  - Pulmonary septic emboli -> antibiotic as per ID  - s/p acute hypoxic respiratory failure -> pulmonary  - MSSA septicemia -> antibiotic as per ID  - Infected AV fistula -> ID, Renal, vascular surgery  - ESRD-> Renal      Assessment & Plan:  - (Permanent) atrial fibrillation - elevated [id number] score => on Coumadin. INR therapeuti 2.8 on warfarin 1 mg.  Dose Coumadin to maintain target INR 2.0-3.0 range    - sick sinus syndrome  - no sustained bradycardia or recurrent pauses  Awaiting clearance from ID for permanent pacemaker implant . Avoiding use of beta blocker and calcium channel blocker due to bradycardia    - elevated troponin - CPK-MB is normal. No angina.  No plan for cardiac catheterization at this time.  Continue optimized antianginal therapy with aspirin and nitrate. No BB or CCB due to sick sinus syndrome/bradycardia    - HTN - controlled imdur.  Avoiding use of beta blocker and calcium channel blocker due to bradycardia      Dose Coumadin to maintain target INR 2.0-3.0 range   Continue  supportive management. ID has recommended against placement of permanent pacemaker pending repeat blood cultures one week after finishing current antibiotic therapy (approximately 3/13/2017)    Further evaluation and management per Dr Guerrero.     . Ness Leonard, APRN  2/10/17    Paulette Guerrero MD    EMR Dragon/Transcription disclaimer:   Much of this encounter note is an electronic transcription/translation of spoken language to printed text. The electronic translation of spoken language may permit erroneous, or at times, nonsensical words or phrases to be inadvertently transcribed; Although I have reviewed the note for such errors, some may still exist.

## 2017-02-11 NOTE — PROGRESS NOTES
"Pharmacokinetic Consult - Vancomycin Dosing (Follow-up Note)    Ze Wu is on day 22 pharmacy to dose vancomycin for sepsis/bacteremia,infected graft s/p removal.  Pharmacy dosing vancomycin per Dr No's request. Dr Lozano following for ID   Goal pre HD level ~20mcg/ml     Relevant clinical data and objective history reviewed:  76 y.o. male 74\" (188 cm) 159 lb 6.4 oz (72.3 kg)    Past Medical History   Diagnosis Date   • Anemia    • Anxiety    • CHF (congestive heart failure)    • Chronic a-fib    • Chronic anticoagulation    • Confusion    • Disease of thyroid gland    • GERD (gastroesophageal reflux disease)    • Hypertension    • Hypothyroidism    • Kidney disease    • Lymphoma    • Staph aureus infection 01/21/2017     labs called from [facility]     CREATININE   Date Value Ref Range Status   02/08/2017 4.95 (H) 0.76 - 1.27 mg/dL Final   02/07/2017 7.21 (H) 0.76 - 1.27 mg/dL Final   02/06/2017 5.97 (H) 0.76 - 1.27 mg/dL Final     BUN   Date Value Ref Range Status   02/08/2017 27 (H) 8 - 23 mg/dL Final       Lab Results   Component Value Date    WBC 6.70 02/09/2017     Temp Readings from Last 3 Encounters:   02/11/17 97.4 °F (36.3 °C) (Oral)     Baseline cultures/labs/radiology:  1/19 Blood cx 2/2: MRSA+ ([facility])  1/20 Sputum cx: Pending ([facility])  1/21 Blood cx= MRSA+  1/21 CXR: Mild cardiomegaly, pulmonary mass or congestion. Areas of opacity in the right lung.  1/23 Wound cx (RUE AV graft): MRSA+  1/23 Tissue cx( Rt arm): 3+ MRSA  1/24 Blood cx 2/2: NG x 5 days  1/25 CT chest: Numerous pulmonary nodules within both lungs. Two of these lesions are cavitary along the anterior superior aspect of the right upper lobe. The largest measures up to 4 cm. Margins are ill-defined. There is some surrounding inflammation or infiltrate within the right upper lobe. Differential considerations include cavitary infectious etiologies or septic emboli. An atypical or fungal process " cannot be excluded. Cavitary malignancy and metastatic disease cannot be excluded.  1/27 Blood cx: NG x 5 days  2/02 Wound cx (Lt chest): MRSA+    IV Anti-Infectives     Ordered     Dose/Rate Route Frequency Start Stop    02/10/17 1335  vancomycin 500 mg/100 mL 0.9% NS IVPB ([facility])     Ordering Provider:  Rc No MD    500 mg  over 50 Minutes Intravenous Once 02/10/17 1415 02/10/17 1555    02/04/17 1340  vancomycin 1250 mg/250 mL 0.9% NS IVPB ([facility])     Ordering Provider:  Rc No MD    1,250 mg  over 120 Minutes Intravenous Once 02/04/17 1415 02/04/17 1743    01/31/17 1448  vancomycin 1250 mg/250 mL 0.9% NS IVPB ([facility])     Ordering Provider:  Naif Lozano MD    1,250 mg  over 125 Minutes Intravenous Once 01/31/17 1530 01/31/17 1907    01/28/17 0922  vancomycin 1250 mg/250 mL 0.9% NS IVPB ([facility])     Ordering Provider:  Rc No MD    1,250 mg  over 125 Minutes Intravenous Once 01/28/17 1600 01/28/17 1743    01/26/17 1138  vancomycin (VANCOCIN) IVPB 1 g (premix) in Dextrose 5% 200 mL     Ordering Provider:  Rc No MD    1,000 mg  over 100 Minutes Intravenous Once 01/26/17 1600 01/26/17 1851    01/24/17 1258  vancomycin (VANCOCIN) IVPB 1 g (premix) in Dextrose 5% 200 mL     Ordering Provider:  Rc No MD    1,000 mg  over 100 Minutes Intravenous Once 01/24/17 1500 01/24/17 2017    01/22/17 1159  vancomycin 1750 mg/500 mL 0.9% NS IVPB ([facility])     Ordering Provider:  Rc No MD    1,750 mg  over 180 Minutes Intravenous Once 01/22/17 1230 01/22/17 2120    01/21/17 1444  Vancomycin Pharmacy Intermittent Dosing     Ordering Provider:  Rc No MD     Does not apply Daily 01/21/17 1515      01/21/17 1037  Pharmacy to dose vancomycin     Ordering Provider:  Rc No MD     Does not apply Continuous PRN 01/21/17 1037           No results found for: CHAYITO  Lab Results   Component Value Date    ROSANNANDOM 22.60 02/11/2017     Vancomycin dosing history:   Dosing intermittently  based on HD. Previously receiving vanco 1250mg IV after each HD session based on initial dosing history. Last dose on 2/4. Random level supratherapeutic on 2/6, dosing held.  2/9 0339 random= 24.6 mcg/ml, [id number] dose ordered, pt refused the dose  2/10 1500 vancomycin 500mg iv x1 given  2/11 AM random level= 22.6 mcg/ml    Assessment/Plan  Dialysis today, vancomycin random level this am= 22.6mcg/ml    Will order vancomycin 500mg IV x1 to be given after dialysis today     Will order random level before next dialysis scheduled session (per nephrology) on Tuesday.

## 2017-02-11 NOTE — PLAN OF CARE
Problem: Patient Care Overview (Adult)  Goal: Plan of Care Review  Outcome: Ongoing (interventions implemented as appropriate)    02/11/17 5778   Coping/Psychosocial Response Interventions   Plan Of Care Reviewed With patient   Patient Care Overview   Progress progress toward functional goals is gradual   Outcome Evaluation   Outcome Summary/Follow up Plan VSS, confused and agitated after hemodialysis, calmed down and is now napping. D/C on Monday d/t precerts.          Problem: Chronic Kidney Disease/End Stage Renal Disease (Adult)  Goal: Signs and Symptoms of Listed Potential Problems Will be Absent or Manageable (Chronic Kidney Disease/End Stage Renal Disease)  Outcome: Ongoing (interventions implemented as appropriate)    Problem: Confusion, Chronic (Adult)  Goal: Cognitive/Functional Impairments Minimized  Outcome: Ongoing (interventions implemented as appropriate)  Goal: Free from Harm/Injuries  Outcome: Ongoing (interventions implemented as appropriate)

## 2017-02-11 NOTE — SIGNIFICANT NOTE
02/11/17 1427   Rehab Treatment   Discipline physical therapist   Treatment Not Performed patient/family declined treatment  (patient sleeping, very difficult to arouse, states walked this morning, refused therapy x 3, will check tomorrow)   Recommendation   PT - Next Appointment 02/12/17

## 2017-02-11 NOTE — PROGRESS NOTES
"DAILY PROGRESS NOTE  [facility]    Patient Identification:  Name: Ze Wu  Age: 76 y.o.  Sex: male  :  1940  MRN: [id number]         Primary Care Physician: No Known Provider    Subjective:  DOING SAME  NO NEW COMPLAINTS    Objective:    Scheduled Meds:    aspirin 81 mg Oral Daily   epoetin bacilio 10,000 Units Intravenous Once per day on Tue Thu Sat   isosorbide mononitrate 30 mg Oral Q24H   lansoprazole 30 mg Oral QAM   levothyroxine 100 mcg Oral Daily   vancomycin 500 mg Intravenous Once   Vancomycin Pharmacy Intermittent Dosing  Does not apply Daily   warfarin 1 mg Oral Daily     Continuous Infusions:    Pharmacy to dose vancomycin        Vital signs in last 24 hours:  Temp:  [96.4 °F (35.8 °C)-98.5 °F (36.9 °C)] 97.7 °F (36.5 °C)  Heart Rate:  [62-92] 82  Resp:  [18] 18  BP: (112-135)/(46-62) 113/54    Intake/Output:    Intake/Output Summary (Last 24 hours) at 17 1319  Last data filed at 17 1200   Gross per 24 hour   Intake    430 ml   Output    400 ml   Net     30 ml       Exam:  Visit Vitals   • /54 (BP Location: Left arm, Patient Position: Lying)   • Pulse 82   • Temp 97.7 °F (36.5 °C) (Oral)   • Resp 18   • Ht 74\" (188 cm)   • Wt 159 lb 6.4 oz (72.3 kg)   • SpO2 93%   • BMI 20.47 kg/m2       General Appearance:    Alert, cooperative, no distress, AAOx3                          Head:    Normocephalic, without obvious abnormality, atraumatic                           Eyes:    PERRL, conjunctiva/corneas clear, EOM's intact, both eyes                         Throat:   Lips, tongue, gums normal; oral mucosa pink and moist                           Neck:   Supple, symmetrical, trachea midline, no JVD                         Lungs:    Clear to auscultation bilaterally, respirations unlabored                 Chest Wall:    No tenderness or deformity                          Heart:    Regular rate and rhythm, S1 and S2 normal, no murmur,no  Rub                                "       or gallop                  Abdomen:     Soft, non-tender, bowel sounds active, no masses, no                                                        organomegaly                  Extremities:   Extremities normal, atraumatic, no cyanosis or edema                        Pulses:   Pulses palpable in all extremities                            Skin:   Skin is warm and dry,  no rashes or palpable lesions                  Neurologic:   CNII-XII intact, motor strength grossly intact, sensation grossly                                         intact to light touch, no focal deficits noted       Data Review:  Lab Results   Component Value Date    WBC 6.70 02/09/2017    HGB 8.0 (L) 02/09/2017    HCT 24.2 (L) 02/09/2017     02/09/2017     No results found for: NA, K, CL, CO2, BUN, CREATININE, GLUCOSE  No results found for: CALCIUM, MG, PHOS  No results found for: AST, ALT, ALKPHOS  Lab Results   Component Value Date    INR 2.80 (H) 02/11/2017       Assessment:  Active Problems:    Sepsis  esrd on hd  Atrial fibrillation permanent  Hypertension  mrsa bacteremia  BRADYCARDIA NO PPM FOR NOW SEC TO INFECTION      Plan:  CPM  CHANGE XANX TO VALIUM  D/C  RESTRAINS  IV ABX  AC  NO PPM UNTIL ABX COMPLETED  HDC TIW  PT/OT  SUPPORTIVE CARE  JESSICA ONCE BED AVAILABLE    Rc No MD  2/11/2017  1:19 PM

## 2017-02-11 NOTE — PROGRESS NOTES
NEPHROLOGY PROGRESS NOTE    PATIENT IDENTIFICATION:   Name:  Ze Wu      MRN:  [id number]     76 y.o.  male             Reason for visit: ESRD    SUBJECTIVE:   Seen and examined. Alert and appropriate but remains confused. No SOA or CP. No new events.  OBJECTIVE:  Vitals:    02/10/17 2307 02/11/17 0810 02/11/17 1210 02/11/17 1550   BP: 127/62 112/46 113/54 121/54   BP Location: Left arm Left arm Left arm Left arm   Patient Position: Lying Lying Lying Lying   Pulse: 80 62 82 72   Resp: 18 18 18 18   Temp: 98.5 °F (36.9 °C) 97.4 °F (36.3 °C) 97.7 °F (36.5 °C) 98.4 °F (36.9 °C)   TempSrc: Oral Oral Oral Oral   SpO2:       Weight:       Height:         FiO2 (%): 100 %     Body mass index is 20.47 kg/(m^2).    Intake/Output Summary (Last 24 hours) at 02/11/17 1637  Last data filed at 02/11/17 1200   Gross per 24 hour   Intake    210 ml   Output    400 ml   Net   -190 ml         Exam:  GEN:  Frail.  appears stated age. Confused. On dialysis. Qb 350  EYES:   Anicteric sclera  ENT:    External ears/nose normal, MM are dry  NECK:  No adenopathy, JVP .  LUNGS: Clear to auscultation   CV:  Irreg,  Bradycardic  ABD:  Non-tender, non-distended, +BS  EXT: No lower ext edema. . Rt TDC    Scheduled meds:      aspirin 81 mg Oral Daily   epoetin bacilio 10,000 Units Intravenous Once per day on Tue Thu Sat   isosorbide mononitrate 30 mg Oral Q24H   lansoprazole 30 mg Oral QAM   levothyroxine 100 mcg Oral Daily   Vancomycin Pharmacy Intermittent Dosing  Does not apply Daily   warfarin 1 mg Oral Daily     IV meds:                          Pharmacy to dose vancomycin        Data Review:      Results from last 7 days  Lab Units 02/08/17  0215 02/07/17  0412 02/06/17  0531   SODIUM mmol/L 139 133* 135*   POTASSIUM mmol/L 4.3 4.8 4.4   CHLORIDE mmol/L 99 94* 98   TOTAL CO2 mmol/L 20.8* 18.9* 20.4*   BUN mg/dL 27* 40* 31*   CREATININE mg/dL 4.95* 7.21* 5.97*   CALCIUM mg/dL 9.0 9.1 9.3   BILIRUBIN mg/dL 1.1 1.1  --    ALK PHOS U/L 117  123*  --    ALT (SGPT) U/L 11 14  --    AST (SGOT) U/L 14 18  --    GLUCOSE mg/dL 95 96 96       Estimated Creatinine Clearance: 13.2 mL/min (by C-G formula based on Cr of 4.95).      Results from last 7 days  Lab Units 02/06/17  0531   MAGNESIUM mg/dL 2.2         Results from last 7 days  Lab Units 02/09/17  0339 02/08/17  0215 02/07/17  0412 02/06/17  0531 02/05/17  2107   WBC 10*3/mm3 6.70 6.52 7.75 8.62 8.79   HEMOGLOBIN g/dL 8.0* 8.6* 8.8* 9.2* 8.9*   PLATELETS 10*3/mm3 208 206 286 307 326         Results from last 7 days  Lab Units 02/11/17  0541 02/10/17  0534 02/09/17  0339 02/08/17  0215 02/07/17  0412   INR  2.80* 2.62* 2.11* 1.56* 1.30*             ASSESSMENT:   Active Problems:    Sepsis    1. ESRD on HD TTS.  2. Hypercaogulopathy .  3. MRSA bacteremia: SP graft excision and port removal.   4. Encephalopathy. Still confused.   5. Anemia of ESRD. Procrit with HD.   6. Secondary hyperparathyroidism  7.  Pulmonary septic emboli  8. HTN. Too controlled. Dc hydralazine.    9. Hypothyroid on replacement.     Plan:     1. HD today. Volume and lytes are stable.   2. Procrit with HD.  3. BP is under control        Freya Hastings MD  2/11/2017    4:37 PM

## 2017-02-11 NOTE — PROGRESS NOTES
Continued Stay Note  [facility]     Patient Name: Ze Wu  MRN: [id number]  Today's Date: 2/11/2017    Admit Date: 1/20/2017          Discharge Plan       02/11/17 1118    Case Management/Social Work Plan    Plan [facility] skilled with HD- precert initiated 2-10 with [employer]    Patient/Family In Agreement With Plan other (see comments)    Additional Comments CCP paged by floor JU Weston if precert recieved. CCP called Oralia Santana [phone] with [facility] and she states precert still in process, hopefully will have update on Monday 2-13-17. Updated JU Weston. Angela DODD/CCP              Discharge Codes     None        Expected Discharge Date and Time     Expected Discharge Date Expected Discharge Time    Feb 13, 2017             Alba Ervin, RN

## 2017-02-12 NOTE — PROGRESS NOTES
NEPHROLOGY PROGRESS NOTE    PATIENT IDENTIFICATION:   Name:  Ze Wu      MRN:  [id number]     76 y.o.  male             Reason for visit: ESRD    SUBJECTIVE:   Seen and examined. Alert and appropriate but remains confused. No SOA or CP. His son Tano is at bedside.  He would like for Mr. Wu to go to [facility].  Have instructed him to speak with care coordination in the am as it appears he is currently setup to have rehab at [place].  OBJECTIVE:  Vitals:    02/12/17 0541 02/12/17 0742 02/12/17 1128 02/12/17 1506   BP:  119/62 110/45 111/66   BP Location:  Left arm Left arm Left arm   Patient Position:  Lying Lying Lying   Pulse:  89 78 76   Resp:  16 16 16   Temp:  97.6 °F (36.4 °C) 97.4 °F (36.3 °C) 96.5 °F (35.8 °C)   TempSrc:  Oral Oral Oral   SpO2:       Weight: 165 lb 5.5 oz (75 kg)      Height:         FiO2 (%): 100 %     Body mass index is 21.23 kg/(m^2).    Intake/Output Summary (Last 24 hours) at 02/12/17 1521  Last data filed at 02/12/17 1506   Gross per 24 hour   Intake    570 ml   Output      0 ml   Net    570 ml         Exam:  GEN:  Frail.  appears stated age. Confused. On dialysis. Qb 350  EYES:   Anicteric sclera  ENT:    External ears/nose normal, MM are dry  NECK:  No adenopathy, JVP .  LUNGS: Clear to auscultation   CV:  Irreg,  Bradycardic  ABD:  Non-tender, non-distended, +BS  EXT: No lower ext edema. . Rt TDC    Scheduled meds:      aspirin 81 mg Oral Daily   epoetin bacilio 10,000 Units Intravenous Once per day on Tue Thu Sat   isosorbide mononitrate 30 mg Oral Q24H   lansoprazole 30 mg Oral QAM   levothyroxine 100 mcg Oral Daily   Vancomycin Pharmacy Intermittent Dosing  Does not apply Daily   warfarin 1 mg Oral Daily     IV meds:                          Pharmacy to dose vancomycin        Data Review:      Results from last 7 days  Lab Units 02/08/17  0215 02/07/17  0412 02/06/17  0531   SODIUM mmol/L 139 133* 135*   POTASSIUM mmol/L 4.3 4.8 4.4   CHLORIDE mmol/L 99 94* 98    TOTAL CO2 mmol/L 20.8* 18.9* 20.4*   BUN mg/dL 27* 40* 31*   CREATININE mg/dL 4.95* 7.21* 5.97*   CALCIUM mg/dL 9.0 9.1 9.3   BILIRUBIN mg/dL 1.1 1.1  --    ALK PHOS U/L 117 123*  --    ALT (SGPT) U/L 11 14  --    AST (SGOT) U/L 14 18  --    GLUCOSE mg/dL 95 96 96       Estimated Creatinine Clearance: 13.7 mL/min (by C-G formula based on Cr of 4.95).      Results from last 7 days  Lab Units 02/06/17  0531   MAGNESIUM mg/dL 2.2         Results from last 7 days  Lab Units 02/09/17  0339 02/08/17  0215 02/07/17  0412 02/06/17  0531 02/05/17  2107   WBC 10*3/mm3 6.70 6.52 7.75 8.62 8.79   HEMOGLOBIN g/dL 8.0* 8.6* 8.8* 9.2* 8.9*   PLATELETS 10*3/mm3 208 206 286 307 326         Results from last 7 days  Lab Units 02/12/17  0435 02/11/17  0541 02/10/17  0534 02/09/17  0339 02/08/17  0215   INR  2.89* 2.80* 2.62* 2.11* 1.56*             ASSESSMENT:   Active Problems:    Sepsis    1. ESRD on HD TTS.  2. Hypercaogulopathy .  3. MRSA bacteremia: SP graft excision and port removal.   4. Encephalopathy. Still confused.   5. Anemia of ESRD. Procrit with HD.   6. Secondary hyperparathyroidism  7.  Pulmonary septic emboli  8. HTN. Too controlled. Dc hydralazine.    9. Hypothyroid on replacement.     Plan:     1. HD again on Tuesday. Volume and lytes are stable.  2. Procrit with HD.  3. BP is under control        Freya Hastings MD  2/12/2017    3:21 PM

## 2017-02-12 NOTE — PROGRESS NOTES
Floyd Fraga MD       LOS: 23 days   Patient Care Team:  No Known Provider as PCP - General    Subjective     76 y.o. male with functioning right jugular palindrome dialysis catheter.  Right arm wounds healing nicely clean and granulating with no signs infection at removal of infected arteriovenous shunt and hero catheter.  Left chest incision after removal of infected port also healing nicely.    Review of Systems  Review of Systems - Negative except history of present illness      Objective     Vital Signs  Temp:  [96.9 °F (36.1 °C)-98.4 °F (36.9 °C)] 97.6 °F (36.4 °C)  Heart Rate:  [72-89] 89  Resp:  [16-18] 16  BP: ()/(42-62) 119/62    Physical Exam  General: No acute distress. Alert and oriented x 4  HEENT: No jugular venous distension, trachea is midline  CV: RRR, S1S2  Resp: Clear unlabored breathing  Abd: Abdomen is soft, nontender, nondistended  Extremities: No extremity swelling    Results Review:       Recent Results (from the past 12 hour(s))   Protime-INR    Collection Time: 02/12/17  4:35 AM   Result Value Ref Range    Protime 29.4 (H) 11.7 - 14.2 Seconds    INR 2.89 (H) 0.90 - 1.10   ]      Assessment/Plan           Active Problems:    Sepsis      Assessment & Plan  76 y.o. male functioning right jugular palindrome dialysis catheter.  Okay for transfer to skilled facility when available.  Continue local wound care right arm wounds until healed.  Patient could have left arm basilic vein fistula in future but will plan to do only after cardiac status stabilized in pacemaker placed over the next month or 2.      Floyd Fraga MD  02/12/17  9:52 AM

## 2017-02-12 NOTE — PLAN OF CARE
Problem: Patient Care Overview (Adult)  Goal: Plan of Care Review  Outcome: Ongoing (interventions implemented as appropriate)    02/12/17 1816   Coping/Psychosocial Response Interventions   Plan Of Care Reviewed With patient;zachary   Patient Care Overview   Progress improving   Outcome Evaluation   Outcome Summary/Follow up Plan still confused but pleasant. dressing change done. dialysis on tuesday. medicated with prn lortab for back pain. refused physical therapy         Problem: Confusion, Chronic (Adult)  Goal: Cognitive/Functional Impairments Minimized  Outcome: Ongoing (interventions implemented as appropriate)  Goal: Free from Harm/Injuries  Outcome: Ongoing (interventions implemented as appropriate)    Problem: Perioperative Period (Adult)  Goal: Signs and Symptoms of Listed Potential Problems Will be Absent or Manageable (Perioperative Period)  Outcome: Ongoing (interventions implemented as appropriate)  Goal: Signs and Symptoms of Listed Potential Problems Will be Absent or Manageable (Perioperative Period)  Outcome: Ongoing (interventions implemented as appropriate)

## 2017-02-12 NOTE — SIGNIFICANT NOTE
Pt approached in room in bed. Pt stated he did not feel well and was not working with therapy. Pt demonstrating some confusion and was calm. Pt was encouraged to participate wit therapy, but c/o back and hemorrhoid pain.

## 2017-02-12 NOTE — PLAN OF CARE
Problem: Patient Care Overview (Adult)  Goal: Plan of Care Review  Outcome: Ongoing (interventions implemented as appropriate)    02/12/17 0120   Coping/Psychosocial Response Interventions   Plan Of Care Reviewed With patient   Patient Care Overview   Progress improving   Outcome Evaluation   Outcome Summary/Follow up Plan PT VSS. CONFUSION CONTIUNES BUT PT NON-HOSTILE. DRESSING CDI ON ARM. WILL CONTINUE TO MONITER. PLAN TO D/C MONDAY. PT RESTING WITH CALL LIGHT IN REACH         Problem: Chronic Kidney Disease/End Stage Renal Disease (Adult)  Goal: Signs and Symptoms of Listed Potential Problems Will be Absent or Manageable (Chronic Kidney Disease/End Stage Renal Disease)  Outcome: Ongoing (interventions implemented as appropriate)    02/11/17 1638 02/12/17 0120   Chronic Kidney Disease/End Stage Renal Disease   Problems Assessed (Chronic Kidney Disease/ESRD) --  all   Problems Present (Chronic Kidney Disease/ESRD) vascular access complications;infection;functional decline/self-care deficit --          Problem: Confusion, Chronic (Adult)  Goal: Cognitive/Functional Impairments Minimized  Outcome: Ongoing (interventions implemented as appropriate)    02/12/17 0120   Confusion, Chronic (Adult)   Cognitive/Functional Impairments Minimized making progress toward outcome       Goal: Free from Harm/Injuries  Outcome: Ongoing (interventions implemented as appropriate)    02/12/17 0120   Confusion, Chronic (Adult)   Free from Harm/Injuries making progress toward outcome         Problem: Pneumonia (Adult)  Goal: Signs and Symptoms of Listed Potential Problems Will be Absent or Manageable (Pneumonia)  Outcome: Ongoing (interventions implemented as appropriate)    02/12/17 0120   Pneumonia   Problems Present (Pneumonia) none         Problem: Perioperative Period (Adult)  Goal: Signs and Symptoms of Listed Potential Problems Will be Absent or Manageable (Perioperative Period)  Outcome: Ongoing (interventions implemented as  appropriate)    02/12/17 0120   Perioperative Period   Problems Assessed (Perioperative Period) all   Problems Present (Perioperative Period) none       Goal: Signs and Symptoms of Listed Potential Problems Will be Absent or Manageable (Perioperative Period)  Outcome: Ongoing (interventions implemented as appropriate)    02/12/17 0120   Perioperative Period   Problems Assessed (Perioperative Period) all   Problems Present (Perioperative Period) none

## 2017-02-12 NOTE — PROGRESS NOTES
[facility]  Cardiology Progress Note    Patient Identification:  Name: Ze Wu  Age: 76 y.o.  Sex: male  :  1940  MRN: [id number]                 Follow Up / Chief Complaint: CAD, (permanent) afib,variable vr with bradycardia,CHF,  HTN    Interval History:    Denies any cp/pressure or shob. Feels good today.  More alert.  Had HD .        INR 2.89 (2.80<---2.62)=. Tele AFib with PVc's VR 60-70's some V- Bigeminy.  BP stable  's / 40-60's and no symptomatic  Bradycardia,      HPI:   This is a 76-year-old  male with a history of  coronary artery disease, chronic A. fib on anticoagulation with Coumadin, history of congestive heart failure, hypertension. We have been asked to consult regarding atrial fibrillation with variable ventricular rates and supratherapuetic INR.       Past Medical History:  Past Medical History   Diagnosis Date   • Anemia    • Anxiety    • CHF (congestive heart failure)    • Chronic a-fib    • Chronic anticoagulation    • Confusion    • Disease of thyroid gland    • GERD (gastroesophageal reflux disease)    • Hypertension    • Hypothyroidism    • Kidney disease    • Lymphoma    • Staph aureus infection 2017     labs called from [facility]     Past Surgical History:  Past Surgical History   Procedure Laterality Date   • Mediport insertion, single     • Thumb amputation Left    • Av fistula placement Right    • Arteriovenous fistula/shunt surgery Right 2017     Procedure: ultrasound access of right internal jugular vein with mahurkar dialysis catheter placement, EXCISION INFECTED RIGTH ARM AV GRAFT WITH REMOVAL OF HERO CATHETER, REMOVAL OF FEMORAL DIALYSIS CATHETER;  Surgeon: Floyd Fraga MD;  Location: [facility] OR ;  Service:    • Pr insj tunneled cvc w/o subq port/ age 6 yr/> Right 2017     Procedure: RT INTERNAL JUGULAR PALINDROME CATHETER PLACEMENT;  Surgeon: Floyd Fraga MD;  Location:  [facility] MAIN OR;  Service: Vascular   • Venous access device (port) removal Left 2/2/2017     Procedure: LT CHEST PORT REMOVAL ;  Surgeon: Floyd Fraga MD;  Location: [facility] OR;  Service:         Social History:   Social History   Substance Use Topics   • Smoking status: Never Smoker   • Smokeless tobacco: Not on file   • Alcohol use No      Family History:  History reviewed. No pertinent family history.       Allergies:  No Known Allergies     Scheduled Meds:    aspirin 81 mg Daily   epoetin bacilio 10,000 Units Once per day on Tue Thu Sat   isosorbide mononitrate 30 mg Q24H   lansoprazole 30 mg QAM   levothyroxine 100 mcg Daily   Vancomycin Pharmacy Intermittent Dosing  Daily   warfarin 1 mg Daily       INTAKE AND OUTPUT:    Intake/Output Summary (Last 24 hours) at 02/12/17 1104  Last data filed at 02/11/17 1814   Gross per 24 hour   Intake    450 ml   Output    400 ml   Net     50 ml       Review of Systems:   GI: no n/v  Cardiac  No chest pain or pressure  Pulmonary:  No SOA or cough    Constitutional:  Temp:  [96.9 °F (36.1 °C)-98.4 °F (36.9 °C)] 97.6 °F (36.4 °C)  Heart Rate:  [72-89] 89  Resp:  [16-18] 16  BP: ()/(42-62) 119/62    Physical Exam by Dr. Guerrero    General:  Resting quietly   Appears in no acute distress  Eyes: PERTL,  HEENT:  Thyroid not visibly enlarged.  Oral mucosal moist, no cyanosis  Respiratory: Respirations regular and unlabored at rest. BBS with air entry in all fields  No crackles or wheezes auscultated.  Left subclavian Mediport and  Right HD catheter noted  Cardiovascular: S1S2 irregular rate and rhythm. Unchanged I/VI systolic murmur without rub or gallop.No pretibial pitting edema.    Gastrointestinal: Abdomen soft, flat and non tender. Bowel sounds present.   Musculoskeletal: ALEGRIA x4. No abnormal movements  Extremities: No digital cyanosis.   Skin: Color pale pink. Skin warm and dry to touch.   Neuro: AAO x2 CN II-XII grossly intact  Psych: pleasant and  cooperative      Cardiographics    Telemetry: afib VR 60-70's, , no pauses.    Echocardiogram: 1/23/17  · All left ventricular wall segments contract normally.  · Left atrial cavity size is mildly dilated.  · Mild to moderate aortic valve stenosis is present.  · Mild to moderate tricuspid valve regurgitation is present.  · Left Ventricle: Calculated EF = 66.7%.  · There is no evidence of pericardial effusion       Lab Review     Results from last 7 days  Lab Units 02/09/17  0339 02/08/17  0215 02/07/17  0412   WBC 10*3/mm3 6.70 6.52 7.75   HEMOGLOBIN g/dL 8.0* 8.6* 8.8*   HEMATOCRIT % 24.2* 26.0* 27.0*   PLATELETS 10*3/mm3 208 206 286       Results from last 7 days  Lab Units 02/12/17  0435 02/11/17  0541 02/10/17  0534 02/09/17  0339 02/08/17  1821 02/08/17  1119   INR  2.89* 2.80* 2.62* 2.11*  --   --    APTT seconds  --   --   --  84.4* 39.0* 54.5*         Assessment:  - (Permanent) atrial fibrillation  - sick sinus syndrome  - elevated [id number] score  - elevated troponin  - CAD  - HTN  - a/c anemia -> IM, Renal  - s/p Pneumonia  - Pulmonary septic emboli -> antibiotic as per ID  - s/p acute hypoxic respiratory failure -> pulmonary  - MSSA septicemia -> antibiotic as per ID  - Infected AV fistula -> ID, Renal, vascular surgery  - ESRD-> Renal      Assessment & Plan:  - (Permanent) atrial fibrillation - elevated [id number] score => on Coumadin. INR therapeutic 2.89  2.8 on warfarin 1 mg.  Dose Coumadin to maintain target INR 2.0-3.0 range    - sick sinus syndrome  - no sustained bradycardia or recurrent pauses  Can wait on the  pacemaker implant for now and has stable hemodynamicx. Avoiding use of beta blocker and calcium channel blocker due to bradycardia    - elevated troponin - CPK-MB is normal. No angina.  No plan for cardiac catheterization at this time.  Continue optimized antianginal therapy with aspirin and nitrate. No BB or CCB due to sick sinus syndrome/bradycardia    - HTN - controlled imdur.   Avoiding use of beta blocker and calcium channel blocker due to bradycardia      Dose Coumadin to maintain target INR 2.0-3.0 range   Continue supportive management. ID has recommended against placement of permanent pacemaker pending repeat blood cultures one week after finishing current antibiotic therapy (approximately 3/13/2017).      evaluation and management per Dr Guerrero.     FRANCISCA Fajardo       Patient is seen and examined by me. All labs, radiology reports and cardiac procedures are reviewed by me. Asessment and plan is supervised and advised by me.    Finesse Guerrero MD,[facility]    EMR Dragon/Transcription disclaimer:   Much of this encounter note is an electronic transcription/translation of spoken language to printed text. The electronic translation of spoken language may permit erroneous, or at times, nonsensical words or phrases to be inadvertently transcribed; Although I have reviewed the note for such errors, some may still exist.

## 2017-02-13 NOTE — PROGRESS NOTES
"Pharmacokinetic Consult - Vancomycin Dosing (Follow-up Note)  Patient: Ze Wu  : 1940  MRN: [id number]  Admit date: 2017  8:35 PM    Day 24  Consult for Dr No  Treating: sepsis/bacteremia infected graft s/p removal  Goal pre-HD level: 20 mcg/ml  Plan is for 6 weeks therapy, stop date 3/6/17.    Relevant clinical data and objective history reviewed:  76 y.o. male 74\" (188 cm) 165 lb 5.5 oz (75 kg)    No results found for: CREATININE, BUN  ESRD on HD qTTSa    Lab Results   Component Value Date    WBC 6.70 2017    WBC 6.52 2017    WBC 7.75 2017     Temp Readings from Last 3 Encounters:   17 97 °F (36.1 °C) (Oral)     Baseline cultures/labs/radiology:  Blood and [facility] cultures from [facility]: MRSA  Blood cx  [id number]    IV Anti-Infectives     Ordered     Dose/Rate Route Frequency Start Stop    17 1444  Vancomycin Pharmacy Intermittent Dosing     Ordering Provider:  Rc No MD     Does not apply Daily 17 1515      17 1037  Pharmacy to dose vancomycin     Ordering Provider:  Rc No MD     Does not apply Continuous PRN 17 1037           Vancomycin dosing history:   Dosing intermittently based on HD. Previously receiving vanco 1250mg IV after each HD session based on initial dosing history. Last dose on . Random level supratherapeutic on , dosing held.  339 random= 24.6 mcg/ml, [id number] dose ordered, pt refused the dose  2/10 1505 vancomycin 500mg iv x1 given   AM random level= 22.6 mcg/ml   1319: 500mg IV x1   0333 random: 20.3 mcg/ml    Assessment/Plan  1.  Continue to dose vancomycin intermittently with dialysis. Random level this AM at goal. Next HD tomorrow. Expect minimal non-renal clearance before tomorrow, no dose today. Plan for vancomycin 500mg after each HD for now.  2. Will check next level prior to Saturday's HD to make sure dose does not need to be re-adjusted.  Pharmacy will continue to follow daily " while on vancomycin and adjust as needed.     Thank you,  Dia Anguiano, PharmD, BCPS  02/13/17 10:57 AM

## 2017-02-13 NOTE — PLAN OF CARE
Problem: Patient Care Overview (Adult)  Goal: Plan of Care Review  Outcome: Ongoing (interventions implemented as appropriate)    02/13/17 0521   Coping/Psychosocial Response Interventions   Plan Of Care Reviewed With patient   Patient Care Overview   Progress improving   Outcome Evaluation   Outcome Summary/Follow up Plan Denies pain thru out shift. Vss. Restful night       Goal: Adult Individualization and Mutuality  Outcome: Ongoing (interventions implemented as appropriate)  Goal: Discharge Needs Assessment  Outcome: Ongoing (interventions implemented as appropriate)    Problem: Chronic Kidney Disease/End Stage Renal Disease (Adult)  Goal: Signs and Symptoms of Listed Potential Problems Will be Absent or Manageable (Chronic Kidney Disease/End Stage Renal Disease)  Outcome: Ongoing (interventions implemented as appropriate)    Problem: Confusion, Chronic (Adult)  Goal: Cognitive/Functional Impairments Minimized  Outcome: Ongoing (interventions implemented as appropriate)  Goal: Free from Harm/Injuries  Outcome: Ongoing (interventions implemented as appropriate)

## 2017-02-13 NOTE — PROGRESS NOTES
Continued Stay Note  [facility]     Patient Name: Ze Wu  MRN: [id number]  Today's Date: 2/13/2017    Admit Date: 1/20/2017          Discharge Plan       02/13/17 9162    Case Management/Social Work Plan    Additional Comments IMM letter information given to sonTano via phone by CSW. . Explained patient was ready for discharge today. Son asked if patient could go to [facility].  He was told by [facility] staff that rooms were available. Called Chary with [facility]. She states that rooms won't be available until Wednesday or Thursday at the earliest. Tano updated. He states he has other family at [facility] and it would be easier to have both in the same place.  After much discussion, son agreed  to patient's transfer to  [facility]. He will receive skilled  care. [facility] patient tonight at 8:30. Family updated. Nurse to complete transfer arrangements .........   DEVIN Bradford      02/13/17 8591    Case Management/Social Work Plan    Plan  Pre cert obtained . Patient will transfer to  [facility] for  skilled care.              Discharge Codes     None        Expected Discharge Date and Time     Expected Discharge Date Expected Discharge Time    Feb 13, 2017             DEVIN Bradford

## 2017-02-13 NOTE — PROGRESS NOTES
NEPHROLOGY PROGRESS NOTE    PATIENT IDENTIFICATION:   Name:  Ze Wu      MRN:  [id number]     76 y.o.  male             Reason for visit: ESRD    SUBJECTIVE:   Patient is awake, disoriented to time and place.  Denies any chest pain or shortness of air, no orthopnea or PND, no nausea or vomiting.  OBJECTIVE:  Vitals:    02/12/17 1128 02/12/17 1506 02/12/17 1900 02/12/17 2300   BP: 110/45 111/66 116/56 119/50   BP Location: Left arm Left arm Left arm Left arm   Patient Position: Lying Lying Lying Lying   Pulse: 78 76 60 85   Resp: 16 16 18 20   Temp: 97.4 °F (36.3 °C) 96.5 °F (35.8 °C) 97.2 °F (36.2 °C) 98.2 °F (36.8 °C)   TempSrc: Oral Oral Oral Oral   SpO2:   94%    Weight:       Height:         FiO2 (%): 100 %     Body mass index is 21.23 kg/(m^2).    Intake/Output Summary (Last 24 hours) at 02/13/17 0725  Last data filed at 02/12/17 1801   Gross per 24 hour   Intake    320 ml   Output      0 ml   Net    320 ml         Exam:  GEN:  Frail.  appears stated age. Confused.   EYES:   Anicteric sclera  ENT:    External ears/nose normal, oral mucosa dry  NECK:  No adenopathy, JVP .  LUNGS: Clear to auscultation, unlabored breathing effort   CV:  Irregularly irregular, rub  ABD:  Non-tender, non-distended, +BS  EXT:             No lower ext edema. . Rt TDC    Scheduled meds:      aspirin 81 mg Oral Daily   epoetin bacilio 10,000 Units Intravenous Once per day on Tue Thu Sat   isosorbide mononitrate 30 mg Oral Q24H   lansoprazole 30 mg Oral QAM   levothyroxine 100 mcg Oral Daily   Vancomycin Pharmacy Intermittent Dosing  Does not apply Daily   warfarin 1 mg Oral Daily     IV meds:                          Pharmacy to dose vancomycin        Data Review:      Results from last 7 days  Lab Units 02/08/17  0215 02/07/17  0412   SODIUM mmol/L 139 133*   POTASSIUM mmol/L 4.3 4.8   CHLORIDE mmol/L 99 94*   TOTAL CO2 mmol/L 20.8* 18.9*   BUN mg/dL 27* 40*   CREATININE mg/dL 4.95* 7.21*   CALCIUM mg/dL 9.0 9.1   BILIRUBIN  mg/dL 1.1 1.1   ALK PHOS U/L 117 123*   ALT (SGPT) U/L 11 14   AST (SGOT) U/L 14 18   GLUCOSE mg/dL 95 96                   Results from last 7 days  Lab Units 02/09/17  0339 02/08/17  0215 02/07/17  0412   WBC 10*3/mm3 6.70 6.52 7.75   HEMOGLOBIN g/dL 8.0* 8.6* 8.8*   PLATELETS 10*3/mm3 208 206 286         Results from last 7 days  Lab Units 02/13/17  0333 02/12/17  0435 02/11/17  0541 02/10/17  0534 02/09/17  0339   INR  2.77* 2.89* 2.80* 2.62* 2.11*             ASSESSMENT:   1. ESRD on HD TTS.  2. Hypercaogulopathy .  INR today is 2.77  3. MRSA bacteremia: SP graft excision and port removal.   4. Encephalopathy. Still confused.   5. Anemia of ESRD. Procrit with HD.   6. Secondary hyperparathyroidism  7.  Pulmonary septic emboli  8. HTN.  Better controlled now off hydralazine   9. Hypothyroid on replacement.     Plan:     1. HD again tomorrow  2. Procrit with HD.  3.  Surveillance lab        Elmer Downs MD  2/13/2017    7:25 AM

## 2017-02-13 NOTE — DISCHARGE SUMMARY
DATE OF ADMISSION:  01/20/2017    DATE OF DISCHARGE:   02/13/2017    DISCHARGE DIAGNOSES   1.  Methicillin-resistant staphylococcus aureus septicemia.   2.  Right extremity arteriovenous graft infected.   3.  Status post graft excision on 01/23/2017.    4.  End-stage renal disease on hemodialysis.   5.  Toxic metabolic encephalopathy.     6.  Septic pulmonary emboli.   7.  Chronic atrial fibrillation on anticoagulation.   8.  History of hypertension.   9.  Bradycardia, needs pacemaker after completion of intravenous antibiotics.   10.  History of thyroid cancer.   11.  History of B-cell lymphoma.   12.  Coronary artery disease.   13.  History of congestive heart failure.     DISCHARGE MEDICATIONS: Per discharge medication reconciliation sheet.     CONSULTATIONS:  1.  Cardiology.  2.  Pulmonary.    3.  Psychiatry.  4.  Nephrology.  5.  Infectious disease.  6.  Vascular surgery.     PROCEDURE: Status post excision graft surgery followed by hemodialysis catheter placement.     HISTORY: This is a 76-year-old white male with a history of end-stage renal disease on hemodialysis. Other medical problems are B-cell lymphoma, chronic AFib, thyroid cancer, coronary artery disease, congestive heart failure, hypertension, admitted to the [facility] with shortness of breath and productive cough.  Workup over there revealed pneumonia with sepsis, was getting treatment for sepsis, but his AV site was not working. He was transferred to my care at [facility] for further management.     HOSPITAL COURSE: The patient was admitted. His workup revealed that he has MRSA bacteremia with sepsis.  The source of the infection was the AV graft.  The patient was followed by infectious disease, vascular surgery, cardiology and pulmonary, he was on anticoagulation and he received multiple fresh frozen plasma.  Then vascular surgery performed the surgery on his AV graft and then he needed a new hemodialysis catheter,  which was placed. He was severely bradycardic. Cardiology recommended he needed a permanent pacemaker but his blood culture were positive. Infectious disease recommended not to do a permanent pacemaker at this time until he finished the IV antibiotics, which will be on 03/13/2017, 1 month from now. Once all surgery was completed, he was restarted on anticoagulation. He has been therapeutic with INR 2.7 today. His hemoglobin is stable. He remained on hemodialysis 3 times a week. He is afebrile. Vital signs stable. Blood pressure is 119/47.  He remains encephalopathic followed by psychiatry service and he is off the restraints. His repeated cultures were negative except the wound still showed MRSA but the blood cultures are negative now. He had a FAITH which did not show any evidence of endocarditis, but again, he is currently on IV vancomycin until 03/13/2017 and then he can have his pacemaker placed. His heart rate has been fine, it is 73 at the time of discharge. All of his labs look good, especially white count is 5.     DISCHARGE DIET: Soft ground, nectar thick, low potassium. He is also getting dietary supplements 2x daily.      ACTIVITY: Per physical therapy and occupational therapy.     FOLLOWUP:   1.  Further care per accepting physician.   2.  He is going to continue to have hemodialysis 3 times a week.     Rc No M.D.  AA:jose  D:   02/13/2017 15:10:00  T:   02/13/2017 15:26:42  Job ID:   [id number]  Document ID:   [id number]  cc:   [facility] [phone] [address] [place] [address] [phone]  (no pcp on file)

## 2017-02-13 NOTE — PROGRESS NOTES
"DAILY PROGRESS NOTE  [facility]    Patient Identification:  Name: Ze Wu  Age: 76 y.o.  Sex: male  :  1940  MRN: [id number]         Primary Care Physician: No Known Provider    Subjective:  DOING BETTER  NO NEW COMPLAINTS    Objective:    Scheduled Meds:    aspirin 81 mg Oral Daily   epoetin bacilio 10,000 Units Intravenous Once per day on  Sat   isosorbide mononitrate 30 mg Oral Q24H   lansoprazole 30 mg Oral QAM   levothyroxine 100 mcg Oral Daily   Vancomycin Pharmacy Intermittent Dosing  Does not apply Daily   warfarin 1 mg Oral Daily     Continuous Infusions:    Pharmacy to dose vancomycin        Vital signs in last 24 hours:  Temp:  [96.5 °F (35.8 °C)-98.2 °F (36.8 °C)] 97 °F (36.1 °C)  Heart Rate:  [60-90] 73  Resp:  [16-20] 18  BP: (106-119)/(45-66) 113/47    Intake/Output:    Intake/Output Summary (Last 24 hours) at 17 1330  Last data filed at 17 0956   Gross per 24 hour   Intake    680 ml   Output      0 ml   Net    680 ml       Exam:  Visit Vitals   • /47 (BP Location: Left arm, Patient Position: Lying)   • Pulse 73   • Temp 97 °F (36.1 °C) (Oral)   • Resp 18   • Ht 74\" (188 cm)   • Wt 165 lb 5.5 oz (75 kg)   • SpO2 94%   • BMI 21.23 kg/m2       General Appearance:    Alert, cooperative, no distress, AAOx3                          Head:    Normocephalic, without obvious abnormality, atraumatic                           Eyes:    PERRL, conjunctiva/corneas clear, EOM's intact, both eyes                         Throat:   Lips, tongue, gums normal; oral mucosa pink and moist                           Neck:   Supple, symmetrical, trachea midline, no JVD                         Lungs:    Clear to auscultation bilaterally, respirations unlabored                 Chest Wall:    No tenderness or deformity                          Heart:    Regular rate and rhythm, S1 and S2 normal, no murmur,no  Rub                                      or gallop                  " Abdomen:     Soft, non-tender, bowel sounds active, no masses, no                                                        organomegaly                  Extremities:   Extremities normal, atraumatic, no cyanosis or edema                        Pulses:   Pulses palpable in all extremities                            Skin:   Skin is warm and dry,  no rashes or palpable lesions                  Neurologic:   CNII-XII intact, motor strength grossly intact, sensation grossly                                         intact to light touch, no focal deficits noted       Data Review:  No results found for: WBC, HGB, HCT, PLT  No results found for: NA, K, CL, CO2, BUN, CREATININE, GLUCOSE  No results found for: CALCIUM, MG, PHOS  No results found for: AST, ALT, ALKPHOS  Lab Results   Component Value Date    INR 2.77 (H) 02/13/2017       Assessment:  Active Problems:    Sepsis  esrd on hd  Atrial fibrillation permanent  Hypertension  mrsa bacteremia  BRADYCARDIA NO PPM FOR NOW SEC TO INFECTION      Plan:  Discharge  Summary dictated  Rc No MD  2/13/2017  1:30 PM

## 2017-02-13 NOTE — PLAN OF CARE
Problem: Patient Care Overview (Adult)  Goal: Plan of Care Review  Outcome: Ongoing (interventions implemented as appropriate)    02/13/17 1152   Coping/Psychosocial Response Interventions   Plan Of Care Reviewed With patient   Outcome Evaluation   Outcome Summary/Follow up Plan Pt increased mobility this am. More cooperative today. Minimal cueing for safety w/ use of RW during ambulation.

## 2017-02-13 NOTE — PROGRESS NOTES
Acute Care - Physical Therapy Treatment Note  [facility]     Patient Name: Ze Wu  : 1940  MRN: [id number]  Today's Date: 2017  Onset of Illness/Injury or Date of Surgery Date: 17          Admit Date: 2017    Visit Dx:    ICD-10-CM ICD-9-CM   1. SSS (sick sinus syndrome) I49.5 427.81   2. 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 Infected venous access port T80.219A 999.31     Patient Active Problem List   Diagnosis   • Sepsis               Adult Rehabilitation Note       17 1100          Rehab Assessment/Intervention    Discipline physical therapy assistant  -RW      Document Type therapy note (daily note)  -RW      Subjective Information agree to therapy  -RW      Patient Effort, Rehab Treatment fair  -RW      Precautions/Limitations fall precautions  -RW      Recorded by [RW] Jane Beltran PTA      Pain Assessment    Pain Assessment No/denies pain  -RW      Recorded by [RW] Jane Beltran PTA      Cognitive Assessment/Intervention    Current Cognitive/Communication Assessment impaired  -RW      Orientation Status oriented to;person  -RW      Follows Commands/Answers Questions 75% of the time;able to follow single-step instructions;needs cueing  -RW      Personal Safety mild impairment;at risk behaviors demonstrated  -RW      Personal Safety Interventions fall prevention program maintained;gait belt;nonskid shoes/slippers when out of bed  -RW      Recorded by [RW] Jane Beltran PTA      Bed Mobility, Assessment/Treatment    Bed Mobility, Assistive Device bed rails;head of bed elevated  -RW      Bed Mob, Supine to Sit, [place] verbal cues required;nonverbal cues required (demo/gesture);minimum assist (75% patient effort)  -RW      Bed Mob, Sit to Supine, [place] minimum assist (75% patient effort);verbal cues required;nonverbal cues required (demo/gesture)   assist w/ BLE  -RW      Recorded by [RW] Jane Beltran PTA      Transfer  Assessment/Treatment    Transfers, Sit-Stand [place] verbal cues required;nonverbal cues required (demo/gesture);minimum assist (75% patient effort);2 person assist required  -RW      Transfers, Stand-Sit [place] verbal cues required;nonverbal cues required (demo/gesture);minimum assist (75% patient effort)  -RW      Transfers, Sit-Stand-Sit, Assist Device rolling walker  -RW      Recorded by [RW] Jane Beltran PTA      Gait Assessment/Treatment    Gait, [place] Level verbal cues required;nonverbal cues required (demo/gesture);contact guard assist;minimum assist (75% patient effort);2 person assist required  -RW      Gait, Assistive Device rolling walker  -RW      Gait, Distance (Feet) 100  -RW      Gait, Gait Deviations forward flexed posture;bilateral:;andria decreased;step length decreased;stride length decreased  -RW      Gait, Safety Issues balance decreased during turns;step length decreased  -RW      Recorded by [RW] Jane Beltran PTA      Positioning and Restraints    Pre-Treatment Position in bed  -RW      Post Treatment Position bed  -RW      In Bed side lying left;call light within reach;encouraged to call for assist;exit alarm on  -RW      Recorded by [RW] Jane Beltran PTA        User Key  (r) = Recorded By, (t) = Taken By, (c) = Cosigned By    Initials Name Effective Dates    MANINDER Beltran PTA 04/06/16 -                 IP PT Goals       02/07/17 1155          Bed Mobility PT LTG    Bed Mobility PT LTG, Time to Achieve 1 wk  -AR      Bed Mobility PT LTG, Date Goal Reviewed 02/07/17  -AR      Bed Mobility PT LTG, Outcome goal ongoing  -AR      Transfer Training PT LTG    Transfer Training PT LTG, Time to Achieve 1 wk  -AR      Transfer Training PT  LTG, Date Goal Reviewed 02/07/17  -AR      Transfer Training PT LTG, Outcome goal ongoing  -AR      Gait Training PT LTG    Gait Training Goal PT LTG, Time to Achieve 1 wk  -AR      Gait Training Goal PT LTG, Date Goal Reviewed  02/07/17  -AR      Gait Training Goal PT LTG, Outcome goal ongoing  -AR        User Key  (r) = Recorded By, (t) = Taken By, (c) = Cosigned By    Initials Name Provider Type    AR Teetee BLAINE Hernandez Physical Therapist          Physical Therapy Education     Title: PT OT SLP Therapies (Active)     Topic: Physical Therapy (Active)     Point: Mobility training (Done)    Learning Progress Summary    Learner Readiness Method Response Comment Documented by Status   Patient Acceptance TB,E,D NR,VU   02/13/17 1152 Done    Acceptance E,TB,D VU,NR   02/10/17 1244 Done    Acceptance E NL  AR 02/07/17 1157 Active    Acceptance E,TB DU,VU   02/06/17 1405 Done    Acceptance E NR confusion limits education SI 02/05/17 1146 Active    Acceptance E,D NR   02/03/17 1256 Active    Acceptance E,TB,D VU,NR   01/30/17 1352 Done    Acceptance E NR   01/29/17 1019 Active    Acceptance E NR  AR 01/27/17 1602 Active               Point: Home exercise program (Active)    Learning Progress Summary    Learner Readiness Method Response Comment Documented by Status   Patient Acceptance E NL  AR 02/07/17 1157 Active    Acceptance E,TB DU,VU   02/06/17 1405 Done    Acceptance E,D NR   02/03/17 1256 Active    Acceptance E,TB,D VU,NR   01/30/17 1352 Done    Acceptance E NR  AR 01/27/17 1602 Active               Point: Body mechanics (Done)    Learning Progress Summary    Learner Readiness Method Response Comment Documented by Status   Patient Acceptance TB,E,D NR,VU   02/13/17 1152 Done    Acceptance E,TB,D VU,NR   02/10/17 1244 Done    Acceptance E NL  AR 02/07/17 1157 Active    Acceptance E,TB DU,VU   02/06/17 1405 Done    Acceptance E,D NR   02/03/17 1256 Active    Acceptance E,TB,D VU,NR   01/30/17 1352 Done    Acceptance E NR  AR 01/27/17 1602 Active               Point: Precautions (Done)    Learning Progress Summary    Learner Readiness Method Response Comment Documented by Status   Patient Acceptance TB,E,D NR,VU   RW 02/13/17 1152 Done    Acceptance E,TB,D VU,NR  RW 02/10/17 1244 Done    Acceptance E NL  AR 02/07/17 1157 Active    Acceptance E,TB DU,VU  CW 02/06/17 1405 Done    Acceptance E,D NR  RW 02/03/17 1256 Active    Acceptance E,TB,D VU,NR  RW 01/30/17 1352 Done    Acceptance E NR  AR 01/27/17 1602 Active                      User Key     Initials Effective Dates Name Provider Type Discipline    SI 05/18/15 -  Debbie Stout, PTA Physical Therapy Assistant PT    JW 02/18/16 -  Shaina Gannon, PTA Physical Therapy Assistant PT    AR 06/27/16 -  Teetee Hernandez, PT Physical Therapist PT    RW 04/06/16 -  Jane Beltran, PTA Physical Therapy Assistant PT     12/13/16 -  Moe Wise Physical Therapy Assistant PT                    PT Recommendation and Plan  Anticipated Discharge Disposition: skilled nursing facility  Planned Therapy Interventions: balance training, bed mobility training, gait training, home exercise program, patient/family education, ROM (Range of Motion), strengthening, transfer training  PT Frequency: daily  Plan of Care Review  Plan Of Care Reviewed With: patient  Outcome Summary/Follow up Plan: Pt increased mobility this am. More cooperative today. Minimal cueing for safety w/ use of RW during ambulation.          Outcome Measures       02/13/17 1100          How much help from another person do you currently need...    Turning from your back to your side while in flat bed without using bedrails? 3  -RW      Moving from lying on back to sitting on the side of a flat bed without bedrails? 3  -RW      Moving to and from a bed to a chair (including a wheelchair)? 3  -RW      Standing up from a chair using your arms (e.g., wheelchair, bedside chair)? 3  -RW      Climbing 3-5 steps with a railing? 2  -RW      To walk in hospital room? 3  -RW      AM-PAC 6 Clicks Score 17  -RW      Functional Assessment    Outcome Measure Options AM-PAC 6 Clicks Basic Mobility (PT)  -RW        User Key  (r) =  Recorded By, (t) = Taken By, (c) = Cosigned By    Initials Name Provider Type     Jane Beltran PTA Physical Therapy Assistant           Time Calculation:         PT Charges       02/13/17 1142          Time Calculation    Start Time 1137  -RW      Stop Time 1151  -RW      Time Calculation (min) 14 min  -RW      PT Received On 02/13/17  -RW      PT - Next Appointment 02/14/17  -        User Key  (r) = Recorded By, (t) = Taken By, (c) = Cosigned By    Initials Name Provider Type     Jane Beltran PTA Physical Therapy Assistant          Therapy Charges for Today     Code Description Service Date Service Provider Modifiers Qty    22929141172 HC PT THER PROC EA 15 MIN 2/13/2017 Jane Beltran PTA GP 1    61007675669 HC PT THER SUPP EA 15 MIN 2/13/2017 Jane Beltran PTA GP 1          PT G-Codes  Outcome Measure Options: AM-PAC 6 Clicks Basic Mobility (PT)    Jane Beltran PTA  2/13/2017

## 2017-02-13 NOTE — PLAN OF CARE
Problem: Patient Care Overview (Adult)  Goal: Plan of Care Review  Outcome: Ongoing (interventions implemented as appropriate)    02/13/17 1024   Coping/Psychosocial Response Interventions   Plan Of Care Reviewed With patient   Patient Care Overview   Progress improving   Outcome Evaluation   Outcome Summary/Follow up Plan PT alert to self, disoriented to time, place, & situation, pleasant and cooperative this morning. Remains incontinent of B&B, receives hemodialysis [id number], c/o pain this morning and prn pain meds given per orders. Patient possibly to d/c to [facility], will see if therapy can work with pt today. Bed alarm on , call light in reach.         Problem: Chronic Kidney Disease/End Stage Renal Disease (Adult)  Goal: Signs and Symptoms of Listed Potential Problems Will be Absent or Manageable (Chronic Kidney Disease/End Stage Renal Disease)  Outcome: Ongoing (interventions implemented as appropriate)    02/13/17 1024   Chronic Kidney Disease/End Stage Renal Disease   Problems Assessed (Chronic Kidney Disease/ESRD) all   Problems Present (Chronic Kidney Disease/ESRD) cardiovascular disease;chronic pain;situational response;undernutrition;functional decline/self-care deficit         Problem: Confusion, Chronic (Adult)  Goal: Cognitive/Functional Impairments Minimized  Outcome: Ongoing (interventions implemented as appropriate)    02/13/17 1024   Confusion, Chronic (Adult)   Cognitive/Functional Impairments Minimized making progress toward outcome       Goal: Free from Harm/Injuries  Outcome: Ongoing (interventions implemented as appropriate)    02/13/17 1024   Confusion, Chronic (Adult)   Free from Harm/Injuries making progress toward outcome         Problem: Pneumonia (Adult)  Goal: Signs and Symptoms of Listed Potential Problems Will be Absent or Manageable (Pneumonia)  Outcome: Ongoing (interventions implemented as appropriate)    02/13/17 1024   Pneumonia   Problems Assessed (Pneumonia) all          Problem: Perioperative Period (Adult)  Goal: Signs and Symptoms of Listed Potential Problems Will be Absent or Manageable (Perioperative Period)  Outcome: Ongoing (interventions implemented as appropriate)    02/13/17 1024   Perioperative Period   Problems Assessed (Perioperative Period) all   Problems Present (Perioperative Period) pain;situational response

## 2017-02-13 NOTE — PROGRESS NOTES
[facility]  Cardiology Progress Note    Patient Identification:  Name: Ze Wu  Age: 76 y.o.  Sex: male  :  1940  MRN: [id number]                 Follow Up / Chief Complaint: CAD, (permanent) afib,variable vr with bradycardia,CHF,  HTN    Interval History:  This is a 76-year-old  male with a history of  coronary artery disease, chronic A. fib on anticoagulation with Coumadin, history of congestive heart failure, hypertension. We have been asked to consult regarding atrial fibrillation with variable ventricular rates and supratherapuetic INR.       Subjective:    Objective:    Past Medical History:  Past Medical History   Diagnosis Date   • Anemia    • Anxiety    • CHF (congestive heart failure)    • Chronic a-fib    • Chronic anticoagulation    • Confusion    • Disease of thyroid gland    • GERD (gastroesophageal reflux disease)    • Hypertension    • Hypothyroidism    • Kidney disease    • Lymphoma    • Staph aureus infection 2017     labs called from [facility]     Past Surgical History:  Past Surgical History   Procedure Laterality Date   • Mediport insertion, single     • Thumb amputation Left    • Av fistula placement Right    • Arteriovenous fistula/shunt surgery Right 2017     Procedure: ultrasound access of right internal jugular vein with mahurkar dialysis catheter placement, EXCISION INFECTED RIGTH ARM AV GRAFT WITH REMOVAL OF HERO CATHETER, REMOVAL OF FEMORAL DIALYSIS CATHETER;  Surgeon: Floyd Fraga MD;  Location: [facility] OR ;  Service:    • Pr insj tunneled cvc w/o subq port/ age 6 yr/> Right 2017     Procedure: RT INTERNAL JUGULAR PALINDROME CATHETER PLACEMENT;  Surgeon: Floyd Fraga MD;  Location: [facility] OR;  Service: Vascular   • Venous access device (port) removal Left 2017     Procedure: LT CHEST PORT REMOVAL ;  Surgeon: Flyod Fraga MD;  Location: [facility] OR;  Service:         Social  History:   Social History   Substance Use Topics   • Smoking status: Never Smoker   • Smokeless tobacco: Not on file   • Alcohol use No      Family History:  History reviewed. No pertinent family history.       Allergies:  No Known Allergies     Scheduled Meds:    aspirin 81 mg Daily   epoetin bacilio 10,000 Units Once per day on Tue Thu Sat   isosorbide mononitrate 30 mg Q24H   lansoprazole 30 mg QAM   levothyroxine 100 mcg Daily   Vancomycin Pharmacy Intermittent Dosing  Daily   warfarin 1 mg Daily       INTAKE AND OUTPUT:    Intake/Output Summary (Last 24 hours) at 02/13/17 0831  Last data filed at 02/12/17 1801   Gross per 24 hour   Intake    320 ml   Output      0 ml   Net    320 ml       Review of Systems:   GI: no n/v  Cardiac  No chest pain or pressure  Pulmonary:  No SOA or cough    Constitutional:  Temp:  [96.5 °F (35.8 °C)-98.2 °F (36.8 °C)] 97 °F (36.1 °C)  Heart Rate:  [60-90] 90  Resp:  [16-20] 18  BP: (106-119)/(45-66) 106/45    Physical Exam by Tristan Mojica MD  General:  Resting quietly   Appears in no acute distress  Eyes: PERTL,  HEENT:  Thyroid not visibly enlarged.  Oral mucosal moist, no cyanosis  Respiratory: Respirations regular and unlabored at rest. BBS with air entry in all fields  No crackles or wheezes auscultated.    Cardiovascular: S1S2 irregular rate and rhythm. Unchanged I/VI systolic murmur without rub or gallop.No pretibial pitting edema.    Gastrointestinal: Abdomen soft, flat and non tender. Bowel sounds present.   Musculoskeletal: ALEGRIA weakly x4. No abnormal movements  Extremities: No digital cyanosis. Dry dressing right upper arm  Skin: Color pale pink. Skin warm and dry to touch.   Neuro: AAO x2 CN II-XII grossly intact  Psych: pleasant and cooperative            Cardiographics  Telemetry: afib VR 60-70's, , no pauses.         Echocardiogram: 1/23/17  · All left ventricular wall segments contract normally.  · Left atrial cavity size is mildly dilated.  · Mild to moderate  aortic valve stenosis is present.  · Mild to moderate tricuspid valve regurgitation is present.  · Left Ventricle: Calculated EF = 66.7%.  · There is no evidence of pericardial effusion       Lab Review     Results from last 7 days  Lab Units 02/13/17  0333 02/12/17  0435 02/11/17  0541  02/09/17  0339 02/08/17  1821 02/08/17  1119   INR  2.77* 2.89* 2.80*  < > 2.11*  --   --    APTT seconds  --   --   --   --  84.4* 39.0* 54.5*   < > = values in this interval not displayed.      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 INR therapeutic @ 2.77. Dose Coumadin to maintain target INR 2.0-3.0 range    - sick sinus syndrome  - no sustained bradycardia or recurrent pauses  Can wait on the  pacemaker implant for now and has stable hemodynamicx. Avoiding use of beta blocker and calcium channel blocker due to bradycardia    - elevated troponin - CPK-MB is normal. No angina.  No plan for cardiac catheterization at this time.  Continue optimized antianginal therapy with aspirin and nitrate. No BB or CCB due to sick sinus syndrome/bradycardia    - HTN - controlled imdur.  Avoiding use of beta blocker and calcium channel blocker due to bradycardia      Defer pacemaker implant until cleared by Infectious Disease. HR stable.  Continue current antianginal therapy. Dose Coumadin to maintain target INR 2.0-3.0 range     Tristan Mojica MD         EMR Dragon/Transcription disclaimer:   Much of this encounter note is an electronic transcription/translation of spoken language to  printed text. The electronic translation of spoken language may permit erroneous, or at times, nonsensical words or phrases to be inadvertently transcribed; Although I have reviewed the note for such errors, some may still exist.

## 2017-02-14 NOTE — NURSING NOTE
"CALLED [facility] CONCERNING TIME OF PT PICK OVERDUE. SPOKE WITH SHELDON VIA TELEPHONE WHO TOLD ME THAT \"THE PT IS STILL SCHEDULED BUT THEY WE ARE WAITING FOR A RIG TO PICK HIM UP.\" WHEN ASKED FOR A TIMELINE, SHELDON WAS UNABLE TO GIVE ONE.    "

## 2017-02-14 NOTE — NURSING NOTE
PER REPORT FROM GEORGIE ANDERSON, JU PT IS TO LEAVE WITH LEFT AC IV IN PLACE PER REQUEST FROM [facility]. IV TAPED DOWN AND SECURED FOR TRANSPORT

## 2017-02-23 NOTE — TELEPHONE ENCOUNTER
I called and spoke to Thea REA: patient's decreased Hgb level from 2/21/17. It was 7.5 and he is a dialysis patient. Dr. Lozano asked me to contact them to be sure someone was aware of the low level. She said she had a copy of these results and would make the doctor at their facility aware (Dr. Layton)--PW,RN

## 2017-03-06 PROBLEM — I48.91 ATRIAL FIBRILLATION WITH RVR (HCC): Status: ACTIVE | Noted: 2017-01-01

## 2017-03-06 NOTE — ED NOTES
Spoke to Magdalena from [facility] at [phone]. Pt usually only wears 2LNC at night. Pt gets dialysis on T, TH, S. Gets vanc at same time as dialysis due to previous MRSA of blood although recent blood cultures are negative. Pt east nectar thick liquid and mechanical soft. Has a blood transfusion schedule on the 8th at a [facility]. Told [facility] to keep appointment until notified. Son is Silas Wu at [phone].     Trista Lizama RN  03/06/17 3728       Trista Lizama RN  03/06/17 5976

## 2017-03-06 NOTE — ED PROVIDER NOTES
EMERGENCY DEPARTMENT ENCOUNTER    CHIEF COMPLAINT  Chief Complaint: Fatigue  History given by: Pt, NH report  History limited by: Confusion  Room Number: 28/28  PMD: No Known Provider      HPI:  Per NH, pt is a 76 y.o. male who presents complaining of fatigue and tachycardia. Pt denies chest pain, abd pain, and headache. He is confused at baseline. Hemoglobin was 7.7 on labs drawn this morning.    Duration: Noticed earlier today  Onset: Gradual  Timing: Constant  Location: N/A  Radiation: None  Quality: Fatigue  Intensity/Severity: Moderate  Progression: Unchanged  Associated Symptoms: Tachycardia  Aggravating Factors: Nothing  Alleviating Factors: Nothing  Previous Episodes: None specified  Treatment before arrival: Labs this morning show Hemoglobin of 7.7    PAST MEDICAL HISTORY  Active Ambulatory Problems     Diagnosis Date Noted   • Sepsis 01/21/2017     Resolved Ambulatory Problems     Diagnosis Date Noted   • No Resolved Ambulatory Problems     Past Medical History   Diagnosis Date   • Anemia    • Anxiety    • CHF (congestive heart failure)    • Chronic a-fib    • Chronic anticoagulation    • Confusion    • Disease of thyroid gland    • GERD (gastroesophageal reflux disease)    • Hypertension    • Hypothyroidism    • Kidney disease    • Lymphoma    • Staph aureus infection 01/21/2017       PAST SURGICAL HISTORY  Past Surgical History   Procedure Laterality Date   • Mediport insertion, single     • Thumb amputation Left    • Av fistula placement Right    • Arteriovenous fistula/shunt surgery Right 1/23/2017     Procedure: ultrasound access of right internal jugular vein with mahurkar dialysis catheter placement, EXCISION INFECTED RIGTH ARM AV GRAFT WITH REMOVAL OF HERO CATHETER, REMOVAL OF FEMORAL DIALYSIS CATHETER;  Surgeon: Floyd Fraga MD;  Location: [facility] OR 18/19;  Service:    • Pr insj tunneled cvc w/o subq port/ age 6 yr/> Right 1/31/2017     Procedure: RT INTERNAL JUGULAR PALINDROME  CATHETER PLACEMENT;  Surgeon: Floyd Fraga MD;  Location: [facility] OR;  Service: Vascular   • Venous access device (port) removal Left 2/2/2017     Procedure: LT CHEST PORT REMOVAL ;  Surgeon: Floyd Fraga MD;  Location: [facility] OR;  Service:        FAMILY HISTORY  History reviewed. No pertinent family history.    SOCIAL HISTORY  Social History     Social History   • Marital status:      Spouse name: N/A   • Number of children: N/A   • Years of education: N/A     Occupational History   • Not on file.     Social History Main Topics   • Smoking status: Never Smoker   • Smokeless tobacco: Not on file   • Alcohol use No   • Drug use: No   • Sexual activity: Not on file     Other Topics Concern   • Not on file     Social History Narrative       ALLERGIES  Review of patient's allergies indicates no known allergies.    REVIEW OF SYSTEMS  Review of Systems   Unable to perform ROS: Other (Confusion)   Constitutional: Positive for fatigue.   Cardiovascular: Negative for chest pain.   Gastrointestinal: Negative for abdominal pain.   Neurological: Positive for weakness (generalized). Negative for headaches.   Psychiatric/Behavioral: Positive for confusion.       PHYSICAL EXAM  ED Triage Vitals   Temp Heart Rate Resp BP SpO2   03/06/17 1456 03/06/17 1456 03/06/17 1456 03/06/17 1456 03/06/17 1456   98.5 °F (36.9 °C) 158 18 106/60 99 %      Temp src Heart Rate Source Patient Position BP Location FiO2 (%)   03/06/17 1456 03/06/17 1456 03/06/17 1456 -- --   Tympanic Monitor Lying         Physical Exam   HENT:   Head: Normocephalic and atraumatic.   Mouth/Throat: Mucous membranes are dry.   Eyes: EOM are normal. Pupils are equal, round, and reactive to light.   Neck: Normal range of motion. Neck supple.   Pt has a dialysis cath in his R neck   Cardiovascular: An irregularly irregular rhythm present. Tachycardia present.    Murmur heard.   Systolic murmur is present with a grade of 1/6   Pulmonary/Chest:  Effort normal and breath sounds normal. No respiratory distress.   Abdominal: Soft. There is no tenderness. There is no rebound and no guarding.   Musculoskeletal: Normal range of motion. He exhibits edema (trace).   Pt has a well healed surgical incision from previous access site to his R upper arm   Neurological: He is alert. He has normal sensation and normal strength. He is disoriented.   Pt is oriented to person only. He appears to be confused, which is baseline   Skin: Skin is warm and dry.   Psychiatric: Mood and affect normal.   Nursing note and vitals reviewed.      LAB RESULTS  Lab Results (last 24 hours)     Procedure Component Value Units Date/Time    CBC & Differential [[id number]] Collected:  03/06/17 1534    Specimen:  Blood Updated:  03/06/17 1547    Narrative:       The following orders were created for panel order CBC & Differential.  Procedure                               Abnormality         Status                     ---------                               -----------         ------                     CBC Auto Differential[[id number]]         Abnormal            Final result                 Please view results for these tests on the individual orders.    Comprehensive Metabolic Panel [[id number]]  (Abnormal) Collected:  03/06/17 1534    Specimen:  Blood Updated:  03/06/17 1615     Glucose 108 (H) mg/dL      BUN 31 (H) mg/dL      Creatinine 6.91 (H) mg/dL      Sodium 137 mmol/L      Potassium 5.5 (H) mmol/L      Chloride 94 (L) mmol/L      CO2 23.0 mmol/L      Calcium 8.6 mg/dL      Total Protein 7.2 g/dL      Albumin 3.10 (L) g/dL      ALT (SGPT) 12 U/L      AST (SGOT) 24 U/L      Alkaline Phosphatase 100 U/L      Total Bilirubin 1.2 mg/dL      eGFR Non African Amer 8 (L) mL/min/1.73      Globulin 4.1 gm/dL      A/G Ratio 0.8 g/dL      BUN/Creatinine Ratio 4.5 (L)      Anion Gap 20.0 mmol/L     Narrative:       The MDRD GFR formula is only valid for adults with stable renal function between ages 18  and 70.    Protime-INR [[id number]]  (Abnormal) Collected:  03/06/17 1534    Specimen:  Blood Updated:  03/06/17 1600     Protime 21.4 (H) Seconds      INR 1.93 (H)     BNP [[id number]]  (Abnormal) Collected:  03/06/17 1534    Specimen:  Blood Updated:  03/06/17 1634     proBNP >94843.0 (H) pg/mL     Narrative:       Among patients with dyspnea, NT-proBNP is highly sensitive for the detection of acute congestive heart failure. In addition NT-proBNP of <300 pg/ml effectively rules out acute congestive heart failure with 99% negative predictive value.    CBC Auto Differential [[id number]]  (Abnormal) Collected:  03/06/17 1534    Specimen:  Blood Updated:  03/06/17 1547     WBC 6.73 10*3/mm3      RBC 2.59 (L) 10*6/mm3      Hemoglobin 8.0 (L) g/dL      Hematocrit 25.7 (L) %      MCV 99.2 (H) fL      MCH 30.9 pg      MCHC 31.1 (L) g/dL      RDW 18.9 (H) %      RDW-SD 69.0 (H) fl      MPV 10.1 fL      Platelets 157 10*3/mm3      Neutrophil % 66.4 %      Lymphocyte % 21.8 %      Monocyte % 10.1 %      Eosinophil % 1.3 %      Basophil % 0.4 %      Immature Grans % 0.0 %      Neutrophils, Absolute 4.46 10*3/mm3      Lymphocytes, Absolute 1.47 10*3/mm3      Monocytes, Absolute 0.68 10*3/mm3      Eosinophils, Absolute 0.09 10*3/mm3      Basophils, Absolute 0.03 10*3/mm3      Immature Grans, Absolute 0.00 10*3/mm3     Magnesium [[id number]]  (Abnormal) Collected:  03/06/17 1534    Specimen:  Blood Updated:  03/06/17 1615     Magnesium 2.5 (H) mg/dL     TSH [[id number]]  (Abnormal) Collected:  03/06/17 1534    Specimen:  Blood Updated:  03/06/17 1621     TSH 33.500 (H) mIU/mL     Troponin [[id number]]  (Abnormal) Collected:  03/06/17 1534    Specimen:  Blood Updated:  03/06/17 1623     Troponin T 0.105 (C) ng/mL     Narrative:       Troponin T Reference Ranges:  Less than 0.03 ng/mL:    Negative for AMI  0.03 to 0.09 ng/mL:      Indeterminant for AMI  Greater than 0.09 ng/mL: Positive for AMI          I ordered the above labs and reviewed  the results    RADIOLOGY  XR Chest 1 View   Final Result      CXR:  Cardiomegaly and slight vascular congestion. Tiny left pleural effusion.     I ordered the above noted radiological studies. Interpreted by radiologist. Discussed with radiologist Reviewed by me in PACS.     EKG         EKG time: 3:44 PM  Rhythm/Rate: A-fib at 119 bpm  P waves and MI: N/A  QRS, axis: RAD  ST and T waves: Normal  Interpreted contemporaneously by me and independently viewed.  Unchanged compared to prior (2/25/17).    PROCEDURES  Procedures      PROGRESS AND CONSULTS  ED Course   Value Comment By Time   Troponin T: (!!) 0.105 (Reviewed) Babatunde Francis MD 03/06 1726   3:15 PM:  Vitals: BP: 106/60 HR: (!) 158 Temp: 98.5 °F (36.9 °C) (Tympanic) O2 sat: 99%  D/w pt plan for labs, CXR, and EKG for further evaluation. Pt understands and agrees with the plan, all questions answered.    5:27 PM:  Vitals: BP: 116/59 HR: (!) 130 Temp: 98.5 °F (36.9 °C) (Tympanic) O2 sat: 96%  Rechecked pt. Pt is resting. Reviewed pt's vitals. His O2 sats are good on 4L O2, will reevaluate on 2L. Pt understands and agrees with the plan, all questions answered.    5:32 PM:  Call placed to [facility] for admission.    5:44 PM:  Vitals: BP: 116/59 HR: (!) 127 Temp: 98.5 °F (36.9 °C) (Tympanic) O2 sat: 98%  Rechecked pt. Pt continues to be tachycardic, but his O2 sats are still good and he is resting comfortably.    5:45 PM:  Discussed pt's case with Dr. Cesar Russo ([facility]), who requests the pt not be given anything for rate control at this point. He agrees to consult with the pt upon admission about pacer placement. He requests I order repeated Troponin, CK, and CKMB, which I will do. Call placed to [facility] for admission and Nephrology for consult.        MEDICAL DECISION MAKING  Results were reviewed/discussed with the patient and they were also made aware of online access. Pt also made aware that some labs, such as cultures, will not be resulted during ER visit and follow  up with PMD is necessary.     MDM  Number of Diagnoses or Management Options     Amount and/or Complexity of Data Reviewed  Clinical lab tests: ordered and reviewed (Hemoglobin 8, Troponin 0.105, TSH 33.5, Magnesium 2.5, BNP >50146, BUN 31, Creatinine 6.91)  Tests in the radiology section of CPT®: ordered and reviewed (CXR: Cardiomegaly and slight vascular congestion. Tiny left pleural effusion. )  Tests in the medicine section of CPT®: ordered and reviewed (EKG time: 3:44 PM  Rhythm/Rate: A-fib at 119 bpm  P waves and NC: N/A  QRS, axis: RAD  ST and T waves: Normal  Interpreted contemporaneously by me and independently viewed.  Unchanged compared to prior (2/25/17).)  Discussion of test results with the performing providers: yes  Decide to obtain previous medical records or to obtain history from someone other than the patient: yes  Review and summarize past medical records: yes (Hemoglobin was 8.2 nine days ago. He was scheduled to receive a pacemaker, but has not yet done so)  Discuss the patient with other providers: yes (Dr. Russo (Cardiology))  Independent visualization of images, tracings, or specimens: yes    Patient Progress  Patient progress: stable         DIAGNOSIS  Final diagnoses:   Atrial fibrillation with RVR   Hypothyroidism, unspecified type   Sick sinus syndrome       DISPOSITION  ADMISSION    Discussed treatment plan and reason for admission with pt/family and admitting physician.  Pt/family voiced understanding of the plan for admission for further testing/treatment as needed.     Latest Documented Vital Signs:  As of 5:50 PM  BP- 90/51 HR- (!) 137 Temp- 98.5 °F (36.9 °C) (Tympanic) O2 sat- 96%    --  Documentation assistance provided by crystal Coleman for Dr. Francis.  Information recorded by the scribhermilo was done at my direction and has been verified and validated by me.     Pratik Coleman  03/06/17 2140       Babatunde Francis MD  03/06/17 4945

## 2017-03-06 NOTE — ED NOTES
Patient sent from [facility] for tachycardia and fatigue. EMS reports that patient's HR was in the 150's. HR at time of triage was 109. Patient is confused at baseline and poor historian.      Yasmin Almanzar RN  03/06/17 1500

## 2017-03-06 NOTE — ED NOTES
Upon investigation of NH paperwork patient's labs that were drawn this morning at 0555 patient's Hgb is 7.7.      Yasmin Almanzar RN  03/06/17 7714

## 2017-03-07 NOTE — CONSULTS
Referring Provider: Rc No MD  [facility] [address] [address]    Reason for Consultation: SEPSIS    History of present illness:  Mr Wu is a 76 YOM who I am asked to evaluate and give opinion for SEPSIS. History is obtained from the patient, RN, and review of the old medical records which I summarize/synthesize as follows: I recently saw him in the hospital for MRSA septicemia. He had an infected left arm HD graft so underwent graft excision on 1/23/17 by Dr Fraga. There were impressive amounts of pus per op note and cultures grew MRSA. He also had a port but it remained in place because it was felt spared. Vascular did a culture from that port that was negative. He was discharged to completed 6 weeks of vancomycin with HD to end on 3/6/17. He needed a PPM placed but I recommended not doing this until he finished his antibiotics course and had negative blood cultures 1 week later. He missed his follow-up appt w/ me yesterday bc he was in the ER.     At rehab yesterday, he developed sudden onset shortness of air worse w/ any movement. He was found to have associated tachycardia and ultimtely Afib with RVR. He did not have chest pain but was apparently confused. In the ER he was afebrile but tachycardic to 158 bpm. His WBC was normal at 6.7k. He was admitted for further care.     At the time of my interview, he denies fevers, chills, sweats, SOA or palpitations. His current HR is 120-150.    PMH:  MRSA septicemia  Sick sinus syndrome  Anxiety  CHF  Afib on coumadin  HTN  Lymphoma  Hypothyroidism  GERD     PSH:  AV hero graft  AV hero graft removal  Thumb amputation left  L chest port     Social History:  Retired  Lives near [place]  Never smoker, EtOH or illicits     Family History:  No family history of ESRD     Allergies:  NKDA    Medications:    Current Facility-Administered Medications:   •  albumin human 25 % IV SOLN 12.5 g, 12.5 g, Intravenous, PRN, Kin Jacinto,  MD  •  albumin human 25 % IV SOLN 12.5 g, 12.5 g, Intravenous, PRN, Kin Jacinto MD  •  ALPRAZolam (XANAX) tablet 0.25 mg, 0.25 mg, Oral, 4x Daily PRN, Rc No MD  •  aspirin EC tablet 81 mg, 81 mg, Oral, Daily, Rc No MD  •  isosorbide mononitrate (IMDUR) 24 hr tablet 30 mg, 30 mg, Oral, Q24H, Rc No MD  •  levothyroxine (SYNTHROID, LEVOTHROID) tablet 100 mcg, 100 mcg, Oral, QAM AC, Rc No MD  •  metoprolol (LOPRESSOR) injection 2.5 mg, 2.5 mg, Intravenous, Q12H, FRANCISCA Washburn  •  pantoprazole (PROTONIX) EC tablet 40 mg, 40 mg, Oral, Q AM, Rc No MD, 40 mg at 03/07/17 0649  •  warfarin (COUMADIN) tablet 2.5 mg, 2.5 mg, Oral, Once, Rc No MD      Review of Systems  All systems were reviewed and are negative unless otherwise stated above in the HPI    Objective   Vital Signs   Temp:  [98.2 °F (36.8 °C)-99.1 °F (37.3 °C)] 98.3 °F (36.8 °C)  Heart Rate:  [] 133  Resp:  [16-20] 16  BP: ()/(40-70) 106/65    Physical Exam:   General: awake, some confusion, chronically ill appearing  Head: Normocephalic, atraumatic  Eyes: PERRL, EOMI, no scleral icterus, no conjunctival hemorrhages  ENT: MM dry, OP clear, no thrush. Dentures in place   Neck: Supple, trachea is midline  Cardiovascular: tachycardic irreg irreg rhythm, no murmurs, rubs, or gallops; no LE edema  Respiratory: Lungs are clear to ascultation bilaterally, no rales or wheezing; normal work of breathing on 2L NC  GI: Abdomen is soft, non-tender, non-distended, normal bowel sounds in all four quadrants; no hepatosplenomegaly, no masses palpated  : no Adair catheter present  Musculoskeletal: no joint abnormalities, normal musculature  Skin: No rashes, lesions, or embolic phenomenon  Neurological: Alert and oriented x 2, motor strength 5/5 in all four extremities  Psychiatric: slow to answer questions  Lymph: no pre-auricular, post-auricular, submandibular, cervical, supraclavicular LAD  Vasc: no cyanosis;  RUE AV graft removed; could not view incision; L chest port non-erythematous; RIJ tunneled catheter accessed for HD    Labs:     Lab Results   Component Value Date    WBC 6.36 03/07/2017    HGB 8.2 (L) 03/07/2017    HCT 26.7 (L) 03/07/2017    .1 (H) 03/07/2017     03/07/2017       Lab Results   Component Value Date    GLUCOSE 83 03/07/2017    BUN 40 (H) 03/07/2017    CREATININE 7.11 (H) 03/07/2017    EGFRIFNONA 8 (L) 03/07/2017    BCR 5.6 (L) 03/07/2017    CO2 18.3 (L) 03/07/2017    CALCIUM 8.6 03/07/2017    ALBUMIN 3.10 (L) 03/06/2017    LABIL2 0.8 03/06/2017    AST 24 03/06/2017    ALT 12 03/06/2017       Microbiology:  1/19 BCx @ [facility]: MRSA in 2/2 sets  1/21 BCx @ [facility]: MRSA  1/23 OR Cx from RUE AV graft: + MRSA  1/24 BCx @ [facility]: negative  1/27 BCx from port: negative  3/7 BCx: requested    Radiology (personally reviewed images/report):  CXR w/ vascular congestion    EKG personally reviewed with Afib and RVR    Assessment/Plan   1. Atrial fibrillation with rapid ventricular response  -this is the reason for admission; cardiology following    2.  Recent MRSA septicemia due to RUE AV graft site  -s/p graft excision 1/23; impressive amounts of pus per op note; cultures w/ MRSA  -port remained in place  -cleared blood cultures as of 1/24 once graft removed  -he completed 6 weeks course of vancomycin with HD on 3/6/17  -repeat blood cultures today  -check random vancomycin level too  -hold vancomycin; no evidence of infection at this time and completed prior course of vancomycin       3. ESRD on HD  4. Septic pulmonary emboli   5. Bradycardi/Sick sinus syndrome - needs PPM but hesitant to place with recent MRSA bacteremia. Let's see what today's blood cultures show.    Thank you for this consult. ID will follow.

## 2017-03-07 NOTE — CONSULTS
Referring Provider: Dr. Rc No  Reason for Consultation: ESRD    Subjective     Chief complaint   Chief Complaint   Patient presents with   • Rapid Heart Rate   • Fatigue       History of present illness:  75 yo WM with ESRD on HD, recently placed at [place] for rehab following deconditioning from MRSA bacteremia (for which course of vanc iv still in progress thru 3.13.17), re-admitted to hosp last night d/t fatigue and tachycardia (Afib RVR, 150's).   Pt had been in the hospital here for 3 weeks with d/c on 2.13.17; he req'd removal of the infected right arm AVG during that hospitalization.  Full PMH outlined below; ROS not reliable d/t somnolence and confusion.  Usual HD is TTS, with last HD on 3.4.17.  HR's bit better, with most rates in 110-120's; BP's bit labile still.  · HD unit told me that pt's last vanc dose was 3.4.17; they thought vanc course concluded with that dose, tho d/c summary states last dose should be 3.13.17  · He denies SOB and pain  · He does not know where he is  · Has right chest TDC in place    Past Medical History   Diagnosis Date   • Anemia    • Anxiety    • CHF (congestive heart failure)    • Chronic a-fib    • Chronic anticoagulation    • Confusion    • Disease of thyroid gland    • GERD (gastroesophageal reflux disease)    • Hypertension    • Hypothyroidism    • Kidney disease    • Lymphoma    • Staph aureus infection 01/21/2017     labs called from [facility]     Past Surgical History   Procedure Laterality Date   • Mediport insertion, single     • Thumb amputation Left    • Av fistula placement Right    • Arteriovenous fistula/shunt surgery Right 1/23/2017     Procedure: ultrasound access of right internal jugular vein with mahurkar dialysis catheter placement, EXCISION INFECTED RIGTH ARM AV GRAFT WITH REMOVAL OF HERO CATHETER, REMOVAL OF FEMORAL DIALYSIS CATHETER;  Surgeon: Floyd Fraga MD;  Location: [facility] OR 18/19;  Service:    • Christian lemus  tunneled cvc w/o subq port/ age 6 yr/> Right 1/31/2017     Procedure: RT INTERNAL JUGULAR PALINDROME CATHETER PLACEMENT;  Surgeon: Floyd Fraga MD;  Location: [facility];  Service: Vascular   • Venous access device (port) removal Left 2/2/2017     Procedure: LT CHEST PORT REMOVAL ;  Surgeon: Floyd Fraga MD;  Location: [facility];  Service:      History reviewed. No pertinent family history.  Social History   Substance Use Topics   • Smoking status: Never Smoker   • Smokeless tobacco: None   • Alcohol use No     Prescriptions Prior to Admission   Medication Sig Dispense Refill Last Dose   • ALPRAZolam (XANAX) 0.5 MG tablet Take 0.5 mg by mouth Every 12 (Twelve) Hours.      • ALPRAZolam (XANAX) 0.5 MG tablet Take 0.5 mg by mouth 4 (Four) Times a Day As Needed for anxiety (Generalized anxiety disorder).      • aspirin 81 MG EC tablet Take 1 tablet by mouth Daily for 30 days. 30 tablet 0    • bisacodyl (DULCOLAX) 10 MG suppository Insert 10 mg into the rectum Daily As Needed for constipation.      • epoetin bacilio (EPOGEN,PROCRIT) 95552 UNIT/ML injection Inject 10,000 Units under the skin 3 (Three) Times a Week. Gets with dialysis      • HYDROcodone-acetaminophen (NORCO) 5-325 MG per tablet Take 1 tablet by mouth Every 6 (Six) Hours As Needed.      • HYDROcodone-acetaminophen (NORCO) 5-325 MG per tablet Take 1 tablet by mouth 3 (Three) Times a Day.      • isosorbide mononitrate (IMDUR) 30 MG 24 hr tablet Take 1 tablet by mouth Daily for 30 days. 30 tablet 0    • lactulose (CHRONULAC) 10 GM/15ML solution Take 20 g by mouth Daily As Needed (constipation).      • lansoprazole (FIRST) 3 MG/ML suspension oral suspension Take 10 mL by mouth Every Morning for 30 days. 300 mL 0    • levothyroxine (SYNTHROID, LEVOTHROID) 100 MCG tablet Take 100 mcg by mouth Every Morning Before Breakfast.      • Vancomycin HCl in Dextrose (PHARMACY TO DOSE VANCOMYCIN) Continuous As Needed (mrsa sepsis) for up to 28 days.  "(Patient taking differently: Infuse 1,000 mg into a venous catheter Continuous As Needed (mrsa sepsis, to be given with HD until 3/13/17).) 1 each 0    • warfarin (COUMADIN) 1 MG tablet inr 2-3 (Patient taking differently: Take 1 mg by mouth Daily. inr 2-3) 30 tablet 0      Allergies:  Review of patient's allergies indicates no known allergies.    Review of Systems  See HPI for details; ROS not reliably obtainable from pt    Objective     Vital Signs  Temp:  [98.2 °F (36.8 °C)-98.5 °F (36.9 °C)] 98.2 °F (36.8 °C)  Heart Rate:  [] 128  Resp:  [18-20] 20  BP: ()/(49-70) 92/49    Flowsheet Rows         First Filed Value    Admission Height  74\" (188 cm) Documented at 03/06/2017 1456    Admission Weight  210 lb (95.3 kg) Documented at 03/06/2017 1456                 No intake or output data in the 24 hours ending 03/07/17 0807    Physical Exam:  NAD but confused and lethargic; not oriented; looks chr ill; gaunt with temporal wasting  Dry MM; no scleral icterus  No JVD; no carotid bruits  Coarse bilat; not labored  irreg irreg, tachy, no rub  Soft, NT, ND, BS+  No edema; no bruit or thrill overlying right arm AVG  Right chest TDC  Moves all extremities     Results Review:    Results from last 7 days  Lab Units 03/06/17  1534 02/28/17  1506   SODIUM mmol/L 137 138   POTASSIUM mmol/L 5.5* 5.6*   CHLORIDE mmol/L 94* 95*   TOTAL CO2 mmol/L 23.0 26.1   BUN mg/dL 31* 34*   CREATININE mg/dL 6.91* 6.14*   CALCIUM mg/dL 8.6 8.2*   BILIRUBIN mg/dL 1.2  --    ALK PHOS U/L 100  --    ALT (SGPT) U/L 12  --    AST (SGOT) U/L 24  --    GLUCOSE mg/dL 108* 83       Estimated Creatinine Clearance: 10.7 mL/min (by C-G formula based on Cr of 6.91).      Results from last 7 days  Lab Units 03/06/17  1534   MAGNESIUM mg/dL 2.5*         Results from last 7 days  Lab Units 03/07/17  0707 03/06/17  1534   WBC 10*3/mm3 6.36 6.73   HEMOGLOBIN g/dL 8.2* 8.0*   PLATELETS 10*3/mm3 171 157         Results from last 7 days  Lab Units " 03/07/17  0707 03/06/17  1534   INR  2.02* 1.93*       Active Medications    isosorbide mononitrate 30 mg Oral Q24H   levothyroxine 100 mcg Oral Q AM   pantoprazole 40 mg Oral Q AM          Assessment/Plan   Assessment  1.  ESRD: high K (this AM's result reportedly above 6); looks dry.  Usual HD schedule is TTS  2.  Afib with RVR; pt had had bradycardia during previous admission, for which ppm planned once iv abx concluded  3.  Recent MRSA bacteremia d/t infected AVG right arm that was removed last month; vanc with HD thru 3.13.17  4.  Confusion and lethargy  5.  Very elevated TSH  6.  Anemia  7.  Hypotension       Active Problems:    Atrial fibrillation with RVR      Plan  1.  HD today with blood, low-K bath, and iv vanc  2.  Rate control  3.  Small bolus of saline  4.  Surveillance labs    I discussed the patient's findings and my recommendations with pt's nurse.    Kin Jacinto MD  03/07/17  8:07 AM

## 2017-03-07 NOTE — PLAN OF CARE
Problem: Patient Care Overview (Adult)  Goal: Plan of Care Review  Outcome: Ongoing (interventions implemented as appropriate)    03/07/17 1414   Coping/Psychosocial Response Interventions   Plan Of Care Reviewed With patient   Patient Care Overview   Progress no change   Outcome Evaluation   Outcome Summary/Follow up Plan pt recieving hemodiaylsis, 2 units of prbc, low bp. pt is being monitored closely          Problem: Older Inpatient, Acutely Ill (Adult)  Goal: Signs and Symptoms of Listed Potential Problems Will be Absent or Manageable (Older Inpatient, Acutely Ill)  Outcome: Ongoing (interventions implemented as appropriate)    Problem: Confusion, Acute (Adult)  Goal: Safety  Outcome: Ongoing (interventions implemented as appropriate)

## 2017-03-07 NOTE — CONSULTS
[facility]  Cardiology Consult Note    Patient Identification:  Name: Ze Wu  Age: 76 y.o.  Sex: male  :  1940  MRN: [id number]             Requesting Physician: Dr No    Reason for Consultation / Chief Complaint: A. fib RVR, hypotension    History of Present Illness:     PMH & HPI obtained from review of medical records due to patient illness, orientation/lethargy    This is a 76-year-old white male with permanent atrial fibrillation on Coumadin, hypertension and ESRD originally seen by our service one month ago when hospitalized with sepsis.  At that time he was experiencing episodes of bradycardia and slow ventricular rates.  He had no angina, but had troponin elevation (0.374 peak) in the setting of sepsis and ESRD. Although ventricular rates improved once calcium channel blocker was stopped, permanent pacemaker implant was recommended. At that time he was also being treated by infectious disease for MSSA bacteremia with septic pulmonary emboli attributed to an infected AV fistula which required surgical repair/replacement. Infectious disease wanted to wait until patient had completed a course of antibiotic therapy and had negative follow-up cultures before clearing for permanent pacemaker implant    According to the ER notes, the patient presented from a nursing facility with complaints of fatigue and noted elevated heart rate.  There were no reports of any chest pain, pressure or shortness of breath.  Currently the patient is oriented to person and place however is unable to tell me why he is in hospital.  He denies any chest pain, pressure or shortness of breath.    Admission EKG (see below) showed atrial fibrillation with RVR at 119.  Since admission to the floor he has had variable ventricular rates ranging from 100-140's and labile blood pressures: 84//62.  He is currently being seen in dialysis with blood pressure 110/62,  A. fib with ventricular rate 124.  Troponin  mildly elevated at 0.105 -> 0.102 in the setting of BUN 40, creatinine 7.11, potassium 6.3.  BNP >12999  His TSH is 33.5, T4 2 0.91, magnesium 2.5    Comorbid cardiac risk factors: Hypertension    Past Medical History:  Past Medical History   Diagnosis Date   • Anemia    • Anxiety    • CHF (congestive heart failure)    • Chronic a-fib    • Chronic anticoagulation    • Confusion    • Disease of thyroid gland    • GERD (gastroesophageal reflux disease)    • Hypertension    • Hypothyroidism    • Kidney disease    • Lymphoma    • Staph aureus infection 01/21/2017     labs called from [facility]     Past Surgical History:  Past Surgical History   Procedure Laterality Date   • Mediport insertion, single     • Thumb amputation Left    • Av fistula placement Right    • Arteriovenous fistula/shunt surgery Right 1/23/2017     Procedure: ultrasound access of right internal jugular vein with mahurkar dialysis catheter placement, EXCISION INFECTED RIGTH ARM AV GRAFT WITH REMOVAL OF HERO CATHETER, REMOVAL OF FEMORAL DIALYSIS CATHETER;  Surgeon: Floyd Fraga MD;  Location: [facility] OR 18/19;  Service:    • Pr insj tunneled cvc w/o subq port/ age 6 yr/> Right 1/31/2017     Procedure: RT INTERNAL JUGULAR PALINDROME CATHETER PLACEMENT;  Surgeon: Floyd Fraga MD;  Location: [facility];  Service: Vascular   • Venous access device (port) removal Left 2/2/2017     Procedure: LT CHEST PORT REMOVAL ;  Surgeon: Floyd Fraga MD;  Location: [facility];  Service:       Allergies:  No Known Allergies  Home Meds:  Prescriptions Prior to Admission   Medication Sig Dispense Refill Last Dose   • ALPRAZolam (XANAX) 0.5 MG tablet Take 0.5 mg by mouth Every 12 (Twelve) Hours.      • ALPRAZolam (XANAX) 0.5 MG tablet Take 0.5 mg by mouth 4 (Four) Times a Day As Needed for anxiety (Generalized anxiety disorder).      • aspirin 81 MG EC tablet Take 1 tablet by mouth Daily for 30 days. 30 tablet 0    •  bisacodyl (DULCOLAX) 10 MG suppository Insert 10 mg into the rectum Daily As Needed for constipation.      • epoetin bacilio (EPOGEN,PROCRIT) 46357 UNIT/ML injection Inject 10,000 Units under the skin 3 (Three) Times a Week. Gets with dialysis      • HYDROcodone-acetaminophen (NORCO) 5-325 MG per tablet Take 1 tablet by mouth Every 6 (Six) Hours As Needed.      • HYDROcodone-acetaminophen (NORCO) 5-325 MG per tablet Take 1 tablet by mouth 3 (Three) Times a Day.      • isosorbide mononitrate (IMDUR) 30 MG 24 hr tablet Take 1 tablet by mouth Daily for 30 days. 30 tablet 0    • lactulose (CHRONULAC) 10 GM/15ML solution Take 20 g by mouth Daily As Needed (constipation).      • lansoprazole (FIRST) 3 MG/ML suspension oral suspension Take 10 mL by mouth Every Morning for 30 days. 300 mL 0    • levothyroxine (SYNTHROID, LEVOTHROID) 100 MCG tablet Take 100 mcg by mouth Every Morning Before Breakfast.      • Vancomycin HCl in Dextrose (PHARMACY TO DOSE VANCOMYCIN) Continuous As Needed (mrsa sepsis) for up to 28 days. (Patient taking differently: Infuse 1,000 mg into a venous catheter Continuous As Needed (mrsa sepsis, to be given with HD until 3/13/17).) 1 each 0    • warfarin (COUMADIN) 1 MG tablet inr 2-3 (Patient taking differently: Take 1 mg by mouth Daily. inr 2-3) 30 tablet 0      Current Meds:   [unfilled]  Social History:   Social History   Substance Use Topics   • Smoking status: Never Smoker   • Smokeless tobacco: Not on file   • Alcohol use No      Family History:  History reviewed. No pertinent family history.     Review of Systems  unable to assess due to patient illness    Constitutional:    Eyes:    ENT/oropharynx:    Cardiovascular:    Respiratory:    Gastrointestinal:    Genitourinary:    Neurological:    Musculoskeletal:    Integument:            Constitutional:  Temp:  [98.2 °F (36.8 °C)-98.5 °F (36.9 °C)] 98.2 °F (36.8 °C)  Heart Rate:  [] 113  Resp:  [18-20] 20  BP: ()/(48-70) 86/48    Physical  Exam by Tristan Mojica MD  General:  Frail, elderly white male Appears chronically ill appearing  In, but in no acute distress  Eyes: PERTL  HEENT: Thyroid not visibly enlarged. Oral mucosal dry, no cyanosis  Respiratory: Respirations regular and unlabored at rest on supplemental oxygen per nasal cannula  BBS with slightly diminished air entry in all fields. Few coarse crackles, no wheezes auscultated.  Right tunnel catheter noted with HTN in progress  Cardiovascular: S1S2 irregular rate and rhythm. I/VI systolic murmur LSB without rub or gallop auscultated. No carotid bruits. PT pulses 1 . No pretibial pitting edema  Gastrointestinal: Abdomen soft, flat, non tender. Bowel sounds present.  No ascites  Musculoskeletal: ALEGRIA weakly x4. No abnormal movements  Extremities: No digital clubbing. Absent left thumb.  Skin: Color pale pink.  LE mottled, warm to touch below calves  Well-healed  RUE surgical site   Neuro: AAO x2 [place]  Follows commands          Cardiographics  ECG:       Telemetry:      Echocardiogram 1-2017   · All left ventricular wall segments contract normally.  · Left atrial cavity size is mildly dilated.  · Mild to moderate aortic valve stenosis is present.  · Mild to moderate tricuspid valve regurgitation is present.  · Left Ventricle: Calculated EF = 66.7%.  · There is no evidence of pericardial effusion    Imaging  Chest X-ray IMPRESSIONS: Cardiomegaly and slight vascular congestion. Tiny left pleural effusion     Lab Review     Results from last 7 days  Lab Units 03/06/17  1815 03/06/17  1534   CK TOTAL U/L  --  20   TROPONIN T ng/mL 0.102* 0.105*       Results from last 7 days  Lab Units 03/06/17  1534   MAGNESIUM mg/dL 2.5*       Results from last 7 days  Lab Units 03/07/17  0708   SODIUM mmol/L 137   POTASSIUM mmol/L 6.3*   BUN mg/dL 40*   CREATININE mg/dL 7.11*   CALCIUM mg/dL 8.6       Results from last 7 days  Lab Units 03/07/17  0707 03/06/17  1534   WBC 10*3/mm3 6.36 6.73   HEMOGLOBIN  "g/dL 8.2* 8.0*   HEMATOCRIT % 26.7* 25.7*   PLATELETS 10*3/mm3 171 157       Results from last 7 days  Lab Units 03/07/17  0707 03/06/17  1534   INR  2.02* 1.93*         Assessment:  - (Permanent) atrial fibrillation - rvr  - h/o sick sinus syndrome  - elevated [id number] score -> Coumadin  - Hypotension  - mild troponin elevation  - ESRD-> Renal  - hyperkalemia  - anemia   - lethargy, ms changes  - Hypothyroidism with high TSH    - h/o infected AV fistula  1-2017 - still on IV vancomycin  - h/o MSSA septicemia 1-2017 -                 \"  - h/o pulmonary septic emboli 1-2017 -      \"  - Thyroid cancer (in remission)    Recommendations / Plan:     In summary, this 76-year-old male presents with lethargy, A. fib RVR and labile blood pressures with intermittent hypotension.  Review of telemetry shows no episodes of bradycardia, pauses or high degree AV block's.  He will be started on low-dose beta blocker (IV until swallow study completed), serial EKGs and a Holter monitor have been ordered to watch for development of bradycardias.  Although his BNP is high, he clinically appears dry.  Coronary anatomy is unknown.  Troponin are mildly elevated and trending downward.  Elevated BNP and mild troponin bump are both likely secondary to ESRD.  Additional cardiac enzymes have been ordered   Further recommendations to treatment plan pending.  Thank you for allowing us to participate in his care      I reviewed the patient's clinical results, medications and treatment plan  I personally viewed and interpreted the patient's EKG/Telemetry data      Patient personally interviewed and above subjective findings personally confirmed during a face to face contact with patient today  All findings of physical examination confirmed  All labs, cardiac procedures rtinent radiographs of the last 24 hours personally reviewed  Impression and plans discussed/elaborated and implemented jointly as described above         Tristan Mojica, " MD  3/7/2017, 10:20 AM      EMR Dragon/Transcription disclaimer:   Much of this encounter note is an electronic transcription/translation of spoken language to printed text. The electronic translation of spoken language may permit erroneous, or at times, nonsensical words or phrases to be inadvertently transcribed; Although I have reviewed the note for such errors, some may still exist.

## 2017-03-07 NOTE — ED NOTES
Updated son to pt's progress and diagnosis. Son stated he will visit tomorrow.      Trista Lizama RN  03/06/17 2036

## 2017-03-07 NOTE — PROGRESS NOTES
Continued Stay Note  [facility]     Patient Name: Ze Wu  MRN: [id number]  Today's Date: 3/7/2017    Admit Date: 3/6/2017          Discharge Plan       03/07/17 1110    Case Management/Social Work Plan    Plan Return Back to [place] for skilled rehab- may need LTC??    Patient/Family In Agreement With Plan yes    Additional Comments Spoke with patients son Silas Wu [phone] via outbound call, introduced self and explained CCP role. Silas states pt is from [facility] and he states plan is for patient to return there. Silas states patient may need Long term care but he is not sure. CCP called and left  for Jane with [facility] to determine bed status. CCP to continue to follow. Angela DODD/CCP              Discharge Codes     None            Alba Ervin, RN

## 2017-03-07 NOTE — H&P
CHIEF COMPLAINT: Fatigue.     HISTORY OF PRESENT ILLNESS: Patient is a 76-year-old white male who is well known to our service from recent admission with MRSA septicemia, on vancomycin, secondary to infected graft. He is also on hemodialysis with end-stage renal disease, septic, pulmonary emboli, toxic metabolic encephalopathy, chronic AFib, on anticoagulation, hypertension, thyroid CA, B-cell lymphoma, coronary artery disease, congestive heart failure. Patient was brought to the [facility] Emergency Room with fatigue. He was found to have increased heart rate. Patient was evaluated and found to be in AFib with rapid ventricular rate, severe anemia, admitted for management. Patient is a poor historian, unable to give detailed history. Most of the history was obtained from the chart, nursing staff, old records, and I know the patient very well from recent admission. No chest pain, no palpitations but fatigued, tired, weak.     PAST MEDICAL HISTORY:  1. Recent MRSA bacteremia with septicemia secondary to infected graft.   2. End-stage renal disease, on hemodialysis.    3. Septic pulmonary emboli, on anticoagulation.   4. Chronic AFib, on anticoagulation.   5. Hypertension.   6. History of bradycardia, off beta blocker, needs pacemaker per Cardiology.   7. History of thyroid cancer.   8. B-cell lymphoma.   9. Coronary artery disease.   10. Congestive heart failure.     SOCIAL HISTORY: Nursing home resident. No recent tobacco, alcohol or drug abuse.     FAMILY HISTORY: Noncontributory.     ALLERGIES: No known drug allergies.     HOME MEDICATIONS:  1. Aspirin.  2. Imdur.   3. Prevacid.   4. Synthroid.   5. Coumadin.  6. IV vancomycin.     PHYSICAL EXAMINATION:  GENERAL: Alert, oriented but very weak, lethargic.   VITAL SIGNS: Afebrile, pulse 133, respirations 20, blood pressure 106/65, O2 saturation 96% on 2L.   HEENT: Unremarkable.   NECK: Supple.   LUNGS: Decreased breath sounds.   HEART: S1, S2,  tachycardic, irregular.   ABDOMEN: Soft, nontender. Bowel sounds positive.   EXTREMITIES: Trace edema.   NEUROLOGIC: Moves all 4 extremities but weak. Gait and balance not checked.     DIAGNOSTIC DATA: Hemoglobin 8.2. INR 2.0. BUN 14, creatinine 7.1, potassium 6.3. No x-ray done. EKG showed AFib with heart rate of 130s, nonspecific ST-T wave changes, no significant change from previous tracing.     ASSESSMENT:  1. AFib with rapid ventricular rate.   2. End-stage renal disease.   3. Recent MRSA septicemia.    4. Chronic AFib.   5. Hypertension.   6. Hypothyroidism.   7. Gastroesophageal reflux disease.   8. Dementia.     PLAN:  1. Admit.   2. Control the heart rate.  3. Anticoagulate.  4. Cardiology to see.   5. Renal to see for hemodialysis.   6. Repeat echocardiogram.   7. Continue IV antibiotics.   8. May need a pacemaker if it is okay with Renal Service.   9. Will also involve Infectious Disease.  10. Further recommendations according to hospital course.     Rc No M.D.  AA:jae  D:   03/07/2017 12:33:54  T:   03/07/2017 12:57:38  Job ID:   [id number]  Document ID:   [id number]  cc:

## 2017-03-07 NOTE — PLAN OF CARE
Problem: Patient Care Overview (Adult)  Goal: Plan of Care Review  Outcome: Ongoing (interventions implemented as appropriate)    03/07/17 0302   Coping/Psychosocial Response Interventions   Plan Of Care Reviewed With patient   Patient Care Overview   Progress no change   Outcome Evaluation   Outcome Summary/Follow up Plan Pt admitted this evening with a-fib RVR into 130's. Healing pressure ulcer to coccyx. Pt confused, poor historian. Pt pulling at tubes/wires. Maintaining 100% oxygen saturation on 2L of O2 via NC.       Goal: Discharge Needs Assessment  Outcome: Ongoing (interventions implemented as appropriate)    Problem: Older Inpatient, Acutely Ill (Adult)  Goal: Signs and Symptoms of Listed Potential Problems Will be Absent or Manageable (Older Inpatient, Acutely Ill)  Outcome: Ongoing (interventions implemented as appropriate)    Problem: Confusion, Acute (Adult)  Goal: Identify Related Risk Factors and Signs and Symptoms  Outcome: Ongoing (interventions implemented as appropriate)  Goal: Cognitive/Functional Impairments Minimized  Outcome: Ongoing (interventions implemented as appropriate)  Goal: Safety  Outcome: Ongoing (interventions implemented as appropriate)

## 2017-03-07 NOTE — CONSULTS
Adult Nutrition  Assessment/PES    Patient Name:  Ze Wu  YOB: 1940  MRN: [id number]  Admit Date:  3/6/2017    Assessment Date:  3/7/2017     Comments:  Nursing nutrition screen trigger.  Patient is in dialysis at time of visit.  No diet order.  Received mechanical soft diet with NTL last admission. Will follow as diet is advanced for intake         Reason for Assessment       03/07/17 1512    Reason for Assessment    Reason For Assessment/Visit identified at risk by screening criteria    Identified At Risk By Screening Criteria MST SCORE 2+    Diagnosis Diagnosis    Infectious Disease Sepsis    Renal ESRD;Hemodialysis              Nutrition/Diet History       03/07/17 1512    Nutrition/Diet History    Typical Food/Fluid Intake previously on mechanical soft diet NTL              Labs/Tests/Procedures/Meds       03/07/17 1513    Labs/Tests/Procedures/Meds    Diagnostic Test/Procedure Review reviewed    Labs/Tests Review Reviewed    Medication Review Anticoag   synthroid    Significant Vitals reviewed            Physical Findings       03/07/17 1514    Physical Findings/Assessment    Additional Documentation Physical Appearance (Group)    Physical Appearance    Skin --   intact              Nutrition Prescription Ordered       03/07/17 1514    Nutrition Prescription PO    Current PO Diet NPO                Problem/Interventions:        Problem 1       03/07/17 1514    Nutrition Diagnoses Problem 1    Problem 1 Nutrition Appropriate for Condition at this Time    Etiology (related to) Factors Affecting Nutrition;Functional Diagnosis    Functional Diagnosis Dysphagia    Mental State/Condition Confusion    Signs/Symptoms (evidenced by) NPO                    Intervention Goal       03/07/17 1515    Intervention Goal    General Maintain nutrition    PO Advance diet;Tolerate PO    Weight Maintain weight            Nutrition Intervention       03/07/17 1515    Nutrition Intervention    RD/Tech Action  Await begin PO;Encourage intake;Follow Tx progress;Care plan reviewd            Nutrition Prescription       03/07/17 1515    Nutrition Prescription PO    PO Prescription Begin/change diet    Begin/Change Diet to Soft Texture    Texture Ground    Fluid Consistency Nectar/syrup thick            Education/Evaluation       03/07/17 1515    Monitor/Evaluation    Monitor Per protocol;PO intake;Skin status;Pertinent labs            Electronically signed by:  Phyllis Phillips RD  03/07/17 3:16 PM

## 2017-03-08 NOTE — PROGRESS NOTES
Continued Stay Note  [facility]     Patient Name: Ze Wu  MRN: [id number]  Today's Date: 3/8/2017    Admit Date: 3/6/2017          Discharge Plan       03/08/17 1017    Case Management/Social Work Plan    Plan [facility] skilled [facility] pending bed availablity. - Pt will need [employer] precert.    Patient/Family In Agreement With Plan other (see comments)    Additional Comments Spoke with Leeanne with [facility] and she states patient is from skilled level of care with no bed hold and he can return pending bed availability. Leeanne states beds are tight but she will follow. CCP explained anticipated dc date is unknown. Packet in CCP office. Angela DODD/CCP               Discharge Codes     None            Alba Ervin, RN

## 2017-03-08 NOTE — PLAN OF CARE
Problem: Patient Care Overview (Adult)  Goal: Plan of Care Review  Outcome: Ongoing (interventions implemented as appropriate)    03/08/17 0325   Coping/Psychosocial Response Interventions   Plan Of Care Reviewed With patient   Patient Care Overview   Progress improving   Outcome Evaluation   Outcome Summary/Follow up Plan Pt more alert this shift than last. Pulling at tubes/wires. Cursing, uncooperative with care. Will await am labs to recheck Hgb.        Goal: Adult Individualization and Mutuality  Outcome: Ongoing (interventions implemented as appropriate)  Goal: Discharge Needs Assessment  Outcome: Ongoing (interventions implemented as appropriate)    Problem: Older Inpatient, Acutely Ill (Adult)  Goal: Signs and Symptoms of Listed Potential Problems Will be Absent or Manageable (Older Inpatient, Acutely Ill)  Outcome: Ongoing (interventions implemented as appropriate)    Problem: Confusion, Acute (Adult)  Goal: Identify Related Risk Factors and Signs and Symptoms  Outcome: Ongoing (interventions implemented as appropriate)  Goal: Cognitive/Functional Impairments Minimized  Outcome: Ongoing (interventions implemented as appropriate)  Goal: Safety  Outcome: Ongoing (interventions implemented as appropriate)

## 2017-03-08 NOTE — PLAN OF CARE
Problem: Patient Care Overview (Adult)  Goal: Plan of Care Review  Outcome: Ongoing (interventions implemented as appropriate)    03/08/17 1415   Coping/Psychosocial Response Interventions   Plan Of Care Reviewed With patient   Patient Care Overview   Progress improving   Outcome Evaluation   Outcome Summary/Follow up Plan Passed bedside swallow eval for nectar thick liquid with mech soft no mixed consistencies         Problem: Inpatient SLP  Goal: Dysphagia- Patient will safely consume diet as per recommendation with no signs/symptoms of aspiration  Outcome: Ongoing (interventions implemented as appropriate)    03/08/17 1415   Safely Consume Diet   Safely Consume Diet- SLP, Date Established 03/08/17   Safely Consume Diet- SLP, Time to Achieve by discharge   Safely Consume Diet- SLP, Outcome goal ongoing

## 2017-03-08 NOTE — PROGRESS NOTES
" DAILY PROGRESS NOTE      CHIEF COMPLAINT:   DOING BETTER  NO NEW COMPLAINTS    HISTORY OF PRESENT ILLNESS: Patient is a 76-year-old white male who is well known to our service from recent admission with MRSA septicemia, on vancomycin, secondary to infected graft. He is also on hemodialysis with end-stage renal disease, septic, pulmonary emboli, toxic metabolic encephalopathy, chronic AFib, on anticoagulation, hypertension, thyroid CA, B-cell lymphoma, coronary artery disease, congestive heart failure. Patient was brought to the [facility] Emergency Room with fatigue. He was found to have increased heart rate. Patient was evaluated and found to be in AFib with rapid ventricular rate, severe anemia, admitted for management. Patient is a poor historian, unable to give detailed history. Most of the history was obtained from the chart, nursing staff, old records, and I know the patient very well from recent admission. No chest pain, no palpitations but fatigued, tired, weak.     PHYSICAL EXAMINATION:Blood pressure 102/57, pulse (!) 126, temperature 98.9 °F (37.2 °C), temperature source Rectal, resp. rate 16, height 74\" (188 cm), weight 166 lb 0.1 oz (75.3 kg), SpO2 97 %.    GENERAL: Alert, oriented but very weak, lethargic.   HEENT: Unremarkable.   NECK: Supple.   LUNGS: Decreased breath sounds.   HEART: S1, S2, tachycardic, irregular.   ABDOMEN: Soft, nontender. Bowel sounds positive.   EXTREMITIES: Trace edema.   NEUROLOGIC: Moves all 4 extremities but weak. Gait and balance not checked.     DIAGNOSTIC DATA:   Lab Results (last 24 hours)     Procedure Component Value Units Date/Time    Vancomycin, Random [[id number]]  (Normal) Collected:  03/07/17 1406    Specimen:  Blood Updated:  03/07/17 1448     Vancomycin Random 9.00 mcg/mL     Hemoglobin A1c [[id number]]  (Normal) Collected:  03/07/17 1406    Specimen:  Blood Updated:  03/07/17 1545     Hemoglobin A1C 4.90 %     Narrative:       Hemoglobin A1C " Ranges:    Increased Risk for Diabetes  5.7% to 6.4%  Diabetes                     >= 6.5%  Diabetic Goal                < 7.0%    Troponin [[id number]]  (Abnormal) Collected:  03/07/17 1614    Specimen:  Blood Updated:  03/07/17 1727     Troponin T 0.105 (C) ng/mL     Narrative:       Troponin T Reference Ranges:  Less than 0.03 ng/mL:    Negative for AMI  0.03 to 0.09 ng/mL:      Indeterminant for AMI  Greater than 0.09 ng/mL: Positive for AMI    Blood Culture [[id number]]  (Normal) Collected:  03/07/17 1405    Specimen:  Blood from Arm, Left Updated:  03/08/17 0306     Blood Culture No growth at less than 24 hours     Blood Culture [[id number]]  (Normal) Collected:  03/07/17 1420    Specimen:  Blood from Arm, Right Updated:  03/08/17 0306     Blood Culture No growth at less than 24 hours     CBC & Differential [[id number]] Collected:  03/08/17 0326    Specimen:  Blood Updated:  03/08/17 0408    Narrative:       The following orders were created for panel order CBC & Differential.  Procedure                               Abnormality         Status                     ---------                               -----------         ------                     CBC Auto Differential[[id number]]         Abnormal            Final result                 Please view results for these tests on the individual orders.    CBC Auto Differential [[id number]]  (Abnormal) Collected:  03/08/17 0326    Specimen:  Blood Updated:  03/08/17 0408     WBC 6.46 10*3/mm3      RBC 3.05 (L) 10*6/mm3      Hemoglobin 9.2 (L) g/dL      Hematocrit 29.3 (L) %      MCV 96.1 fL      MCH 30.2 pg      MCHC 31.4 (L) g/dL      RDW 20.1 (H) %      RDW-SD 70.0 (H) fl      MPV 10.6 fL      Platelets 153 10*3/mm3      Neutrophil % 70.4 %      Lymphocyte % 18.6 (L) %      Monocyte % 9.6 %      Eosinophil % 0.6 %      Basophil % 0.5 %      Immature Grans % 0.3 %      Neutrophils, Absolute 4.55 10*3/mm3      Lymphocytes, Absolute 1.20 10*3/mm3      Monocytes, Absolute 0.62  10*3/mm3      Eosinophils, Absolute 0.04 10*3/mm3      Basophils, Absolute 0.03 10*3/mm3      Immature Grans, Absolute 0.02 10*3/mm3     Protime-INR [[id number]]  (Abnormal) Collected:  03/08/17 0326    Specimen:  Blood Updated:  03/08/17 0415     Protime 22.2 (H) Seconds      INR 2.03 (H)     Lipid Panel [[id number]]  (Abnormal) Collected:  03/08/17 0326    Specimen:  Blood Updated:  03/08/17 0421     Total Cholesterol 120 mg/dL      Triglycerides 86 mg/dL      HDL Cholesterol 18 (L) mg/dL      LDL Cholesterol  85 mg/dL      VLDL Cholesterol 17.2 mg/dL      LDL/HDL Ratio 4.71     Narrative:       Cholesterol Reference Ranges  (U.S. Department of Health and Human Services ATP III Classifications)    Desirable          <200 mg/dL  Borderline High    200-239 mg/dL  High Risk          >240 mg/dL      Triglyceride Reference Ranges  (U.S. Department of Health and Human Services ATP III Classifications)    Normal           <150 mg/dL  Borderline High  150-199 mg/dL  High             200-499 mg/dL  Very High        >500 mg/dL    HDL Reference Ranges  (U.S. Department of Health and Human Services ATP III Classifcations)    Low     <40 mg/dl (major risk factor for CHD)  High    >60 mg/dl ('negative' risk factor for CHD)        LDL Reference Ranges  (U.S. Department of Health and Human Services ATP III Classifcations)    Optimal          <100 mg/dL  Near Optimal     100-129 mg/dL  Borderline High  130-159 mg/dL  High             160-189 mg/dL  Very High        >189 mg/dL    Comprehensive Metabolic Panel [[id number]]  (Abnormal) Collected:  03/08/17 0326    Specimen:  Blood Updated:  03/08/17 0423     Glucose 112 (H) mg/dL      BUN 32 (H) mg/dL      Creatinine 5.37 (H) mg/dL      Sodium 142 mmol/L      Potassium 5.3 (H) mmol/L      Chloride 100 mmol/L      CO2 22.1 mmol/L      Calcium 8.3 (L) mg/dL      Total Protein 6.7 g/dL      Albumin 2.80 (L) g/dL      ALT (SGPT) 19 U/L      AST (SGOT) 37 U/L      Alkaline Phosphatase 86 U/L       Total Bilirubin 1.9 (H) mg/dL      eGFR Non African Amer 10 (L) mL/min/1.73      Globulin 3.9 gm/dL      A/G Ratio 0.7 g/dL      BUN/Creatinine Ratio 6.0 (L)      Anion Gap 19.9 mmol/L     Narrative:       The MDRD GFR formula is only valid for adults with stable renal function between ages 18 and 70.      Hemoglobin 8.2. INR 2.0. BUN 14, creatinine 7.1, potassium 6.3. No x-ray done. EKG showed AFib with heart rate of 130s, nonspecific ST-T wave changes, no significant change from previous tracing.   Imaging Results (last 72 hours)     Procedure Component Value Units Date/Time    XR Chest 1 View [[id number]] Collected:  03/06/17 1652     Updated:  03/06/17 1657    Narrative:       PORTABLE CHEST 3/6/2017 AT 1555 HOURS     CLINICAL HISTORY: CT. Tachycardia.     Compared to the previous chest x-ray dated 1/31/2017.     The lungs are somewhat poorly inflated. There is mild vascular  congestion but no jared pulmonary edema. The heart is moderately  enlarged and unchanged. No focal areas of consolidation are identified.  A tiny left pleural effusion is evident. A dialysis catheter is in place  in satisfactory position in the right internal jugular vein without  change.     IMPRESSIONS: Cardiomegaly and slight vascular congestion. Tiny left  pleural effusion.     This report was finalized on 3/6/2017 4:54 PM by Dr. Anil Ceron MD.           Current Facility-Administered Medications:   •  albumin human 25 % IV SOLN 12.5 g, 12.5 g, Intravenous, PRN, Kin Jacinto MD  •  albumin human 25 % IV SOLN 12.5 g, 12.5 g, Intravenous, PRN, Kin Jacinto MD  •  ALPRAZolam (XANAX) tablet 0.25 mg, 0.25 mg, Oral, 4x Daily PRN, Rc No MD  •  aspirin EC tablet 81 mg, 81 mg, Oral, Daily, Rc No MD, 81 mg at 03/08/17 0846  •  heparin (porcine) injection 3,800 Units, 3,800 Units, Intracatheter, PRN, Kin Jacinto MD  •  isosorbide mononitrate (IMDUR) 24 hr tablet 30 mg, 30 mg, Oral, Q24H, Rc  MD Oneal, 30 mg at 03/08/17 0846  •  levothyroxine (SYNTHROID, LEVOTHROID) tablet 100 mcg, 100 mcg, Oral, QAM AC, Rc No MD, 100 mcg at 03/08/17 0643  •  metoprolol (LOPRESSOR) injection 2.5 mg, 2.5 mg, Intravenous, Q12H, Kateryna CADY Lester, APRN, 2.5 mg at 03/07/17 1529  •  pantoprazole (PROTONIX) EC tablet 40 mg, 40 mg, Oral, Q AM, Rc No MD, 40 mg at 03/08/17 0643  •  warfarin (COUMADIN) tablet 2.5 mg, 2.5 mg, Oral, Once, Rc No MD, 2.5 mg at 03/07/17 1758       ASSESSMENT:  1. AFib with rapid ventricular rate.   2. End-stage renal disease. ON HD  3. Recent MRSA septicemia.    4. Chronic AFib.   5. Hypertension.   6. Hypothyroidism.   7. Gastroesophageal reflux disease.   8. Dementia.     PLAN:  1. CPM  2. Control the heart rate.  3. Anticoagulate.  4. Cardiology FOLLOWING  5. HD TIW  6. Echocardiogram. NOTED  7. OFF antibiotics.   8. PPM  9. ID FOLLOWING  10. Further recommendations according to hospital course.     Rc No M.D.

## 2017-03-08 NOTE — PROGRESS NOTES
NEPHROLOGY PROGRESS NOTE    PATIENT IDENTIFICATION:   Name:  Ze Wu      MRN:  [id number]     76 y.o.  male             Reason for visit: ESRD    SUBJECTIVE:  Thirsty; getting ready to have swallowing study; denies SOB and palpitations; HR's still in 120's    OBJECTIVE:  Vitals:    03/08/17 0804 03/08/17 0858 03/08/17 1243 03/08/17 1251   BP: 108/62  90/57 102/57   BP Location: Right arm  Right arm Right arm   Patient Position: Lying  Lying Lying   Pulse: 101  113 (!) 126   Resp: 16   16   Temp:  98.9 °F (37.2 °C)     TempSrc:  Rectal     SpO2:   98% 97%   Weight:       Height:               Body mass index is 21.31 kg/(m^2).  No intake or output data in the 24 hours ending 03/08/17 1537  Wt Readings from Last 1 Encounters:   03/08/17 0500 166 lb 0.1 oz (75.3 kg)   03/06/17 1456 210 lb (95.3 kg)     Wt Readings from Last 3 Encounters:   03/08/17 166 lb 0.1 oz (75.3 kg)   02/25/17 150 lb (68 kg)   02/12/17 165 lb 5.5 oz (75 kg)         Exam:  NAD but confused; more awake than yesterday; know month but not year; looks chr ill; gaunt with temporal wasting  Dry MM; no scleral icterus  No JVD; no carotid bruits  Coarse bilat; not labored  irreg irreg, tachy, no rub  Soft, NT, ND, BS+  No edema; no bruit or thrill overlying right arm AVG  Right chest TDC  Moves all extremities    Scheduled meds:    aspirin 81 mg Oral Daily   levothyroxine 100 mcg Oral QAM AC   metoprolol 2.5 mg Intravenous Q8H   pantoprazole 40 mg Oral Q AM   warfarin 2.5 mg Oral Once     IV meds:                           Data Review:      Results from last 7 days  Lab Units 03/08/17  0326 03/07/17  0708 03/06/17  1534   SODIUM mmol/L 142 137 137   POTASSIUM mmol/L 5.3* 6.3* 5.5*   CHLORIDE mmol/L 100 93* 94*   TOTAL CO2 mmol/L 22.1 18.3* 23.0   BUN mg/dL 32* 40* 31*   CREATININE mg/dL 5.37* 7.11* 6.91*   CALCIUM mg/dL 8.3* 8.6 8.6   BILIRUBIN mg/dL 1.9*  --  1.2   ALK PHOS U/L 86  --  100   ALT (SGPT) U/L 19  --  12   AST (SGOT) U/L 37  --  24    GLUCOSE mg/dL 112* 83 108*     Estimated Creatinine Clearance: 12.7 mL/min (by C-G formula based on Cr of 5.37).        Results from last 7 days  Lab Units 03/06/17  1534   MAGNESIUM mg/dL 2.5*         Results from last 7 days  Lab Units 03/08/17  0326 03/07/17  0707 03/06/17  1534   WBC 10*3/mm3 6.46 6.36 6.73   HEMOGLOBIN g/dL 9.2* 8.2* 8.0*   PLATELETS 10*3/mm3 153 171 157         Results from last 7 days  Lab Units 03/08/17  0326 03/07/17  0707 03/06/17  1534   INR  2.03* 2.02* 1.93*             ASSESSMENT:   Active Problems:    Atrial fibrillation with RVR    1. ESRD: lytes acceptable with mildly elevated K.  Last HD was yesterday  2. Afib with RVR  3. Recent MRSA bacteremia d/t infected AVG right arm that was removed last month; vancomycin course concluded with HD on 3.6.17  4. Confusion and lethargy  5. Very elevated TSH  6. Anemia  7. Hypotension       PLAN:  1.  HD tomorrow  2.  Rate control       Kin Jacinto MD  3/8/2017    3:37 PM

## 2017-03-08 NOTE — PROGRESS NOTES
[facility]  Cardiology Progress Note    Patient Identification:  Name: Ze Wu  Age: 76 y.o.  Sex: male  :  1940  MRN: [id number]                 Follow Up / Chief Complaint: (Permanent) atrial fib-RVR, history of sss, hypotension    Interval History:  76-year-old male presents with lethargy, A. fib RVR and labile blood pressures with intermittent hypotension. Review of telemetry shows no episodes of bradycardia, pauses or high degree AV block's. He will be started on low-dose beta blocker, serial EKGs and a Holter monitor have been ordered to watch for development of bradycardias. Although his BNP is high, he clinically appears dry.  Coronary anatomy is unknown. Troponin are mildly elevated and trending downward.     Subjective:  Oriented x1. Denies chest pain or SOA  Appears NAD    Objective:  Troponin 0.102 , 0.105  (-) CKMB. -120's. No bradycardia or pauses      Past Medical History:  Past Medical History   Diagnosis Date   • Anemia    • Anxiety    • CHF (congestive heart failure)    • Chronic a-fib    • Chronic anticoagulation    • Confusion    • Disease of thyroid gland    • GERD (gastroesophageal reflux disease)    • Hypertension    • Hypothyroidism    • Kidney disease    • Lymphoma    • Staph aureus infection 2017     labs called from [facility]     Past Surgical History:  Past Surgical History   Procedure Laterality Date   • Mediport insertion, single     • Thumb amputation Left    • Av fistula placement Right    • Arteriovenous fistula/shunt surgery Right 2017     Procedure: ultrasound access of right internal jugular vein with mahurkar dialysis catheter placement, EXCISION INFECTED RIGTH ARM AV GRAFT WITH REMOVAL OF HERO CATHETER, REMOVAL OF FEMORAL DIALYSIS CATHETER;  Surgeon: Floyd Fraga MD;  Location: [facility] OR ;  Service:    • Pr insj tunneled cvc w/o subq port/ age 6 yr/> Right 2017     Procedure: RT INTERNAL  JUGULAR PALINDROME CATHETER PLACEMENT;  Surgeon: Floyd Fraga MD;  Location: [facility] OR;  Service: Vascular   • Venous access device (port) removal Left 2/2/2017     Procedure: LT CHEST PORT REMOVAL ;  Surgeon: Floyd Fraga MD;  Location: [facility] OR;  Service:         Social History:   Social History   Substance Use Topics   • Smoking status: Never Smoker   • Smokeless tobacco: Not on file   • Alcohol use No      Family History:  History reviewed. No pertinent family history.       Allergies:  No Known Allergies  Scheduled Meds:    aspirin 81 mg Daily   isosorbide mononitrate 30 mg Q24H   levothyroxine 100 mcg QAM AC   metoprolol 2.5 mg Q12H   pantoprazole 40 mg Q AM   warfarin 2.5 mg Once           INTAKE AND OUTPUT:    Intake/Output Summary (Last 24 hours) at 03/08/17 1416  Last data filed at 03/07/17 1425   Gross per 24 hour   Intake    300 ml   Output      0 ml   Net    300 ml       Review of Systems:   GI:  No n/v - remains NPO pending swallow eval  Cardiac:  vr 100-120, no bradycardia  Pulmonary:  No SOA, cough    Constitutional:  Temp:  [93.7 °F (34.3 °C)-98.9 °F (37.2 °C)] 98.9 °F (37.2 °C)  Heart Rate:  [101-130] 126  Resp:  [16-18] 16  BP: ()/(36-62) 102/57    Physical Exam by Tristan Mojica MD  General:  More alert, but remains confused Appears in no acute distress  Eyes: PERTL,  HEENT:  No JVD. No mucosal cyanosis  Respiratory: Respirations regular and unlabored at rest. BBS with good air entry in all fields. No crackles or wheezes auscultated  Cardiovascular: S1S2 irregular rate and rhythm. No murmur  No pretibial pitting edema  Gastrointestinal: Abdomen soft, flat, non tender. Bowel sounds present. No ascites  Musculoskeletal: ALEGRIA weakly x4. No abnormal movements  Extremities: No digital  cyanosis  Skin: Color pale pink. Skin warm and dry to touch.    Neuro: AAO x1-2  [place]  Psych:pleasant and cooperative      Cardiographics   Telemetry:  Afib, vr 100-120      ECG:  "3-8-2017      Echocardiogram 1-2017  · All left ventricular wall segments contract normally.  · Left atrial cavity size is mildly dilated.  · Mild to moderate aortic valve stenosis is present.  · Mild to moderate tricuspid valve regurgitation is present.  · Left Ventricle: Calculated EF = 66.7%.  · There is no evidence of pericardial effusion       Lab Review       Results from last 7 days  Lab Units 03/08/17  0326   SODIUM mmol/L 142   POTASSIUM mmol/L 5.3*   BUN mg/dL 32*   CREATININE mg/dL 5.37*   CALCIUM mg/dL 8.3*       Results from last 7 days  Lab Units 03/08/17  0326 03/07/17  0707 03/06/17  1534   WBC 10*3/mm3 6.46 6.36 6.73   HEMOGLOBIN g/dL 9.2* 8.2* 8.0*   HEMATOCRIT % 29.3* 26.7* 25.7*   PLATELETS 10*3/mm3 153 171 157       Results from last 7 days  Lab Units 03/08/17  0326 03/07/17  0707 03/06/17  1534   INR  2.03* 2.02* 1.93*         Assessment:  - (Permanent) atrial fibrillation - rvr  - h/o sick sinus syndrome  - elevated [id number] score   - Hypotension  - mild troponin elevation  - ESRD-> Renal  - hyperkalemia  - anemia   - lethargy, ms changes  - Hypothyroidism with high TSH     - h/o infected AV fistula 1-2017 - still on IV vancomycin  - h/o MSSA septicemia 1-2017 - \"  - h/o pulmonary septic emboli 1-2017       Plan:  - (Permanent) atrial fibrillation rvr  - Adjust betablocker to improved VR History of bradycardia and pauses in past. Holter in progress    - h/o sick sinus syndrome -  Adjust betablocker to improved VR History of bradycardia and pauses in past. Holter in progress    - elevated [id number] score -> On Coumadin. Dose to maintain INR 2.0-3.0 range    - Hypotension  Volume management as per HD. Controlling vr will help BP    - mild troponin elevation - in setting of ESRD. CKMB (-)  No chest pain      Adjust IV betablocker to better control rate - Need to watch for bradycardia (history of bradycardia and pauses in the past). Review Holter once completed    I reviewed the patient's new " clinical results and treatment plan with Dr Mojica. I personally viewed and interpreted the patient's EKG/Telemetry data    )3/8/2017  MD ROBBIN Pollard Dragon/Transcription disclaimer:   Much of this encounter note is an electronic transcription/translation of spoken language to printed text. The electronic translation of spoken language may permit erroneous, or at times, nonsensical words or phrases to be inadvertently transcribed; Although I have reviewed the note for such errors, some may still exist.

## 2017-03-08 NOTE — PROGRESS NOTES
LOS: 2 days     Chief Complaint:  Follow-up Afib with RVR and recent MRSA septicemia    Interval History:  No acute events. HR improved but still tachycardic and in Afib. He is breathing comfortably. There is no rash or diarrhea.     ROS: no n/v    Vital Signs  Temp:  [93.7 °F (34.3 °C)-99.1 °F (37.3 °C)] 98.9 °F (37.2 °C)  Heart Rate:  [101-133] 101  Resp:  [16-18] 16  BP: ()/(36-65) 108/62    Physical Exam:  General: resting comfortably  Head: Normocephalic  Eyes: PERRL, EOMI, no scleral icterus, no conjunctival hemorrhages  ENT: MM dry, OP clear, no thrush. Dentures in place   Neck: Supple  Cardiovascular: tachycardic irreg irreg rhythm, no murmurs, rubs, or gallops; no LE edema  Respiratory: Lungs are clear to ascultation bilaterally, no rales or wheezing; normal work of breathing on 2L NC  GI: Abdomen is soft, non-tender, non-distended  : no Adair catheter present  Musculoskeletal: no joint abnormalities, normal musculature  Skin: No rashes, lesions, or embolic phenomenon  Neurological: Alert and oriented x 2, motor strength 5/5 in all four extremities  Psychiatric: slow to answer questions  Vasc: no cyanosis; RUE AV graft removed; could not view incision; L chest port non-erythematous; RIJ tunneled catheter    Meds:    Current Facility-Administered Medications:   •  albumin human 25 % IV SOLN 12.5 g, 12.5 g, Intravenous, PRN, Kin Jacinto MD  •  albumin human 25 % IV SOLN 12.5 g, 12.5 g, Intravenous, PRN, Kin Jacinto MD  •  ALPRAZolam (XANAX) tablet 0.25 mg, 0.25 mg, Oral, 4x Daily PRN, Rc No MD  •  aspirin EC tablet 81 mg, 81 mg, Oral, Daily, Rc No MD, 81 mg at 03/08/17 0846  •  heparin (porcine) injection 3,800 Units, 3,800 Units, Intracatheter, PRN, Kin Jacinto MD  •  isosorbide mononitrate (IMDUR) 24 hr tablet 30 mg, 30 mg, Oral, Q24H, Rc MD Oneal, 30 mg at 03/08/17 0846  •  levothyroxine (SYNTHROID, LEVOTHROID) tablet 100 mcg, 100 mcg, Oral, QAM  AC, Rc No MD, 100 mcg at 03/08/17 0643  •  metoprolol (LOPRESSOR) injection 2.5 mg, 2.5 mg, Intravenous, Q12H, FRANCISCA Washburn, 2.5 mg at 03/07/17 1529  •  pantoprazole (PROTONIX) EC tablet 40 mg, 40 mg, Oral, Q AM, Rc No MD, 40 mg at 03/08/17 0643  •  warfarin (COUMADIN) tablet 2.5 mg, 2.5 mg, Oral, Once, Rc No MD, 2.5 mg at 03/07/17 1758    LABS:  CNC, CMP, VTr, micro reviewed today  Lab Results   Component Value Date    WBC 6.46 03/08/2017    HGB 9.2 (L) 03/08/2017    HCT 29.3 (L) 03/08/2017    MCV 96.1 03/08/2017     03/08/2017     Lab Results   Component Value Date    GLUCOSE 112 (H) 03/08/2017    BUN 32 (H) 03/08/2017    CREATININE 5.37 (H) 03/08/2017    EGFRIFNONA 10 (L) 03/08/2017    BCR 6.0 (L) 03/08/2017    CO2 22.1 03/08/2017    CALCIUM 8.3 (L) 03/08/2017    ALBUMIN 2.80 (L) 03/08/2017    LABIL2 0.7 03/08/2017    AST 37 03/08/2017    ALT 19 03/08/2017     Lab Results   Component Value Date    VANCOTROUGH 15.90 02/28/2017    VANCORANDOM 9.00 03/07/2017     Microbiology:  1/19 BCx @ [facility]: MRSA in 2/2 sets  1/21 BCx @ [facility]: MRSA  1/23 OR Cx from RUE AV graft: + MRSA  1/24 BCx @ [facility]: negative  1/27 BCx from port: negative  3/7 BCx: NGTD     Radiology (personally reviewed images/report):  No new imaging today    Assessment/Plan   1. Atrial fibrillation with rapid ventricular response  -this is the reason for admission; cardiology following  -mildly improved today on beta blockade     2. Recent MRSA septicemia due to RUE AV graft site  -s/p graft excision 1/23; impressive amounts of pus per op note; cultures w/ MRSA  -port remained in place; culture from it was negative  -cleared blood cultures as of 1/24 once graft removed  -he completed 6 weeks course of vancomycin with HD on 3/6/17  -repeat blood cultures 3/7 are NGTD though vanco level still 9 at that time  -hold vancomycin; no evidence of infection at this time and completed prior course of vancomycin     3.  ESRD on HD  4. Septic pulmonary emboli   5. Bradycardi/Sick sinus syndrome - currently Afib is the problem. Will f/u cardiology recs re: need for PPM.     Thank you for this consult. ID will follow.

## 2017-03-08 NOTE — PROGRESS NOTES
Acute Care - Speech Language Pathology   Swallow Initial Evaluation    [facility]     Patient Name: Ze Wu  : 1940  MRN: [id number]  Today's Date: 3/8/2017               Admit Date: 3/6/2017    SPEECH-LANGUAGE PATHOLOGY EVALUATION - SWALLOW  Subjective: The patient was seen on this date for a Clinical Swallow evaluation.  Patient was alert and cooperative.    The patient's history is significant for a-fib w/ RVR, dementia, VFSS 2017 recommending nectar thick liquids with [facility] soft no mixed with water protocol.   Objective: Textures given included thin liquid, nectar thick liquid, puree consistency and mechanical soft consistency.  Assessment: Difficulties were noted with thin liquid, characterized by inconsistent delayed throat clearing.  SLP Findings:  Patient presents with mild oropharyngeal dysphagia, without esophageal component.   Recommendations: Diet Textures: nectar thick liquid, mechanical soft consistency with no mixed textures food.  Medications should be taken crushed with puree. May have water and ice between meals after oral care, under staff or family supervision and with the recommended strategies for safe swallowing.   Recommended Strategies: supervision with PO, Upright for PO, small bites and sips and no straw. Oral care before breakfast, after all meals and PRN.  Other Recommended Evaluations:     Dysphagia therapy is recommended. Rationale: Tolerate least restricitive.    Visit Dx:     ICD-10-CM ICD-9-CM   1. Atrial fibrillation with RVR I48.91 427.31   2. Hypothyroidism, unspecified type E03.9 244.9   3. Sick sinus syndrome I49.5 427.81     Patient Active Problem List   Diagnosis   • Sepsis   • Atrial fibrillation with RVR     Past Medical History   Diagnosis Date   • Anemia    • Anxiety    • CHF (congestive heart failure)    • Chronic a-fib    • Chronic anticoagulation    • Confusion    • Disease of thyroid gland    • GERD (gastroesophageal reflux disease)    • Hypertension     • Hypothyroidism    • Kidney disease    • Lymphoma    • Staph aureus infection 01/21/2017     labs called from [facility]     Past Surgical History   Procedure Laterality Date   • Mediport insertion, single     • Thumb amputation Left    • Av fistula placement Right    • Arteriovenous fistula/shunt surgery Right 1/23/2017     Procedure: ultrasound access of right internal jugular vein with mahurkar dialysis catheter placement, EXCISION INFECTED RIGTH ARM AV GRAFT WITH REMOVAL OF HERO CATHETER, REMOVAL OF FEMORAL DIALYSIS CATHETER;  Surgeon: Floyd Fraga MD;  Location: [facility] OR 18/19;  Service:    • Pr insj tunneled cvc w/o subq port/ age 6 yr/> Right 1/31/2017     Procedure: RT INTERNAL JUGULAR PALINDROME CATHETER PLACEMENT;  Surgeon: Floyd Fraga MD;  Location: [facility];  Service: Vascular   • Venous access device (port) removal Left 2/2/2017     Procedure: LT CHEST PORT REMOVAL ;  Surgeon: Floyd Fraga MD;  Location: [facility];  Service:           SWALLOW EVALUATION (last 72 hours)      Swallow Evaluation       03/08/17 1415                Rehab Evaluation    Document Type evaluation  -OH        Subjective Information no complaints;agree to therapy  -OH        Patient Effort, Rehab Treatment good  -OH        Symptoms Noted During/After Treatment none  -OH        General Information    Patient Profile Review yes  -OH        Current Diet Limitations NPO  -OH        Barriers to Rehab cognitive status  -OH        Clinical Impression    Patient's Goals For Discharge patient did not state  -OH        SLP Swallowing Diagnosis oral dysfunction;pharyngeal dysfunction  -OH        Rehab Potential/Prognosis, Swallowing good, to achieve stated therapy goals  -OH        Criteria for Skilled Therapeutic Interventions Met skilled criteria for dysphagia intervention met  -OH        FCM, Swallowing 4-->Level 4  -OH        Therapy Frequency PRN  -OH        Predicted Duration  Therapy Interv (days) until discharge  -OH        Expected Duration Therapy Session (min) 15-30 minutes  -OH        SLP Diet Recommendation IV - mechanical soft, no mixed consistencies;nectar/syrup-thick liquids  -OH        Recommended Feeding/Eating Techniques maintain upright posture during/after eating for 30 mins;small sips/bites;no straws;alternate between small bites and sips of food/liquid  -OH        SLP Rec. for Method of Medication Administration meds whole in pudding/applesauce;meds crushed in pudding/applesauce  -OH        Monitor For Signs Of Aspiration cough;gurgly voice;throat clearing  -OH        Anticipated Discharge Disposition other (see comments)   unknown  -OH        Pain Assessment    Pain Assessment No/denies pain  -OH        Cognitive Assessment/Intervention    Current Cognitive/Communication Assessment impaired  -OH        Oral Motor Structure and Function    Oral Motor Anatomy and Physiology patient demonstrates anatomy and physiology that is WNL  -OH        Dentition Assessment missing teeth  -OH        Secretion Management WNL/WFL  -OH        Mucosal Quality dry  -OH        Velar Elevation WFL (within functional limits)  -OH        Volitional Swallow mild to moderate difficulties initiating volitional swallow  -OH        Volitional Cough no difficulties initiating volitional cough  -OH        Oral Musculature General Assessment WFL (within functional limits)  -OH          User Key  (r) = Recorded By, (t) = Taken By, (c) = Cosigned By    Initials Name Effective Dates    OH Macarena Rodriguez MA,CCC-SLP 04/13/15 -         EDUCATION  The patient has been educated in the following areas:   Dysphagia (Swallowing Impairment) Modified Diet Instruction.    SLP Recommendation and Plan  SLP Swallowing Diagnosis: oral dysfunction, pharyngeal dysfunction  SLP Diet Recommendation: IV - mechanical soft, no mixed consistencies, nectar/syrup-thick liquids  Recommended Feeding/Eating Techniques: maintain  upright posture during/after eating for 30 mins, small sips/bites, no straws, alternate between small bites and sips of food/liquid  SLP Rec. for Method of Medication Administration: meds whole in pudding/applesauce, meds crushed in pudding/applesauce  Monitor For Signs Of Aspiration: cough, gurgly voice, throat clearing     Criteria for Skilled Therapeutic Interventions Met: skilled criteria for dysphagia intervention met  Anticipated Discharge Disposition: other (see comments) (unknown)  Rehab Potential/Prognosis, Swallowing: good, to achieve stated therapy goals  Therapy Frequency: PRN             Plan of Care Review  Plan Of Care Reviewed With: patient  Progress: improving  Outcome Summary/Follow up Plan: Passed bedside swallow eval for nectar thick liquid with mech soft no mixed consistencies          IP SLP Goals       03/08/17 1415          Safely Consume Diet    Safely Consume Diet- SLP, Date Established 03/08/17  -OH      Safely Consume Diet- SLP, Time to Achieve by discharge  -OH      Safely Consume Diet- SLP, Outcome goal ongoing  -OH        User Key  (r) = Recorded By, (t) = Taken By, (c) = Cosigned By    Initials Name Provider Type    OH Macarena Rodriguez MA,CCC-SLP Speech and Language Pathologist             SLP Outcome Measures (last 72 hours)      SLP Outcome Measures       03/08/17 1415          SLP Outcome Measures    Outcome Measure Used? Adult NOMS;Modified Niraj  -OH      FCM Scores    FCM Chosen Swallowing  -OH      Swallowing FCM Score 4  -OH      Modified Niraj Scale    Modified Sully Scale 3 - Moderate disability.  Requiring some help, but able to walk without assistance.  -OH        User Key  (r) = Recorded By, (t) = Taken By, (c) = Cosigned By    Initials Name Effective Dates    OH Macarena Rodriguez MA,CCC-SLP 04/13/15 -            Time Calculation:         Time Calculation- SLP       03/08/17 1415          Time Calculation- SLP    SLP Received On 03/08/17  -OH        User Key  (r) = Recorded  By, (t) = Taken By, (c) = Cosigned By    Initials Name Provider Type    OH Macarena Rodriguez MA,CCC-SLP Speech and Language Pathologist          Therapy Charges for Today     Code Description Service Date Service Provider Modifiers Qty    [id number]  ST EVAL ORAL PHARYNG SWALLOW 4 3/8/2017 Macarena Rodriguez MA,CCC-SLP GN 1               Macarena Rodriguez MA,CCC-SLP  3/8/2017

## 2017-03-09 NOTE — PLAN OF CARE
Problem: Patient Care Overview (Adult)  Goal: Plan of Care Review  Outcome: Ongoing (interventions implemented as appropriate)    03/09/17 5003   Coping/Psychosocial Response Interventions   Plan Of Care Reviewed With patient   Patient Care Overview   Progress no change   Outcome Evaluation   Outcome Summary/Follow up Plan Patient blood pressure impoved today. patient denies any pain. Went to dailysis and completed the 4 hour cycle. Vitals stable. will continue to monitor.         Problem: Older Inpatient, Acutely Ill (Adult)  Goal: Signs and Symptoms of Listed Potential Problems Will be Absent or Manageable (Older Inpatient, Acutely Ill)  Outcome: Ongoing (interventions implemented as appropriate)    Problem: Confusion, Acute (Adult)  Goal: Identify Related Risk Factors and Signs and Symptoms  Outcome: Outcome(s) achieved Date Met:  03/09/17  Goal: Cognitive/Functional Impairments Minimized  Outcome: Ongoing (interventions implemented as appropriate)  Goal: Safety  Outcome: Ongoing (interventions implemented as appropriate)

## 2017-03-09 NOTE — PROGRESS NOTES
LOS: 3 days     Chief Complaint:  Follow-up Afib with RVR and recent MRSA septicemia    Interval History:  No acute events. HR improved but still tachycardic and in Afib. He is breathing comfortably. There is no rash or diarrhea. Volume not removed w/ HD today.    ROS: no n/v    Vital Signs  Temp:  [96.4 °F (35.8 °C)-98.3 °F (36.8 °C)] 98.3 °F (36.8 °C)  Heart Rate:  [117-128] 117  Resp:  [16-18] 18  BP: (102-135)/(57-62) 113/57    Physical Exam:  General: resting comfortably at HD, more awake and alert today  Head: Normocephalic  Eyes: PERRL, EOMI, no scleral icterus, no conjunctival hemorrhages  ENT: MM dry, OP clear, no thrush. Dentures in place   Neck: Supple  Cardiovascular: tachycardic irreg irreg rhythm, no murmurs, rubs, or gallops; no LE edema  Respiratory: Lungs are clear to ascultation bilaterally, no rales or wheezing; normal work of breathing on 2L NC  GI: Abdomen is soft, non-tender, non-distended  : no Adair catheter present  Musculoskeletal: no joint abnormalities, normal musculature  Skin: No rashes, lesions, or embolic phenomenon  Neurological: Alert and oriented x 3, motor strength 5/5 in all four extremities  Psychiatric: slow to answer questions  Vasc: no cyanosis; RUE AV graft removed and incision well-healed; L chest port non-erythematous; RIJ tunneled catheter w/o erythema    Meds:    Current Facility-Administered Medications:   •  ALPRAZolam (XANAX) tablet 0.25 mg, 0.25 mg, Oral, 4x Daily PRN, Rc No MD  •  aspirin EC tablet 81 mg, 81 mg, Oral, Daily, Rc No MD, 81 mg at 03/08/17 0846  •  heparin (porcine) injection 3,800 Units, 3,800 Units, Intracatheter, PRN, Kin Jacinto MD  •  levothyroxine (SYNTHROID, LEVOTHROID) tablet 100 mcg, 100 mcg, Oral, QAM AC, Rc No MD, 100 mcg at 03/09/17 0631  •  metoprolol tartrate (LOPRESSOR) tablet 25 mg, 25 mg, Oral, Q12H, FRANCISCA Washburn  •  pantoprazole (PROTONIX) EC tablet 40 mg, 40 mg, Oral, Q AM, Rc No MD, 40  mg at 03/09/17 0631  •  warfarin (COUMADIN) tablet 2.5 mg, 2.5 mg, Oral, Once, FRANCISCA Washburn    LABS:  BMP, micro reviewed today  Lab Results   Component Value Date    WBC 6.46 03/08/2017    HGB 9.2 (L) 03/08/2017    HCT 29.3 (L) 03/08/2017    MCV 96.1 03/08/2017     03/08/2017     Lab Results   Component Value Date    GLUCOSE 105 (H) 03/09/2017    BUN 47 (H) 03/09/2017    CREATININE 6.42 (H) 03/09/2017    EGFRIFNONA 9 (L) 03/09/2017    BCR 7.3 03/09/2017    CO2 19.2 (L) 03/09/2017    CALCIUM 8.4 (L) 03/09/2017    ALBUMIN 3.00 (L) 03/09/2017    LABIL2 0.7 03/08/2017    AST 37 03/08/2017    ALT 19 03/08/2017     Lab Results   Component Value Date    VANCOTROUGH 15.90 02/28/2017    VANCORANDOM 9.00 03/07/2017     Microbiology:  1/19 BCx @ [facility]: MRSA in 2/2 sets  1/21 BCx @ [facility]: MRSA  1/23 OR Cx from RUE AV graft: + MRSA  1/24 BCx @ [facility]: negative  1/27 BCx from port: negative  3/7 BCx: NGTD     Radiology (personally reviewed images/report):  No new imaging today    Assessment/Plan   1. Atrial fibrillation with rapid ventricular response  -this is the reason for admission; cardiology following  -stable today on beta blockade     2. Recent MRSA septicemia due to RUE AV graft site  -s/p graft excision last admission on 1/23/17; impressive amounts of pus per op note; cultures w/ MRSA  -port remained in place; culture from it was negative  -cleared blood cultures as of 1/24 once graft removed  -he completed 6 weeks course of vancomycin with HD on 3/6/17  -repeat blood cultures 3/7 are NGTD though vanco level still 9 at that time  -hold vancomycin; no evidence of infection at this time and completed prior course of vancomycin     3. ESRD on HD  4. Septic pulmonary emboli   5. Bradycardi/Sick sinus syndrome - currently Afib is the problem. Will f/u cardiology recs re: need for PPM in the future.     Thank you for this consult. ID will follow.

## 2017-03-09 NOTE — PROGRESS NOTES
NEPHROLOGY PROGRESS NOTE    PATIENT IDENTIFICATION:   Name:  Ze Wu      MRN:  [id number]     76 y.o.  male             Reason for visit: ESRD    SUBJECTIVE:  Seen and examined on HD; HR's remain generous and BP's low; awake but lethargic; Qb 400, UF goal is set for 1.6 liters    OBJECTIVE:  Vitals:    03/08/17 1900 03/08/17 2300 03/09/17 0500 03/09/17 0815   BP: 135/62 111/60  113/57   BP Location: Left arm Left arm     Patient Position: Lying Lying     Pulse: (!) 128   117   Resp: 16 18  18   Temp: 96.4 °F (35.8 °C) 97.8 °F (36.6 °C)  98.3 °F (36.8 °C)   TempSrc: Oral Oral     SpO2:       Weight:   163 lb 6 oz (74.1 kg)    Height:               Body mass index is 20.98 kg/(m^2).  No intake or output data in the 24 hours ending 03/09/17 1125  Wt Readings from Last 1 Encounters:   03/09/17 0500 163 lb 6 oz (74.1 kg)   03/08/17 0500 166 lb 0.1 oz (75.3 kg)   03/06/17 1456 210 lb (95.3 kg)     Wt Readings from Last 3 Encounters:   03/09/17 163 lb 6 oz (74.1 kg)   02/25/17 150 lb (68 kg)   02/12/17 165 lb 5.5 oz (75 kg)         Exam:  NAD but confused and lethargic; chr ill; gaunt with temporal wasting  Dry MM; no scleral icterus  No JVD; no carotid bruits  Coarse bilat; not labored  irreg irreg, tachy, no rub  Soft, NT, ND, BS+  No edema; no bruit or thrill overlying right arm AVG  Right chest TDC  Moves extremities    Scheduled meds:      aspirin 81 mg Oral Daily   levothyroxine 100 mcg Oral QAM AC   metoprolol tartrate 25 mg Oral Q12H   pantoprazole 40 mg Oral Q AM   warfarin 2.5 mg Oral Once     IV meds:                           Data Review:      Results from last 7 days  Lab Units 03/09/17  0504 03/08/17  0326 03/07/17  0708 03/06/17  1534   SODIUM mmol/L 142 142 137 137   POTASSIUM mmol/L 5.8* 5.3* 6.3* 5.5*   CHLORIDE mmol/L 97* 100 93* 94*   TOTAL CO2 mmol/L 19.2* 22.1 18.3* 23.0   BUN mg/dL 47* 32* 40* 31*   CREATININE mg/dL 6.42* 5.37* 7.11* 6.91*   CALCIUM mg/dL 8.4* 8.3* 8.6 8.6   BILIRUBIN mg/dL   --  1.9*  --  1.2   ALK PHOS U/L  --  86  --  100   ALT (SGPT) U/L  --  19  --  12   AST (SGOT) U/L  --  37  --  24   GLUCOSE mg/dL 105* 112* 83 108*     Estimated Creatinine Clearance: 10.4 mL/min (by C-G formula based on Cr of 6.42).        Results from last 7 days  Lab Units 03/09/17  0504 03/06/17  1534   MAGNESIUM mg/dL 2.6* 2.5*   PHOSPHORUS mg/dL 6.0*  --          Results from last 7 days  Lab Units 03/08/17  0326 03/07/17  0707 03/06/17  1534   WBC 10*3/mm3 6.46 6.36 6.73   HEMOGLOBIN g/dL 9.2* 8.2* 8.0*   PLATELETS 10*3/mm3 153 171 157         Results from last 7 days  Lab Units 03/09/17  0504 03/08/17  0326 03/07/17  0707 03/06/17  1534   INR  2.28* 2.03* 2.02* 1.93*             ASSESSMENT:   Active Problems:    Atrial fibrillation with RVR    1. ESRD: lytes acceptable with mildly elevated K. Looks dry by exam  2. Afib with RVR  3. Recent MRSA bacteremia d/t infected right arm AVG that was resected last month; vancomycin course concluded with HD on 3.6.17  4. Confusion and lethargy  5. Very elevated TSH  6. Anemia  7. Hypotension       PLAN:  1.  HD today; will d/c fluid removal and just run even, with low threshold to give volume  2.  Rate control       Kin Jacinto MD  3/9/2017    11:25 AM

## 2017-03-09 NOTE — PLAN OF CARE
Problem: Patient Care Overview (Adult)  Goal: Plan of Care Review  Outcome: Ongoing (interventions implemented as appropriate)    03/08/17 2129 03/09/17 0436   Coping/Psychosocial Response Interventions   Plan Of Care Reviewed With patient --    Patient Care Overview   Progress --  no change   Outcome Evaluation   Outcome Summary/Follow up Plan --  Patients blood pressure slightly low around 0400 but came up enough for patient to have lopresser medication. Patient denies pain and discomfort this shift. Will continue to monitor.         Problem: Older Inpatient, Acutely Ill (Adult)  Goal: Signs and Symptoms of Listed Potential Problems Will be Absent or Manageable (Older Inpatient, Acutely Ill)  Outcome: Ongoing (interventions implemented as appropriate)    Problem: Confusion, Acute (Adult)  Goal: Identify Related Risk Factors and Signs and Symptoms  Outcome: Ongoing (interventions implemented as appropriate)  Goal: Cognitive/Functional Impairments Minimized  Outcome: Ongoing (interventions implemented as appropriate)  Goal: Safety  Outcome: Ongoing (interventions implemented as appropriate)

## 2017-03-09 NOTE — PROGRESS NOTES
" DAILY PROGRESS NOTE      CHIEF COMPLAINT:   DOING BETTER  NO NEW COMPLAINTS    HISTORY OF PRESENT ILLNESS: Patient is a 76-year-old white male who is well known to our service from recent admission with MRSA septicemia, on vancomycin, secondary to infected graft. He is also on hemodialysis with end-stage renal disease, septic, pulmonary emboli, toxic metabolic encephalopathy, chronic AFib, on anticoagulation, hypertension, thyroid CA, B-cell lymphoma, coronary artery disease, congestive heart failure. Patient was brought to the [facility] Emergency Room with fatigue. He was found to have increased heart rate. Patient was evaluated and found to be in AFib with rapid ventricular rate, severe anemia, admitted for management. Patient is a poor historian, unable to give detailed history. Most of the history was obtained from the chart, nursing staff, old records, and I know the patient very well from recent admission. No chest pain, no palpitations but fatigued, tired, weak.     PHYSICAL EXAMINATION:Blood pressure 118/65, pulse 114, temperature 98.6 °F (37 °C), temperature source Oral, resp. rate 18, height 74\" (188 cm), weight 163 lb 6 oz (74.1 kg), SpO2 99 %.    GENERAL: Alert, oriented but very weak, lethargic.   HEENT: Unremarkable.   NECK: Supple.   LUNGS: Decreased breath sounds.   HEART: S1, S2, tachycardic, irregular.   ABDOMEN: Soft, nontender. Bowel sounds positive.   EXTREMITIES: Trace edema.   NEUROLOGIC: Moves all 4 extremities but weak. Gait and balance not checked.     DIAGNOSTIC DATA:   Lab Results (last 24 hours)     Procedure Component Value Units Date/Time    Blood Culture [[id number]]  (Normal) Collected:  03/07/17 1405    Specimen:  Blood from Arm, Left Updated:  03/08/17 1501     Blood Culture No growth at 24 hours     Blood Culture [[id number]]  (Normal) Collected:  03/07/17 1420    Specimen:  Blood from Arm, Right Updated:  03/08/17 1601     Blood Culture No growth at 24 hours     " Protime-INR [[id number]]  (Abnormal) Collected:  03/09/17 0504    Specimen:  Blood Updated:  03/09/17 0616     Protime 24.4 (H) Seconds      INR 2.28 (H)     Renal Function Panel [[id number]]  (Abnormal) Collected:  03/09/17 0504    Specimen:  Blood Updated:  03/09/17 0623     Glucose 105 (H) mg/dL      BUN 47 (H) mg/dL      Creatinine 6.42 (H) mg/dL      Sodium 142 mmol/L      Potassium 5.8 (H) mmol/L      Chloride 97 (L) mmol/L      CO2 19.2 (L) mmol/L      Calcium 8.4 (L) mg/dL      Albumin 3.00 (L) g/dL      Phosphorus 6.0 (H) mg/dL      Anion Gap 25.8 mmol/L      BUN/Creatinine Ratio 7.3      eGFR Non African Amer 9 (L) mL/min/1.73     Narrative:       The MDRD GFR formula is only valid for adults with stable renal function between ages 18 and 70.    Magnesium [[id number]]  (Abnormal) Collected:  03/09/17 0504    Specimen:  Blood Updated:  03/09/17 0623     Magnesium 2.6 (H) mg/dL     Protime-INR [[id number]] Collected:  03/09/17 1436    Specimen:  Blood Updated:  03/09/17 1452      Hemoglobin 8.2. INR 2.0. BUN 14, creatinine 7.1, potassium 6.3. No x-ray done. EKG showed AFib with heart rate of 130s, nonspecific ST-T wave changes, no significant change from previous tracing.   Imaging Results (last 72 hours)     Procedure Component Value Units Date/Time    XR Chest 1 View [[id number]] Collected:  03/06/17 1652     Updated:  03/06/17 1657    Narrative:       PORTABLE CHEST 3/6/2017 AT 1555 HOURS     CLINICAL HISTORY: CT. Tachycardia.     Compared to the previous chest x-ray dated 1/31/2017.     The lungs are somewhat poorly inflated. There is mild vascular  congestion but no jared pulmonary edema. The heart is moderately  enlarged and unchanged. No focal areas of consolidation are identified.  A tiny left pleural effusion is evident. A dialysis catheter is in place  in satisfactory position in the right internal jugular vein without  change.     IMPRESSIONS: Cardiomegaly and slight vascular congestion. Tiny left  pleural  effusion.     This report was finalized on 3/6/2017 4:54 PM by Dr. Anil Ceorn MD.           Current Facility-Administered Medications:   •  ALPRAZolam (XANAX) tablet 0.25 mg, 0.25 mg, Oral, 4x Daily PRN, Rc No MD  •  aspirin EC tablet 81 mg, 81 mg, Oral, Daily, Rc No MD, 81 mg at 03/09/17 1350  •  heparin (porcine) injection 3,800 Units, 3,800 Units, Intracatheter, PRN, Kin Jacinto MD  •  levothyroxine (SYNTHROID, LEVOTHROID) tablet 100 mcg, 100 mcg, Oral, QAM AC, Rc No MD, 100 mcg at 03/09/17 0631  •  metoprolol tartrate (LOPRESSOR) tablet 25 mg, 25 mg, Oral, Q12H, FRANCISCA Washburn, 25 mg at 03/09/17 1348  •  pantoprazole (PROTONIX) EC tablet 40 mg, 40 mg, Oral, Q AM, Rc No MD, 40 mg at 03/09/17 0631  •  warfarin (COUMADIN) tablet 2.5 mg, 2.5 mg, Oral, Once, Kateryna Batista, APRN       ASSESSMENT:  1. AFib with rapid ventricular rate.   2. End-stage renal disease. ON HD  3. Recent MRSA septicemia.    4. Chronic AFib.   5. Hypertension.   6. Hypothyroidism.   7. Gastroesophageal reflux disease.   8. Dementia.     PLAN:  1. CPM  2. Control the heart rate.  3. Anticoagulate.  4. HOLTER PENDING  5. HD TIW  6. Echocardiogram. NOTED  7. OFF antibiotics.   8. PPM PER CARD  9. ID FOLLOWING  10. Further recommendations according to hospital course.     Rc No M.D.

## 2017-03-09 NOTE — PROGRESS NOTES
[facility]  Cardiology Progress Note    Patient Identification:  Name: eZ Wu  Age: 76 y.o.  Sex: male  :  1940  MRN: [id number]                 Follow Up / Chief Complaint: (Permanent) atrial fib-RVR, history of sss, hypotension    Interval History:  76-year-old male presents with lethargy, A. fib RVR and labile blood pressures with intermittent hypotension. Review of telemetry shows no episodes of bradycardia, pauses or high degree AV block's. He will be started on low-dose beta blocker, serial EKGs and a Holter monitor have been ordered to watch for development of bradycardias. Although his BNP is high, he clinically appears dry.  Coronary anatomy is unknown. Troponin are mildly elevated and trending downward.     Subjective:  Seen in HD, Awake, but confused. Denies pain - appears in NAD    Objective:   -120's on IV Lopressor. BP improved. NR 2.28 today    Past Medical History:  Past Medical History   Diagnosis Date   • Anemia    • Anxiety    • CHF (congestive heart failure)    • Chronic a-fib    • Chronic anticoagulation    • Confusion    • Disease of thyroid gland    • GERD (gastroesophageal reflux disease)    • Hypertension    • Hypothyroidism    • Kidney disease    • Lymphoma    • Staph aureus infection 2017     labs called from [facility]     Past Surgical History:  Past Surgical History   Procedure Laterality Date   • Mediport insertion, single     • Thumb amputation Left    • Av fistula placement Right    • Arteriovenous fistula/shunt surgery Right 2017     Procedure: ultrasound access of right internal jugular vein with mahurkar dialysis catheter placement, EXCISION INFECTED RIGTH ARM AV GRAFT WITH REMOVAL OF HERO CATHETER, REMOVAL OF FEMORAL DIALYSIS CATHETER;  Surgeon: Floyd Fraga MD;  Location: [facility] OR ;  Service:    • Pr insj tunneled cvc w/o subq port/ age 6 yr/> Right 2017     Procedure: RT INTERNAL JUGULAR  PALINDROME CATHETER PLACEMENT;  Surgeon: Floyd Fraga MD;  Location: [facility] OR;  Service: Vascular   • Venous access device (port) removal Left 2/2/2017     Procedure: LT CHEST PORT REMOVAL ;  Surgeon: Floyd Fraga MD;  Location: [facility] OR;  Service:         Social History:   Social History   Substance Use Topics   • Smoking status: Never Smoker   • Smokeless tobacco: Not on file   • Alcohol use No      Family History:  History reviewed. No pertinent family history.       Allergies:  No Known Allergies  Scheduled Meds:    aspirin 81 mg Daily   levothyroxine 100 mcg QAM AC   metoprolol 2.5 mg Q8H   pantoprazole 40 mg Q AM           INTAKE AND OUTPUT:  No intake or output data in the 24 hours ending 03/09/17 1018    Review of Systems:   GI:  No n/v - diet has been advanced  Cardiac:  vr 100-120, no bradycardia  Pulmonary:  No SOA, cough    Constitutional:  Temp:  [96.4 °F (35.8 °C)-98.3 °F (36.8 °C)] 98.3 °F (36.8 °C)  Heart Rate:  [113-128] 117  Resp:  [16-18] 18  BP: ()/(57-62) 113/57    Physical Exam by Tristan Mojica MD  General:  More alert, but remains confused Appears in no acute distress  Eyes: PERTL,  HEENT:  No JVD. No mucosal cyanosis  Respiratory: Respirations regular and unlabored at rest. BBS with good air entry in all fields. No crackles or wheezes auscultated  Cardiovascular: S1S2 irregular rate and rhythm. No murmur  No pretibial pitting edema  Gastrointestinal: Abdomen soft, flat, non tender. Bowel sounds present. No ascites  Musculoskeletal: ALEGRIA weakly x4. No abnormal movements  Extremities: No digital  cyanosis  Skin: Color pale pink. Skin warm and dry to touch.    Neuro: AAO x1  [place]  Psych:pleasant and cooperative        Cardiographics   Telemetry:  Afib, vr 100-120            Echocardiogram 1-2017  · All left ventricular wall segments contract normally.  · Left atrial cavity size is mildly dilated.  · Mild to moderate aortic valve stenosis is present.  · Mild  "to moderate tricuspid valve regurgitation is present.  · Left Ventricle: Calculated EF = 66.7%.  · There is no evidence of pericardial effusion       Lab Review     Results from last 7 days  Lab Units 03/09/17  0504   SODIUM mmol/L 142   POTASSIUM mmol/L 5.8*   BUN mg/dL 47*   CREATININE mg/dL 6.42*   CALCIUM mg/dL 8.4*     Results from last 7 days  Lab Units 03/09/17  0504 03/08/17  0326 03/07/17  0707   INR  2.28* 2.03* 2.02*         Assessment:  - (Permanent) atrial fibrillation - rvr  - h/o sick sinus syndrome  - elevated [id number] score   - Hypotension  - mild troponin elevation  - ESRD-> Renal  - hyperkalemia  - anemia   - lethargy, ms changes  - Hypothyroidism with high TSH     - h/o infected AV fistula 1-2017 - still on IV vancomycin  - h/o MSSA septicemia 1-2017 - \"  - h/o pulmonary septic emboli 1-2017       Plan:  - (Permanent) atrial fibrillation rvr  - Switch to oral  betablocker to improve VR.  Review Holter.    - h/o sick sinus syndrome -   Switch to oral betablocker  Observe for bradycardia since he has a history of transient low vr and pauses in past. Review Holter.    - elevated [id number] score -> On Coumadin. Dose to maintain INR 2.0-3.0 range    - Hypotension  BP improved. Volume management as per HD.     - mild troponin elevation - in setting of ESRD. CKMB (-)  No chest pain      Switch to oral Betablocker  Observe for bradycardia (history of slow vr and pauses in the past). No indication for pacemaker implant so far.  Review Holter   INR ordered for am    I reviewed the patient's new clinical results and treatment plan . I personally viewed and interpreted the patient's EKG/Telemetry data    )3/9/2017  Tristan Mojica MD      EMR Dragon/Transcription disclaimer:   Much of this encounter note is an electronic transcription/translation of spoken language to printed text. The electronic translation of spoken language may permit erroneous, or at times, nonsensical words or phrases to be " inadvertently transcribed; Although I have reviewed the note for such errors, some may still exist.

## 2017-03-10 NOTE — PROGRESS NOTES
" DAILY PROGRESS NOTE      CHIEF COMPLAINT:   NOT DOING TOO WELL      HISTORY OF PRESENT ILLNESS: Patient is a 76-year-old white male who is well known to our service from recent admission with MRSA septicemia, on vancomycin, secondary to infected graft. He is also on hemodialysis with end-stage renal disease, septic, pulmonary emboli, toxic metabolic encephalopathy, chronic AFib, on anticoagulation, hypertension, thyroid CA, B-cell lymphoma, coronary artery disease, congestive heart failure. Patient was brought to the [facility] Emergency Room with fatigue. He was found to have increased heart rate. Patient was evaluated and found to be in AFib with rapid ventricular rate, severe anemia, admitted for management. Patient is a poor historian, unable to give detailed history. Most of the history was obtained from the chart, nursing staff, old records, and I know the patient very well from recent admission. No chest pain, no palpitations but fatigued, tired, weak.     PHYSICAL EXAMINATION:Blood pressure 101/51, pulse 113, temperature 97.9 °F (36.6 °C), temperature source Oral, resp. rate 18, height 74\" (188 cm), weight 158 lb 11.7 oz (72 kg), SpO2 97 %.    GENERAL: Alert, oriented but very weak, lethargic.   HEENT: Unremarkable.   NECK: Supple.   LUNGS: Decreased breath sounds.   HEART: S1, S2, tachycardic, irregular.   ABDOMEN: Soft, nontender. Bowel sounds positive.   EXTREMITIES: Trace edema.   NEUROLOGIC: Moves all 4 extremities but weak. Gait and balance not checked.     DIAGNOSTIC DATA:   Lab Results (last 24 hours)     Procedure Component Value Units Date/Time    Blood Culture [[id number]]  (Normal) Collected:  03/07/17 1405    Specimen:  Blood from Arm, Left Updated:  03/09/17 1501     Blood Culture No growth at 2 days     Protime-INR [[id number]]  (Abnormal) Collected:  03/09/17 1436    Specimen:  Blood Updated:  03/09/17 1512     Protime 24.5 (H) Seconds      INR 2.29 (H)     Blood Culture " [[id number]]  (Normal) Collected:  03/07/17 1420    Specimen:  Blood from Arm, Right Updated:  03/09/17 1601     Blood Culture No growth at 2 days     Protime-INR [[id number]]  (Abnormal) Collected:  03/10/17 0516    Specimen:  Blood Updated:  03/10/17 0600     Protime 27.3 (H) Seconds      INR 2.63 (H)     Basic Metabolic Panel [[id number]]  (Abnormal) Collected:  03/10/17 0516    Specimen:  Blood Updated:  03/10/17 0609     Glucose 112 (H) mg/dL      BUN 33 (H) mg/dL      Creatinine 4.87 (H) mg/dL      Sodium 138 mmol/L      Potassium 4.8 mmol/L      Chloride 97 (L) mmol/L      CO2 21.2 (L) mmol/L      Calcium 8.1 (L) mg/dL      eGFR Non African Amer 12 (L) mL/min/1.73      BUN/Creatinine Ratio 6.8 (L)      Anion Gap 19.8 mmol/L     Narrative:       The MDRD GFR formula is only valid for adults with stable renal function between ages 18 and 70.    Blood Culture [[id number]] Collected:  03/10/17 1034    Specimen:  Blood from Arm, Left Updated:  03/10/17 1101    Blood Culture [[id number]] Collected:  03/10/17 1336    Specimen:  Blood from Arm, Left Updated:  03/10/17 1425      Hemoglobin 8.2. INR 2.0. BUN 14, creatinine 7.1, potassium 6.3. No x-ray done. EKG showed AFib with heart rate of 130s, nonspecific ST-T wave changes, no significant change from previous tracing.   Imaging Results (last 72 hours)     Procedure Component Value Units Date/Time    XR Chest 1 View [[id number]] Collected:  03/06/17 1652     Updated:  03/06/17 1657    Narrative:       PORTABLE CHEST 3/6/2017 AT 1555 HOURS     CLINICAL HISTORY: CT. Tachycardia.     Compared to the previous chest x-ray dated 1/31/2017.     The lungs are somewhat poorly inflated. There is mild vascular  congestion but no jared pulmonary edema. The heart is moderately  enlarged and unchanged. No focal areas of consolidation are identified.  A tiny left pleural effusion is evident. A dialysis catheter is in place  in satisfactory position in the right internal jugular vein  without  change.     IMPRESSIONS: Cardiomegaly and slight vascular congestion. Tiny left  pleural effusion.     This report was finalized on 3/6/2017 4:54 PM by Dr. Anil Ceron MD.           Current Facility-Administered Medications:   •  ALPRAZolam (XANAX) tablet 0.25 mg, 0.25 mg, Oral, 4x Daily PRN, Rc No MD  •  aspirin EC tablet 81 mg, 81 mg, Oral, Daily, Rc No MD, 81 mg at 03/10/17 1002  •  heparin (porcine) injection 3,800 Units, 3,800 Units, Intracatheter, PRN, Kin Jacinto MD  •  levothyroxine (SYNTHROID, LEVOTHROID) tablet 100 mcg, 100 mcg, Oral, QAM AC, Rc No MD, 100 mcg at 03/10/17 0635  •  metoprolol tartrate (LOPRESSOR) tablet 25 mg, 25 mg, Oral, Q8H, Kateryna Batista, APRN  •  pantoprazole (PROTONIX) EC tablet 40 mg, 40 mg, Oral, Q AM, Rc No MD, 40 mg at 03/10/17 0635       ASSESSMENT:  1. AFib with rapid ventricular rate.   2. End-stage renal disease. ON HD  3. Recent MRSA septicemia.    4. Chronic AFib.   5. Hypertension.   6. Hypothyroidism.   7. Gastroesophageal reflux disease.   8. Dementia.     PLAN:  1. CPM  2. Control the heart rate.  3. Anticoagulate.  4. SUPPORTIVE CARE  5. HD TIW  6. PT/OT  7. OFF antibiotics.   8. NO PPM  9. FAMILY MEETING IN AM  10. NH VS PALLIATIVE CARE    Rc No M.D.

## 2017-03-10 NOTE — PROGRESS NOTES
Continued Stay Note  [facility]     Patient Name: Ze Wu  MRN: [id number]  Today's Date: 3/10/2017    Admit Date: 3/6/2017          Discharge Plan       03/10/17 1611    Case Management/Social Work Plan    Plan Referrals to [facility], [facility] and [facility]    Patient/Family In Agreement With Plan yes    Additional Comments Spoke with Gemma with [facility] she will call son to speak with him, they have a private room at [facility] will just need precert if they choose facility. CCP called [facility] and left voice message for Christiane of referral. CCP called and left vm for Toni at [facility] for referral. CCP to follow up on monday for accepting facility and to initate precert. Angela DODD/CCP      03/10/17 4819    Case Management/Social Work Plan    Plan Accepting SNF, onsite HD or HD transportation- needs [employer] precert.    Patient/Family In Agreement With Plan yes    Additional Comments Spoke with pts son Silas and his wife at bedside, introduced self and explained CCP role. Contact info given. CCP explained again, no beds at [facility] and [facility] unsure of bed availablity and HD chair availablity. Pts son states he would like pt near their home in [place]. Silas states pt goes MWF at 5am at [facility] HD clinic. Son unsure if he wants [facility], but requests referrals to [facility] and [facility] rehab and to wait for determination of [facility] from [facility]. Packet in CCP office, pt needs [employer] precert. Angela DODD/CCP              Discharge Codes     None            Alba Ervin, RN

## 2017-03-10 NOTE — PROGRESS NOTES
Continued Stay Note  [facility]     Patient Name: Ze Wu  MRN: [id number]  Today's Date: 3/10/2017    Admit Date: 3/6/2017          Discharge Plan       03/10/17 1134    Case Management/Social Work Plan    Plan Accepting SNF-    Patient/Family In Agreement With Plan yes    Additional Comments Spoke with Leeanne powell/ [facility] and she states no bed available today or thru the weekend and pt has not been seen by PT yet. CCP called pts son Silas and explained no bed available at [facility], son requests to talk with MD about dc today. CCP explained other SNF that has onsite HD is [facility] in [facility], and son agreeable for referral but wants to check it out. Pt requested other SNF closer to him, CCP explained their maybe a cost with HD transportation, as alot of facilities do not provide transportation. CCP spoke with JU Herron and Claudette/Dr. No coordinator of sons request to speak with MD for update. CCP called [place] with [facility] of referral she will check HD chair availablity at facility and will get back to CCP. Angela DODD/CCP      03/10/17 1034    Case Management/Social Work Plan    Plan [facility] pending bed availability- pt will need [employer] precert    Patient/Family In Agreement With Plan yes    Additional Comments Spoke with Claudette/Dr. No coordinator, [place] next 1-2 days. Updated Leeanne with [facility] that precert is needed. Await PT eval. Contacted Azucena with [employer] onsite also. Packet in CCP office. Angela DODD/PLACIDO              Discharge Codes     None            Alba Ervin, RN

## 2017-03-10 NOTE — PLAN OF CARE
Problem: Patient Care Overview (Adult)  Goal: Plan of Care Review  Outcome: Ongoing (interventions implemented as appropriate)  Goal: Adult Individualization and Mutuality  Outcome: Ongoing (interventions implemented as appropriate)    03/07/17 0302   Individualization   Patient Specific Interventions Turn pt frequently, decreasing amt of time spent on back.          Problem: Older Inpatient, Acutely Ill (Adult)  Goal: Signs and Symptoms of Listed Potential Problems Will be Absent or Manageable (Older Inpatient, Acutely Ill)  Outcome: Ongoing (interventions implemented as appropriate)    03/10/17 0456 03/10/17 1842   Older Adult Inpatient, Acutely Ill   Problems Assessed (Acutely Ill Older Adult) all --    Problems Present (Acutely Ill Older Adult) --  none         Problem: Confusion, Acute (Adult)  Goal: Cognitive/Functional Impairments Minimized  Outcome: Ongoing (interventions implemented as appropriate)    03/10/17 1842   Confusion, Acute (Adult)   Cognitive/Functional Impairments Minimized making progress toward outcome       Goal: Safety  Outcome: Ongoing (interventions implemented as appropriate)    03/10/17 1842   Confusion, Acute (Adult)   Safety making progress toward outcome

## 2017-03-10 NOTE — PLAN OF CARE
Problem: Patient Care Overview (Adult)  Goal: Plan of Care Review  Outcome: Ongoing (interventions implemented as appropriate)    03/09/17 1758 03/10/17 0456   Coping/Psychosocial Response Interventions   Plan Of Care Reviewed With --  patient   Patient Care Overview   Progress no change --    Outcome Evaluation   Outcome Summary/Follow up Plan --  Patients vitals stable. Patient denies pain or discomfort during shift. Patient ate dinner well when fed. Patient put on nurse aide sheet as feeder. Will continue to monitor.         Problem: Older Inpatient, Acutely Ill (Adult)  Goal: Signs and Symptoms of Listed Potential Problems Will be Absent or Manageable (Older Inpatient, Acutely Ill)  Outcome: Ongoing (interventions implemented as appropriate)    Problem: Confusion, Acute (Adult)  Goal: Cognitive/Functional Impairments Minimized  Outcome: Ongoing (interventions implemented as appropriate)  Goal: Safety  Outcome: Ongoing (interventions implemented as appropriate)

## 2017-03-10 NOTE — PROGRESS NOTES
Acute Care - Physical Therapy Initial Evaluation  [facility]     Patient Name: Ze Wu  : 1940  MRN: [id number]  Today's Date: 3/10/2017   Onset of Illness/Injury or Date of Surgery Date: (P) 17  Date of Referral to PT: (P) 03/10/17  Referring Physician: No (P)      Admit Date: 3/6/2017     Visit Dx:    ICD-10-CM ICD-9-CM   1. Atrial fibrillation with RVR I48.91 427.31   2. Hypothyroidism, unspecified type E03.9 244.9   3. Sick sinus syndrome I49.5 427.81   4. Generalized weakness R53.1 780.79     Patient Active Problem List   Diagnosis   • Sepsis   • Atrial fibrillation with RVR     Past Medical History   Diagnosis Date   • Anemia    • Anxiety    • CHF (congestive heart failure)    • Chronic a-fib    • Chronic anticoagulation    • Confusion    • Disease of thyroid gland    • GERD (gastroesophageal reflux disease)    • Hypertension    • Hypothyroidism    • Kidney disease    • Lymphoma    • Staph aureus infection 2017     labs called from [facility]     Past Surgical History   Procedure Laterality Date   • Mediport insertion, single     • Thumb amputation Left    • Av fistula placement Right    • Arteriovenous fistula/shunt surgery Right 2017     Procedure: ultrasound access of right internal jugular vein with mahurkar dialysis catheter placement, EXCISION INFECTED RIGTH ARM AV GRAFT WITH REMOVAL OF HERO CATHETER, REMOVAL OF FEMORAL DIALYSIS CATHETER;  Surgeon: Floyd Fraga MD;  Location: [facility] OR ;  Service:    • Pr insj tunneled cvc w/o subq port/ age 6 yr/> Right 2017     Procedure: RT INTERNAL JUGULAR PALINDROME CATHETER PLACEMENT;  Surgeon: Floyd Fraga MD;  Location: [facility];  Service: Vascular   • Venous access device (port) removal Left 2017     Procedure: LT CHEST PORT REMOVAL ;  Surgeon: Floyd Fraga MD;  Location: [facility];  Service:           PT ASSESSMENT (last 72 hours)      PT Evaluation        03/10/17 1553 03/10/17 1317    Rehab Evaluation    Document Type (P)  evaluation  -     Subjective Information (P)  agree to therapy;complains of;fatigue  -     Evaluation Not Performed  other (see comments)   Pt with recent fall out of bed today. Will follow up tomorrow for possible OT eval 1315  -SG    Patient Effort, Rehab Treatment (P)  adequate  -CH     Symptoms Noted During/After Treatment (P)  fatigue  -CH     General Information    Patient Profile Review (P)  yes  -CH     Onset of Illness/Injury or Date of Surgery Date (P)  03/06/17  -     Referring Physician (P)  med  -     General Observations (P)  male pt, side lying in bed, no acute distress noted  -     Pertinent History Of Current Problem (P)  pt with extensive PMHx, brought to [facility] with fatigue  -     Precautions/Limitations (P)  fall precautions;oxygen therapy device and L/min   2L  -CH     Prior Level of Function (P)  mod assist:;max assist:;all household mobility;community mobility  -     Equipment Currently Used at Home (P)  shower chair;slide board;wheelchair  -     Plans/Goals Discussed With (P)  patient  -CH     Living Environment    Lives With (P)  child(bonita), adult  -     Living Arrangements (P)  extended care facility  -     Clinical Impression    Date of Referral to PT (P)  03/10/17  -     PT Diagnosis (P)  generalized weakness  -     Patient/Family Goals Statement (P)  unable to voice goals at time of eval  -     Criteria for Skilled Therapeutic Interventions Met (P)  yes;treatment indicated  -     Impairments Found (describe specific impairments) (P)  arousal, attention, and cognition;gait, locomotion, and balance  -     Rehab Potential (P)  fair, will monitor progress closely  -     Pain Assessment    Pain Assessment (P)  No/denies pain  -     Cognitive Assessment/Intervention    Current Cognitive/Communication Assessment (P)  impaired  -     Orientation Status (P)  disoriented x 4  -CH     Follows  Commands/Answers Questions (P)  25% of the time;able to follow single-step instructions;needs cueing;needs increased time;needs repetition;speech unintelligible  -     Personal Safety (P)  severe impairment  -     Personal Safety Interventions (P)  fall prevention program maintained;gait belt;nonskid shoes/slippers when out of bed  -     ROM (Range of Motion)    General ROM (P)  no range of motion deficits identified  -     MMT (Manual Muscle Testing)    General MMT Assessment (P)  lower extremity strength deficits identified  -     General MMT Assessment Detail (P)  pt unable to perform LAQ in BLEs  -     Bed Mobility, Assessment/Treatment    Bed Mob, Supine to Sit, [place] (P)  maximum assist (25% patient effort)  -     Bed Mob, Sit to Supine, [place] (P)  maximum assist (25% patient effort)  -     Transfer Assessment/Treatment    Transfers, Sit-Stand [place] (P)  maximum assist (25% patient effort)  -     Transfers, Stand-Sit [place] (P)  maximum assist (25% patient effort)  -     Transfers, Sit-Stand-Sit, Assist Device (P)  rolling walker  -     Gait Assessment/Treatment    Gait, [place] Level (P)  maximum assist (25% patient effort)  -     Gait, Assistive Device (P)  rolling walker  -     Gait, Distance (Feet) (P)  10  -     Gait, Gait Deviations (P)  andria decreased;forward flexed posture;limb motion velocity decreased;step length decreased  -     Gait, Safety Issues (P)  sequencing ability decreased;supplemental O2  -     Positioning and Restraints    Pre-Treatment Position (P)  in bed  -     Post Treatment Position (P)  bed  -     In Bed (P)  supine;call light within reach;encouraged to call for assist;exit alarm on  -       03/10/17 1122 03/08/17 1415    Rehab Evaluation    Document Type  evaluation  -OH    Subjective Information  no complaints;agree to therapy  -OH    Evaluation Not Performed other (see comments)   nursing in room, reported  pt just fell and they found him on floor, will check back later today  -EM (r) CH (t) EM (c)     Patient Effort, Rehab Treatment  good  -OH    Symptoms Noted During/After Treatment  none  -OH    Pain Assessment    Pain Assessment  No/denies pain  -OH    Cognitive Assessment/Intervention    Current Cognitive/Communication Assessment  impaired  -OH      User Key  (r) = Recorded By, (t) = Taken By, (c) = Cosigned By    Initials Name Provider Type     Karine Islas, OTR Occupational Therapist    [place] Macarena Rodriguez MA,CCC-SLP Speech and Language Pathologist    VALE Clement, PT Physical Therapist     Jalil Gonzales, PT Student PT Student          Physical Therapy Education     Title: PT OT SLP Therapies (Active)     Topic: Physical Therapy (Active)     Point: Mobility training (Active)    Learning Progress Summary    Learner Readiness Method Response Comment Documented by Status   Patient Acceptance E,D NR   03/10/17 1600 Active                      User Key     Initials Effective Dates Name Provider Type Discipline     01/31/17 -  Jalil Gonzales, PT Student PT Student PT                PT Recommendation and Plan  Anticipated Discharge Disposition: (P) skilled nursing facility  Planned Therapy Interventions: (P) bed mobility training, gait training, strengthening, transfer training  PT Frequency: (P) daily  Plan of Care Review  Plan Of Care Reviewed With: (P) patient  Outcome Summary/Follow up Plan: (P) Pt presented this PM to PT disoriented X4 and reporting fatigue. In AM, pt had fall in room where he tried to get up to go to bathroom by himself. Pt demonstrated difficulty following commands from PT and weakness in BLEs. Pt required max A X1 for all functional mobility and ambulated ~10' to other side of bed with rolling walker and multiple cues from PT. Pt will benefit from skilled PT in order to increase strength and independence with functional mobility to decrease his risk for falls.          IP PT  Goals       03/10/17 1600          Bed Mobility PT LTG    Bed Mobility PT LTG, Date Established (P)  03/10/17  -CH      Bed Mobility PT LTG, Time to Achieve (P)  1 wk  -CH      Bed Mobility PT LTG, Activity Type (P)  all bed mobility  -CH      Bed Mobility PT LTG, [place] Level (P)  moderate assist (50% patient effort)  -      Transfer Training PT LTG    Transfer Training PT LTG, Date Established (P)  03/10/17  -CH      Transfer Training PT LTG, Time to Achieve (P)  1 wk  -CH      Transfer Training PT LTG, Activity Type (P)  sit to stand/stand to sit  -CH      Transfer Training PT LTG, [place] Level (P)  moderate assist (50% patient effort)  -      Transfer Training PT LTG, Assist Device (P)  walker, rolling  -CH      Gait Training PT LTG    Gait Training Goal PT LTG, Date Established (P)  03/10/17  -CH      Gait Training Goal PT LTG, Time to Achieve (P)  1 wk  -CH      Gait Training Goal PT LTG, [place] Level (P)  moderate assist (50% patient effort)  -      Gait Training Goal PT LTG, Assist Device (P)  walker, rolling  -CH      Gait Training Goal PT LTG, Distance to Achieve (P)  50  -CH        User Key  (r) = Recorded By, (t) = Taken By, (c) = Cosigned By    Initials Name Provider Type    AGUSTIN Gonzales, PT Student PT Student                Outcome Measures       03/10/17 1600          How much help from another person do you currently need...    Turning from your back to your side while in flat bed without using bedrails? (P)  3  -CH      Moving from lying on back to sitting on the side of a flat bed without bedrails? (P)  2  -CH      Moving to and from a bed to a chair (including a wheelchair)? (P)  2  -CH      Standing up from a chair using your arms (e.g., wheelchair, bedside chair)? (P)  2  -CH      Climbing 3-5 steps with a railing? (P)  1  -CH      To walk in hospital room? (P)  2  -CH      AM-PAC 6 Clicks Score (P)  12  -CH      Functional Assessment    Outcome Measure Options (P)   AM-PAC 6 Clicks Basic Mobility (PT)  -        User Key  (r) = Recorded By, (t) = Taken By, (c) = Cosigned By    Initials Name Provider Type    AGUSTIN Gonzales, PT Student PT Student           Time Calculation:         PT Charges       03/10/17 1550 03/10/17 1122       Time Calculation    Start Time (P)  1529  -CH      Stop Time (P)  1548  -CH      Time Calculation (min) (P)  19 min  -CH      PT Received On (P)  03/10/17  -CH      PT - Next Appointment (P)  03/11/17  -CH 03/10/17  -EM (r) CH (t) EM (c)     PT Goal Re-Cert Due Date (P)  03/17/17  -CH        User Key  (r) = Recorded By, (t) = Taken By, (c) = Cosigned By    Initials Name Provider Type    EM Namrata Clement, PT Physical Therapist    AGUSTIN Gonzales, PT Student PT Student          Therapy Charges for Today     Code Description Service Date Service Provider Modifiers Qty    37718221540 HC PT EVAL MOD COMPLEXITY 2 3/10/2017 Jalil Gonzales, PT Student GP 1    23786705557 HC PT THER PROC EA 15 MIN 3/10/2017 Jalil Gonzales, PT Student GP 1          PT G-Codes  Outcome Measure Options: (P) AM-PAC 6 Clicks Basic Mobility (PT)      Jalil Gonzales PT Student  3/10/2017

## 2017-03-10 NOTE — PROGRESS NOTES
[facility]  Cardiology Progress Note    Patient Identification:  Name: Ze Wu  Age: 76 y.o.  Sex: male  :  1940  MRN: [id number]                 Follow Up / Chief Complaint: (Permanent) atrial fib-RVR, history of sss, hypotension    Interval History:  76-year-old male presents with lethargy, A. fib RVR and labile blood pressures with intermittent hypotension. Review of telemetry shows no episodes of bradycardia, pauses or high degree AV block's. He will be started on low-dose beta blocker, serial EKGs and a Holter monitor have been ordered to watch for development of bradycardias. Although his BNP is high, he clinically appears dry.  Coronary anatomy is unknown. Troponin are mildly elevated and trending downward.     Subjective:   Awake, but confused. Denies pain - appears in NAD    Objective:  VR  on low-dose Lopressor.  Occasional, isolated PVC but no nonsustained or sustained ventricular arrhythmias  INR 2.63 today    Past Medical History:  Past Medical History   Diagnosis Date   • Anemia    • Anxiety    • CHF (congestive heart failure)    • Chronic a-fib    • Chronic anticoagulation    • Confusion    • Disease of thyroid gland    • GERD (gastroesophageal reflux disease)    • Hypertension    • Hypothyroidism    • Kidney disease    • Lymphoma    • Staph aureus infection 2017     labs called from [facility]     Past Surgical History:  Past Surgical History   Procedure Laterality Date   • Mediport insertion, single     • Thumb amputation Left    • Av fistula placement Right    • Arteriovenous fistula/shunt surgery Right 2017     Procedure: ultrasound access of right internal jugular vein with mahurkar dialysis catheter placement, EXCISION INFECTED RIGTH ARM AV GRAFT WITH REMOVAL OF HERO CATHETER, REMOVAL OF FEMORAL DIALYSIS CATHETER;  Surgeon: Floyd Fraga MD;  Location: [facility] OR ;  Service:    • Pr insj tunneled cvc w/o subq port/  age 6 yr/> Right 1/31/2017     Procedure: RT INTERNAL JUGULAR PALINDROME CATHETER PLACEMENT;  Surgeon: Floyd Fraga MD;  Location: [facility] MAIN OR;  Service: Vascular   • Venous access device (port) removal Left 2/2/2017     Procedure: LT CHEST PORT REMOVAL ;  Surgeon: Floyd Fraga MD;  Location: [facility] MAIN OR;  Service:         Social History:   Social History   Substance Use Topics   • Smoking status: Never Smoker   • Smokeless tobacco: Not on file   • Alcohol use No      Family History:  History reviewed. No pertinent family history.       Allergies:  No Known Allergies  Scheduled Meds:    aspirin 81 mg Daily   levothyroxine 100 mcg QAM AC   metoprolol tartrate 25 mg Q12H   pantoprazole 40 mg Q AM           INTAKE AND OUTPUT:  No intake or output data in the 24 hours ending 03/10/17 1117  Review of Systems:    GI:  No n/v   Cardiac:  no bradycardia  Pulmonary:  No SOA, cough    Constitutional:  Temp:  [97.3 °F (36.3 °C)-98.6 °F (37 °C)] 97.9 °F (36.6 °C)  Heart Rate:  [] 113  Resp:  [16-24] 18  BP: ()/(50-65) 101/51    Physical Exam by Tristan Mojica MD  General:  More alert, but remains confused Appears in no acute distress  Eyes: PERTL,  HEENT:  No mucosal cyanosis  Respiratory: Respirations regular and unlabored at rest. BBS with good air entry in all fields. No crackles or wheezes auscultated  Right tunnel catheter noted  Cardiovascular: S1S2 irregular rate and rhythm. No rub or gallop  No pretibial pitting edema  Gastrointestinal: Abdomen soft, flat, non tender. Bowel sounds present. No ascites  Musculoskeletal: ALEGRIA weakly x4. No abnormal movements  Extremities: No digital  cyanosis  Skin: Color pale pink. Skin warm and dry to touch.    Neuro: AAO x1  Tribal  Psych:pleasant and cooperative        Cardiographics   Telemetry:  Afib, vr 90's-117, no pauses. Occasional, isolated PVC          Echocardiogram 1-2017  · All left ventricular wall segments contract normally.  · Left  "atrial cavity size is mildly dilated.  · Mild to moderate aortic valve stenosis is present.  · Mild to moderate tricuspid valve regurgitation is present.  · Left Ventricle: Calculated EF = 66.7%.  · There is no evidence of pericardial effusion     Holter:   · An abnormal monitor study.  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      - Ventricular tachycardia - asymptomatic episode noted on Holter.  Will adjust beta blocker.  Uncontrolled hypothyroidism may be a contributing factor and also would prevent use of anti-antiarrhythmic at this point    - h/o sick sinus syndrome -   tolerating current dose of beta blocker without bradycardia or pauses.Uncontrolled hypothyroidism may be a contributing factor     - elevated [id number] score -> On Coumadin. Dose to maintain INR 2.0-3.0 range    Adjust betablocker to control VR and suppress ventricular ectopy No indication for pacemaker implant at this point  Defer management of hypothyroidism with high TSH to internal medicine.  INR ordered for am    I reviewed the patient's new clinical results and treatment plan . I personally viewed and interpreted the patient's EKG/Telemetry data    )3/10/2017  Tristan " MD ROBBIN Mojica/Transcription disclaimer:   Much of this encounter note is an electronic transcription/translation of spoken language to printed text. The electronic translation of spoken language may permit erroneous, or at times, nonsensical words or phrases to be inadvertently transcribed; Although I have reviewed the note for such errors, some may still exist.

## 2017-03-10 NOTE — NURSING NOTE
Received phone call from phleb who was on the unit to draw labs from patient.  Phleb stated that the patient had fell on the floor.  Immediately went to patients room.  Patient was laying on the floor, on the of the bed.  He was positioned on his left side.  Bed alarm was not on.  Called for additional staff members to assist in lifting patient back into the bed. Patient was cleaned up and placed back in bed.  Patient is confused, which has been his baseline.  When asked what happened, patient explained that he had to go to the restroom so he got up to go but couldn't make it there. Patient has been incontinent and wearing a brief since his admission.  Stated that he didn't press call light because he forgot. When asked how and where he fell patient insisted that he didn't fall, but that he sat down on the floor.  When questioned further on how he ended up on his side if he sat down the patient stated because he was trying to get up.  Patient reports no pain.  Full body assessment completed for any signs of trauma.  No injuries currently identified.  Patient to be moved to room closer to nurses station.  MD notified of fall.  Will continue to monitor patient.

## 2017-03-10 NOTE — PROGRESS NOTES
Continued Stay Note  [facility]     Patient Name: Ze Wu  MRN: [id number]  Today's Date: 3/10/2017    Admit Date: 3/6/2017          Discharge Plan       03/10/17 1157    Case Management/Social Work Plan    Plan Accepting SNF- and HD onsite or HD transportation.    Patient/Family In Agreement With Plan yes    Additional Comments Spoke with patient son Silas via outbound call, he requested facilities in [place] or closer to him for pt. Silas requested referral to [facility], CCP explained acute rehab but he requested facility to look at him. Son stated pt goes to [facility] in [place] is home HD clinic. Spoke with [place] she [facility] [place] as well as [facility]. Spoke with Margi with [facility] she will evaluate and will let ccp know. Printed off medicare.gov list for facilties within 25 [place] [address] for son and left in patients room. Updated Claudette/Dr. No coordinator that need SNF choice and need precert, did not anticipate dc over the weekend. Angela forbes/sara      03/10/17 1134    Case Management/Social Work Plan    Plan Accepting SNF-    Patient/Family In Agreement With Plan yes    Additional Comments Spoke with [facility]/ [place] and she states no bed available today or thru the weekend and pt has not been seen by PT yet. CCP called pts son Silas and explained no bed available at [facility], son requests to talk with MD about dc today. CCP explained other SNF that has onsite HD is [facility] in [facility], and son agreeable for referral but wants to check it out. Pt requested other SNF closer to him, CCP explained their maybe a cost with HD transportation, as alot of facilities do not provide transportation. CCP spoke with JU Herron and Claudette/Dr. No coordinator of sons request to speak with MD for update. CCP called [place] [facility] she will check HD chair availablity at facility and will get back to CCP.  Angela RN/CCP      03/10/17 1034    Case Management/Social Work Plan    Plan [facility] skilled rehab pending bed availability- pt will need [employer] precert    Patient/Family In Agreement With Plan yes    Additional Comments Spoke with Claudette/Dr. No coordinator, [place] next 1-2 days. Updated Leeanne with [facility] that precert is needed. Await PT eval. Contacted Azucena with [employer] onsite also. Packet in CCP office. Angela DODD/CCP              Discharge Codes     None            Alba Ervin, RN

## 2017-03-10 NOTE — PROGRESS NOTES
Continued Stay Note  [facility]     Patient Name: Ze Wu  MRN: [id number]  Today's Date: 3/10/2017    Admit Date: 3/6/2017          Discharge Plan       03/10/17 1034    Case Management/Social Work Plan    Plan [facility] skilled [facility]hab pending bed availability- pt will need [employer] precert    Patient/Family In Agreement With Plan yes    Additional Comments Spoke with Claudette/Dr. No coordinator, dc next 1-2 days. Updated Leeanne with [facility] to check bed availablity and that precert is needed. Await PT eval. Contacted Azucena with [employer] onsite also. Packet in CCP office. Angela DODD/CCP              Discharge Codes     None            Alba Ervin, RN

## 2017-03-10 NOTE — PLAN OF CARE
Problem: Patient Care Overview (Adult)  Goal: Plan of Care Review  Outcome: Ongoing (interventions implemented as appropriate)    03/10/17 1600   Coping/Psychosocial Response Interventions   Plan Of Care Reviewed With patient   Outcome Evaluation   Outcome Summary/Follow up Plan Pt presented this PM to PT disoriented X4 and reporting fatigue. In AM, pt had fall in room where he tried to get up to go to bathroom by himself. Pt demonstrated difficulty following commands from PT and weakness in BLEs. Pt required max A X1 for all functional mobility and ambulated ~10' to other side of bed with rolling walker and multiple cues from PT. Pt will benefit from skilled PT in order to increase strength and independence with functional mobility to decrease his risk for falls.         Problem: Inpatient Physical Therapy  Goal: Bed Mobility Goal LTG- PT  Outcome: Ongoing (interventions implemented as appropriate)    03/10/17 1600   Bed Mobility PT LTG   Bed Mobility PT LTG, Date Established 03/10/17   Bed Mobility PT LTG, Time to Achieve 1 wk   Bed Mobility PT LTG, Activity Type all bed mobility   Bed Mobility PT LTG, [place] Level moderate assist (50% patient effort)       Goal: Transfer Training Goal 1 LTG- PT  Outcome: Ongoing (interventions implemented as appropriate)    03/10/17 1600   Transfer Training PT LTG   Transfer Training PT LTG, Date Established 03/10/17   Transfer Training PT LTG, Time to Achieve 1 wk   Transfer Training PT LTG, Activity Type sit to stand/stand to sit   Transfer Training PT LTG, [place] Level moderate assist (50% patient effort)   Transfer Training PT LTG, Assist Device walker, rolling       Goal: Gait Training Goal LTG- PT  Outcome: Ongoing (interventions implemented as appropriate)    03/10/17 1600   Gait Training PT LTG   Gait Training Goal PT LTG, Date Established 03/10/17   Gait Training Goal PT LTG, Time to Achieve 1 wk   Gait Training Goal PT LTG, [place] Level moderate assist  (50% patient effort)   Gait Training Goal PT LTG, Assist Device walker, rolling   Gait Training Goal PT LTG, Distance to Achieve 50

## 2017-03-10 NOTE — PROGRESS NOTES
LOS: 4 days     Chief Complaint:  Follow-up Afib with RVR and recent MRSA septicemia    Interval History:  No acute events. Tachycardia worse today than yesterday during my exam.  He is breathing comfortably but seems a little more confused. There is no rash or diarrhea. He has no signs of infection off of antibiotics.    ROS: no n/v; no rash    Vital Signs  Temp:  [97.3 °F (36.3 °C)-98.6 °F (37 °C)] 97.9 °F (36.6 °C)  Heart Rate:  [] 113  Resp:  [16-24] 18  BP: ()/(50-65) 101/51    Physical Exam:  General: some confusion this AM  Head: Normocephalic  Eyes: PERRL, EOMI, no scleral icterus, no conjunctival hemorrhages  ENT: MM dry, OP clear, no thrush. Dentures in place   Neck: Supple  Cardiovascular: tachycardic irreg irreg rhythm, no murmurs, rubs, or gallops; no LE edema  Respiratory: Lungs are clear to ascultation bilaterally, no rales or wheezing; normal work of breathing on 2L NC  GI: Abdomen is soft, non-tender, non-distended  : no Adair catheter present  Musculoskeletal: no joint abnormalities, normal musculature  Skin: No rashes, lesions, or embolic phenomenon  Neurological: Alert and oriented x 3, motor strength 5/5 in all four extremities  Psychiatric: slow to answer questions  Vasc: no cyanosis; RUE AV graft removed and incision well-healed; L chest port non-erythematous; RIJ tunneled catheter w/o erythema    Meds:    Current Facility-Administered Medications:   •  ALPRAZolam (XANAX) tablet 0.25 mg, 0.25 mg, Oral, 4x Daily PRN, Rc No MD  •  aspirin EC tablet 81 mg, 81 mg, Oral, Daily, Rc No MD, 81 mg at 03/10/17 1002  •  heparin (porcine) injection 3,800 Units, 3,800 Units, Intracatheter, PRN, Kin Jacinto MD  •  levothyroxine (SYNTHROID, LEVOTHROID) tablet 100 mcg, 100 mcg, Oral, QAM AC, Rc No MD, 100 mcg at 03/10/17 0635  •  metoprolol tartrate (LOPRESSOR) tablet 25 mg, 25 mg, Oral, Q12H, FRANCISCA Washburn, 25 mg at 03/10/17 1003  •  pantoprazole (PROTONIX)  EC tablet 40 mg, 40 mg, Oral, Q AM, Rcda No MD, 40 mg at 03/10/17 0635    LABS:  BMP, micro reviewed today  Lab Results   Component Value Date    WBC 6.46 03/08/2017    HGB 9.2 (L) 03/08/2017    HCT 29.3 (L) 03/08/2017    MCV 96.1 03/08/2017     03/08/2017     Lab Results   Component Value Date    GLUCOSE 112 (H) 03/10/2017    BUN 33 (H) 03/10/2017    CREATININE 4.87 (H) 03/10/2017    EGFRIFNONA 12 (L) 03/10/2017    BCR 6.8 (L) 03/10/2017    CO2 21.2 (L) 03/10/2017    CALCIUM 8.1 (L) 03/10/2017    ALBUMIN 3.00 (L) 03/09/2017    LABIL2 0.7 03/08/2017    AST 37 03/08/2017    ALT 19 03/08/2017     Lab Results   Component Value Date    VANCOTROUGH 15.90 02/28/2017    VANCORANDOM 9.00 03/07/2017     Microbiology:  1/19 BCx @ [facility]: MRSA in 2/2 sets  1/21 BCx @ [facility]: MRSA  1/23 OR Cx from RUE AV graft: + MRSA  1/24 BCx @ [facility]: negative  1/27 BCx from port: negative  3/7 BCx: NGTD  3/10 BCx: requested     Radiology (personally reviewed images/report):  No new imaging today    Assessment/Plan   1. Atrial fibrillation with rapid ventricular response  -this is the reason for admission; cardiology following  -on beta blockade  -tachycardia a little worse today during my exam     2. Recent MRSA septicemia due to RUE AV graft site  -s/p graft excision last admission on 1/23/17; impressive amounts of pus per op note; cultures w/ MRSA  -port remained in place; culture from it was negative  -cleared blood cultures as of 1/24 once graft removed  -he completed 6 weeks course of vancomycin with HD on 3/6/17  -repeat blood cultures 3/7 are NGTD though vanco level still 9 at that time  -repeat blood cultures today (3/10) now that vanco likely out of system; check CBC in AM  -hold vancomycin; no evidence of infection at this time and completed prior course of vancomycin     3. ESRD on HD  4. Septic pulmonary emboli   5. Bradycardi/Sick sinus syndrome - currently Afib is the problem. No plans for PPM at this  time.     Thank you for this consult. ID will follow.

## 2017-03-10 NOTE — PROGRESS NOTES
NEPHROLOGY PROGRESS NOTE    PATIENT IDENTIFICATION:   Name:  Ze Wu      MRN:  [id number]     76 y.o.  male             Reason for visit: ESRD    SUBJECTIVE:  Eating poorly, tho denies N or V; affect remains very flat; denies pain or SOB    OBJECTIVE:  Vitals:    03/09/17 2010 03/09/17 2342 03/10/17 0500 03/10/17 0727   BP:  92/50  101/51   BP Location:  Left arm  Left arm   Patient Position:  Lying  Lying   Pulse: 112 91  113   Resp:  24  18   Temp:    97.9 °F (36.6 °C)   TempSrc:    Oral   SpO2:  98%  97%   Weight:   158 lb 11.7 oz (72 kg)    Height:               Body mass index is 20.38 kg/(m^2).    Intake/Output Summary (Last 24 hours) at 03/10/17 1835  Last data filed at 03/10/17 1239   Gross per 24 hour   Intake    100 ml   Output      0 ml   Net    100 ml     Wt Readings from Last 1 Encounters:   03/10/17 0500 158 lb 11.7 oz (72 kg)   03/09/17 0500 163 lb 6 oz (74.1 kg)   03/08/17 0500 166 lb 0.1 oz (75.3 kg)   03/06/17 1456 210 lb (95.3 kg)     Wt Readings from Last 3 Encounters:   03/10/17 158 lb 11.7 oz (72 kg)   02/25/17 150 lb (68 kg)   02/12/17 165 lb 5.5 oz (75 kg)         Exam:  NAD but very flat and bit lethargic; chr ill; gaunt with temporal wasting  Dry MM; no scleral icterus  No JVD; no carotid bruits  Coarse bilat; not labored  irreg irreg, tachy, no rub  Soft, NT, ND, BS+  No edema; no bruit or thrill overlying right arm AVG  Right chest TDC  Moves extremities    Scheduled meds:      aspirin 81 mg Oral Daily   levothyroxine 100 mcg Oral QAM AC   metoprolol tartrate 25 mg Oral Q8H   pantoprazole 40 mg Oral Q AM   warfarin 2.5 mg Oral Daily     IV meds:                           Data Review:      Results from last 7 days  Lab Units 03/10/17  0516 03/09/17  0504 03/08/17  0326  03/06/17  1534   SODIUM mmol/L 138 142 142  < > 137   POTASSIUM mmol/L 4.8 5.8* 5.3*  < > 5.5*   CHLORIDE mmol/L 97* 97* 100  < > 94*   TOTAL CO2 mmol/L 21.2* 19.2* 22.1  < > 23.0   BUN mg/dL 33* 47* 32*  < > 31*    CREATININE mg/dL 4.87* 6.42* 5.37*  < > 6.91*   CALCIUM mg/dL 8.1* 8.4* 8.3*  < > 8.6   BILIRUBIN mg/dL  --   --  1.9*  --  1.2   ALK PHOS U/L  --   --  86  --  100   ALT (SGPT) U/L  --   --  19  --  12   AST (SGOT) U/L  --   --  37  --  24   GLUCOSE mg/dL 112* 105* 112*  < > 108*   < > = values in this interval not displayed.  Estimated Creatinine Clearance: 13.3 mL/min (by C-G formula based on Cr of 4.87).        Results from last 7 days  Lab Units 03/09/17  0504 03/06/17  1534   MAGNESIUM mg/dL 2.6* 2.5*   PHOSPHORUS mg/dL 6.0*  --          Results from last 7 days  Lab Units 03/08/17  0326 03/07/17  0707 03/06/17  1534   WBC 10*3/mm3 6.46 6.36 6.73   HEMOGLOBIN g/dL 9.2* 8.2* 8.0*   PLATELETS 10*3/mm3 153 171 157         Results from last 7 days  Lab Units 03/10/17  0516 03/09/17  1436 03/09/17  0504 03/08/17  0326 03/07/17  0707   INR  2.63* 2.29* 2.28* 2.03* 2.02*             ASSESSMENT:   Active Problems:    Atrial fibrillation with RVR    1. ESRD: lytes acceptable; suspect vol low from lack of eating  2. Afib with RVR  3. Recent MRSA bacteremia d/t infected right arm AVG that was resected last month; vancomycin course concluded with HD on 3.6.17  4. Confusion and lethargy  5. Very elevated TSH  6. Anemia  7. Hypotension: exacerbated by malnutrition      PLAN:  1.  HD tomorrow (TTS)  2.  Encouraged him to eat; broached with him and family option of stopping dialysis   3.  Augment nutrition with Ensure     Kin Jacinto MD  3/10/2017    6:35 PM

## 2017-03-11 NOTE — PLAN OF CARE
Problem: Older Inpatient, Acutely Ill (Adult)  Goal: Signs and Symptoms of Listed Potential Problems Will be Absent or Manageable (Older Inpatient, Acutely Ill)  Outcome: Ongoing (interventions implemented as appropriate)    03/11/17 1710   Older Adult Inpatient, Acutely Ill   Problems Assessed (Acutely Ill Older Adult) all   Problems Present (Acutely Ill Older Adult) cognitive impairment;functional decline/self-care deficit         Problem: Confusion, Acute (Adult)  Goal: Cognitive/Functional Impairments Minimized  Outcome: Ongoing (interventions implemented as appropriate)    03/11/17 1710   Confusion, Acute (Adult)   Cognitive/Functional Impairments Minimized making progress toward outcome       Goal: Safety  Outcome: Ongoing (interventions implemented as appropriate)    03/11/17 1710   Confusion, Acute (Adult)   Safety making progress toward outcome

## 2017-03-11 NOTE — PROGRESS NOTES
[facility]  Cardiology Progress Note    Patient Identification:  Name: Ze Wu  Age: 76 y.o.  Sex: male  :  1940  MRN: [id number]                 Follow Up / Chief Complaint: (Permanent) atrial fib-RVR, history of sss, hypotension    Interval History:  76-year-old male presents with lethargy, A. fib RVR and labile blood pressures with intermittent hypotension. Review of telemetry shows no episodes of bradycardia, pauses or high degree AV block's. He will be started on low-dose beta blocker, serial EKGs and a Holter monitor have been ordered to watch for development of bradycardias. Although his BNP is high, he clinically appears dry.  Coronary anatomy is unknown. Troponin are mildly elevated and trending downward.     Subjective:  Sleeping, arouses easily Denies pain, palpitations or shortness of breath    Objective:  VR 80's-104 on adjusted dose of Lopressor.  No pauses or ventricular ectopy   INR 3.22 today    Past Medical History:  Past Medical History   Diagnosis Date   • Anemia    • Anxiety    • CHF (congestive heart failure)    • Chronic a-fib    • Chronic anticoagulation    • Confusion    • Disease of thyroid gland    • GERD (gastroesophageal reflux disease)    • Hypertension    • Hypothyroidism    • Kidney disease    • Lymphoma    • Staph aureus infection 2017     labs called from [facility]     Past Surgical History:  Past Surgical History   Procedure Laterality Date   • Mediport insertion, single     • Thumb amputation Left    • Av fistula placement Right    • Arteriovenous fistula/shunt surgery Right 2017     Procedure: ultrasound access of right internal jugular vein with mahurkar dialysis catheter placement, EXCISION INFECTED RIGTH ARM AV GRAFT WITH REMOVAL OF HERO CATHETER, REMOVAL OF FEMORAL DIALYSIS CATHETER;  Surgeon: Floyd Fraga MD;  Location: [facility] OR ;  Service:    • Pr insj tunneled cvc w/o subq port/ age 6 yr/> Right  1/31/2017     Procedure: RT INTERNAL JUGULAR PALINDROME CATHETER PLACEMENT;  Surgeon: Floyd Fraga MD;  Location: [facility] OR;  Service: Vascular   • Venous access device (port) removal Left 2/2/2017     Procedure: LT CHEST PORT REMOVAL ;  Surgeon: Floyd Fraga MD;  Location: [facility] OR;  Service:         Social History:   Social History   Substance Use Topics   • Smoking status: Never Smoker   • Smokeless tobacco: Not on file   • Alcohol use No      Family History:  History reviewed. No pertinent family history.       Allergies:  No Known Allergies  Scheduled Meds:    aspirin 81 mg Daily   epoetin bacilio 10,000 Units Once   levothyroxine 100 mcg QAM AC   metoprolol tartrate 25 mg Q8H   pantoprazole 40 mg Q AM           INTAKE AND OUTPUT:    Intake/Output Summary (Last 24 hours) at 03/11/17 1422  Last data filed at 03/11/17 0843   Gross per 24 hour   Intake    420 ml   Output      0 ml   Net    420 ml     Review of Systems:    GI:  No n/v   Cardiac:  no bradycardia, pauses or VT  Pulmonary:  No SOA, cough    Constitutional:  Temp:  [97.3 °F (36.3 °C)-98.2 °F (36.8 °C)] 98.2 °F (36.8 °C)  Heart Rate:  [] 92  Resp:  [16-18] 18  BP: ()/(47-54) 111/54    Physical Exam by FRANCISCA Mccarty  General:  Sleeping, arouses easily.  Appears in no acute distress  Eyes: PERTL,  HEENT:  Oral mucosa dry, no cyanosis  Respiratory: Respirations regular and unlabored at rest. BBS with good air entry in all fields. No crackles or wheezes auscultated  Right tunnel catheter noted  Cardiovascular: S1S2 irregular rate and rhythm. No rub or gallop  No pretibial pitting edema  Gastrointestinal: Abdomen soft, flat, non tender. Bowel sounds present.   Musculoskeletal: ALEGRIA weakly x4. No abnormal movements  Extremities: No digital cyanosis  Skin: Color pale pink. Skin warm and dry to touch.    Neuro: AAO x1  [place]  Psych:pleasant and cooperative        Cardiographics   Telemetry:  Afib, vr 90's-105, no pauses  "        Echocardiogram 1-2017  · All left ventricular wall segments contract normally.  · Left atrial cavity size is mildly dilated.  · Mild to moderate aortic valve stenosis is present.  · Mild to moderate tricuspid valve regurgitation is present.  · Left Ventricle: Calculated EF = 66.7%.  · There is no evidence of pericardial effusion     Holter:   · An abnormal monitor study.  · Afib with uncontrolled rate  · One long VT asymptomatic    Lab Review     Results from last 7 days  Lab Units 03/11/17  0627   SODIUM mmol/L 139   POTASSIUM mmol/L 5.1   BUN mg/dL 49*   CREATININE mg/dL 5.87*   CALCIUM mg/dL 8.0*       Results from last 7 days  Lab Units 03/11/17  0627 03/10/17  0516 03/09/17  1436   INR  3.22* 2.63* 2.29*         Assessment:  - (Permanent) atrial fibrillation - rvr  - Ventricular tachycardia  - h/o sick sinus syndrome  - elevated [id number] score   - mild troponin elevation  - ESRD-> Renal  - anemia   - lethargy, ms changes  - Hypothyroidism with high TSH     - h/o infected AV fistula 1-2017 - still on IV vancomycin  - h/o MSSA septicemia 1-2017 - \"  - h/o pulmonary septic emboli 1-2017       Plan:  - (Permanent) atrial fibrillation rvr  -   controlled VR with no bradycardia or or pauses on current dose of beta blocker.      - Ventricular tachycardia - asymptomatic episode noted on Holter.  No ventricular ectopy since beta blocker adjusted.  Uncontrolled hypothyroidism may be a contributing factor and also would prevent use of anti-antiarrhythmic at this point    - h/o sick sinus syndrome -   tolerating current dose of beta blocker without bradycardia or pauses.Uncontrolled hypothyroidism may be a contributing factor     - elevated [id number] score -> hold Coumadin today.  INR 3.22  Dose to maintain INR 2.0-3.0 range    Continue current dose of Lopressor.  Hold Coumadin and resume once INR decreased   No indication for pacemaker implant at this point  Defer management of hypothyroidism with high TSH to " internal medicine.  CBC and INR ordered for a.m.    I reviewed the patient's new clinical results and treatment plan . I personally viewed and interpreted the patient's EKG/Telemetry data    )3/11/2017  FRANCISCA Mccarty Dragon/Transcription disclaimer:   Much of this encounter note is an electronic transcription/translation of spoken language to printed text. The electronic translation of spoken language may permit erroneous, or at times, nonsensical words or phrases to be inadvertently transcribed; Although I have reviewed the note for such errors, some may still exist.

## 2017-03-11 NOTE — PROGRESS NOTES
" DAILY PROGRESS NOTE      CHIEF COMPLAINT:   DOING SAME  SEEN IN HD UNIT      HISTORY OF PRESENT ILLNESS: Patient is a 76-year-old white male who is well known to our service from recent admission with MRSA septicemia, on vancomycin, secondary to infected graft. He is also on hemodialysis with end-stage renal disease, septic, pulmonary emboli, toxic metabolic encephalopathy, chronic AFib, on anticoagulation, hypertension, thyroid CA, B-cell lymphoma, coronary artery disease, congestive heart failure. Patient was brought to the [facility] Emergency Room with fatigue. He was found to have increased heart rate. Patient was evaluated and found to be in AFib with rapid ventricular rate, severe anemia, admitted for management. Patient is a poor historian, unable to give detailed history. Most of the history was obtained from the chart, nursing staff, old records, and I know the patient very well from recent admission. No chest pain, no palpitations but fatigued, tired, weak.     PHYSICAL EXAMINATION:Blood pressure 111/54, pulse 92, temperature 98.2 °F (36.8 °C), resp. rate 18, height 74\" (188 cm), weight 155 lb (70.3 kg), SpO2 100 %.    GENERAL: Alert, oriented but very weak, lethargic.   HEENT: Unremarkable.   NECK: Supple.   LUNGS: Decreased breath sounds.   HEART: S1, S2, tachycardic, irregular.   ABDOMEN: Soft, nontender. Bowel sounds positive.   EXTREMITIES: Trace edema.   NEUROLOGIC: Moves all 4 extremities but weak. Gait and balance not checked.     DIAGNOSTIC DATA:   Lab Results (last 24 hours)     Procedure Component Value Units Date/Time    Blood Culture [[id number]]  (Normal) Collected:  03/07/17 1405    Specimen:  Blood from Arm, Left Updated:  03/10/17 1501     Blood Culture No growth at 3 days     Blood Culture [[id number]]  (Normal) Collected:  03/07/17 1420    Specimen:  Blood from Arm, Right Updated:  03/10/17 1601     Blood Culture No growth at 3 days     Blood Culture [[id number]]  (Normal) " Collected:  03/10/17 1336    Specimen:  Blood from Arm, Left Updated:  03/11/17 0302     Blood Culture No growth at less than 24 hours     Protime-INR [[id number]]  (Abnormal) Collected:  03/11/17 0627    Specimen:  Blood Updated:  03/11/17 0653     Protime 31.9 (H) Seconds      INR 3.22 (H)     CBC & Differential [[id number]] Collected:  03/11/17 0628    Specimen:  Blood Updated:  03/11/17 0656    Narrative:       The following orders were created for panel order CBC & Differential.  Procedure                               Abnormality         Status                     ---------                               -----------         ------                     CBC Auto Differential[[id number]]         Abnormal            Final result                 Please view results for these tests on the individual orders.    CBC Auto Differential [[id number]]  (Abnormal) Collected:  03/11/17 0628    Specimen:  Blood Updated:  03/11/17 0656     WBC 6.27 10*3/mm3      RBC 3.15 (L) 10*6/mm3      Hemoglobin 9.4 (L) g/dL      Hematocrit 30.3 (L) %      MCV 96.2 fL      MCH 29.8 pg      MCHC 31.0 (L) g/dL      RDW 20.1 (H) %      RDW-SD 68.3 (H) fl      MPV 10.7 fL      Platelets 164 10*3/mm3      Neutrophil % 63.8 %      Lymphocyte % 26.0 %      Monocyte % 6.2 %      Eosinophil % 2.7 %      Basophil % 0.8 %      Immature Grans % 0.5 %      Neutrophils, Absolute 4.00 10*3/mm3      Lymphocytes, Absolute 1.63 10*3/mm3      Monocytes, Absolute 0.39 10*3/mm3      Eosinophils, Absolute 0.17 10*3/mm3      Basophils, Absolute 0.05 10*3/mm3      Immature Grans, Absolute 0.03 10*3/mm3      nRBC 0.8 (H) /100 WBC     Renal Function Panel [[id number]]  (Abnormal) Collected:  03/11/17 0627    Specimen:  Blood Updated:  03/11/17 0730     Glucose 118 (H) mg/dL      BUN 49 (H) mg/dL      Creatinine 5.87 (H) mg/dL      Sodium 139 mmol/L      Potassium 5.1 mmol/L      Chloride 97 (L) mmol/L      CO2 15.7 (L) mmol/L      Calcium 8.0 (L) mg/dL      Albumin 2.70 (L)  g/dL      Phosphorus 6.1 (H) mg/dL      Anion Gap 26.3 mmol/L      BUN/Creatinine Ratio 8.3      eGFR Non African Amer 9 (L) mL/min/1.73     Narrative:       The MDRD GFR formula is only valid for adults with stable renal function between ages 18 and 70.    Blood Culture [[id number]]  (Normal) Collected:  03/10/17 1034    Specimen:  Blood from Arm, Left Updated:  03/11/17 1201     Blood Culture No growth at 24 hours       Hemoglobin 8.2. INR 2.0. BUN 14, creatinine 7.1, potassium 6.3. No x-ray done. EKG showed AFib with heart rate of 130s, nonspecific ST-T wave changes, no significant change from previous tracing.   Imaging Results (last 72 hours)     Procedure Component Value Units Date/Time    XR Chest 1 View [[id number]] Collected:  03/06/17 1652     Updated:  03/06/17 1657    Narrative:       PORTABLE CHEST 3/6/2017 AT 1555 HOURS     CLINICAL HISTORY: CT. Tachycardia.     Compared to the previous chest x-ray dated 1/31/2017.     The lungs are somewhat poorly inflated. There is mild vascular  congestion but no jared pulmonary edema. The heart is moderately  enlarged and unchanged. No focal areas of consolidation are identified.  A tiny left pleural effusion is evident. A dialysis catheter is in place  in satisfactory position in the right internal jugular vein without  change.     IMPRESSIONS: Cardiomegaly and slight vascular congestion. Tiny left  pleural effusion.     This report was finalized on 3/6/2017 4:54 PM by Dr. Anil Ceron MD.           Current Facility-Administered Medications:   •  ALPRAZolam (XANAX) tablet 0.25 mg, 0.25 mg, Oral, 4x Daily PRN, Rc No MD, 0.25 mg at 03/10/17 1603  •  aspirin EC tablet 81 mg, 81 mg, Oral, Daily, Rc No MD, 81 mg at 03/10/17 1002  •  levothyroxine (SYNTHROID, LEVOTHROID) tablet 100 mcg, 100 mcg, Oral, QAM AC, Rc No MD, 100 mcg at 03/11/17 0634  •  metoprolol tartrate (LOPRESSOR) tablet 25 mg, 25 mg, Oral, Q8H, FRANCISCA Washburn, 25 mg at 03/10/17  1839  •  pantoprazole (PROTONIX) EC tablet 40 mg, 40 mg, Oral, Q AM, Rc No MD, 40 mg at 03/11/17 0634  •  warfarin (COUMADIN) tablet 2.5 mg, 2.5 mg, Oral, Daily, Rc No MD, Stopped at 03/11/17 1800       ASSESSMENT:  1. AFib with rapid ventricular rate.   2. End-stage renal disease. ON HD  3. Recent MRSA septicemia.    4. Chronic AFib.   5. Hypertension.   6. Hypothyroidism.   7. Gastroesophageal reflux disease.   8. Dementia.     PLAN:  1. CPM  2. Control the heart rate.  3. AC  4. SUPPORTIVE CARE  5. HD TIW  6. PT/OT  7. OFF antibiotics.   8. NO PPM  9. FAMILY MEETING SOON  10. NH VS PALLIATIVE CARE    Rc No M.D.

## 2017-03-11 NOTE — PROGRESS NOTES
NEPHROLOGY PROGRESS NOTE    PATIENT IDENTIFICATION:   Name:  Ze Wu      MRN:  [id number]     76 y.o.  male             Reason for visit: ESRD    SUBJECTIVE:  Pt seen on dialysis, no complaints.  Wt down from last tx, no UF.    OBJECTIVE:  Vitals:    03/11/17 0010 03/11/17 0500 03/11/17 0752 03/11/17 0855   BP:   92/50 102/50   BP Location: Right arm  Left arm    Patient Position: Lying  Lying    Pulse: 94  83 104   Resp:   18 18   Temp:   97.4 °F (36.3 °C) 98.2 °F (36.8 °C)   TempSrc:   Oral    SpO2: 100%  100%    Weight:  155 lb (70.3 kg)     Height:               Body mass index is 19.9 kg/(m^2).    Intake/Output Summary (Last 24 hours) at 03/11/17 0944  Last data filed at 03/11/17 0843   Gross per 24 hour   Intake    470 ml   Output      0 ml   Net    470 ml     Wt Readings from Last 1 Encounters:   03/11/17 0500 155 lb (70.3 kg)   03/10/17 0500 158 lb 11.7 oz (72 kg)   03/09/17 0500 163 lb 6 oz (74.1 kg)   03/08/17 0500 166 lb 0.1 oz (75.3 kg)   03/06/17 1456 210 lb (95.3 kg)     Wt Readings from Last 3 Encounters:   03/11/17 155 lb (70.3 kg)   02/25/17 150 lb (68 kg)   02/12/17 165 lb 5.5 oz (75 kg)         Exam:  NAD but very flat and bit lethargic; chr ill; gaunt with temporal wasting  Dry MM; no scleral icterus  No JVD; no carotid bruits  Coarse bilat; not labored  irreg irreg, tachy, no rub  Soft, NT, ND, BS+  No edema; no bruit or thrill overlying right arm AVG  Right chest TDC  Moves extremities    Scheduled meds:      aspirin 81 mg Oral Daily   levothyroxine 100 mcg Oral QAM AC   metoprolol tartrate 25 mg Oral Q8H   pantoprazole 40 mg Oral Q AM   warfarin 2.5 mg Oral Daily     IV meds:                           Data Review:      Results from last 7 days  Lab Units 03/11/17  0627 03/10/17  0516 03/09/17  0504 03/08/17  0326  03/06/17  1534   SODIUM mmol/L 139 138 142 142  < > 137   POTASSIUM mmol/L 5.1 4.8 5.8* 5.3*  < > 5.5*   CHLORIDE mmol/L 97* 97* 97* 100  < > 94*   TOTAL CO2 mmol/L 15.7*  21.2* 19.2* 22.1  < > 23.0   BUN mg/dL 49* 33* 47* 32*  < > 31*   CREATININE mg/dL 5.87* 4.87* 6.42* 5.37*  < > 6.91*   CALCIUM mg/dL 8.0* 8.1* 8.4* 8.3*  < > 8.6   BILIRUBIN mg/dL  --   --   --  1.9*  --  1.2   ALK PHOS U/L  --   --   --  86  --  100   ALT (SGPT) U/L  --   --   --  19  --  12   AST (SGOT) U/L  --   --   --  37  --  24   GLUCOSE mg/dL 118* 112* 105* 112*  < > 108*   < > = values in this interval not displayed.  Estimated Creatinine Clearance: 10.8 mL/min (by C-G formula based on Cr of 5.87).        Results from last 7 days  Lab Units 03/11/17  0627 03/09/17  0504 03/06/17  1534   MAGNESIUM mg/dL  --  2.6* 2.5*   PHOSPHORUS mg/dL 6.1* 6.0*  --          Results from last 7 days  Lab Units 03/11/17  0628 03/08/17  0326 03/07/17  0707 03/06/17  1534   WBC 10*3/mm3 6.27 6.46 6.36 6.73   HEMOGLOBIN g/dL 9.4* 9.2* 8.2* 8.0*   PLATELETS 10*3/mm3 164 153 171 157         Results from last 7 days  Lab Units 03/11/17  0627 03/10/17  0516 03/09/17  1436 03/09/17  0504 03/08/17  0326   INR  3.22* 2.63* 2.29* 2.28* 2.03*             ASSESSMENT:   Active Problems:    Atrial fibrillation with RVR    1. ESRD: lytes acceptable; suspect vol low from lack of eating  2. Afib with RVR  3. Recent MRSA bacteremia d/t infected right arm AVG that was resected last month; vancomycin course concluded with HD on 3.6.17  4. Confusion and lethargy  5. Very elevated TSH  6. Anemia  7. Hypotension: exacerbated by malnutrition      PLAN:  1.  HD (TTS)  2.  Encouraged him to eat  3.  Augment nutrition with Ensure     Benson Appiah MD  3/11/2017    9:44 AM

## 2017-03-11 NOTE — PLAN OF CARE
Problem: Patient Care Overview (Adult)  Goal: Plan of Care Review  Outcome: Ongoing (interventions implemented as appropriate)    03/11/17 0247   Coping/Psychosocial Response Interventions   Plan Of Care Reviewed With patient   Patient Care Overview   Progress no change   Outcome Evaluation   Outcome Summary/Follow up Plan Pt remains confused. Blood pressure remains low. Planned dialysis in am.        Goal: Adult Individualization and Mutuality  Outcome: Ongoing (interventions implemented as appropriate)  Goal: Discharge Needs Assessment  Outcome: Ongoing (interventions implemented as appropriate)    Problem: Older Inpatient, Acutely Ill (Adult)  Goal: Signs and Symptoms of Listed Potential Problems Will be Absent or Manageable (Older Inpatient, Acutely Ill)  Outcome: Ongoing (interventions implemented as appropriate)    Problem: Confusion, Acute (Adult)  Goal: Cognitive/Functional Impairments Minimized  Outcome: Ongoing (interventions implemented as appropriate)  Goal: Safety  Outcome: Ongoing (interventions implemented as appropriate)

## 2017-03-12 NOTE — PROGRESS NOTES
NEPHROLOGY PROGRESS NOTE    PATIENT IDENTIFICATION:   Name:  Ze Wu      MRN:  [id number]     76 y.o.  male             Reason for visit: ESRD    SUBJECTIVE:  Pt without any new complaints.  No events.    OBJECTIVE:  Vitals:    03/11/17 2013 03/11/17 2329 03/12/17 0500 03/12/17 0943   BP: 103/53 106/64  97/49   BP Location: Left arm Left arm  Left arm   Patient Position: Lying Lying  Lying   Pulse:  108  99   Resp: 16 16 26   Temp:  97.3 °F (36.3 °C)  97.5 °F (36.4 °C)   TempSrc:  Oral  Axillary   SpO2:  96%  96%   Weight:   153 lb 3 oz (69.5 kg)    Height:               Body mass index is 19.67 kg/(m^2).    Intake/Output Summary (Last 24 hours) at 03/12/17 1019  Last data filed at 03/12/17 0650   Gross per 24 hour   Intake    720 ml   Output      0 ml   Net    720 ml     Wt Readings from Last 1 Encounters:   03/12/17 0500 153 lb 3 oz (69.5 kg)   03/11/17 0500 155 lb (70.3 kg)   03/10/17 0500 158 lb 11.7 oz (72 kg)   03/09/17 0500 163 lb 6 oz (74.1 kg)   03/08/17 0500 166 lb 0.1 oz (75.3 kg)   03/06/17 1456 210 lb (95.3 kg)     Wt Readings from Last 3 Encounters:   03/12/17 153 lb 3 oz (69.5 kg)   02/25/17 150 lb (68 kg)   02/12/17 165 lb 5.5 oz (75 kg)         Exam:  NAD but very flat and bit lethargic; chr ill; gaunt with temporal wasting  Dry MM; no scleral icterus  No JVD; no carotid bruits  Coarse bilat; not labored  irreg irreg, tachy, no rub  Soft, NT, ND, BS+  No edema; no bruit or thrill overlying right arm AVG  Right chest TDC  Moves extremities    Scheduled meds:      aspirin 81 mg Oral Daily   levothyroxine 100 mcg Oral QAM AC   metoprolol tartrate 25 mg Oral Q8H   pantoprazole 40 mg Oral Q AM     IV meds:                           Data Review:      Results from last 7 days  Lab Units 03/11/17  0627 03/10/17  0516 03/09/17  0504 03/08/17  0326  03/06/17  1534   SODIUM mmol/L 139 138 142 142  < > 137   POTASSIUM mmol/L 5.1 4.8 5.8* 5.3*  < > 5.5*   CHLORIDE mmol/L 97* 97* 97* 100  < > 94*    TOTAL CO2 mmol/L 15.7* 21.2* 19.2* 22.1  < > 23.0   BUN mg/dL 49* 33* 47* 32*  < > 31*   CREATININE mg/dL 5.87* 4.87* 6.42* 5.37*  < > 6.91*   CALCIUM mg/dL 8.0* 8.1* 8.4* 8.3*  < > 8.6   BILIRUBIN mg/dL  --   --   --  1.9*  --  1.2   ALK PHOS U/L  --   --   --  86  --  100   ALT (SGPT) U/L  --   --   --  19  --  12   AST (SGOT) U/L  --   --   --  37  --  24   GLUCOSE mg/dL 118* 112* 105* 112*  < > 108*   < > = values in this interval not displayed.  Estimated Creatinine Clearance: 10.7 mL/min (by C-G formula based on Cr of 5.87).        Results from last 7 days  Lab Units 03/11/17  0627 03/09/17  0504 03/06/17  1534   MAGNESIUM mg/dL  --  2.6* 2.5*   PHOSPHORUS mg/dL 6.1* 6.0*  --          Results from last 7 days  Lab Units 03/12/17  0634 03/11/17  0628 03/08/17  0326 03/07/17  0707 03/06/17  1534   WBC 10*3/mm3 6.74 6.27 6.46 6.36 6.73   HEMOGLOBIN g/dL 10.6* 9.4* 9.2* 8.2* 8.0*   PLATELETS 10*3/mm3 143 164 153 171 157         Results from last 7 days  Lab Units 03/12/17  0634 03/11/17  0627 03/10/17  0516 03/09/17  1436 03/09/17  0504   INR  2.92* 3.22* 2.63* 2.29* 2.28*             ASSESSMENT:   Active Problems:    Atrial fibrillation with RVR    1. ESRD: lytes acceptable; suspect vol low from lack of eating  2. Afib with RVR  3. Recent MRSA bacteremia d/t infected right arm AVG that was resected last month; vancomycin course concluded with HD on 3.6.17  4. Confusion and lethargy  5. Very elevated TSH  6. Anemia  7. Hypotension: exacerbated by malnutrition      PLAN:  1.  HD (TTS)  2.  Encouraged him to eat  3.  Augment nutrition with Ensure     Benson Appiah MD  3/12/2017    10:19 AM

## 2017-03-12 NOTE — PROGRESS NOTES
[facility]  Cardiology Progress Note    Patient Identification:  Name: Ze Wu  Age: 76 y.o.  Sex: male  :  1940  MRN: [id number]                 Follow Up / Chief Complaint: (Permanent) atrial fib-RVR, history of sss, hypotension    Interval History:  76-year-old male presents with lethargy, A. fib RVR and labile blood pressures with intermittent hypotension. Review of telemetry shows no episodes of bradycardia, pauses or high degree AV block's. He will be started on low-dose beta blocker, serial EKGs and a Holter monitor have been ordered to watch for development of bradycardias. Although his BNP is high, he clinically appears dry.  Coronary anatomy is unknown. Troponin are mildly elevated and trending downward.     Subjective:  Unable to obtain    Objective:  VR 90's on adjusted dose of Lopressor.  No pauses or ventricular ectopy   INR 2.92 today    Past Medical History:  Past Medical History   Diagnosis Date   • Anemia    • Anxiety    • CHF (congestive heart failure)    • Chronic a-fib    • Chronic anticoagulation    • Confusion    • Disease of thyroid gland    • GERD (gastroesophageal reflux disease)    • Hypertension    • Hypothyroidism    • Kidney disease    • Lymphoma    • Staph aureus infection 2017     labs called from [facility]     Past Surgical History:  Past Surgical History   Procedure Laterality Date   • Mediport insertion, single     • Thumb amputation Left    • Av fistula placement Right    • Arteriovenous fistula/shunt surgery Right 2017     Procedure: ultrasound access of right internal jugular vein with mahurkar dialysis catheter placement, EXCISION INFECTED RIGTH ARM AV GRAFT WITH REMOVAL OF HERO CATHETER, REMOVAL OF FEMORAL DIALYSIS CATHETER;  Surgeon: Floyd Fraga MD;  [facility]  Service:    • Pr insj tunneled cvc w/o subq port/ age 6 yr/> Right 2017     Procedure: RT INTERNAL JUGULAR PALINDROME  CATHETER PLACEMENT;  Surgeon: Floyd Fraga MD;  Location: [facility]  Service: Vascular   • Venous access device (port) removal Left 2/2/2017     Procedure: LT CHEST PORT REMOVAL ;  Surgeon: Floyd Fraga MD;  Location: [facility] OR;  Service:         Social History:   Social History   Substance Use Topics   • Smoking status: Never Smoker   • Smokeless tobacco: Not on file   • Alcohol use No      Family History:  History reviewed. No pertinent family history.       Allergies:  No Known Allergies  Scheduled Meds:           INTAKE AND OUTPUT:    Intake/Output Summary (Last 24 hours) at 03/12/17 1506  Last data filed at 03/12/17 0650   Gross per 24 hour   Intake    720 ml   Output      0 ml   Net    720 ml     Review of Systems:    GI:  No n/v   Cardiac:  no bradycardia, pauses or VT  Pulmonary:  No SOA, cough    Constitutional:  Temp:  [97.3 °F (36.3 °C)-97.5 °F (36.4 °C)] 97.5 °F (36.4 °C)  Heart Rate:  [] 99  Resp:  [16-26] 26  BP: ()/(49-64) 97/49        Cardiographics   Telemetry:  Afib, vr 90's, no pauses         Echocardiogram 1-2017  · All left ventricular wall segments contract normally.  · Left atrial cavity size is mildly dilated.  · Mild to moderate aortic valve stenosis is present.  · Mild to moderate tricuspid valve regurgitation is present.  · Left Ventricle: Calculated EF = 66.7%.  · There is no evidence of pericardial effusion     Holter:   · An abnormal monitor study.  · Afib with uncontrolled rate  · One long VT asymptomatic    Lab Review       Results from last 7 days  Lab Units 03/12/17  0634 03/11/17  0627 03/10/17  0516   INR  2.92* 3.22* 2.63*      3/12/2017 06:34   WBC 6.74   RBC 3.53 (L)   Hemoglobin 10.6 (L)   Hematocrit 35.0 (L)   RDW 20.5 (H)   MCV 99.2 (H)   MCH 30.0   MCHC 30.3 (L)   MPV 10.9   Platelets 143         Assessment:  - (Permanent) atrial fibrillation - rvr  - Ventricular tachycardia  - h/o sick sinus syndrome  - elevated [id number] score   -  "mild troponin elevation  - ESRD-> Renal  - anemia   - lethargy, ms changes  - Hypothyroidism with high TSH     - h/o infected AV fistula 1-2017 - still on IV vancomycin  - h/o MSSA septicemia 1-2017 - \"  - h/o pulmonary septic emboli 1-2017       Plan:  - (Permanent) atrial fibrillation rvr  -   controlled VR with no bradycardia or or pauses on current dose of beta blocker.      - Ventricular tachycardia - asymptomatic episode noted on Holter.  No ventricular ectopy since beta blocker adjusted.  Uncontrolled hypothyroidism may be a contributing factor and also would prevent use of anti-antiarrhythmic at this point    - h/o sick sinus syndrome -   tolerating current dose of beta blocker without bradycardia or pauses.Uncontrolled hypothyroidism may be a contributing factor     - elevated [id number] score -> hold Coumadin today.  INR 2.92  Dose to maintain INR 2.0-3.0 range    Continue current dose of Lopressor.  Hold Coumadin today and recheck INR in a.m. (target range 2.0-3.0).   No indication for pacemaker implant at this point  Defer management of hypothyroidism with high TSH to internal medicine.  INR ordered for a.m.    I reviewed the patient's new clinical results and treatment plan . I personally viewed and interpreted the patient's EKG/Telemetry data    )3/12/2017  FRANCISCA Mccarty Dragon/Transcription disclaimer:   Much of this encounter note is an electronic transcription/translation of spoken language to printed text. The electronic translation of spoken language may permit erroneous, or at times, nonsensical words or phrases to be inadvertently transcribed; Although I have reviewed the note for such errors, some may still exist.     "

## 2017-03-12 NOTE — PROGRESS NOTES
LOS: 6 days     Chief Complaint:  Follow-up Afib with RVR and recent MRSA septicemia    Interval History:  No acute events. Tachycardia better today. He is breathing comfortably on 2L NC. There is no rash or diarrhea. He has no signs of infection off of antibiotics. BCx remain negative.    ROS: no n/v; no rash    Vital Signs  Temp:  [97.3 °F (36.3 °C)-97.5 °F (36.4 °C)] 97.5 °F (36.4 °C)  Heart Rate:  [] 99  Resp:  [16-26] 26  BP: ()/(49-64) 97/49    Physical Exam:  General: calm, tired  Head: Normocephalic  Eyes: PERRL, EOMI, no scleral icterus, no conjunctival hemorrhages  ENT: MM dry, OP clear, no thrush. Dentures in place   Neck: Supple  Cardiovascular: tachycardic irreg irreg rhythm, no murmurs, rubs, or gallops; no LE edema  Respiratory: Lungs are clear to ascultation bilaterally, no rales or wheezing; normal work of breathing on 2L NC  GI: Abdomen is soft, non-tender, non-distended  : no Adair catheter present  Musculoskeletal: no joint abnormalities, normal musculature  Skin: No rashes, lesions, or embolic phenomenon  Neurological: Alert and oriented x 3, motor strength 5/5 in all four extremities  Psychiatric: slow to answer questions  Vasc: no cyanosis; RUE AV graft removed and incision well-healed; L chest port non-erythematous; RIJ tunneled catheter w/o erythema    Meds:    Current Facility-Administered Medications:   •  ALPRAZolam (XANAX) tablet 0.25 mg, 0.25 mg, Oral, 4x Daily PRN, Rc No MD, 0.25 mg at 03/11/17 2026  •  aspirin EC tablet 81 mg, 81 mg, Oral, Daily, Rc No MD, 81 mg at 03/12/17 0937  •  levothyroxine (SYNTHROID, LEVOTHROID) tablet 100 mcg, 100 mcg, Oral, QAM AC, Rc No MD, 100 mcg at 03/12/17 0634  •  metoprolol tartrate (LOPRESSOR) tablet 25 mg, 25 mg, Oral, Q8H, FRANCISCA Washburn, 25 mg at 03/11/17 4995  •  pantoprazole (PROTONIX) EC tablet 40 mg, 40 mg, Oral, Q AM, Rc No MD, 40 mg at 03/12/17 0634  •  sodium chloride 0.9 % bolus 1,000 mL,  1,000 mL, Intravenous, PRN, Elmer Downs MD    LABS:  CBC, micro reviewed today  Lab Results   Component Value Date    WBC 6.74 03/12/2017    HGB 10.6 (L) 03/12/2017    HCT 35.0 (L) 03/12/2017    MCV 99.2 (H) 03/12/2017     03/12/2017     Lab Results   Component Value Date    GLUCOSE 118 (H) 03/11/2017    BUN 49 (H) 03/11/2017    CREATININE 5.87 (H) 03/11/2017    EGFRIFNONA 9 (L) 03/11/2017    BCR 8.3 03/11/2017    CO2 15.7 (L) 03/11/2017    CALCIUM 8.0 (L) 03/11/2017    ALBUMIN 2.70 (L) 03/11/2017    LABIL2 0.7 03/08/2017    AST 37 03/08/2017    ALT 19 03/08/2017     Lab Results   Component Value Date    VANCOTROUGH 15.90 02/28/2017    VANCORANDOM 9.00 03/07/2017     Microbiology:  1/19 BCx @ [facility]: MRSA in 2/2 sets  1/21 BCx @ [facility]: MRSA  1/23 OR Cx from RUE AV graft: + MRSA  1/24 BCx @ [facility]: negative  1/27 BCx from port: negative  3/7 BCx: negative  3/10 BCx: NGTD     Radiology (personally reviewed images/report):  No new imaging today    Assessment/Plan   1. Atrial fibrillation with rapid ventricular response  -this is the reason for admission; cardiology following  -on beta blockade  -tachycardia better today     2. Recent MRSA septicemia due to RUE AV graft site  -s/p graft excision last admission on 1/23/17; impressive amounts of pus per op note; cultures w/ MRSA  -port remained in place; culture from it was negative  -cleared blood cultures as of 1/24 once graft removed  -he completed 6 weeks course of vancomycin with HD on 3/6/17  -repeat blood cultures 3/7 and 3/10 are negative  -no need for antibiotics at this time     3. ESRD on HD  4. Septic pulmonary emboli   5. Bradycardi/Sick sinus syndrome - currently Afib is the problem. No plans for PPM at this time.     Thank you for allowing me to be involved in the care of this patient. Infectious diseases will sign off at this time  but please call me at [phone] if any further questions.

## 2017-03-12 NOTE — PLAN OF CARE
Problem: Patient Care Overview (Adult)  Goal: Plan of Care Review  Outcome: Ongoing (interventions implemented as appropriate)    03/12/17 1735 03/12/17 1827   Coping/Psychosocial Response Interventions   Plan Of Care Reviewed With patient;family --    Patient Care Overview   Progress --  declining   Outcome Evaluation   Outcome Summary/Follow up Plan --  Pt transferred to palliative care this evenig. Very agitated and uncooperative. Order recieved for IV haldol, haldol given. Morphine given for back pain. Family educated on palliative care. Will continue to monitor.       Goal: Adult Individualization and Mutuality  Outcome: Ongoing (interventions implemented as appropriate)  Goal: Discharge Needs Assessment  Outcome: Ongoing (interventions implemented as appropriate)    Problem: Older Inpatient, Acutely Ill (Adult)  Goal: Signs and Symptoms of Listed Potential Problems Will be Absent or Manageable (Older Inpatient, Acutely Ill)  Outcome: Ongoing (interventions implemented as appropriate)    Problem: Confusion, Acute (Adult)  Goal: Cognitive/Functional Impairments Minimized  Outcome: Ongoing (interventions implemented as appropriate)  Goal: Safety  Outcome: Ongoing (interventions implemented as appropriate)    Problem: Skin Integrity Impairment, Risk/Actual (Adult)  Goal: Identify Related Risk Factors and Signs and Symptoms  Outcome: Outcome(s) achieved Date Met:  03/12/17  Goal: Skin Integrity/Wound Healing  Outcome: Ongoing (interventions implemented as appropriate)    Problem: Dying Patient, Actively (Adult)  Goal: Identify Related Risk Factors and Signs and Symptoms  Outcome: Outcome(s) achieved Date Met:  03/12/17  Goal: Comfort/Pain Control  Outcome: Ongoing (interventions implemented as appropriate)  Goal: Dying Process, Peace and Dignity  Outcome: Ongoing (interventions implemented as appropriate)

## 2017-03-12 NOTE — PLAN OF CARE
Problem: Patient Care Overview (Adult)  Goal: Plan of Care Review  Outcome: Ongoing (interventions implemented as appropriate)    03/12/17 0321   Coping/Psychosocial Response Interventions   Plan Of Care Reviewed With patient   Patient Care Overview   Progress improving   Outcome Evaluation   Outcome Summary/Follow up Plan Remains confused. B/P improved. Eating/drinking improved.        Goal: Adult Individualization and Mutuality  Outcome: Ongoing (interventions implemented as appropriate)  Goal: Discharge Needs Assessment  Outcome: Ongoing (interventions implemented as appropriate)    Problem: Older Inpatient, Acutely Ill (Adult)  Goal: Signs and Symptoms of Listed Potential Problems Will be Absent or Manageable (Older Inpatient, Acutely Ill)  Outcome: Ongoing (interventions implemented as appropriate)    Problem: Confusion, Acute (Adult)  Goal: Cognitive/Functional Impairments Minimized  Outcome: Ongoing (interventions implemented as appropriate)  Goal: Safety  Outcome: Ongoing (interventions implemented as appropriate)

## 2017-03-12 NOTE — PLAN OF CARE
Problem: Patient Care Overview (Adult)  Goal: Plan of Care Review    03/12/17 1119   Coping/Psychosocial Response Interventions   Plan Of Care Reviewed With patient   Patient Care Overview   Progress declining   Outcome Evaluation   Outcome Summary/Follow up Plan Pt anorexic today, and not in cordial manner. BM X 1, large; barrier cream (blue) used (no orange d/t allergy). Does not want to be bothered. Call to [facility]; wants family conf in person. Called Son, Silas, who said he will be here soon. Will attempt to get them together when son arrives. Continue to monitor.         Problem: Older Inpatient, Acutely Ill (Adult)  Goal: Signs and Symptoms of Listed Potential Problems Will be Absent or Manageable (Older Inpatient, Acutely Ill)  Outcome: Ongoing (interventions implemented as appropriate)    03/12/17 1119   Older Adult Inpatient, Acutely Ill   Problems Assessed (Acutely Ill Older Adult) all   Problems Present (Acutely Ill Older Adult) cognitive impairment;undernutrition;functional decline/self-care deficit;fluid imbalance         Problem: Confusion, Acute (Adult)  Goal: Cognitive/Functional Impairments Minimized  Outcome: Ongoing (interventions implemented as appropriate)    03/12/17 1119   Confusion, Acute (Adult)   Cognitive/Functional Impairments Minimized making progress toward outcome       Goal: Safety  Outcome: Ongoing (interventions implemented as appropriate)    03/12/17 1119   Confusion, Acute (Adult)   Safety making progress toward outcome

## 2017-03-12 NOTE — PROGRESS NOTES
" DAILY PROGRESS NOTE      CHIEF COMPLAINT:   DOING SAME  FAMILY AT BEDSIDE      HISTORY OF PRESENT ILLNESS: Patient is a 76-year-old white male who is well known to our service from recent admission with MRSA septicemia, on vancomycin, secondary to infected graft. He is also on hemodialysis with end-stage renal disease, septic, pulmonary emboli, toxic metabolic encephalopathy, chronic AFib, on anticoagulation, hypertension, thyroid CA, B-cell lymphoma, coronary artery disease, congestive heart failure. Patient was brought to the [facility] Emergency Room with fatigue. He was found to have increased heart rate. Patient was evaluated and found to be in AFib with rapid ventricular rate, severe anemia, admitted for management. Patient is a poor historian, unable to give detailed history. Most of the history was obtained from the chart, nursing staff, old records, and I know the patient very well from recent admission. No chest pain, no palpitations but fatigued, tired, weak.     PHYSICAL EXAMINATION:Blood pressure 97/49, pulse 99, temperature 97.5 °F (36.4 °C), temperature source Axillary, resp. rate 26, height 74\" (188 cm), weight 153 lb 3 oz (69.5 kg), SpO2 96 %.    GENERAL: Alert, oriented but very weak, lethargic.   HEENT: Unremarkable.   NECK: Supple.   LUNGS: Decreased breath sounds.   HEART: S1, S2, tachycardic, irregular.   ABDOMEN: Soft, nontender. Bowel sounds positive.   EXTREMITIES: Trace edema.   NEUROLOGIC: Moves all 4 extremities but weak. Gait and balance not checked.     DIAGNOSTIC DATA:   Lab Results (last 24 hours)     Procedure Component Value Units Date/Time    Blood Culture [[id number]]  (Normal) Collected:  03/10/17 1336    Specimen:  Blood from Arm, Left Updated:  03/11/17 1501     Blood Culture No growth at 24 hours     Blood Culture [[id number]]  (Normal) Collected:  03/07/17 1405    Specimen:  Blood from Arm, Left Updated:  03/11/17 1501     Blood Culture No growth at 4 days     " Blood Culture [[id number]]  (Normal) Collected:  03/07/17 1420    Specimen:  Blood from Arm, Right Updated:  03/11/17 1601     Blood Culture No growth at 4 days     CBC (No Diff) [[id number]]  (Abnormal) Collected:  03/12/17 0634    Specimen:  Blood Updated:  03/12/17 0653     WBC 6.74 10*3/mm3      RBC 3.53 (L) 10*6/mm3      Hemoglobin 10.6 (L) g/dL      Hematocrit 35.0 (L) %      MCV 99.2 (H) fL      MCH 30.0 pg      MCHC 30.3 (L) g/dL      RDW 20.5 (H) %      RDW-SD 71.7 (H) fl      MPV 10.9 fL      Platelets 143 10*3/mm3     Protime-INR [[id number]]  (Abnormal) Collected:  03/12/17 0634    Specimen:  Blood Updated:  03/12/17 0659     Protime 29.5 (H) Seconds      INR 2.92 (H)     Blood Culture [[id number]]  (Normal) Collected:  03/10/17 1034    Specimen:  Blood from Arm, Left Updated:  03/12/17 1301     Blood Culture No growth at 2 days       Hemoglobin 8.2. INR 2.0. BUN 14, creatinine 7.1, potassium 6.3. No x-ray done. EKG showed AFib with heart rate of 130s, nonspecific ST-T wave changes, no significant change from previous tracing.   Imaging Results (last 72 hours)     Procedure Component Value Units Date/Time    XR Chest 1 View [[id number]] Collected:  03/06/17 1652     Updated:  03/06/17 1657    Narrative:       PORTABLE CHEST 3/6/2017 AT 1555 HOURS     CLINICAL HISTORY: CT. Tachycardia.     Compared to the previous chest x-ray dated 1/31/2017.     The lungs are somewhat poorly inflated. There is mild vascular  congestion but no jared pulmonary edema. The heart is moderately  enlarged and unchanged. No focal areas of consolidation are identified.  A tiny left pleural effusion is evident. A dialysis catheter is in place  in satisfactory position in the right internal jugular vein without  change.     IMPRESSIONS: Cardiomegaly and slight vascular congestion. Tiny left  pleural effusion.     This report was finalized on 3/6/2017 4:54 PM by Dr. Anil Ceron MD.           Current Facility-Administered Medications:   •   ALPRAZolam (XANAX) tablet 0.25 mg, 0.25 mg, Oral, 4x Daily PRN, Rc No MD, 0.25 mg at 03/11/17 2026  •  aspirin EC tablet 81 mg, 81 mg, Oral, Daily, Rc No MD, 81 mg at 03/12/17 0937  •  levothyroxine (SYNTHROID, LEVOTHROID) tablet 100 mcg, 100 mcg, Oral, QAM AC, Rc No MD, 100 mcg at 03/12/17 0634  •  metoprolol tartrate (LOPRESSOR) tablet 25 mg, 25 mg, Oral, Q8H, Kateryna Batista, APRN, 25 mg at 03/11/17 2322  •  pantoprazole (PROTONIX) EC tablet 40 mg, 40 mg, Oral, Q AM, Rc No MD, 40 mg at 03/12/17 0634  •  sodium chloride 0.9 % bolus 1,000 mL, 1,000 mL, Intravenous, PRN, Elmer Downs MD       ASSESSMENT:  1. AFib with rapid ventricular rate.   2. End-stage renal disease. ON HD  3. Recent MRSA septicemia.    4. Chronic AFib.   5. Hypertension.   6. Hypothyroidism.   7. Gastroesophageal reflux disease.   8. Dementia.     PLAN:   Long discussion with family including POA about prognosis diagnosis and outcome  Family decided to withdraw the care including MEDS and hemodialysis  Transfer patient to palliative care unit for comfort care  Allow natural death  We will comply with patient and family wishes          Rc No M.D.

## 2017-03-12 NOTE — PLAN OF CARE
Problem: Patient Care Overview (Adult)  Goal: Plan of Care Review  Outcome: Ongoing (interventions implemented as appropriate)    Problem: Older Inpatient, Acutely Ill (Adult)  Goal: Signs and Symptoms of Listed Potential Problems Will be Absent or Manageable (Older Inpatient, Acutely Ill)  Outcome: Ongoing (interventions implemented as appropriate)    03/12/17 1131   Older Adult Inpatient, Acutely Ill   Problems Assessed (Acutely Ill Older Adult) all   Problems Present (Acutely Ill Older Adult) acute cognitive dysfunction;cognitive impairment;functional decline/self-care deficit;undernutrition;skin breakdown         Problem: Confusion, Acute (Adult)  Goal: Cognitive/Functional Impairments Minimized  Outcome: Ongoing (interventions implemented as appropriate)    03/12/17 1131   Confusion, Acute (Adult)   Cognitive/Functional Impairments Minimized making progress toward outcome       Goal: Safety  Outcome: Ongoing (interventions implemented as appropriate)    03/12/17 1131   Confusion, Acute (Adult)   Safety making progress toward outcome         Problem: Skin Integrity Impairment, Risk/Actual (Adult)  Goal: Identify Related Risk Factors and Signs and Symptoms  Outcome: Ongoing (interventions implemented as appropriate)    03/12/17 1131   Skin Integrity Impairment, Risk/Actual   Skin Integrity Impairment, Risk/Actual: Related Risk Factors age extremes;cognitive impairment;fluid/nutrition status;other (see comments)  (Incontinence)   Signs and Symptoms (Skin Integrity Impairment) (Coccyx, stage 1-2. Using blue barrier cream.)       Goal: Skin Integrity/Wound Healing  Outcome: Ongoing (interventions implemented as appropriate)    03/12/17 1131   Skin Integrity Impairment, Risk/Actual (Adult)   Skin Integrity/Wound Healing making progress toward outcome

## 2017-03-13 NOTE — PROGRESS NOTES
Now palliatve care / comfort measures only.  Dialysis and routine medications stopped.  Spoke with family members.    Will sign off and see again upon request.  Thank you

## 2017-03-13 NOTE — PLAN OF CARE
Problem: Patient Care Overview (Adult)  Goal: Plan of Care Review  Outcome: Ongoing (interventions implemented as appropriate)    03/12/17 1825 03/12/17 2129 03/13/17 0358   Coping/Psychosocial Response Interventions   Plan Of Care Reviewed With --  patient --    Patient Care Overview   Progress declining --  --    Outcome Evaluation   Outcome Summary/Follow up Plan --  --  Maintained comfort measures per palliative care protocol. No family at bedside. Medicated PRN for pain and anxiety. Will continue to monitor vital signs and comfort.       Goal: Adult Individualization and Mutuality  Outcome: Ongoing (interventions implemented as appropriate)  Goal: Discharge Needs Assessment  Outcome: Ongoing (interventions implemented as appropriate)    Problem: Older Inpatient, Acutely Ill (Adult)  Goal: Signs and Symptoms of Listed Potential Problems Will be Absent or Manageable (Older Inpatient, Acutely Ill)  Outcome: Ongoing (interventions implemented as appropriate)    Problem: Confusion, Acute (Adult)  Goal: Cognitive/Functional Impairments Minimized  Outcome: Ongoing (interventions implemented as appropriate)  Goal: Safety  Outcome: Ongoing (interventions implemented as appropriate)    Problem: Skin Integrity Impairment, Risk/Actual (Adult)  Goal: Skin Integrity/Wound Healing  Outcome: Ongoing (interventions implemented as appropriate)    Problem: Dying Patient, Actively (Adult)  Goal: Comfort/Pain Control  Outcome: Ongoing (interventions implemented as appropriate)  Goal: Dying Process, Peace and Dignity  Outcome: Ongoing (interventions implemented as appropriate)

## 2017-03-13 NOTE — PLAN OF CARE
Problem: Patient Care Overview (Adult)  Goal: Plan of Care Review  Outcome: Ongoing (interventions implemented as appropriate)    03/13/17 0229   Coping/Psychosocial Response Interventions   Plan Of Care Reviewed With patient;zachary   Patient Care Overview   Progress no change   Outcome Evaluation   Outcome Summary/Follow up Plan Pt resting comfortably. Does not like to be disturbed when sleeping. Pleasant and cooperative this afternoon. Medicated x 1 for bathing this am. Will continue to monitor.       Goal: Adult Individualization and Mutuality  Outcome: Ongoing (interventions implemented as appropriate)  Goal: Discharge Needs Assessment  Outcome: Ongoing (interventions implemented as appropriate)    Problem: Skin Integrity Impairment, Risk/Actual (Adult)  Goal: Skin Integrity/Wound Healing  Outcome: Ongoing (interventions implemented as appropriate)    Problem: Dying Patient, Actively (Adult)  Goal: Comfort/Pain Control  Outcome: Ongoing (interventions implemented as appropriate)  Goal: Dying Process, Peace and Dignity  Outcome: Ongoing (interventions implemented as appropriate)

## 2017-03-13 NOTE — PROGRESS NOTES
" DAILY PROGRESS NOTE      CHIEF COMPLAINT:   COMFORTABLE  NO NEW ISSUES  FAMILY AT BEDSIDE      HISTORY OF PRESENT ILLNESS: Patient is a 76-year-old white male who is well known to our service from recent admission with MRSA septicemia, on vancomycin, secondary to infected graft. He is also on hemodialysis with end-stage renal disease, septic, pulmonary emboli, toxic metabolic encephalopathy, chronic AFib, on anticoagulation, hypertension, thyroid CA, B-cell lymphoma, coronary artery disease, congestive heart failure. Patient was brought to the [facility] Emergency Room with fatigue. He was found to have increased heart rate. Patient was evaluated and found to be in AFib with rapid ventricular rate, severe anemia, admitted for management. Patient is a poor historian, unable to give detailed history. Most of the history was obtained from the chart, nursing staff, old records, and I know the patient very well from recent admission. No chest pain, no palpitations but fatigued, tired, weak.     PHYSICAL EXAMINATION:Blood pressure (!) 81/41, pulse 105, temperature 96.9 °F (36.1 °C), temperature source Oral, resp. rate 20, height 74\" (188 cm), weight 153 lb 3 oz (69.5 kg), SpO2 96 %.    EXAM UNCHANGED    DIAGNOSTIC DATA:   Lab Results (last 24 hours)     Procedure Component Value Units Date/Time    Blood Culture [[id number]]  (Normal) Collected:  03/10/17 1336    Specimen:  Blood from Arm, Left Updated:  03/12/17 1601     Blood Culture No growth at 2 days     Blood Culture [[id number]]  (Normal) Collected:  03/07/17 1405    Specimen:  Blood from Arm, Left Updated:  03/12/17 1601     Blood Culture No growth at 5 days     Blood Culture [[id number]]  (Normal) Collected:  03/07/17 1420    Specimen:  Blood from Arm, Right Updated:  03/12/17 1601     Blood Culture No growth at 5 days     Blood Culture [[id number]]  (Normal) Collected:  03/10/17 1034    Specimen:  Blood from Arm, Left Updated:  03/13/17 1301     Blood " Culture No growth at 3 days       Hemoglobin 8.2. INR 2.0. BUN 14, creatinine 7.1, potassium 6.3. No x-ray done. EKG showed AFib with heart rate of 130s, nonspecific ST-T wave changes, no significant change from previous tracing.   Imaging Results (last 72 hours)     Procedure Component Value Units Date/Time    XR Chest 1 View [[id number]] Collected:  03/06/17 1652     Updated:  03/06/17 1657    Narrative:       PORTABLE CHEST 3/6/2017 AT 1555 HOURS     CLINICAL HISTORY: CT. Tachycardia.     Compared to the previous chest x-ray dated 1/31/2017.     The lungs are somewhat poorly inflated. There is mild vascular  congestion but no jared pulmonary edema. The heart is moderately  enlarged and unchanged. No focal areas of consolidation are identified.  A tiny left pleural effusion is evident. A dialysis catheter is in place  in satisfactory position in the right internal jugular vein without  change.     IMPRESSIONS: Cardiomegaly and slight vascular congestion. Tiny left  pleural effusion.     This report was finalized on 3/6/2017 4:54 PM by Dr. Anil Ceron MD.           Current Facility-Administered Medications:   •  acetaminophen (TYLENOL) tablet 650 mg, 650 mg, Oral, Q4H PRN **OR** acetaminophen (TYLENOL) 160 MG/5ML solution 650 mg, 650 mg, Oral, Q4H PRN **OR** acetaminophen (TYLENOL) suppository 650 mg, 650 mg, Rectal, Q4H PRN, Rc No MD  •  diphenoxylate-atropine (LOMOTIL) 2.5-0.025 MG per tablet 1 tablet, 1 tablet, Oral, Q2H PRN, Rc No MD  •  Glycerin-Hypromellose- (ARTIFICIAL TEARS) 0.2-0.2-1 % ophthalmic solution solution 1 drop, 1 drop, Both Eyes, Q30 Min PRN, Rc No MD  •  glycopyrrolate (ROBINUL) injection 0.2 mg, 0.2 mg, Intravenous, Q2H PRN **OR** glycopyrrolate (ROBINUL) injection 0.2 mg, 0.2 mg, Subcutaneous, Q2H PRN **OR** glycopyrrolate (ROBINUL) injection 0.4 mg, 0.4 mg, Intravenous, Q2H PRN **OR** glycopyrrolate (ROBINUL) injection 0.4 mg, 0.4 mg, Subcutaneous, Q2H PRN, Rc  MD Oneal  •  haloperidol (HALDOL) tablet 1 mg, 1 mg, Oral, Q4H PRN **OR** haloperidol (HALDOL) 2 MG/ML solution 1 mg, 1 mg, Oral, Q4H PRN **OR** haloperidol lactate (HALDOL) injection 1 mg, 1 mg, Subcutaneous, Q4H PRN, Rc No MD  •  haloperidol (HALDOL) tablet 2 mg, 2 mg, Oral, Q4H PRN **OR** haloperidol (HALDOL) 2 MG/ML solution 2 mg, 2 mg, Oral, Q4H PRN **OR** haloperidol lactate (HALDOL) injection 2 mg, 2 mg, Subcutaneous, Q4H PRN, Rc No MD  •  haloperidol lactate (HALDOL) injection 1 mg, 1 mg, Intravenous, Q4H PRN **OR** haloperidol lactate (HALDOL) injection 2 mg, 2 mg, Intravenous, Q4H PRN, Rc No MD, 2 mg at 03/13/17 0931  •  LORazepam (ATIVAN) tablet 0.5 mg, 0.5 mg, Oral, Q1H PRN **OR** LORazepam (ATIVAN) injection 0.5 mg, 0.5 mg, Intravenous, Q1H PRN **OR** LORazepam (ATIVAN) injection 0.5 mg, 0.5 mg, Intramuscular, Q1H PRN **OR** LORazepam (ATIVAN) 2 MG/ML concentrated solution 0.5 mg, 0.5 mg, Oral, Q1H PRN **OR** LORazepam (ATIVAN) 2 MG/ML concentrated solution 0.5 mg, 0.5 mg, Sublingual, Q1H PRN, Rc No MD  •  LORazepam (ATIVAN) tablet 1 mg, 1 mg, Oral, Q1H PRN **OR** LORazepam (ATIVAN) injection 1 mg, 1 mg, Intravenous, Q1H PRN **OR** LORazepam (ATIVAN) injection 1 mg, 1 mg, Intramuscular, Q1H PRN **OR** LORazepam (ATIVAN) 2 MG/ML concentrated solution 1 mg, 1 mg, Oral, Q1H PRN **OR** LORazepam (ATIVAN) 2 MG/ML concentrated solution 1 mg, 1 mg, Sublingual, Q1H PRN, Rc No MD  •  LORazepam (ATIVAN) tablet 2 mg, 2 mg, Oral, Q1H PRN **OR** LORazepam (ATIVAN) injection 2 mg, 2 mg, Intravenous, Q1H PRN **OR** LORazepam (ATIVAN) injection 2 mg, 2 mg, Intramuscular, Q1H PRN **OR** LORazepam (ATIVAN) 2 MG/ML concentrated solution 2 mg, 2 mg, Oral, Q1H PRN **OR** LORazepam (ATIVAN) 2 MG/ML concentrated solution 2 mg, 2 mg, Sublingual, Q1H PRN, Rc No MD  •  morphine injection 4 mg, 4 mg, Intravenous, Q1H PRN, 4 mg at 03/13/17 0931 **OR** Morphine 20 MG/ML concentrated  solution 10 mg, 10 mg, Oral, Q1H PRN, Rc No MD  •  Morphine sulfate (PF) injection 6 mg, 6 mg, Intravenous, Q1H PRN **OR** Morphine 20 MG/ML concentrated solution 20 mg, 20 mg, Oral, Q1H PRN, Rc No MD  •  morphine injection 2 mg, 2 mg, Intravenous, Q1H PRN **OR** Morphine 20 MG/ML concentrated solution 5 mg, 5 mg, Oral, Q1H PRN, Rc No MD  •  promethazine (PHENERGAN) injection 6.25 mg, 6.25 mg, Intravenous, Q4H PRN **OR** promethazine (PHENERGAN) tablet 6.25 mg, 6.25 mg, Oral, Q4H PRN **OR** promethazine (PHENERGAN) 6.25 MG/5ML syrup 6.25 mg, 6.25 mg, Oral, Q4H PRN **OR** promethazine (PHENERGAN) suppository 6.25 mg, 6.25 mg, Rectal, Q4H PRN, Rc No MD       ASSESSMENT:  1. AFib with rapid ventricular rate.   2. End-stage renal disease. ON HD  3. Recent MRSA septicemia.    4. Chronic AFib.   5. Hypertension.   6. Hypothyroidism.   7. Gastroesophageal reflux disease.   8. Dementia.     PLAN:  COMFORT CARE  NO MORE HD  DNR/AND  D/W FAMILY        Rc No M.D.

## 2017-03-13 NOTE — PROGRESS NOTES
Continued Stay Note  [facility]     Patient Name: Ze Wu  MRN: [id number]  Today's Date: 3/13/2017    Admit Date: 3/6/2017          Discharge Plan       03/13/17 0928    Case Management/Social Work Plan    Plan -    Additional Comments Recieved inbound call from Toni at [facility] and she stated they have no beds available, CCP explained pt had been moved to Pallative Care Unit. Angela forbes/ccp              Discharge Codes     None        Expected Discharge Date and Time     Expected Discharge Date Expected Discharge Time    Mar 13, 2017             Alba Ervin, RN

## 2017-03-14 NOTE — PROGRESS NOTES
" DAILY PROGRESS NOTE      CHIEF COMPLAINT:   COMFORTABLE  NO NEW ISSUES        HISTORY OF PRESENT ILLNESS: Patient is a 76-year-old white male who is well known to our service from recent admission with MRSA septicemia, on vancomycin, secondary to infected graft. He is also on hemodialysis with end-stage renal disease, septic, pulmonary emboli, toxic metabolic encephalopathy, chronic AFib, on anticoagulation, hypertension, thyroid CA, B-cell lymphoma, coronary artery disease, congestive heart failure. Patient was brought to the [facility] Emergency Room with fatigue. He was found to have increased heart rate. Patient was evaluated and found to be in AFib with rapid ventricular rate, severe anemia, admitted for management. Patient is a poor historian, unable to give detailed history. Most of the history was obtained from the chart, nursing staff, old records, and I know the patient very well from recent admission. No chest pain, no palpitations but fatigued, tired, weak.     PHYSICAL EXAMINATION:Blood pressure 112/68, pulse (!) 161, temperature 96.9 °F (36.1 °C), temperature source Oral, resp. rate 26, height 74\" (188 cm), weight 153 lb 3 oz (69.5 kg), SpO2 96 %.    EXAM UNCHANGED    DIAGNOSTIC DATA:   Lab Results (last 24 hours)     Procedure Component Value Units Date/Time    Blood Culture [[id number]]  (Normal) Collected:  03/10/17 1336    Specimen:  Blood from Arm, Left Updated:  03/13/17 1601     Blood Culture No growth at 3 days     Blood Culture [[id number]]  (Normal) Collected:  03/10/17 1034    Specimen:  Blood from Arm, Left Updated:  03/14/17 1301     Blood Culture No growth at 4 days       Hemoglobin 8.2. INR 2.0. BUN 14, creatinine 7.1, potassium 6.3. No x-ray done. EKG showed AFib with heart rate of 130s, nonspecific ST-T wave changes, no significant change from previous tracing.   Imaging Results (last 72 hours)     Procedure Component Value Units Date/Time    XR Chest 1 View [[id number]] " Collected:  03/06/17 1652     Updated:  03/06/17 1657    Narrative:       PORTABLE CHEST 3/6/2017 AT 1555 HOURS     CLINICAL HISTORY: CT. Tachycardia.     Compared to the previous chest x-ray dated 1/31/2017.     The lungs are somewhat poorly inflated. There is mild vascular  congestion but no jared pulmonary edema. The heart is moderately  enlarged and unchanged. No focal areas of consolidation are identified.  A tiny left pleural effusion is evident. A dialysis catheter is in place  in satisfactory position in the right internal jugular vein without  change.     IMPRESSIONS: Cardiomegaly and slight vascular congestion. Tiny left  pleural effusion.     This report was finalized on 3/6/2017 4:54 PM by Dr. Anil Ceron MD.           Current Facility-Administered Medications:   •  acetaminophen (TYLENOL) tablet 650 mg, 650 mg, Oral, Q4H PRN **OR** acetaminophen (TYLENOL) 160 MG/5ML solution 650 mg, 650 mg, Oral, Q4H PRN **OR** acetaminophen (TYLENOL) suppository 650 mg, 650 mg, Rectal, Q4H PRN, Rc No MD  •  diphenoxylate-atropine (LOMOTIL) 2.5-0.025 MG per tablet 1 tablet, 1 tablet, Oral, Q2H PRN, Rc No MD  •  Glycerin-Hypromellose- (ARTIFICIAL TEARS) 0.2-0.2-1 % ophthalmic solution solution 1 drop, 1 drop, Both Eyes, Q30 Min PRN, Rc No MD  •  glycopyrrolate (ROBINUL) injection 0.2 mg, 0.2 mg, Intravenous, Q2H PRN **OR** glycopyrrolate (ROBINUL) injection 0.2 mg, 0.2 mg, Subcutaneous, Q2H PRN **OR** glycopyrrolate (ROBINUL) injection 0.4 mg, 0.4 mg, Intravenous, Q2H PRN **OR** glycopyrrolate (ROBINUL) injection 0.4 mg, 0.4 mg, Subcutaneous, Q2H PRN, Rc No MD  •  haloperidol (HALDOL) tablet 1 mg, 1 mg, Oral, Q4H PRN **OR** haloperidol (HALDOL) 2 MG/ML solution 1 mg, 1 mg, Oral, Q4H PRN **OR** haloperidol lactate (HALDOL) injection 1 mg, 1 mg, Subcutaneous, Q4H PRN, Rc No MD  •  haloperidol (HALDOL) tablet 2 mg, 2 mg, Oral, Q4H PRN **OR** haloperidol (HALDOL) 2 MG/ML solution  2 mg, 2 mg, Oral, Q4H PRN **OR** haloperidol lactate (HALDOL) injection 2 mg, 2 mg, Subcutaneous, Q4H PRN, Rc No MD  •  haloperidol lactate (HALDOL) injection 1 mg, 1 mg, Intravenous, Q4H PRN **OR** haloperidol lactate (HALDOL) injection 2 mg, 2 mg, Intravenous, Q4H PRN, Rc No MD, 2 mg at 03/14/17 0546  •  LORazepam (ATIVAN) tablet 0.5 mg, 0.5 mg, Oral, Q1H PRN **OR** LORazepam (ATIVAN) injection 0.5 mg, 0.5 mg, Intravenous, Q1H PRN **OR** LORazepam (ATIVAN) injection 0.5 mg, 0.5 mg, Intramuscular, Q1H PRN **OR** LORazepam (ATIVAN) 2 MG/ML concentrated solution 0.5 mg, 0.5 mg, Oral, Q1H PRN **OR** LORazepam (ATIVAN) 2 MG/ML concentrated solution 0.5 mg, 0.5 mg, Sublingual, Q1H PRN, Rc No MD  •  LORazepam (ATIVAN) tablet 1 mg, 1 mg, Oral, Q1H PRN **OR** LORazepam (ATIVAN) injection 1 mg, 1 mg, Intravenous, Q1H PRN **OR** LORazepam (ATIVAN) injection 1 mg, 1 mg, Intramuscular, Q1H PRN **OR** LORazepam (ATIVAN) 2 MG/ML concentrated solution 1 mg, 1 mg, Oral, Q1H PRN **OR** LORazepam (ATIVAN) 2 MG/ML concentrated solution 1 mg, 1 mg, Sublingual, Q1H PRN, Rc No MD  •  LORazepam (ATIVAN) tablet 2 mg, 2 mg, Oral, Q1H PRN **OR** LORazepam (ATIVAN) injection 2 mg, 2 mg, Intravenous, Q1H PRN **OR** LORazepam (ATIVAN) injection 2 mg, 2 mg, Intramuscular, Q1H PRN **OR** LORazepam (ATIVAN) 2 MG/ML concentrated solution 2 mg, 2 mg, Oral, Q1H PRN **OR** LORazepam (ATIVAN) 2 MG/ML concentrated solution 2 mg, 2 mg, Sublingual, Q1H PRN, Rc No MD  •  morphine injection 4 mg, 4 mg, Intravenous, Q1H PRN, 4 mg at 03/14/17 0545 **OR** Morphine 20 MG/ML concentrated solution 10 mg, 10 mg, Oral, Q1H PRN, Rc No MD  •  Morphine sulfate (PF) injection 6 mg, 6 mg, Intravenous, Q1H PRN **OR** Morphine 20 MG/ML concentrated solution 20 mg, 20 mg, Oral, Q1H PRN, Rc No MD  •  morphine injection 2 mg, 2 mg, Intravenous, Q1H PRN **OR** Morphine 20 MG/ML concentrated solution 5 mg, 5 mg, Oral, Q1H PRN,  Rc No MD  •  promethazine (PHENERGAN) injection 6.25 mg, 6.25 mg, Intravenous, Q4H PRN **OR** promethazine (PHENERGAN) tablet 6.25 mg, 6.25 mg, Oral, Q4H PRN **OR** promethazine (PHENERGAN) 6.25 MG/5ML syrup 6.25 mg, 6.25 mg, Oral, Q4H PRN **OR** promethazine (PHENERGAN) suppository 6.25 mg, 6.25 mg, Rectal, Q4H PRN, Rc No MD       ASSESSMENT:  1. AFib with rapid ventricular rate.   2. End-stage renal disease. ON HD  3. Recent MRSA septicemia.    4. Chronic AFib.   5. Hypertension.   6. Hypothyroidism.   7. Gastroesophageal reflux disease.   8. Dementia.     PLAN:  COMFORT CARE  NO MORE HD  DNR/AND  D/W FAMILY        Rc No M.D.

## 2017-03-14 NOTE — PLAN OF CARE
Problem: Patient Care Overview (Adult)  Goal: Plan of Care Review  Outcome: Ongoing (interventions implemented as appropriate)    03/13/17 1838 03/13/17 2100 03/14/17 0506   Coping/Psychosocial Response Interventions   Plan Of Care Reviewed With --  patient;family --    Patient Care Overview   Progress no change --  --    Outcome Evaluation   Outcome Summary/Follow up Plan --  --  Maintained comfort measures per palliative care protocol. Patient drank a lot of water last night. No PRN meds needed all night. No family at bedside. Will continue to montor vital signs and ocmfort.       Goal: Adult Individualization and Mutuality  Outcome: Ongoing (interventions implemented as appropriate)  Goal: Discharge Needs Assessment  Outcome: Ongoing (interventions implemented as appropriate)    Problem: Skin Integrity Impairment, Risk/Actual (Adult)  Goal: Skin Integrity/Wound Healing  Outcome: Ongoing (interventions implemented as appropriate)    Problem: Dying Patient, Actively (Adult)  Goal: Comfort/Pain Control  Outcome: Ongoing (interventions implemented as appropriate)  Goal: Dying Process, Peace and Dignity  Outcome: Ongoing (interventions implemented as appropriate)

## 2017-03-14 NOTE — PLAN OF CARE
Problem: Patient Care Overview (Adult)  Goal: Plan of Care Review  Outcome: Ongoing (interventions implemented as appropriate)    03/14/17 1951   Coping/Psychosocial Response Interventions   Plan Of Care Reviewed With patient   Patient Care Overview   Progress no change   Outcome Evaluation   Outcome Summary/Follow up Plan Medicated prn today for pain and anxiety. Changed dialysis dressing. Cont. to monitor and provide comfort care.       Goal: Adult Individualization and Mutuality  Outcome: Ongoing (interventions implemented as appropriate)  Goal: Discharge Needs Assessment  Outcome: Ongoing (interventions implemented as appropriate)    Problem: Skin Integrity Impairment, Risk/Actual (Adult)  Goal: Skin Integrity/Wound Healing  Outcome: Ongoing (interventions implemented as appropriate)    Problem: Dying Patient, Actively (Adult)  Goal: Comfort/Pain Control  Outcome: Ongoing (interventions implemented as appropriate)  Goal: Dying Process, Peace and Dignity  Outcome: Ongoing (interventions implemented as appropriate)

## 2017-03-15 NOTE — PLAN OF CARE
Problem: Patient Care Overview (Adult)  Goal: Plan of Care Review  Outcome: Ongoing (interventions implemented as appropriate)    03/15/17 0554   Coping/Psychosocial Response Interventions   Plan Of Care Reviewed With patient   Patient Care Overview   Progress declining   Outcome Evaluation   Outcome Summary/Follow up Plan Pt anxious early in the night. Morphine and haldol given with good results. Calm this morning but c/o neck pain. Morphine 4mg given, pt then rested quietly. Will continue palliative measures         Problem: Skin Integrity Impairment, Risk/Actual (Adult)  Goal: Skin Integrity/Wound Healing  Outcome: Ongoing (interventions implemented as appropriate)    Problem: Dying Patient, Actively (Adult)  Goal: Comfort/Pain Control  Outcome: Ongoing (interventions implemented as appropriate)  Goal: Dying Process, Peace and Dignity  Outcome: Ongoing (interventions implemented as appropriate)

## 2017-03-15 NOTE — PROGRESS NOTES
" DAILY PROGRESS NOTE      CHIEF COMPLAINT:   COMFORTABLE  NO NEW ISSUES        HISTORY OF PRESENT ILLNESS: Patient is a 76-year-old white male who is well known to our service from recent admission with MRSA septicemia, on vancomycin, secondary to infected graft. He is also on hemodialysis with end-stage renal disease, septic, pulmonary emboli, toxic metabolic encephalopathy, chronic AFib, on anticoagulation, hypertension, thyroid CA, B-cell lymphoma, coronary artery disease, congestive heart failure. Patient was brought to the [facility] Emergency Room with fatigue. He was found to have increased heart rate. Patient was evaluated and found to be in AFib with rapid ventricular rate, severe anemia, admitted for management. Patient is a poor historian, unable to give detailed history. Most of the history was obtained from the chart, nursing staff, old records, and I know the patient very well from recent admission. No chest pain, no palpitations but fatigued, tired, weak.     PHYSICAL EXAMINATION:Blood pressure 103/65, pulse (!) 133, temperature 96.7 °F (35.9 °C), temperature source Oral, resp. rate 16, height 74\" (188 cm), weight 153 lb 3 oz (69.5 kg), SpO2 95 %.    EXAM UNCHANGED    DIAGNOSTIC DATA:   Lab Results (last 24 hours)     Procedure Component Value Units Date/Time    Blood Culture [[id number]]  (Normal) Collected:  03/10/17 1336    Specimen:  Blood from Arm, Left Updated:  03/14/17 1601     Blood Culture No growth at 4 days     Blood Culture [[id number]]  (Normal) Collected:  03/10/17 1034    Specimen:  Blood from Arm, Left Updated:  03/15/17 1301     Blood Culture No growth at 5 days       Hemoglobin 8.2. INR 2.0. BUN 14, creatinine 7.1, potassium 6.3. No x-ray done. EKG showed AFib with heart rate of 130s, nonspecific ST-T wave changes, no significant change from previous tracing.   Imaging Results (last 72 hours)     Procedure Component Value Units Date/Time    XR Chest 1 View [[id number]] " Collected:  03/06/17 1652     Updated:  03/06/17 1657    Narrative:       PORTABLE CHEST 3/6/2017 AT 1555 HOURS     CLINICAL HISTORY: CT. Tachycardia.     Compared to the previous chest x-ray dated 1/31/2017.     The lungs are somewhat poorly inflated. There is mild vascular  congestion but no jared pulmonary edema. The heart is moderately  enlarged and unchanged. No focal areas of consolidation are identified.  A tiny left pleural effusion is evident. A dialysis catheter is in place  in satisfactory position in the right internal jugular vein without  change.     IMPRESSIONS: Cardiomegaly and slight vascular congestion. Tiny left  pleural effusion.     This report was finalized on 3/6/2017 4:54 PM by Dr. Anil Ceron MD.           Current Facility-Administered Medications:   •  acetaminophen (TYLENOL) tablet 650 mg, 650 mg, Oral, Q4H PRN **OR** acetaminophen (TYLENOL) 160 MG/5ML solution 650 mg, 650 mg, Oral, Q4H PRN **OR** acetaminophen (TYLENOL) suppository 650 mg, 650 mg, Rectal, Q4H PRN, Rc No MD  •  diphenoxylate-atropine (LOMOTIL) 2.5-0.025 MG per tablet 1 tablet, 1 tablet, Oral, Q2H PRN, Rc No MD  •  Glycerin-Hypromellose- (ARTIFICIAL TEARS) 0.2-0.2-1 % ophthalmic solution solution 1 drop, 1 drop, Both Eyes, Q30 Min PRN, Rc No MD  •  glycopyrrolate (ROBINUL) injection 0.2 mg, 0.2 mg, Intravenous, Q2H PRN **OR** glycopyrrolate (ROBINUL) injection 0.2 mg, 0.2 mg, Subcutaneous, Q2H PRN **OR** glycopyrrolate (ROBINUL) injection 0.4 mg, 0.4 mg, Intravenous, Q2H PRN **OR** glycopyrrolate (ROBINUL) injection 0.4 mg, 0.4 mg, Subcutaneous, Q2H PRN, Rc No MD  •  haloperidol (HALDOL) tablet 1 mg, 1 mg, Oral, Q4H PRN **OR** haloperidol (HALDOL) 2 MG/ML solution 1 mg, 1 mg, Oral, Q4H PRN **OR** haloperidol lactate (HALDOL) injection 1 mg, 1 mg, Subcutaneous, Q4H PRN, Rc No MD  •  haloperidol (HALDOL) tablet 2 mg, 2 mg, Oral, Q4H PRN **OR** haloperidol (HALDOL) 2 MG/ML solution  2 mg, 2 mg, Oral, Q4H PRN **OR** haloperidol lactate (HALDOL) injection 2 mg, 2 mg, Subcutaneous, Q4H PRN, cR No MD  •  haloperidol lactate (HALDOL) injection 1 mg, 1 mg, Intravenous, Q4H PRN **OR** haloperidol lactate (HALDOL) injection 2 mg, 2 mg, Intravenous, Q4H PRN, Rc No MD, 2 mg at 03/14/17 2004  •  LORazepam (ATIVAN) tablet 0.5 mg, 0.5 mg, Oral, Q1H PRN **OR** LORazepam (ATIVAN) injection 0.5 mg, 0.5 mg, Intravenous, Q1H PRN **OR** LORazepam (ATIVAN) injection 0.5 mg, 0.5 mg, Intramuscular, Q1H PRN **OR** LORazepam (ATIVAN) 2 MG/ML concentrated solution 0.5 mg, 0.5 mg, Oral, Q1H PRN **OR** LORazepam (ATIVAN) 2 MG/ML concentrated solution 0.5 mg, 0.5 mg, Sublingual, Q1H PRN, Rc No MD  •  LORazepam (ATIVAN) tablet 1 mg, 1 mg, Oral, Q1H PRN **OR** LORazepam (ATIVAN) injection 1 mg, 1 mg, Intravenous, Q1H PRN **OR** LORazepam (ATIVAN) injection 1 mg, 1 mg, Intramuscular, Q1H PRN **OR** LORazepam (ATIVAN) 2 MG/ML concentrated solution 1 mg, 1 mg, Oral, Q1H PRN **OR** LORazepam (ATIVAN) 2 MG/ML concentrated solution 1 mg, 1 mg, Sublingual, Q1H PRN, Rc No MD  •  LORazepam (ATIVAN) tablet 2 mg, 2 mg, Oral, Q1H PRN **OR** LORazepam (ATIVAN) injection 2 mg, 2 mg, Intravenous, Q1H PRN **OR** LORazepam (ATIVAN) injection 2 mg, 2 mg, Intramuscular, Q1H PRN **OR** LORazepam (ATIVAN) 2 MG/ML concentrated solution 2 mg, 2 mg, Oral, Q1H PRN **OR** LORazepam (ATIVAN) 2 MG/ML concentrated solution 2 mg, 2 mg, Sublingual, Q1H PRN, Rc No MD  •  morphine injection 4 mg, 4 mg, Intravenous, Q1H PRN, 4 mg at 03/15/17 0358 **OR** Morphine 20 MG/ML concentrated solution 10 mg, 10 mg, Oral, Q1H PRN, Rc No MD  •  Morphine sulfate (PF) injection 6 mg, 6 mg, Intravenous, Q1H PRN **OR** Morphine 20 MG/ML concentrated solution 20 mg, 20 mg, Oral, Q1H PRN, Rc No MD  •  morphine injection 2 mg, 2 mg, Intravenous, Q1H PRN, 2 mg at 03/14/17 2004 **OR** Morphine 20 MG/ML concentrated solution 5  mg, 5 mg, Oral, Q1H PRN, Rc No MD  •  promethazine (PHENERGAN) injection 6.25 mg, 6.25 mg, Intravenous, Q4H PRN **OR** promethazine (PHENERGAN) tablet 6.25 mg, 6.25 mg, Oral, Q4H PRN **OR** promethazine (PHENERGAN) 6.25 MG/5ML syrup 6.25 mg, 6.25 mg, Oral, Q4H PRN **OR** promethazine (PHENERGAN) suppository 6.25 mg, 6.25 mg, Rectal, Q4H PRN, Rc No MD       ASSESSMENT:  1. AFib with rapid ventricular rate.   2. End-stage renal disease. ON HD  3. Recent MRSA septicemia.    4. Chronic AFib.   5. Hypertension.   6. Hypothyroidism.   7. Gastroesophageal reflux disease.   8. Dementia.     PLAN:  COMFORT CARE  NO MORE HD  DNR/AND  D/W FAMILY        Rc No M.D.

## 2017-03-15 NOTE — PLAN OF CARE
Problem: Patient Care Overview (Adult)  Goal: Plan of Care Review    03/15/17 2514   Coping/Psychosocial Response Interventions   Plan Of Care Reviewed With patient   Patient Care Overview   Progress declining   Outcome Evaluation   Outcome Summary/Follow up Plan ptn appeared anxious in the afternoon gave morphine and haldol and he was able to rest well. continue to monitor comfort per palliative protocol        Goal: Discharge Needs Assessment  Outcome: Ongoing (interventions implemented as appropriate)    Problem: Skin Integrity Impairment, Risk/Actual (Adult)  Goal: Skin Integrity/Wound Healing  Outcome: Ongoing (interventions implemented as appropriate)    Problem: Dying Patient, Actively (Adult)  Goal: Comfort/Pain Control  Outcome: Ongoing (interventions implemented as appropriate)  Goal: Dying Process, Peace and Dignity  Outcome: Ongoing (interventions implemented as appropriate)

## 2017-03-16 NOTE — PROGRESS NOTES
" DAILY PROGRESS NOTE      CHIEF COMPLAINT:   COMFORTABLE        HISTORY OF PRESENT ILLNESS: Patient is a 76-year-old white male who is well known to our service from recent admission with MRSA septicemia, on vancomycin, secondary to infected graft. He is also on hemodialysis with end-stage renal disease, septic, pulmonary emboli, toxic metabolic encephalopathy, chronic AFib, on anticoagulation, hypertension, thyroid CA, B-cell lymphoma, coronary artery disease, congestive heart failure. Patient was brought to the [facility] Emergency Room with fatigue. He was found to have increased heart rate. Patient was evaluated and found to be in AFib with rapid ventricular rate, severe anemia, admitted for management. Patient is a poor historian, unable to give detailed history. Most of the history was obtained from the chart, nursing staff, old records, and I know the patient very well from recent admission. No chest pain, no palpitations but fatigued, tired, weak.     PHYSICAL EXAMINATION:Blood pressure 122/56, pulse (!) 128, temperature 96.8 °F (36 °C), temperature source Oral, resp. rate 16, height 74\" (188 cm), weight 153 lb 3 oz (69.5 kg), SpO2 95 %.    EXAM UNCHANGED    DIAGNOSTIC DATA:   Lab Results (last 24 hours)     Procedure Component Value Units Date/Time    Blood Culture [[id number]]  (Normal) Collected:  03/10/17 1336    Specimen:  Blood from Arm, Left Updated:  03/15/17 1601     Blood Culture No growth at 5 days       Hemoglobin 8.2. INR 2.0. BUN 14, creatinine 7.1, potassium 6.3. No x-ray done. EKG showed AFib with heart rate of 130s, nonspecific ST-T wave changes, no significant change from previous tracing.   Imaging Results (last 72 hours)     Procedure Component Value Units Date/Time    XR Chest 1 View [[id number]] Collected:  03/06/17 1652     Updated:  03/06/17 1657    Narrative:       PORTABLE CHEST 3/6/2017 AT 1555 HOURS     CLINICAL HISTORY: CT. Tachycardia.     Compared to the previous " chest x-ray dated 1/31/2017.     The lungs are somewhat poorly inflated. There is mild vascular  congestion but no jared pulmonary edema. The heart is moderately  enlarged and unchanged. No focal areas of consolidation are identified.  A tiny left pleural effusion is evident. A dialysis catheter is in place  in satisfactory position in the right internal jugular vein without  change.     IMPRESSIONS: Cardiomegaly and slight vascular congestion. Tiny left  pleural effusion.     This report was finalized on 3/6/2017 4:54 PM by Dr. Anil Ceron MD.           Current Facility-Administered Medications:   •  acetaminophen (TYLENOL) tablet 650 mg, 650 mg, Oral, Q4H PRN **OR** acetaminophen (TYLENOL) 160 MG/5ML solution 650 mg, 650 mg, Oral, Q4H PRN **OR** acetaminophen (TYLENOL) suppository 650 mg, 650 mg, Rectal, Q4H PRN, Rc No MD  •  diphenoxylate-atropine (LOMOTIL) 2.5-0.025 MG per tablet 1 tablet, 1 tablet, Oral, Q2H PRN, Rc No MD  •  Glycerin-Hypromellose- (ARTIFICIAL TEARS) 0.2-0.2-1 % ophthalmic solution solution 1 drop, 1 drop, Both Eyes, Q30 Min PRN, Rc No MD  •  glycopyrrolate (ROBINUL) injection 0.2 mg, 0.2 mg, Intravenous, Q2H PRN **OR** glycopyrrolate (ROBINUL) injection 0.2 mg, 0.2 mg, Subcutaneous, Q2H PRN **OR** glycopyrrolate (ROBINUL) injection 0.4 mg, 0.4 mg, Intravenous, Q2H PRN **OR** glycopyrrolate (ROBINUL) injection 0.4 mg, 0.4 mg, Subcutaneous, Q2H PRN, Rc No MD  •  haloperidol (HALDOL) tablet 1 mg, 1 mg, Oral, Q4H PRN **OR** haloperidol (HALDOL) 2 MG/ML solution 1 mg, 1 mg, Oral, Q4H PRN **OR** haloperidol lactate (HALDOL) injection 1 mg, 1 mg, Subcutaneous, Q4H PRN, Rc No MD  •  haloperidol (HALDOL) tablet 2 mg, 2 mg, Oral, Q4H PRN **OR** haloperidol (HALDOL) 2 MG/ML solution 2 mg, 2 mg, Oral, Q4H PRN **OR** haloperidol lactate (HALDOL) injection 2 mg, 2 mg, Subcutaneous, Q4H PRN, Rc No MD  •  haloperidol lactate (HALDOL) injection 1 mg, 1 mg,  Intravenous, Q4H PRN, 2 mg at 03/15/17 1437 **OR** haloperidol lactate (HALDOL) injection 2 mg, 2 mg, Intravenous, Q4H PRN, Rc No MD, 2 mg at 03/14/17 2004  •  LORazepam (ATIVAN) tablet 0.5 mg, 0.5 mg, Oral, Q1H PRN **OR** LORazepam (ATIVAN) injection 0.5 mg, 0.5 mg, Intravenous, Q1H PRN **OR** LORazepam (ATIVAN) injection 0.5 mg, 0.5 mg, Intramuscular, Q1H PRN **OR** LORazepam (ATIVAN) 2 MG/ML concentrated solution 0.5 mg, 0.5 mg, Oral, Q1H PRN **OR** LORazepam (ATIVAN) 2 MG/ML concentrated solution 0.5 mg, 0.5 mg, Sublingual, Q1H PRN, Rc No MD  •  LORazepam (ATIVAN) tablet 1 mg, 1 mg, Oral, Q1H PRN **OR** LORazepam (ATIVAN) injection 1 mg, 1 mg, Intravenous, Q1H PRN **OR** LORazepam (ATIVAN) injection 1 mg, 1 mg, Intramuscular, Q1H PRN **OR** LORazepam (ATIVAN) 2 MG/ML concentrated solution 1 mg, 1 mg, Oral, Q1H PRN **OR** LORazepam (ATIVAN) 2 MG/ML concentrated solution 1 mg, 1 mg, Sublingual, Q1H PRN, Rc No MD, 1 mg at 03/16/17 1223  •  LORazepam (ATIVAN) tablet 2 mg, 2 mg, Oral, Q1H PRN **OR** LORazepam (ATIVAN) injection 2 mg, 2 mg, Intravenous, Q1H PRN **OR** LORazepam (ATIVAN) injection 2 mg, 2 mg, Intramuscular, Q1H PRN **OR** LORazepam (ATIVAN) 2 MG/ML concentrated solution 2 mg, 2 mg, Oral, Q1H PRN **OR** LORazepam (ATIVAN) 2 MG/ML concentrated solution 2 mg, 2 mg, Sublingual, Q1H PRN, Rc No MD  •  morphine injection 4 mg, 4 mg, Intravenous, Q1H PRN, 4 mg at 03/15/17 0358 **OR** Morphine 20 MG/ML concentrated solution 10 mg, 10 mg, Oral, Q1H PRN, Rc No MD  •  Morphine sulfate (PF) injection 6 mg, 6 mg, Intravenous, Q1H PRN **OR** Morphine 20 MG/ML concentrated solution 20 mg, 20 mg, Oral, Q1H PRN, Rc No MD  •  morphine injection 2 mg, 2 mg, Intravenous, Q1H PRN, 2 mg at 03/16/17 1223 **OR** Morphine 20 MG/ML concentrated solution 5 mg, 5 mg, Oral, Q1H PRN, Rc oN MD  •  promethazine (PHENERGAN) injection 6.25 mg, 6.25 mg, Intravenous, Q4H PRN **OR**  promethazine (PHENERGAN) tablet 6.25 mg, 6.25 mg, Oral, Q4H PRN **OR** promethazine (PHENERGAN) 6.25 MG/5ML syrup 6.25 mg, 6.25 mg, Oral, Q4H PRN **OR** promethazine (PHENERGAN) suppository 6.25 mg, 6.25 mg, Rectal, Q4H PRN, Rc No MD       ASSESSMENT:  1. AFib with rapid ventricular rate.   2. End-stage renal disease. ON HD  3. Recent MRSA septicemia.    4. Chronic AFib.   5. Hypertension.   6. Hypothyroidism.   7. Gastroesophageal reflux disease.   8. Dementia.     PLAN:  COMFORT CARE  DNR/AND  D/W FAMILY        Rc No M.D.

## 2017-03-16 NOTE — PLAN OF CARE
Problem: Patient Care Overview (Adult)  Goal: Plan of Care Review  Outcome: Ongoing (interventions implemented as appropriate)    03/16/17 0645   Coping/Psychosocial Response Interventions   Plan Of Care Reviewed With patient   Patient Care Overview   Progress declining   Outcome Evaluation   Outcome Summary/Follow up Plan Somnolent but arousable. Appears comfortable. Denied pain until this morning. Will continue palliative measures         Problem: Skin Integrity Impairment, Risk/Actual (Adult)  Goal: Skin Integrity/Wound Healing  Outcome: Ongoing (interventions implemented as appropriate)    Problem: Dying Patient, Actively (Adult)  Goal: Comfort/Pain Control  Outcome: Ongoing (interventions implemented as appropriate)  Goal: Dying Process, Peace and Dignity  Outcome: Ongoing (interventions implemented as appropriate)

## 2017-03-16 NOTE — PLAN OF CARE
Problem: Patient Care Overview (Adult)  Goal: Plan of Care Review  Outcome: Ongoing (interventions implemented as appropriate)    03/16/17 6298   Coping/Psychosocial Response Interventions   Plan Of Care Reviewed With patient;son   Patient Care Overview   Progress declining   Outcome Evaluation   Outcome Summary/Follow up Plan ptn is less responsive today, medicated 2 times for pain and agiation. Son notified, continue to monitor per palliative protocol        Goal: Adult Individualization and Mutuality  Outcome: Ongoing (interventions implemented as appropriate)  Goal: Discharge Needs Assessment  Outcome: Ongoing (interventions implemented as appropriate)    Problem: Skin Integrity Impairment, Risk/Actual (Adult)  Goal: Skin Integrity/Wound Healing  Outcome: Ongoing (interventions implemented as appropriate)    Problem: Dying Patient, Actively (Adult)  Goal: Comfort/Pain Control  Outcome: Ongoing (interventions implemented as appropriate)  Goal: Dying Process, Peace and Dignity  Outcome: Ongoing (interventions implemented as appropriate)

## 2017-03-17 NOTE — PLAN OF CARE
Problem: Patient Care Overview (Adult)  Goal: Plan of Care Review  Outcome: Ongoing (interventions implemented as appropriate)    03/17/17 0531   Coping/Psychosocial Response Interventions   Plan Of Care Reviewed With patient;family   Patient Care Overview   Progress declining   Outcome Evaluation   Outcome Summary/Follow up Plan Family at bedside through the night. Medicated x2 for s/s of pain. Will continue palliative measures         Problem: Skin Integrity Impairment, Risk/Actual (Adult)  Goal: Skin Integrity/Wound Healing  Outcome: Ongoing (interventions implemented as appropriate)    Problem: Dying Patient, Actively (Adult)  Goal: Comfort/Pain Control  Outcome: Ongoing (interventions implemented as appropriate)  Goal: Dying Process, Peace and Dignity  Outcome: Ongoing (interventions implemented as appropriate)

## 2017-03-17 NOTE — PLAN OF CARE
Problem: Patient Care Overview (Adult)  Goal: Plan of Care Review  Outcome: Ongoing (interventions implemented as appropriate)    03/17/17 7966   Coping/Psychosocial Response Interventions   Plan Of Care Reviewed With patient;family   Patient Care Overview   Progress declining   Outcome Evaluation   Outcome Summary/Follow up Plan Medicated with Morphine with Haldol for restlessness. Pt awake at times, unable to eat or drink. Family at bedside. Will continue to monitor.       Goal: Adult Individualization and Mutuality  Outcome: Ongoing (interventions implemented as appropriate)  Goal: Discharge Needs Assessment  Outcome: Ongoing (interventions implemented as appropriate)    Problem: Skin Integrity Impairment, Risk/Actual (Adult)  Goal: Skin Integrity/Wound Healing  Outcome: Ongoing (interventions implemented as appropriate)    Problem: Dying Patient, Actively (Adult)  Goal: Comfort/Pain Control  Outcome: Ongoing (interventions implemented as appropriate)  Goal: Dying Process, Peace and Dignity  Outcome: Ongoing (interventions implemented as appropriate)

## 2017-03-17 NOTE — PROGRESS NOTES
" DAILY PROGRESS NOTE      CHIEF COMPLAINT:   COMFORTABLE        HISTORY OF PRESENT ILLNESS: Patient is a 76-year-old white male who is well known to our service from recent admission with MRSA septicemia, on vancomycin, secondary to infected graft. He is also on hemodialysis with end-stage renal disease, septic, pulmonary emboli, toxic metabolic encephalopathy, chronic AFib, on anticoagulation, hypertension, thyroid CA, B-cell lymphoma, coronary artery disease, congestive heart failure. Patient was brought to the [facility] Emergency Room with fatigue. He was found to have increased heart rate. Patient was evaluated and found to be in AFib with rapid ventricular rate, severe anemia, admitted for management. Patient is a poor historian, unable to give detailed history. Most of the history was obtained from the chart, nursing staff, old records, and I know the patient very well from recent admission. No chest pain, no palpitations but fatigued, tired, weak.     PHYSICAL EXAMINATION:Blood pressure 104/62, pulse (!) 135, temperature 97.5 °F (36.4 °C), temperature source Oral, resp. rate 28, height 74\" (188 cm), weight 153 lb 3 oz (69.5 kg), SpO2 98 %.    EXAM UNCHANGED    DIAGNOSTIC DATA:   Lab Results (last 24 hours)     ** No results found for the last 24 hours. **      Hemoglobin 8.2. INR 2.0. BUN 14, creatinine 7.1, potassium 6.3. No x-ray done. EKG showed AFib with heart rate of 130s, nonspecific ST-T wave changes, no significant change from previous tracing.   Imaging Results (last 72 hours)     Procedure Component Value Units Date/Time    XR Chest 1 View [[id number]] Collected:  03/06/17 1652     Updated:  03/06/17 1657    Narrative:       PORTABLE CHEST 3/6/2017 AT 1555 HOURS     CLINICAL HISTORY: CT. Tachycardia.     Compared to the previous chest x-ray dated 1/31/2017.     The lungs are somewhat poorly inflated. There is mild vascular  congestion but no jared pulmonary edema. The heart is " moderately  enlarged and unchanged. No focal areas of consolidation are identified.  A tiny left pleural effusion is evident. A dialysis catheter is in place  in satisfactory position in the right internal jugular vein without  change.     IMPRESSIONS: Cardiomegaly and slight vascular congestion. Tiny left  pleural effusion.     This report was finalized on 3/6/2017 4:54 PM by Dr. Anil Ceron MD.           Current Facility-Administered Medications:   •  acetaminophen (TYLENOL) tablet 650 mg, 650 mg, Oral, Q4H PRN **OR** acetaminophen (TYLENOL) 160 MG/5ML solution 650 mg, 650 mg, Oral, Q4H PRN **OR** acetaminophen (TYLENOL) suppository 650 mg, 650 mg, Rectal, Q4H PRN, Rc No MD  •  diphenoxylate-atropine (LOMOTIL) 2.5-0.025 MG per tablet 1 tablet, 1 tablet, Oral, Q2H PRN, Rc No MD  •  Glycerin-Hypromellose- (ARTIFICIAL TEARS) 0.2-0.2-1 % ophthalmic solution solution 1 drop, 1 drop, Both Eyes, Q30 Min PRN, Rc No MD  •  glycopyrrolate (ROBINUL) injection 0.2 mg, 0.2 mg, Intravenous, Q2H PRN **OR** glycopyrrolate (ROBINUL) injection 0.2 mg, 0.2 mg, Subcutaneous, Q2H PRN **OR** glycopyrrolate (ROBINUL) injection 0.4 mg, 0.4 mg, Intravenous, Q2H PRN **OR** glycopyrrolate (ROBINUL) injection 0.4 mg, 0.4 mg, Subcutaneous, Q2H PRN, Rc No MD  •  haloperidol (HALDOL) tablet 1 mg, 1 mg, Oral, Q4H PRN **OR** haloperidol (HALDOL) 2 MG/ML solution 1 mg, 1 mg, Oral, Q4H PRN **OR** haloperidol lactate (HALDOL) injection 1 mg, 1 mg, Subcutaneous, Q4H PRN, Rc No MD  •  haloperidol (HALDOL) tablet 2 mg, 2 mg, Oral, Q4H PRN **OR** haloperidol (HALDOL) 2 MG/ML solution 2 mg, 2 mg, Oral, Q4H PRN **OR** haloperidol lactate (HALDOL) injection 2 mg, 2 mg, Subcutaneous, Q4H PRN, Rc No MD  •  haloperidol lactate (HALDOL) injection 1 mg, 1 mg, Intravenous, Q4H PRN, 2 mg at 03/15/17 1437 **OR** haloperidol lactate (HALDOL) injection 2 mg, 2 mg, Intravenous, Q4H PRN, Rc No MD, 2 mg at  03/14/17 2004  •  LORazepam (ATIVAN) tablet 0.5 mg, 0.5 mg, Oral, Q1H PRN **OR** LORazepam (ATIVAN) injection 0.5 mg, 0.5 mg, Intravenous, Q1H PRN **OR** LORazepam (ATIVAN) injection 0.5 mg, 0.5 mg, Intramuscular, Q1H PRN **OR** LORazepam (ATIVAN) 2 MG/ML concentrated solution 0.5 mg, 0.5 mg, Oral, Q1H PRN **OR** LORazepam (ATIVAN) 2 MG/ML concentrated solution 0.5 mg, 0.5 mg, Sublingual, Q1H PRN, Rc No MD  •  LORazepam (ATIVAN) tablet 1 mg, 1 mg, Oral, Q1H PRN **OR** LORazepam (ATIVAN) injection 1 mg, 1 mg, Intravenous, Q1H PRN **OR** LORazepam (ATIVAN) injection 1 mg, 1 mg, Intramuscular, Q1H PRN **OR** LORazepam (ATIVAN) 2 MG/ML concentrated solution 1 mg, 1 mg, Oral, Q1H PRN **OR** LORazepam (ATIVAN) 2 MG/ML concentrated solution 1 mg, 1 mg, Sublingual, Q1H PRN, Rc No MD, 1 mg at 03/16/17 1223  •  LORazepam (ATIVAN) tablet 2 mg, 2 mg, Oral, Q1H PRN **OR** LORazepam (ATIVAN) injection 2 mg, 2 mg, Intravenous, Q1H PRN **OR** LORazepam (ATIVAN) injection 2 mg, 2 mg, Intramuscular, Q1H PRN **OR** LORazepam (ATIVAN) 2 MG/ML concentrated solution 2 mg, 2 mg, Oral, Q1H PRN **OR** LORazepam (ATIVAN) 2 MG/ML concentrated solution 2 mg, 2 mg, Sublingual, Q1H PRN, Rc No MD  •  morphine injection 4 mg, 4 mg, Intravenous, Q1H PRN, 4 mg at 03/17/17 0629 **OR** Morphine 20 MG/ML concentrated solution 10 mg, 10 mg, Oral, Q1H PRN, Rc No MD  •  Morphine sulfate (PF) injection 6 mg, 6 mg, Intravenous, Q1H PRN **OR** Morphine 20 MG/ML concentrated solution 20 mg, 20 mg, Oral, Q1H PRN, Rc No MD  •  morphine injection 2 mg, 2 mg, Intravenous, Q1H PRN, 2 mg at 03/16/17 1223 **OR** Morphine 20 MG/ML concentrated solution 5 mg, 5 mg, Oral, Q1H PRN, Rc No MD  •  promethazine (PHENERGAN) injection 6.25 mg, 6.25 mg, Intravenous, Q4H PRN **OR** promethazine (PHENERGAN) tablet 6.25 mg, 6.25 mg, Oral, Q4H PRN **OR** promethazine (PHENERGAN) 6.25 MG/5ML syrup 6.25 mg, 6.25 mg, Oral, Q4H PRN **OR**  promethazine (PHENERGAN) suppository 6.25 mg, 6.25 mg, Rectal, Q4H PRN, Rc No MD       ASSESSMENT:  1. AFib with rapid ventricular rate.   2. End-stage renal disease. ON HD  3. Recent MRSA septicemia.    4. Chronic AFib.   5. Hypertension.   6. Hypothyroidism.   7. Gastroesophageal reflux disease.   8. Dementia.     PLAN:  COMFORT CARE  DNR/AND  D/W SON        Rc No M.D.

## 2017-03-18 NOTE — PLAN OF CARE
Problem: Patient Care Overview (Adult)  Goal: Plan of Care Review  Outcome: Ongoing (interventions implemented as appropriate)    03/18/17 0342   Coping/Psychosocial Response Interventions   Plan Of Care Reviewed With family   Patient Care Overview   Progress declining   Outcome Evaluation   Outcome Summary/Follow up Plan Patient is minimaly responsive except to pain, and needs to be premedicated with Morphine and Haldol prior to turns. Has shown signs of decline overnight, D/W son at bedside. Will continue to assess for symptom management needs       Goal: Adult Individualization and Mutuality  Outcome: Ongoing (interventions implemented as appropriate)  Goal: Discharge Needs Assessment  Outcome: Ongoing (interventions implemented as appropriate)    Problem: Skin Integrity Impairment, Risk/Actual (Adult)  Goal: Skin Integrity/Wound Healing  Outcome: Ongoing (interventions implemented as appropriate)    Problem: Dying Patient, Actively (Adult)  Goal: Comfort/Pain Control  Outcome: Ongoing (interventions implemented as appropriate)  Goal: Dying Process, Peace and Dignity  Outcome: Ongoing (interventions implemented as appropriate)

## 2017-03-18 NOTE — PROGRESS NOTES
" DAILY PROGRESS NOTE      CHIEF COMPLAINT:   COMFORTABLE  FAMILY AT BEDSIDE        HISTORY OF PRESENT ILLNESS: Patient is a 76-year-old white male who is well known to our service from recent admission with MRSA septicemia, on vancomycin, secondary to infected graft. He is also on hemodialysis with end-stage renal disease, septic, pulmonary emboli, toxic metabolic encephalopathy, chronic AFib, on anticoagulation, hypertension, thyroid CA, B-cell lymphoma, coronary artery disease, congestive heart failure. Patient was brought to the [facility] Emergency Room with fatigue. He was found to have increased heart rate. Patient was evaluated and found to be in AFib with rapid ventricular rate, severe anemia, admitted for management. Patient is a poor historian, unable to give detailed history. Most of the history was obtained from the chart, nursing staff, old records, and I know the patient very well from recent admission. No chest pain, no palpitations but fatigued, tired, weak.     PHYSICAL EXAMINATION:Blood pressure (!) 73/49, pulse (!) 156, temperature 97.2 °F (36.2 °C), resp. rate 24, height 74\" (188 cm), weight 153 lb 3 oz (69.5 kg), SpO2 (!) 85 %.    EXAM UNCHANGED    DIAGNOSTIC DATA:   Lab Results (last 24 hours)     ** No results found for the last 24 hours. **      Hemoglobin 8.2. INR 2.0. BUN 14, creatinine 7.1, potassium 6.3. No x-ray done. EKG showed AFib with heart rate of 130s, nonspecific ST-T wave changes, no significant change from previous tracing.   Imaging Results (last 72 hours)     Procedure Component Value Units Date/Time    XR Chest 1 View [[id number]] Collected:  03/06/17 1652     Updated:  03/06/17 1657    Narrative:       PORTABLE CHEST 3/6/2017 AT 1555 HOURS     CLINICAL HISTORY: CT. Tachycardia.     Compared to the previous chest x-ray dated 1/31/2017.     The lungs are somewhat poorly inflated. There is mild vascular  congestion but no jared pulmonary edema. The heart is " moderately  enlarged and unchanged. No focal areas of consolidation are identified.  A tiny left pleural effusion is evident. A dialysis catheter is in place  in satisfactory position in the right internal jugular vein without  change.     IMPRESSIONS: Cardiomegaly and slight vascular congestion. Tiny left  pleural effusion.     This report was finalized on 3/6/2017 4:54 PM by Dr. Anil Ceron MD.           Current Facility-Administered Medications:   •  acetaminophen (TYLENOL) tablet 650 mg, 650 mg, Oral, Q4H PRN **OR** acetaminophen (TYLENOL) 160 MG/5ML solution 650 mg, 650 mg, Oral, Q4H PRN **OR** acetaminophen (TYLENOL) suppository 650 mg, 650 mg, Rectal, Q4H PRN, Rc No MD  •  diphenoxylate-atropine (LOMOTIL) 2.5-0.025 MG per tablet 1 tablet, 1 tablet, Oral, Q2H PRN, Rc No MD  •  Glycerin-Hypromellose- (ARTIFICIAL TEARS) 0.2-0.2-1 % ophthalmic solution solution 1 drop, 1 drop, Both Eyes, Q30 Min PRN, Rc No MD  •  glycopyrrolate (ROBINUL) injection 0.2 mg, 0.2 mg, Intravenous, Q2H PRN, 0.2 mg at 03/18/17 0830 **OR** glycopyrrolate (ROBINUL) injection 0.2 mg, 0.2 mg, Subcutaneous, Q2H PRN **OR** glycopyrrolate (ROBINUL) injection 0.4 mg, 0.4 mg, Intravenous, Q2H PRN, 0.4 mg at 03/18/17 1053 **OR** glycopyrrolate (ROBINUL) injection 0.4 mg, 0.4 mg, Subcutaneous, Q2H PRN, Rc No MD  •  haloperidol (HALDOL) tablet 1 mg, 1 mg, Oral, Q4H PRN **OR** haloperidol (HALDOL) 2 MG/ML solution 1 mg, 1 mg, Oral, Q4H PRN **OR** haloperidol lactate (HALDOL) injection 1 mg, 1 mg, Subcutaneous, Q4H PRN, Rc No MD  •  haloperidol (HALDOL) tablet 2 mg, 2 mg, Oral, Q4H PRN **OR** haloperidol (HALDOL) 2 MG/ML solution 2 mg, 2 mg, Oral, Q4H PRN **OR** haloperidol lactate (HALDOL) injection 2 mg, 2 mg, Subcutaneous, Q4H PRN, Rcda No MD  •  haloperidol lactate (HALDOL) injection 1 mg, 1 mg, Intravenous, Q4H PRN, 2 mg at 03/15/17 1437 **OR** haloperidol lactate (HALDOL) injection 2 mg, 2 mg,  Intravenous, Q4H PRN, Rc No MD, 2 mg at 03/18/17 0830  •  LORazepam (ATIVAN) tablet 0.5 mg, 0.5 mg, Oral, Q1H PRN **OR** LORazepam (ATIVAN) injection 0.5 mg, 0.5 mg, Intravenous, Q1H PRN **OR** LORazepam (ATIVAN) injection 0.5 mg, 0.5 mg, Intramuscular, Q1H PRN **OR** LORazepam (ATIVAN) 2 MG/ML concentrated solution 0.5 mg, 0.5 mg, Oral, Q1H PRN **OR** LORazepam (ATIVAN) 2 MG/ML concentrated solution 0.5 mg, 0.5 mg, Sublingual, Q1H PRN, Rc No MD  •  LORazepam (ATIVAN) tablet 1 mg, 1 mg, Oral, Q1H PRN **OR** LORazepam (ATIVAN) injection 1 mg, 1 mg, Intravenous, Q1H PRN **OR** LORazepam (ATIVAN) injection 1 mg, 1 mg, Intramuscular, Q1H PRN **OR** LORazepam (ATIVAN) 2 MG/ML concentrated solution 1 mg, 1 mg, Oral, Q1H PRN **OR** LORazepam (ATIVAN) 2 MG/ML concentrated solution 1 mg, 1 mg, Sublingual, Q1H PRN, Rc No MD, 1 mg at 03/16/17 1223  •  LORazepam (ATIVAN) tablet 2 mg, 2 mg, Oral, Q1H PRN **OR** LORazepam (ATIVAN) injection 2 mg, 2 mg, Intravenous, Q1H PRN, 2 mg at 03/18/17 0914 **OR** LORazepam (ATIVAN) injection 2 mg, 2 mg, Intramuscular, Q1H PRN **OR** LORazepam (ATIVAN) 2 MG/ML concentrated solution 2 mg, 2 mg, Oral, Q1H PRN **OR** LORazepam (ATIVAN) 2 MG/ML concentrated solution 2 mg, 2 mg, Sublingual, Q1H PRN, Rc No MD  •  morphine injection 4 mg, 4 mg, Intravenous, Q1H PRN, 4 mg at 03/18/17 0830 **OR** Morphine 20 MG/ML concentrated solution 10 mg, 10 mg, Oral, Q1H PRN, Rc No MD  •  Morphine sulfate (PF) injection 6 mg, 6 mg, Intravenous, Q1H PRN **OR** Morphine 20 MG/ML concentrated solution 20 mg, 20 mg, Oral, Q1H PRN, Rc No MD  •  morphine injection 2 mg, 2 mg, Intravenous, Q1H PRN, 2 mg at 03/16/17 1223 **OR** Morphine 20 MG/ML concentrated solution 5 mg, 5 mg, Oral, Q1H PRN, Rc No MD  •  promethazine (PHENERGAN) injection 6.25 mg, 6.25 mg, Intravenous, Q4H PRN **OR** promethazine (PHENERGAN) tablet 6.25 mg, 6.25 mg, Oral, Q4H PRN **OR** promethazine  (PHENERGAN) 6.25 MG/5ML syrup 6.25 mg, 6.25 mg, Oral, Q4H PRN **OR** promethazine (PHENERGAN) suppository 6.25 mg, 6.25 mg, Rectal, Q4H PRN, Rc No MD       ASSESSMENT:  1. AFib with rapid ventricular rate.   2. End-stage renal disease. ON HD  3. Recent MRSA septicemia.    4. Chronic AFib.   5. Hypertension.   6. Hypothyroidism.   7. Gastroesophageal reflux disease.   8. Dementia.     PLAN:  COMFORT CARE  DNR/AND  D/W SON        Rc No M.D.

## 2017-03-19 NOTE — DISCHARGE SUMMARY
Date of Death: 2017    HISTORY: A 76-year-old white male who is well known to our service with a history of end-stage renal disease, on hemodialysis, septic pulmonary emboli, on anticoagulation, recent MRSA bacteremia with septicemia secondary to infected graft, chronic atrial fibrillation, hypertension, bradycardia, thyroid cancer, B-cell lymphoma, coronary artery disease, congestive heart failure, readmitted with fatigue. Workup revealed increased heart rate with a history of atrial fibrillation, readmitted for management.    HOSPITAL COURSE: The patient was admitted. Heart rate was controlled with medication. Hemodialysis was continued. He remained on IV antibiotics, which he finished during this hospitalization, but he continued to be tired, fatigued, not even able to go back to rehabilitation at this time. Family decided that he is not doing too well and withdraw their care and transfer him to the palliative care unit for comfort care. He remained in palliative care unit, comfortable, and  yesterday evening. He was a DO NOT RESUSCITATE, allow natural death and his body was released with the family.    CAUSE OF DEATH: Cardiopulmonary failure.    For details, see my history and physical.        PRASANNA Araiza  D:   2017 13:29:33  T:   2017 13:43:50  Job ID:   [id number]  Document ID:   [id number]  cc:

## 2017-03-20 NOTE — PROGRESS NOTES
Discharge Planning Assessment  [facility]     Patient Name: Ze Wu  MRN: [id number]  Today's Date: 3/20/2017    Admit Date: 3/6/2017          Discharge Needs Assessment     None            Discharge Plan       17 1131    Final Note    Final Note The patient  on 3/18/17 @ 17:39.  B. Bernadette, RN, CCP        Discharge Placement     No information found        Expected Discharge Date and Time     Expected Discharge Date Expected Discharge Time    Mar 18, 2017               Demographic Summary     None            Functional Status     None            Psychosocial     None            Abuse/Neglect     None            Legal     None            Substance Abuse     None            Patient Forms     None          Rachael Fleming, RN

## 2021-05-10 NOTE — ANESTHESIA PREPROCEDURE EVALUATION
Anesthesia Evaluation     Patient summary reviewed    Airway   Mallampati: II  Neck ROM: limited  possible difficult intubation  Dental      Pulmonary    Cardiovascular   (+) hypertension, dysrhythmias Atrial Fib,     Rhythm: irregular    Neuro/Psych  (+) psychiatric history,    GI/Hepatic/Renal/Endo    (+)  chronic renal disease ESRD,     Musculoskeletal     Abdominal    Substance History      OB/GYN          Other         Other Comment: Hb 9.3                        Anesthesia Plan    ASA 3     MAC     intravenous induction   Anesthetic plan and risks discussed with patient.  Use of blood products discussed with patient .      Problem: Paranoid Ideation  Goal: *LTG: Show more trust in others by speaking positively of them & reporting comfort in socializing  Outcome: Progressing Towards Goal
